# Patient Record
Sex: FEMALE | Race: WHITE | NOT HISPANIC OR LATINO | URBAN - METROPOLITAN AREA
[De-identification: names, ages, dates, MRNs, and addresses within clinical notes are randomized per-mention and may not be internally consistent; named-entity substitution may affect disease eponyms.]

---

## 2017-01-09 ENCOUNTER — OFFICE VISIT - HEALTHEAST (OUTPATIENT)
Dept: FAMILY MEDICINE | Facility: CLINIC | Age: 42
End: 2017-01-09

## 2017-01-09 DIAGNOSIS — Z00.00 HEALTHCARE MAINTENANCE: ICD-10-CM

## 2017-01-09 DIAGNOSIS — E03.9 HYPOTHYROID: ICD-10-CM

## 2017-01-09 DIAGNOSIS — I26.99 PE (PULMONARY THROMBOEMBOLISM) (H): ICD-10-CM

## 2017-01-09 DIAGNOSIS — M32.9 SLE (SYSTEMIC LUPUS ERYTHEMATOSUS RELATED SYNDROME) (H): ICD-10-CM

## 2017-01-09 DIAGNOSIS — G43.109 MIGRAINE HEADACHE WITH AURA: ICD-10-CM

## 2017-01-09 DIAGNOSIS — M75.00 FROZEN SHOULDER SYNDROME: ICD-10-CM

## 2017-01-09 DIAGNOSIS — J84.9 ILD (INTERSTITIAL LUNG DISEASE) (H): ICD-10-CM

## 2017-01-09 ASSESSMENT — MIFFLIN-ST. JEOR: SCORE: 1507.74

## 2017-01-10 ENCOUNTER — COMMUNICATION - HEALTHEAST (OUTPATIENT)
Dept: NURSING | Facility: CLINIC | Age: 42
End: 2017-01-10

## 2017-01-12 ENCOUNTER — HOSPITAL ENCOUNTER (OUTPATIENT)
Dept: PALLIATIVE MEDICINE | Facility: OTHER | Age: 42
Discharge: HOME | End: 2017-01-12
Attending: PHYSICIAN ASSISTANT

## 2017-01-12 ENCOUNTER — COMMUNICATION - HEALTHEAST (OUTPATIENT)
Dept: FAMILY MEDICINE | Facility: CLINIC | Age: 42
End: 2017-01-12

## 2017-01-12 DIAGNOSIS — M67.911 BILATERAL SHOULDER TENDINOPATHY: ICD-10-CM

## 2017-01-12 DIAGNOSIS — F11.20 OPIOID DEPENDENCE, CONTINUOUS (H): ICD-10-CM

## 2017-01-12 DIAGNOSIS — G89.4 CHRONIC PAIN SYNDROME: ICD-10-CM

## 2017-01-12 DIAGNOSIS — Z86.718 HISTORY OF DVT (DEEP VEIN THROMBOSIS): ICD-10-CM

## 2017-01-12 DIAGNOSIS — M67.912 BILATERAL SHOULDER TENDINOPATHY: ICD-10-CM

## 2017-01-12 ASSESSMENT — MIFFLIN-ST. JEOR: SCORE: 1507.74

## 2017-01-13 ENCOUNTER — COMMUNICATION - HEALTHEAST (OUTPATIENT)
Dept: FAMILY MEDICINE | Facility: CLINIC | Age: 42
End: 2017-01-13

## 2017-01-20 ENCOUNTER — AMBULATORY - HEALTHEAST (OUTPATIENT)
Dept: PALLIATIVE MEDICINE | Facility: OTHER | Age: 42
End: 2017-01-20

## 2017-01-30 ENCOUNTER — COMMUNICATION - HEALTHEAST (OUTPATIENT)
Dept: FAMILY MEDICINE | Facility: CLINIC | Age: 42
End: 2017-01-30

## 2017-01-30 DIAGNOSIS — F41.9 ANXIETY: ICD-10-CM

## 2017-02-03 ENCOUNTER — COMMUNICATION - HEALTHEAST (OUTPATIENT)
Dept: ADMINISTRATIVE | Facility: CLINIC | Age: 42
End: 2017-02-03

## 2017-02-03 DIAGNOSIS — M32.9 SYSTEMIC LUPUS ERYTHEMATOSUS (H): ICD-10-CM

## 2017-02-03 DIAGNOSIS — M32.9 SLE (SYSTEMIC LUPUS ERYTHEMATOSUS RELATED SYNDROME) (H): ICD-10-CM

## 2017-02-09 ENCOUNTER — COMMUNICATION - HEALTHEAST (OUTPATIENT)
Dept: PALLIATIVE MEDICINE | Facility: OTHER | Age: 42
End: 2017-02-09

## 2017-02-09 DIAGNOSIS — G89.4 CHRONIC PAIN SYNDROME: ICD-10-CM

## 2017-02-09 DIAGNOSIS — F11.20 OPIOID DEPENDENCE, CONTINUOUS (H): ICD-10-CM

## 2017-02-13 ENCOUNTER — AMBULATORY - HEALTHEAST (OUTPATIENT)
Dept: PALLIATIVE MEDICINE | Facility: OTHER | Age: 42
End: 2017-02-13

## 2017-02-13 ENCOUNTER — COMMUNICATION - HEALTHEAST (OUTPATIENT)
Dept: PALLIATIVE MEDICINE | Facility: OTHER | Age: 42
End: 2017-02-13

## 2017-02-13 DIAGNOSIS — F11.20 OPIOID DEPENDENCE, CONTINUOUS (H): ICD-10-CM

## 2017-02-13 DIAGNOSIS — G89.4 CHRONIC PAIN SYNDROME: ICD-10-CM

## 2017-02-23 ENCOUNTER — OFFICE VISIT - HEALTHEAST (OUTPATIENT)
Dept: FAMILY MEDICINE | Facility: CLINIC | Age: 42
End: 2017-02-23

## 2017-02-23 ENCOUNTER — RECORDS - HEALTHEAST (OUTPATIENT)
Dept: GENERAL RADIOLOGY | Facility: CLINIC | Age: 42
End: 2017-02-23

## 2017-02-23 DIAGNOSIS — Z00.00 HEALTHCARE MAINTENANCE: ICD-10-CM

## 2017-02-23 DIAGNOSIS — E03.9 HYPOTHYROID: ICD-10-CM

## 2017-02-23 DIAGNOSIS — R05.9 COUGH: ICD-10-CM

## 2017-02-23 DIAGNOSIS — J20.9 ACUTE BRONCHITIS, UNSPECIFIED ORGANISM: ICD-10-CM

## 2017-02-23 DIAGNOSIS — R06.2 WHEEZING: ICD-10-CM

## 2017-02-23 ASSESSMENT — MIFFLIN-ST. JEOR: SCORE: 1492.77

## 2017-02-24 ENCOUNTER — COMMUNICATION - HEALTHEAST (OUTPATIENT)
Dept: NURSING | Facility: CLINIC | Age: 42
End: 2017-02-24

## 2017-02-24 ENCOUNTER — COMMUNICATION - HEALTHEAST (OUTPATIENT)
Dept: FAMILY MEDICINE | Facility: CLINIC | Age: 42
End: 2017-02-24

## 2017-02-26 ENCOUNTER — AMBULATORY - HEALTHEAST (OUTPATIENT)
Dept: NURSING | Facility: CLINIC | Age: 42
End: 2017-02-26

## 2017-03-01 ENCOUNTER — COMMUNICATION - HEALTHEAST (OUTPATIENT)
Dept: FAMILY MEDICINE | Facility: CLINIC | Age: 42
End: 2017-03-01

## 2017-03-02 ENCOUNTER — AMBULATORY - HEALTHEAST (OUTPATIENT)
Dept: FAMILY MEDICINE | Facility: CLINIC | Age: 42
End: 2017-03-02

## 2017-03-02 DIAGNOSIS — I10 ESSENTIAL HYPERTENSION: ICD-10-CM

## 2017-03-06 ENCOUNTER — COMMUNICATION - HEALTHEAST (OUTPATIENT)
Dept: PALLIATIVE MEDICINE | Facility: CLINIC | Age: 42
End: 2017-03-06

## 2017-03-06 DIAGNOSIS — G89.29 CHRONIC PAIN: ICD-10-CM

## 2017-03-14 ENCOUNTER — HOSPITAL ENCOUNTER (OUTPATIENT)
Dept: PALLIATIVE MEDICINE | Facility: OTHER | Age: 42
Discharge: HOME | End: 2017-03-14
Attending: PHYSICIAN ASSISTANT

## 2017-03-14 DIAGNOSIS — M25.511 BILATERAL SHOULDER PAIN: ICD-10-CM

## 2017-03-14 DIAGNOSIS — M54.9 BACK PAIN: ICD-10-CM

## 2017-03-14 DIAGNOSIS — G89.4 CHRONIC PAIN SYNDROME: ICD-10-CM

## 2017-03-14 DIAGNOSIS — F11.20 OPIOID DEPENDENCE, CONTINUOUS (H): ICD-10-CM

## 2017-03-14 DIAGNOSIS — M25.512 BILATERAL SHOULDER PAIN: ICD-10-CM

## 2017-03-14 ASSESSMENT — MIFFLIN-ST. JEOR: SCORE: 1461.02

## 2017-03-15 ENCOUNTER — RECORDS - HEALTHEAST (OUTPATIENT)
Dept: ADMINISTRATIVE | Facility: OTHER | Age: 42
End: 2017-03-15

## 2017-04-03 ENCOUNTER — COMMUNICATION - HEALTHEAST (OUTPATIENT)
Dept: FAMILY MEDICINE | Facility: CLINIC | Age: 42
End: 2017-04-03

## 2017-04-03 DIAGNOSIS — F41.9 ANXIETY: ICD-10-CM

## 2017-04-05 ENCOUNTER — COMMUNICATION - HEALTHEAST (OUTPATIENT)
Dept: FAMILY MEDICINE | Facility: CLINIC | Age: 42
End: 2017-04-05

## 2017-04-05 ENCOUNTER — OFFICE VISIT - HEALTHEAST (OUTPATIENT)
Dept: PULMONOLOGY | Facility: OTHER | Age: 42
End: 2017-04-05

## 2017-04-05 DIAGNOSIS — J96.11 CHRONIC RESPIRATORY FAILURE WITH HYPOXIA (H): ICD-10-CM

## 2017-04-05 DIAGNOSIS — J84.9 ILD (INTERSTITIAL LUNG DISEASE) (H): ICD-10-CM

## 2017-04-06 ENCOUNTER — COMMUNICATION - HEALTHEAST (OUTPATIENT)
Dept: FAMILY MEDICINE | Facility: CLINIC | Age: 42
End: 2017-04-06

## 2017-04-27 ENCOUNTER — OFFICE VISIT - HEALTHEAST (OUTPATIENT)
Dept: FAMILY MEDICINE | Facility: CLINIC | Age: 42
End: 2017-04-27

## 2017-04-27 DIAGNOSIS — M81.0 OSTEOPOROSIS: ICD-10-CM

## 2017-04-27 DIAGNOSIS — I26.99 PULMONARY EMBOLI (H): ICD-10-CM

## 2017-04-27 DIAGNOSIS — J98.4 RESTRICTIVE LUNG DISEASE DUE TO KYPHOSCOLIOSIS: ICD-10-CM

## 2017-04-27 DIAGNOSIS — E55.9 VITAMIN D DEFICIENCY: ICD-10-CM

## 2017-04-27 DIAGNOSIS — E03.9 HYPOTHYROIDISM: ICD-10-CM

## 2017-04-27 DIAGNOSIS — M32.9 SYSTEMIC LUPUS ERYTHEMATOSUS (H): ICD-10-CM

## 2017-04-27 DIAGNOSIS — Z00.00 HEALTHCARE MAINTENANCE: ICD-10-CM

## 2017-04-27 DIAGNOSIS — M41.9 RESTRICTIVE LUNG DISEASE DUE TO KYPHOSCOLIOSIS: ICD-10-CM

## 2017-04-27 DIAGNOSIS — N17.9 AKI (ACUTE KIDNEY INJURY) (H): ICD-10-CM

## 2017-04-27 LAB
CHOLEST SERPL-MCNC: 191 MG/DL
FASTING STATUS PATIENT QL REPORTED: ABNORMAL
HDLC SERPL-MCNC: 41 MG/DL
LDLC SERPL CALC-MCNC: 122 MG/DL
TRIGL SERPL-MCNC: 138 MG/DL

## 2017-04-28 ENCOUNTER — COMMUNICATION - HEALTHEAST (OUTPATIENT)
Dept: NURSING | Facility: CLINIC | Age: 42
End: 2017-04-28

## 2017-05-03 ENCOUNTER — COMMUNICATION - HEALTHEAST (OUTPATIENT)
Dept: RHEUMATOLOGY | Facility: CLINIC | Age: 42
End: 2017-05-03

## 2017-05-03 DIAGNOSIS — M32.9 SYSTEMIC LUPUS ERYTHEMATOSUS (H): ICD-10-CM

## 2017-05-04 ENCOUNTER — COMMUNICATION - HEALTHEAST (OUTPATIENT)
Dept: ADMINISTRATIVE | Facility: CLINIC | Age: 42
End: 2017-05-04

## 2017-05-04 ENCOUNTER — COMMUNICATION - HEALTHEAST (OUTPATIENT)
Dept: FAMILY MEDICINE | Facility: CLINIC | Age: 42
End: 2017-05-04

## 2017-05-04 DIAGNOSIS — F41.9 ANXIETY: ICD-10-CM

## 2017-05-11 ENCOUNTER — HOSPITAL ENCOUNTER (OUTPATIENT)
Dept: PALLIATIVE MEDICINE | Facility: OTHER | Age: 42
Discharge: HOME | End: 2017-05-11
Attending: PHYSICIAN ASSISTANT

## 2017-05-11 DIAGNOSIS — G89.4 CHRONIC PAIN SYNDROME: ICD-10-CM

## 2017-05-11 DIAGNOSIS — F11.20 OPIOID DEPENDENCE, CONTINUOUS (H): ICD-10-CM

## 2017-05-11 DIAGNOSIS — S22.000S COMPRESSION FRACTURE OF THORACIC VERTEBRA, SEQUELA: ICD-10-CM

## 2017-05-11 ASSESSMENT — MIFFLIN-ST. JEOR: SCORE: 1424.73

## 2017-05-15 ENCOUNTER — OFFICE VISIT - HEALTHEAST (OUTPATIENT)
Dept: RHEUMATOLOGY | Facility: CLINIC | Age: 42
End: 2017-05-15

## 2017-05-15 ENCOUNTER — RECORDS - HEALTHEAST (OUTPATIENT)
Dept: GENERAL RADIOLOGY | Age: 42
End: 2017-05-15

## 2017-05-15 DIAGNOSIS — M67.911 BILATERAL SHOULDER TENDINOPATHY: ICD-10-CM

## 2017-05-15 DIAGNOSIS — M32.9 SYSTEMIC LUPUS ERYTHEMATOSUS, UNSPECIFIED (H): ICD-10-CM

## 2017-05-15 DIAGNOSIS — M32.9 SLE (SYSTEMIC LUPUS ERYTHEMATOSUS RELATED SYNDROME) (H): ICD-10-CM

## 2017-05-15 DIAGNOSIS — M32.9 SYSTEMIC LUPUS ERYTHEMATOSUS (H): ICD-10-CM

## 2017-05-15 DIAGNOSIS — M67.912 UNSPECIFIED DISORDER OF SYNOVIUM AND TENDON, LEFT SHOULDER: ICD-10-CM

## 2017-05-15 DIAGNOSIS — M67.911 UNSPECIFIED DISORDER OF SYNOVIUM AND TENDON, RIGHT SHOULDER: ICD-10-CM

## 2017-05-15 DIAGNOSIS — M67.912 BILATERAL SHOULDER TENDINOPATHY: ICD-10-CM

## 2017-05-15 ASSESSMENT — MIFFLIN-ST. JEOR: SCORE: 1453.76

## 2017-06-08 ENCOUNTER — HOSPITAL ENCOUNTER (OUTPATIENT)
Dept: PALLIATIVE MEDICINE | Facility: OTHER | Age: 42
Discharge: HOME | End: 2017-06-08
Attending: PHYSICIAN ASSISTANT

## 2017-06-08 ENCOUNTER — COMMUNICATION - HEALTHEAST (OUTPATIENT)
Dept: PALLIATIVE MEDICINE | Facility: OTHER | Age: 42
End: 2017-06-08

## 2017-06-08 DIAGNOSIS — F11.20 OPIOID DEPENDENCE, CONTINUOUS (H): ICD-10-CM

## 2017-06-08 DIAGNOSIS — S22.000S THORACIC COMPRESSION FRACTURE, SEQUELA: ICD-10-CM

## 2017-06-08 DIAGNOSIS — G89.4 CHRONIC PAIN SYNDROME: ICD-10-CM

## 2017-06-08 DIAGNOSIS — M25.512 CHRONIC LEFT SHOULDER PAIN: ICD-10-CM

## 2017-06-08 DIAGNOSIS — M54.50 LOW BACK PAIN: ICD-10-CM

## 2017-06-08 DIAGNOSIS — G89.29 CHRONIC LEFT SHOULDER PAIN: ICD-10-CM

## 2017-06-08 ASSESSMENT — MIFFLIN-ST. JEOR: SCORE: 1438.34

## 2017-06-18 ENCOUNTER — COMMUNICATION - HEALTHEAST (OUTPATIENT)
Dept: FAMILY MEDICINE | Facility: CLINIC | Age: 42
End: 2017-06-18

## 2017-06-18 DIAGNOSIS — F41.9 ANXIETY: ICD-10-CM

## 2017-06-22 ENCOUNTER — AMBULATORY - HEALTHEAST (OUTPATIENT)
Dept: PALLIATIVE MEDICINE | Facility: OTHER | Age: 42
End: 2017-06-22

## 2017-06-26 ENCOUNTER — COMMUNICATION - HEALTHEAST (OUTPATIENT)
Dept: FAMILY MEDICINE | Facility: CLINIC | Age: 42
End: 2017-06-26

## 2017-06-29 ENCOUNTER — OFFICE VISIT - HEALTHEAST (OUTPATIENT)
Dept: FAMILY MEDICINE | Facility: CLINIC | Age: 42
End: 2017-06-29

## 2017-06-29 DIAGNOSIS — J84.9 ILD (INTERSTITIAL LUNG DISEASE) (H): ICD-10-CM

## 2017-06-29 DIAGNOSIS — E05.90 HYPERTHYROIDISM: ICD-10-CM

## 2017-06-29 DIAGNOSIS — M32.9 SYSTEMIC LUPUS ERYTHEMATOSUS (H): ICD-10-CM

## 2017-06-29 DIAGNOSIS — G89.4 CHRONIC PAIN SYNDROME: ICD-10-CM

## 2017-06-29 DIAGNOSIS — F41.1 GENERALIZED ANXIETY DISORDER: ICD-10-CM

## 2017-06-29 DIAGNOSIS — M81.0 OSTEOPOROSIS: ICD-10-CM

## 2017-06-29 DIAGNOSIS — D68.59 PRIMARY HYPERCOAGULABLE STATE (H): ICD-10-CM

## 2017-06-30 ENCOUNTER — COMMUNICATION - HEALTHEAST (OUTPATIENT)
Dept: NURSING | Facility: CLINIC | Age: 42
End: 2017-06-30

## 2017-07-03 ENCOUNTER — AMBULATORY - HEALTHEAST (OUTPATIENT)
Dept: FAMILY MEDICINE | Facility: CLINIC | Age: 42
End: 2017-07-03

## 2017-07-05 ENCOUNTER — HOSPITAL ENCOUNTER (OUTPATIENT)
Dept: MRI IMAGING | Facility: HOSPITAL | Age: 42
Discharge: HOME | End: 2017-07-05
Attending: PHYSICIAN ASSISTANT

## 2017-07-05 DIAGNOSIS — G89.29 CHRONIC LEFT SHOULDER PAIN: ICD-10-CM

## 2017-07-05 DIAGNOSIS — M54.50 LOW BACK PAIN: ICD-10-CM

## 2017-07-05 DIAGNOSIS — S22.000S THORACIC COMPRESSION FRACTURE, SEQUELA: ICD-10-CM

## 2017-07-05 DIAGNOSIS — M25.512 CHRONIC LEFT SHOULDER PAIN: ICD-10-CM

## 2017-07-11 ENCOUNTER — HOSPITAL ENCOUNTER (OUTPATIENT)
Dept: PALLIATIVE MEDICINE | Facility: OTHER | Age: 42
Discharge: HOME | End: 2017-07-11
Attending: PHYSICIAN ASSISTANT

## 2017-07-11 DIAGNOSIS — M48.56XS COMPRESSION FRACTURE OF LUMBAR SPINE, NON-TRAUMATIC, SEQUELA: ICD-10-CM

## 2017-07-11 DIAGNOSIS — F11.20 OPIOID DEPENDENCE, CONTINUOUS (H): ICD-10-CM

## 2017-07-11 DIAGNOSIS — M25.512 CHRONIC LEFT SHOULDER PAIN: ICD-10-CM

## 2017-07-11 DIAGNOSIS — M48.061 LUMBAR STENOSIS: ICD-10-CM

## 2017-07-11 DIAGNOSIS — G89.29 CHRONIC LEFT SHOULDER PAIN: ICD-10-CM

## 2017-07-11 DIAGNOSIS — S22.000S FRACTURE, THORACIC VERTEBRA, COMPRESSION, SEQUELA: ICD-10-CM

## 2017-07-11 DIAGNOSIS — G89.4 CHRONIC PAIN SYNDROME: ICD-10-CM

## 2017-07-11 ASSESSMENT — MIFFLIN-ST. JEOR: SCORE: 1429.26

## 2017-07-19 ENCOUNTER — COMMUNICATION - HEALTHEAST (OUTPATIENT)
Dept: FAMILY MEDICINE | Facility: CLINIC | Age: 42
End: 2017-07-19

## 2017-07-19 ENCOUNTER — COMMUNICATION - HEALTHEAST (OUTPATIENT)
Dept: NURSING | Facility: CLINIC | Age: 42
End: 2017-07-19

## 2017-07-19 DIAGNOSIS — F41.9 ANXIETY: ICD-10-CM

## 2017-07-31 ENCOUNTER — COMMUNICATION - HEALTHEAST (OUTPATIENT)
Dept: NURSING | Facility: CLINIC | Age: 42
End: 2017-07-31

## 2017-07-31 ENCOUNTER — AMBULATORY - HEALTHEAST (OUTPATIENT)
Dept: NURSING | Facility: CLINIC | Age: 42
End: 2017-07-31

## 2017-08-01 ENCOUNTER — AMBULATORY - HEALTHEAST (OUTPATIENT)
Dept: NURSING | Facility: CLINIC | Age: 42
End: 2017-08-01

## 2017-08-02 ENCOUNTER — COMMUNICATION - HEALTHEAST (OUTPATIENT)
Dept: NURSING | Facility: CLINIC | Age: 42
End: 2017-08-02

## 2017-08-02 ENCOUNTER — AMBULATORY - HEALTHEAST (OUTPATIENT)
Dept: NURSING | Facility: CLINIC | Age: 42
End: 2017-08-02

## 2017-08-10 ENCOUNTER — COMMUNICATION - HEALTHEAST (OUTPATIENT)
Dept: PALLIATIVE MEDICINE | Facility: OTHER | Age: 42
End: 2017-08-10

## 2017-08-10 ENCOUNTER — HOSPITAL ENCOUNTER (OUTPATIENT)
Dept: PALLIATIVE MEDICINE | Facility: OTHER | Age: 42
Discharge: HOME | End: 2017-08-10
Attending: PHYSICIAN ASSISTANT

## 2017-08-10 DIAGNOSIS — M51.369 DEGENERATION OF LUMBAR INTERVERTEBRAL DISC: ICD-10-CM

## 2017-08-10 DIAGNOSIS — F11.20 OPIOID DEPENDENCE, CONTINUOUS (H): ICD-10-CM

## 2017-08-10 DIAGNOSIS — K59.03 THERAPEUTIC OPIOID-INDUCED CONSTIPATION (OIC): ICD-10-CM

## 2017-08-10 DIAGNOSIS — T40.2X5A THERAPEUTIC OPIOID-INDUCED CONSTIPATION (OIC): ICD-10-CM

## 2017-08-10 DIAGNOSIS — M32.9 SYSTEMIC LUPUS ERYTHEMATOSUS (H): ICD-10-CM

## 2017-08-10 DIAGNOSIS — G89.4 CHRONIC PAIN SYNDROME: ICD-10-CM

## 2017-08-10 ASSESSMENT — MIFFLIN-ST. JEOR: SCORE: 1406.58

## 2017-08-14 ENCOUNTER — COMMUNICATION - HEALTHEAST (OUTPATIENT)
Dept: PALLIATIVE MEDICINE | Facility: OTHER | Age: 42
End: 2017-08-14

## 2017-08-19 ENCOUNTER — COMMUNICATION - HEALTHEAST (OUTPATIENT)
Dept: SCHEDULING | Facility: CLINIC | Age: 42
End: 2017-08-19

## 2017-08-19 DIAGNOSIS — F41.9 ANXIETY: ICD-10-CM

## 2017-09-01 ENCOUNTER — COMMUNICATION - HEALTHEAST (OUTPATIENT)
Dept: NURSING | Facility: CLINIC | Age: 42
End: 2017-09-01

## 2017-09-06 ENCOUNTER — AMBULATORY - HEALTHEAST (OUTPATIENT)
Dept: NURSING | Facility: CLINIC | Age: 42
End: 2017-09-06

## 2017-09-11 ENCOUNTER — COMMUNICATION - HEALTHEAST (OUTPATIENT)
Dept: FAMILY MEDICINE | Facility: CLINIC | Age: 42
End: 2017-09-11

## 2017-09-11 ENCOUNTER — HOSPITAL ENCOUNTER (OUTPATIENT)
Dept: PALLIATIVE MEDICINE | Facility: OTHER | Age: 42
Discharge: HOME | End: 2017-09-11
Attending: PHYSICIAN ASSISTANT

## 2017-09-11 ENCOUNTER — AMBULATORY - HEALTHEAST (OUTPATIENT)
Dept: FAMILY MEDICINE | Facility: CLINIC | Age: 42
End: 2017-09-11

## 2017-09-11 ENCOUNTER — AMBULATORY - HEALTHEAST (OUTPATIENT)
Dept: LAB | Facility: CLINIC | Age: 42
End: 2017-09-11

## 2017-09-11 ENCOUNTER — COMMUNICATION - HEALTHEAST (OUTPATIENT)
Dept: NURSING | Facility: CLINIC | Age: 42
End: 2017-09-11

## 2017-09-11 DIAGNOSIS — I82.409 DVT (DEEP VENOUS THROMBOSIS) (H): ICD-10-CM

## 2017-09-11 DIAGNOSIS — M47.816 LUMBAR SPONDYLOSIS: ICD-10-CM

## 2017-09-11 DIAGNOSIS — Z86.718 HISTORY OF DVT (DEEP VEIN THROMBOSIS): ICD-10-CM

## 2017-09-11 DIAGNOSIS — M48.061 LUMBAR STENOSIS: ICD-10-CM

## 2017-09-11 ASSESSMENT — MIFFLIN-ST. JEOR: SCORE: 1406.58

## 2017-09-12 ENCOUNTER — COMMUNICATION - HEALTHEAST (OUTPATIENT)
Dept: PALLIATIVE MEDICINE | Facility: OTHER | Age: 42
End: 2017-09-12

## 2017-09-12 ENCOUNTER — COMMUNICATION - HEALTHEAST (OUTPATIENT)
Dept: FAMILY MEDICINE | Facility: CLINIC | Age: 42
End: 2017-09-12

## 2017-09-14 ENCOUNTER — OFFICE VISIT - HEALTHEAST (OUTPATIENT)
Dept: FAMILY MEDICINE | Facility: CLINIC | Age: 42
End: 2017-09-14

## 2017-09-14 ENCOUNTER — AMBULATORY - HEALTHEAST (OUTPATIENT)
Dept: FAMILY MEDICINE | Facility: CLINIC | Age: 42
End: 2017-09-14

## 2017-09-14 DIAGNOSIS — J84.9 ILD (INTERSTITIAL LUNG DISEASE) (H): ICD-10-CM

## 2017-09-14 DIAGNOSIS — D68.59 PRIMARY HYPERCOAGULABLE STATE (H): ICD-10-CM

## 2017-09-14 DIAGNOSIS — M54.50 LUMBAR BACK PAIN: ICD-10-CM

## 2017-09-14 DIAGNOSIS — N93.8 DUB (DYSFUNCTIONAL UTERINE BLEEDING): ICD-10-CM

## 2017-09-14 DIAGNOSIS — S21.001A BREAST WOUND, RIGHT, INITIAL ENCOUNTER: ICD-10-CM

## 2017-09-14 DIAGNOSIS — M32.9 SLE (SYSTEMIC LUPUS ERYTHEMATOSUS) (H): ICD-10-CM

## 2017-09-14 DIAGNOSIS — J34.89 NASAL SEPTAL PERFORATION: ICD-10-CM

## 2017-09-18 ENCOUNTER — AMBULATORY - HEALTHEAST (OUTPATIENT)
Dept: FAMILY MEDICINE | Facility: CLINIC | Age: 42
End: 2017-09-18

## 2017-09-19 ENCOUNTER — COMMUNICATION - HEALTHEAST (OUTPATIENT)
Dept: FAMILY MEDICINE | Facility: CLINIC | Age: 42
End: 2017-09-19

## 2017-09-19 DIAGNOSIS — F41.9 ANXIETY: ICD-10-CM

## 2017-09-20 ENCOUNTER — COMMUNICATION - HEALTHEAST (OUTPATIENT)
Dept: NURSING | Facility: CLINIC | Age: 42
End: 2017-09-20

## 2017-09-20 ENCOUNTER — COMMUNICATION - HEALTHEAST (OUTPATIENT)
Dept: UROLOGY | Facility: CLINIC | Age: 42
End: 2017-09-20

## 2017-09-20 ENCOUNTER — COMMUNICATION - HEALTHEAST (OUTPATIENT)
Dept: SCHEDULING | Facility: CLINIC | Age: 42
End: 2017-09-20

## 2017-09-20 DIAGNOSIS — F41.9 ANXIETY: ICD-10-CM

## 2017-09-21 ENCOUNTER — AMBULATORY - HEALTHEAST (OUTPATIENT)
Dept: FAMILY MEDICINE | Facility: CLINIC | Age: 42
End: 2017-09-21

## 2017-09-21 ENCOUNTER — OFFICE VISIT - HEALTHEAST (OUTPATIENT)
Dept: PULMONOLOGY | Facility: OTHER | Age: 42
End: 2017-09-21

## 2017-09-21 DIAGNOSIS — J84.9 ILD (INTERSTITIAL LUNG DISEASE) (H): ICD-10-CM

## 2017-09-21 DIAGNOSIS — F41.9 ANXIETY: ICD-10-CM

## 2017-09-21 DIAGNOSIS — J96.12 CHRONIC RESPIRATORY FAILURE WITH HYPERCAPNIA (H): ICD-10-CM

## 2017-09-21 DIAGNOSIS — I27.20 PULMONARY HYPERTENSION (H): ICD-10-CM

## 2017-09-22 ENCOUNTER — OFFICE VISIT - HEALTHEAST (OUTPATIENT)
Dept: UROLOGY | Facility: CLINIC | Age: 42
End: 2017-09-22

## 2017-09-22 DIAGNOSIS — N20.0 CALCULUS OF KIDNEY: ICD-10-CM

## 2017-09-22 DIAGNOSIS — N20.9 URINARY CALCULUS, UNSPECIFIED: ICD-10-CM

## 2017-09-27 ENCOUNTER — AMBULATORY - HEALTHEAST (OUTPATIENT)
Dept: LAB | Facility: HOSPITAL | Age: 42
End: 2017-09-27

## 2017-09-27 DIAGNOSIS — N20.9 URINARY CALCULUS, UNSPECIFIED: ICD-10-CM

## 2017-09-27 DIAGNOSIS — N20.0 CALCULUS OF KIDNEY: ICD-10-CM

## 2017-09-28 ENCOUNTER — OFFICE VISIT - HEALTHEAST (OUTPATIENT)
Dept: FAMILY MEDICINE | Facility: CLINIC | Age: 42
End: 2017-09-28

## 2017-09-28 DIAGNOSIS — N20.0 KIDNEY STONES: ICD-10-CM

## 2017-09-28 DIAGNOSIS — E03.9 HYPOTHYROIDISM: ICD-10-CM

## 2017-09-28 DIAGNOSIS — M32.9: ICD-10-CM

## 2017-09-29 ENCOUNTER — COMMUNICATION - HEALTHEAST (OUTPATIENT)
Dept: FAMILY MEDICINE | Facility: CLINIC | Age: 42
End: 2017-09-29

## 2017-10-01 ENCOUNTER — COMMUNICATION - HEALTHEAST (OUTPATIENT)
Dept: NURSING | Facility: CLINIC | Age: 42
End: 2017-10-01

## 2017-10-02 ENCOUNTER — COMMUNICATION - HEALTHEAST (OUTPATIENT)
Dept: FAMILY MEDICINE | Facility: CLINIC | Age: 42
End: 2017-10-02

## 2017-10-04 ENCOUNTER — COMMUNICATION - HEALTHEAST (OUTPATIENT)
Dept: PALLIATIVE MEDICINE | Facility: OTHER | Age: 42
End: 2017-10-04

## 2017-10-04 ENCOUNTER — HOSPITAL ENCOUNTER (OUTPATIENT)
Dept: PALLIATIVE MEDICINE | Facility: OTHER | Age: 42
Discharge: HOME | End: 2017-10-04
Attending: PHYSICIAN ASSISTANT

## 2017-10-04 DIAGNOSIS — M54.6 THORACIC BACK PAIN: ICD-10-CM

## 2017-10-04 DIAGNOSIS — G89.4 CHRONIC PAIN SYNDROME: ICD-10-CM

## 2017-10-04 DIAGNOSIS — M54.50 LOW BACK PAIN: ICD-10-CM

## 2017-10-04 DIAGNOSIS — F11.20 OPIOID DEPENDENCE, CONTINUOUS (H): ICD-10-CM

## 2017-10-04 ASSESSMENT — MIFFLIN-ST. JEOR: SCORE: 1429.26

## 2017-10-06 ENCOUNTER — AMBULATORY - HEALTHEAST (OUTPATIENT)
Dept: LAB | Facility: HOSPITAL | Age: 42
End: 2017-10-06

## 2017-10-06 DIAGNOSIS — N20.0 CALCULUS OF KIDNEY: ICD-10-CM

## 2017-10-06 DIAGNOSIS — N20.9 URINARY CALCULUS: ICD-10-CM

## 2017-10-20 ENCOUNTER — OFFICE VISIT - HEALTHEAST (OUTPATIENT)
Dept: UROLOGY | Facility: CLINIC | Age: 42
End: 2017-10-20

## 2017-10-20 DIAGNOSIS — N20.0 CALCULUS OF KIDNEY: ICD-10-CM

## 2017-10-20 DIAGNOSIS — N20.9 URINARY CALCULUS: ICD-10-CM

## 2017-10-20 DIAGNOSIS — R82.991 HYPOCITRATURIA: ICD-10-CM

## 2017-10-20 DIAGNOSIS — R34 LOW URINE OUTPUT: ICD-10-CM

## 2017-10-20 DIAGNOSIS — N20.9 URINARY TRACT STONES: ICD-10-CM

## 2017-10-23 ENCOUNTER — COMMUNICATION - HEALTHEAST (OUTPATIENT)
Dept: FAMILY MEDICINE | Facility: CLINIC | Age: 42
End: 2017-10-23

## 2017-10-23 DIAGNOSIS — F41.9 ANXIETY: ICD-10-CM

## 2017-10-27 ENCOUNTER — COMMUNICATION - HEALTHEAST (OUTPATIENT)
Dept: UROLOGY | Facility: CLINIC | Age: 42
End: 2017-10-27

## 2017-10-27 ENCOUNTER — COMMUNICATION - HEALTHEAST (OUTPATIENT)
Dept: NURSING | Facility: CLINIC | Age: 42
End: 2017-10-27

## 2017-11-13 ENCOUNTER — AMBULATORY - HEALTHEAST (OUTPATIENT)
Dept: LAB | Facility: CLINIC | Age: 42
End: 2017-11-13

## 2017-11-13 ENCOUNTER — COMMUNICATION - HEALTHEAST (OUTPATIENT)
Dept: FAMILY MEDICINE | Facility: CLINIC | Age: 42
End: 2017-11-13

## 2017-11-13 DIAGNOSIS — I82.409 DVT (DEEP VENOUS THROMBOSIS) (H): ICD-10-CM

## 2017-11-14 ENCOUNTER — COMMUNICATION - HEALTHEAST (OUTPATIENT)
Dept: NURSING | Facility: CLINIC | Age: 42
End: 2017-11-14

## 2017-11-24 ENCOUNTER — COMMUNICATION - HEALTHEAST (OUTPATIENT)
Dept: FAMILY MEDICINE | Facility: CLINIC | Age: 42
End: 2017-11-24

## 2017-11-24 DIAGNOSIS — F41.9 ANXIETY: ICD-10-CM

## 2017-11-25 ENCOUNTER — COMMUNICATION - HEALTHEAST (OUTPATIENT)
Dept: SCHEDULING | Facility: CLINIC | Age: 42
End: 2017-11-25

## 2017-11-25 DIAGNOSIS — F41.9 ANXIETY: ICD-10-CM

## 2017-11-27 ENCOUNTER — OFFICE VISIT - HEALTHEAST (OUTPATIENT)
Dept: RHEUMATOLOGY | Facility: CLINIC | Age: 42
End: 2017-11-27

## 2017-11-27 DIAGNOSIS — J84.9 ILD (INTERSTITIAL LUNG DISEASE) (H): ICD-10-CM

## 2017-11-27 DIAGNOSIS — M67.912 BILATERAL SHOULDER TENDINOPATHY: ICD-10-CM

## 2017-11-27 DIAGNOSIS — M32.9 SYSTEMIC LUPUS ERYTHEMATOSUS (H): ICD-10-CM

## 2017-11-27 DIAGNOSIS — M67.911 BILATERAL SHOULDER TENDINOPATHY: ICD-10-CM

## 2017-11-27 ASSESSMENT — MIFFLIN-ST. JEOR: SCORE: 1429.26

## 2017-12-06 ENCOUNTER — HOSPITAL ENCOUNTER (OUTPATIENT)
Dept: PALLIATIVE MEDICINE | Facility: OTHER | Age: 42
Discharge: HOME | End: 2017-12-06
Attending: PHYSICIAN ASSISTANT

## 2017-12-06 DIAGNOSIS — F11.20 OPIOID DEPENDENCE, CONTINUOUS (H): ICD-10-CM

## 2017-12-06 DIAGNOSIS — M48.061 LUMBAR SPINAL STENOSIS: ICD-10-CM

## 2017-12-06 DIAGNOSIS — S22.000A THORACIC COMPRESSION FRACTURE (H): ICD-10-CM

## 2017-12-06 DIAGNOSIS — M25.512 CHRONIC LEFT SHOULDER PAIN: ICD-10-CM

## 2017-12-06 DIAGNOSIS — G89.29 CHRONIC LEFT SHOULDER PAIN: ICD-10-CM

## 2017-12-06 DIAGNOSIS — G89.4 CHRONIC PAIN SYNDROME: ICD-10-CM

## 2017-12-06 ASSESSMENT — MIFFLIN-ST. JEOR: SCORE: 1429.26

## 2017-12-19 ENCOUNTER — RECORDS - HEALTHEAST (OUTPATIENT)
Dept: ADMINISTRATIVE | Facility: OTHER | Age: 42
End: 2017-12-19

## 2017-12-25 ENCOUNTER — COMMUNICATION - HEALTHEAST (OUTPATIENT)
Dept: FAMILY MEDICINE | Facility: CLINIC | Age: 42
End: 2017-12-25

## 2017-12-25 DIAGNOSIS — F41.9 ANXIETY: ICD-10-CM

## 2017-12-26 ENCOUNTER — COMMUNICATION - HEALTHEAST (OUTPATIENT)
Dept: FAMILY MEDICINE | Facility: CLINIC | Age: 42
End: 2017-12-26

## 2017-12-26 DIAGNOSIS — F41.9 ANXIETY: ICD-10-CM

## 2018-01-17 ENCOUNTER — RECORDS - HEALTHEAST (OUTPATIENT)
Dept: ADMINISTRATIVE | Facility: OTHER | Age: 43
End: 2018-01-17

## 2018-01-25 ENCOUNTER — COMMUNICATION - HEALTHEAST (OUTPATIENT)
Dept: FAMILY MEDICINE | Facility: CLINIC | Age: 43
End: 2018-01-25

## 2018-01-25 DIAGNOSIS — F41.9 ANXIETY: ICD-10-CM

## 2018-01-31 ENCOUNTER — COMMUNICATION - HEALTHEAST (OUTPATIENT)
Dept: NURSING | Facility: CLINIC | Age: 43
End: 2018-01-31

## 2018-01-31 ENCOUNTER — OFFICE VISIT - HEALTHEAST (OUTPATIENT)
Dept: FAMILY MEDICINE | Facility: CLINIC | Age: 43
End: 2018-01-31

## 2018-01-31 ENCOUNTER — AMBULATORY - HEALTHEAST (OUTPATIENT)
Dept: SCHEDULING | Facility: CLINIC | Age: 43
End: 2018-01-31

## 2018-01-31 DIAGNOSIS — E03.9 HYPOTHYROIDISM: ICD-10-CM

## 2018-01-31 DIAGNOSIS — D48.5 NEOPLASM OF UNCERTAIN BEHAVIOR OF SKIN: ICD-10-CM

## 2018-01-31 DIAGNOSIS — M81.0 OSTEOPOROSIS: ICD-10-CM

## 2018-01-31 DIAGNOSIS — Z30.09 ENCOUNTER FOR COUNSELING REGARDING CONTRACEPTION: ICD-10-CM

## 2018-01-31 DIAGNOSIS — J18.1 LOBAR PNEUMONIA, UNSPECIFIED ORGANISM (H): ICD-10-CM

## 2018-01-31 DIAGNOSIS — M32.9 SYSTEMIC LUPUS ERYTHEMATOSUS (H): ICD-10-CM

## 2018-01-31 DIAGNOSIS — J18.9 LEFT LOWER LOBE PNEUMONIA: ICD-10-CM

## 2018-01-31 DIAGNOSIS — I82.409 DVT (DEEP VENOUS THROMBOSIS) (H): ICD-10-CM

## 2018-01-31 DIAGNOSIS — J44.9 COPD (CHRONIC OBSTRUCTIVE PULMONARY DISEASE) (H): ICD-10-CM

## 2018-01-31 LAB
C REACTIVE PROTEIN LHE: 4 MG/DL (ref 0–0.8)
C3 SERPL-MCNC: 125 MG/DL (ref 83–177)
C4 SERPL-MCNC: 28 MG/DL (ref 19–59)
ERYTHROCYTE [SEDIMENTATION RATE] IN BLOOD BY WESTERGREN METHOD: 42 MM/HR (ref 0–20)
TSH SERPL DL<=0.005 MIU/L-ACNC: 0.94 UIU/ML (ref 0.3–5)

## 2018-02-01 ENCOUNTER — HOSPITAL ENCOUNTER (OUTPATIENT)
Dept: PALLIATIVE MEDICINE | Facility: OTHER | Age: 43
Discharge: HOME | End: 2018-02-01
Attending: PHYSICIAN ASSISTANT

## 2018-02-01 DIAGNOSIS — G89.4 CHRONIC PAIN SYNDROME: ICD-10-CM

## 2018-02-01 DIAGNOSIS — S22.000S CLOSED COMPRESSION FRACTURE OF THORACIC VERTEBRA, SEQUELA: ICD-10-CM

## 2018-02-01 DIAGNOSIS — S32.000A LUMBAR COMPRESSION FRACTURE (H): ICD-10-CM

## 2018-02-01 DIAGNOSIS — F11.20 OPIOID DEPENDENCE, CONTINUOUS (H): ICD-10-CM

## 2018-02-01 LAB — FACT X ACT/NOR PPP CHRO: 40 %

## 2018-02-01 ASSESSMENT — MIFFLIN-ST. JEOR: SCORE: 1456.48

## 2018-02-06 LAB — DNA (DS) ANTIBODY - HISTORICAL: 15 IU

## 2018-02-07 ENCOUNTER — COMMUNICATION - HEALTHEAST (OUTPATIENT)
Dept: RHEUMATOLOGY | Facility: CLINIC | Age: 43
End: 2018-02-07

## 2018-02-07 DIAGNOSIS — M32.9 SYSTEMIC LUPUS ERYTHEMATOSUS (H): ICD-10-CM

## 2018-02-28 ENCOUNTER — COMMUNICATION - HEALTHEAST (OUTPATIENT)
Dept: FAMILY MEDICINE | Facility: CLINIC | Age: 43
End: 2018-02-28

## 2018-02-28 DIAGNOSIS — F41.9 ANXIETY: ICD-10-CM

## 2018-03-04 ENCOUNTER — COMMUNICATION - HEALTHEAST (OUTPATIENT)
Dept: NURSING | Facility: CLINIC | Age: 43
End: 2018-03-04

## 2018-03-04 DIAGNOSIS — I26.99 PULMONARY EMBOLI (H): ICD-10-CM

## 2018-03-04 DIAGNOSIS — Z86.718 HISTORY OF DVT (DEEP VEIN THROMBOSIS): ICD-10-CM

## 2018-03-05 ENCOUNTER — COMMUNICATION - HEALTHEAST (OUTPATIENT)
Dept: NURSING | Facility: CLINIC | Age: 43
End: 2018-03-05

## 2018-03-06 ENCOUNTER — AMBULATORY - HEALTHEAST (OUTPATIENT)
Dept: NURSING | Facility: CLINIC | Age: 43
End: 2018-03-06

## 2018-03-07 ENCOUNTER — RECORDS - HEALTHEAST (OUTPATIENT)
Dept: ADMINISTRATIVE | Facility: OTHER | Age: 43
End: 2018-03-07

## 2018-03-13 ENCOUNTER — COMMUNICATION - HEALTHEAST (OUTPATIENT)
Dept: FAMILY MEDICINE | Facility: CLINIC | Age: 43
End: 2018-03-13

## 2018-03-13 DIAGNOSIS — E05.90 HYPERTHYROIDISM: ICD-10-CM

## 2018-03-28 ENCOUNTER — COMMUNICATION - HEALTHEAST (OUTPATIENT)
Dept: FAMILY MEDICINE | Facility: CLINIC | Age: 43
End: 2018-03-28

## 2018-03-28 DIAGNOSIS — F41.9 ANXIETY: ICD-10-CM

## 2018-04-04 ENCOUNTER — HOSPITAL ENCOUNTER (OUTPATIENT)
Dept: PALLIATIVE MEDICINE | Facility: OTHER | Age: 43
Discharge: HOME | End: 2018-04-04
Attending: PHYSICIAN ASSISTANT

## 2018-04-04 DIAGNOSIS — M47.816 LUMBAR SPONDYLOSIS: ICD-10-CM

## 2018-04-04 DIAGNOSIS — G89.4 CHRONIC PAIN SYNDROME: ICD-10-CM

## 2018-04-04 DIAGNOSIS — F11.20 OPIOID DEPENDENCE, CONTINUOUS (H): ICD-10-CM

## 2018-04-04 ASSESSMENT — MIFFLIN-ST. JEOR: SCORE: 1429.26

## 2018-04-13 ENCOUNTER — COMMUNICATION - HEALTHEAST (OUTPATIENT)
Dept: FAMILY MEDICINE | Facility: CLINIC | Age: 43
End: 2018-04-13

## 2018-04-13 DIAGNOSIS — M32.9 SYSTEMIC LUPUS ERYTHEMATOSUS (H): ICD-10-CM

## 2018-04-16 ENCOUNTER — COMMUNICATION - HEALTHEAST (OUTPATIENT)
Dept: RHEUMATOLOGY | Facility: CLINIC | Age: 43
End: 2018-04-16

## 2018-04-16 DIAGNOSIS — M32.9 SYSTEMIC LUPUS ERYTHEMATOSUS (H): ICD-10-CM

## 2018-04-19 ENCOUNTER — COMMUNICATION - HEALTHEAST (OUTPATIENT)
Dept: ADMINISTRATIVE | Facility: CLINIC | Age: 43
End: 2018-04-19

## 2018-04-19 DIAGNOSIS — M32.9 SYSTEMIC LUPUS ERYTHEMATOSUS (H): ICD-10-CM

## 2018-04-27 ENCOUNTER — OFFICE VISIT - HEALTHEAST (OUTPATIENT)
Dept: RHEUMATOLOGY | Facility: CLINIC | Age: 43
End: 2018-04-27

## 2018-04-27 DIAGNOSIS — J98.4 RESTRICTIVE LUNG DISEASE DUE TO KYPHOSCOLIOSIS: ICD-10-CM

## 2018-04-27 DIAGNOSIS — M41.9 RESTRICTIVE LUNG DISEASE DUE TO KYPHOSCOLIOSIS: ICD-10-CM

## 2018-04-27 DIAGNOSIS — I31.8: ICD-10-CM

## 2018-04-27 DIAGNOSIS — M32.9 SYSTEMIC LUPUS ERYTHEMATOSUS (H): ICD-10-CM

## 2018-05-01 ENCOUNTER — COMMUNICATION - HEALTHEAST (OUTPATIENT)
Dept: FAMILY MEDICINE | Facility: CLINIC | Age: 43
End: 2018-05-01

## 2018-05-01 DIAGNOSIS — F41.9 ANXIETY: ICD-10-CM

## 2018-05-04 ENCOUNTER — COMMUNICATION - HEALTHEAST (OUTPATIENT)
Dept: FAMILY MEDICINE | Facility: CLINIC | Age: 43
End: 2018-05-04

## 2018-05-08 ENCOUNTER — OFFICE VISIT - HEALTHEAST (OUTPATIENT)
Dept: ENDOCRINOLOGY | Facility: CLINIC | Age: 43
End: 2018-05-08

## 2018-05-08 DIAGNOSIS — E05.90 HYPERTHYROIDISM: ICD-10-CM

## 2018-05-08 DIAGNOSIS — M81.0 OSTEOPOROSIS: ICD-10-CM

## 2018-05-08 ASSESSMENT — MIFFLIN-ST. JEOR: SCORE: 1456.93

## 2018-05-10 ENCOUNTER — COMMUNICATION - HEALTHEAST (OUTPATIENT)
Dept: ENDOCRINOLOGY | Facility: CLINIC | Age: 43
End: 2018-05-10

## 2018-05-10 DIAGNOSIS — M81.0 OSTEOPOROSIS: ICD-10-CM

## 2018-05-24 ENCOUNTER — AMBULATORY - HEALTHEAST (OUTPATIENT)
Dept: LAB | Facility: CLINIC | Age: 43
End: 2018-05-24

## 2018-05-24 ENCOUNTER — COMMUNICATION - HEALTHEAST (OUTPATIENT)
Dept: FAMILY MEDICINE | Facility: CLINIC | Age: 43
End: 2018-05-24

## 2018-05-24 ENCOUNTER — RECORDS - HEALTHEAST (OUTPATIENT)
Dept: ADMINISTRATIVE | Facility: OTHER | Age: 43
End: 2018-05-24

## 2018-05-24 DIAGNOSIS — I82.409 DVT (DEEP VENOUS THROMBOSIS) (H): ICD-10-CM

## 2018-05-24 LAB — FACT X ACT/NOR PPP CHRO: 27 %

## 2018-05-25 ENCOUNTER — COMMUNICATION - HEALTHEAST (OUTPATIENT)
Dept: RHEUMATOLOGY | Facility: CLINIC | Age: 43
End: 2018-05-25

## 2018-05-25 DIAGNOSIS — M32.9 SYSTEMIC LUPUS ERYTHEMATOSUS (H): ICD-10-CM

## 2018-05-28 ENCOUNTER — COMMUNICATION - HEALTHEAST (OUTPATIENT)
Dept: RHEUMATOLOGY | Facility: CLINIC | Age: 43
End: 2018-05-28

## 2018-05-28 DIAGNOSIS — M32.9 SYSTEMIC LUPUS ERYTHEMATOSUS (H): ICD-10-CM

## 2018-05-31 ENCOUNTER — HOSPITAL ENCOUNTER (OUTPATIENT)
Dept: PALLIATIVE MEDICINE | Facility: OTHER | Age: 43
Discharge: HOME | End: 2018-05-31
Attending: PHYSICIAN ASSISTANT

## 2018-05-31 ENCOUNTER — RECORDS - HEALTHEAST (OUTPATIENT)
Dept: ADMINISTRATIVE | Facility: OTHER | Age: 43
End: 2018-05-31

## 2018-05-31 DIAGNOSIS — S32.000A LUMBAR COMPRESSION FRACTURE (H): ICD-10-CM

## 2018-05-31 DIAGNOSIS — G89.4 CHRONIC PAIN SYNDROME: ICD-10-CM

## 2018-05-31 DIAGNOSIS — G89.29 CHRONIC LEFT SHOULDER PAIN: ICD-10-CM

## 2018-05-31 DIAGNOSIS — M25.512 CHRONIC LEFT SHOULDER PAIN: ICD-10-CM

## 2018-05-31 DIAGNOSIS — M54.6 THORACIC SPINE PAIN: ICD-10-CM

## 2018-05-31 DIAGNOSIS — F11.20 OPIOID DEPENDENCE, CONTINUOUS (H): ICD-10-CM

## 2018-05-31 ASSESSMENT — MIFFLIN-ST. JEOR: SCORE: 1456.93

## 2018-06-01 ENCOUNTER — OFFICE VISIT - HEALTHEAST (OUTPATIENT)
Dept: PULMONOLOGY | Facility: OTHER | Age: 43
End: 2018-06-01

## 2018-06-01 ENCOUNTER — TRANSFERRED RECORDS (OUTPATIENT)
Dept: HEALTH INFORMATION MANAGEMENT | Facility: CLINIC | Age: 43
End: 2018-06-01

## 2018-06-01 DIAGNOSIS — J84.9 ILD (INTERSTITIAL LUNG DISEASE) (H): ICD-10-CM

## 2018-06-01 DIAGNOSIS — J96.12 CHRONIC RESPIRATORY FAILURE WITH HYPERCAPNIA (H): ICD-10-CM

## 2018-06-08 ENCOUNTER — OFFICE VISIT - HEALTHEAST (OUTPATIENT)
Dept: FAMILY MEDICINE | Facility: CLINIC | Age: 43
End: 2018-06-08

## 2018-06-08 DIAGNOSIS — H66.92 LEFT OTITIS MEDIA: ICD-10-CM

## 2018-06-08 DIAGNOSIS — I82.409 DVT (DEEP VENOUS THROMBOSIS) (H): ICD-10-CM

## 2018-06-08 DIAGNOSIS — J20.9 ACUTE BRONCHITIS: ICD-10-CM

## 2018-06-08 DIAGNOSIS — R05.9 COUGH: ICD-10-CM

## 2018-06-08 LAB
ERYTHROCYTE [DISTWIDTH] IN BLOOD BY AUTOMATED COUNT: 12.9 % (ref 11–14.5)
HCT VFR BLD AUTO: 48.2 % (ref 35–47)
HGB BLD-MCNC: 15.9 G/DL (ref 12–16)
MCH RBC QN AUTO: 29.6 PG (ref 27–34)
MCHC RBC AUTO-ENTMCNC: 33.1 G/DL (ref 32–36)
MCV RBC AUTO: 90 FL (ref 80–100)
PLATELET # BLD AUTO: 160 THOU/UL (ref 140–440)
PMV BLD AUTO: 7.9 FL (ref 7–10)
RBC # BLD AUTO: 5.37 MILL/UL (ref 3.8–5.4)
WBC: 7.8 THOU/UL (ref 4–11)

## 2018-06-09 ENCOUNTER — COMMUNICATION - HEALTHEAST (OUTPATIENT)
Dept: FAMILY MEDICINE | Facility: CLINIC | Age: 43
End: 2018-06-09

## 2018-06-09 LAB — FACT X ACT/NOR PPP CHRO: 19 %

## 2018-06-11 ENCOUNTER — AMBULATORY - HEALTHEAST (OUTPATIENT)
Dept: PHARMACY | Facility: CLINIC | Age: 43
End: 2018-06-11

## 2018-06-21 ENCOUNTER — COMMUNICATION - HEALTHEAST (OUTPATIENT)
Dept: INTENSIVE CARE | Facility: CLINIC | Age: 43
End: 2018-06-21

## 2018-06-25 ENCOUNTER — COMMUNICATION - HEALTHEAST (OUTPATIENT)
Dept: ADMINISTRATIVE | Facility: CLINIC | Age: 43
End: 2018-06-25

## 2018-07-13 ENCOUNTER — COMMUNICATION - HEALTHEAST (OUTPATIENT)
Dept: FAMILY MEDICINE | Facility: CLINIC | Age: 43
End: 2018-07-13

## 2018-07-16 ENCOUNTER — AMBULATORY - HEALTHEAST (OUTPATIENT)
Dept: LAB | Facility: CLINIC | Age: 43
End: 2018-07-16

## 2018-07-16 DIAGNOSIS — E03.9 HYPOTHYROIDISM: ICD-10-CM

## 2018-07-16 DIAGNOSIS — I82.409 DVT (DEEP VENOUS THROMBOSIS) (H): ICD-10-CM

## 2018-07-16 LAB — TSH SERPL DL<=0.005 MIU/L-ACNC: 2.6 UIU/ML (ref 0.3–5)

## 2018-07-17 ENCOUNTER — COMMUNICATION - HEALTHEAST (OUTPATIENT)
Dept: FAMILY MEDICINE | Facility: CLINIC | Age: 43
End: 2018-07-17

## 2018-07-17 LAB — FACT X ACT/NOR PPP CHRO: 24 %

## 2018-07-19 ENCOUNTER — COMMUNICATION - HEALTHEAST (OUTPATIENT)
Dept: FAMILY MEDICINE | Facility: CLINIC | Age: 43
End: 2018-07-19

## 2018-07-24 ENCOUNTER — AMBULATORY - HEALTHEAST (OUTPATIENT)
Dept: ENDOCRINOLOGY | Facility: CLINIC | Age: 43
End: 2018-07-24

## 2018-07-24 ENCOUNTER — OFFICE VISIT - HEALTHEAST (OUTPATIENT)
Dept: RHEUMATOLOGY | Facility: CLINIC | Age: 43
End: 2018-07-24

## 2018-07-24 DIAGNOSIS — J84.9 ILD (INTERSTITIAL LUNG DISEASE) (H): ICD-10-CM

## 2018-07-24 DIAGNOSIS — M81.0 OSTEOPOROSIS: ICD-10-CM

## 2018-07-24 DIAGNOSIS — M25.50 POLYARTHRALGIA: ICD-10-CM

## 2018-07-24 DIAGNOSIS — M32.9 SYSTEMIC LUPUS ERYTHEMATOSUS (H): ICD-10-CM

## 2018-07-24 DIAGNOSIS — D68.59 PRIMARY HYPERCOAGULABLE STATE (H): ICD-10-CM

## 2018-07-31 ENCOUNTER — HOSPITAL ENCOUNTER (OUTPATIENT)
Dept: PALLIATIVE MEDICINE | Facility: OTHER | Age: 43
Discharge: HOME | End: 2018-07-31
Attending: PHYSICIAN ASSISTANT

## 2018-07-31 DIAGNOSIS — F11.20 OPIOID DEPENDENCE, CONTINUOUS (H): ICD-10-CM

## 2018-07-31 DIAGNOSIS — S32.000A LUMBAR COMPRESSION FRACTURE (H): ICD-10-CM

## 2018-07-31 DIAGNOSIS — M32.9 SYSTEMIC LUPUS ERYTHEMATOSUS (H): ICD-10-CM

## 2018-07-31 DIAGNOSIS — G89.4 CHRONIC PAIN SYNDROME: ICD-10-CM

## 2018-07-31 ASSESSMENT — MIFFLIN-ST. JEOR: SCORE: 1442.87

## 2018-08-08 ENCOUNTER — COMMUNICATION - HEALTHEAST (OUTPATIENT)
Dept: PALLIATIVE MEDICINE | Facility: OTHER | Age: 43
End: 2018-08-08

## 2018-08-09 ENCOUNTER — HOSPITAL ENCOUNTER (OUTPATIENT)
Dept: PALLIATIVE MEDICINE | Facility: OTHER | Age: 43
Discharge: HOME | End: 2018-08-09
Attending: PAIN MEDICINE | Admitting: PAIN MEDICINE

## 2018-08-09 DIAGNOSIS — M47.816 LUMBAR SPONDYLOSIS: ICD-10-CM

## 2018-08-09 ASSESSMENT — MIFFLIN-ST. JEOR: SCORE: 1442.87

## 2018-08-10 ENCOUNTER — COMMUNICATION - HEALTHEAST (OUTPATIENT)
Dept: PALLIATIVE MEDICINE | Facility: OTHER | Age: 43
End: 2018-08-10

## 2018-08-17 ENCOUNTER — COMMUNICATION - HEALTHEAST (OUTPATIENT)
Dept: PALLIATIVE MEDICINE | Facility: OTHER | Age: 43
End: 2018-08-17

## 2018-08-18 ENCOUNTER — COMMUNICATION - HEALTHEAST (OUTPATIENT)
Dept: FAMILY MEDICINE | Facility: CLINIC | Age: 43
End: 2018-08-18

## 2018-09-19 ENCOUNTER — COMMUNICATION - HEALTHEAST (OUTPATIENT)
Dept: FAMILY MEDICINE | Facility: CLINIC | Age: 43
End: 2018-09-19

## 2018-09-27 ENCOUNTER — RECORDS - HEALTHEAST (OUTPATIENT)
Dept: ADMINISTRATIVE | Facility: OTHER | Age: 43
End: 2018-09-27

## 2018-09-27 ENCOUNTER — HOSPITAL ENCOUNTER (OUTPATIENT)
Dept: PALLIATIVE MEDICINE | Facility: OTHER | Age: 43
Discharge: HOME | End: 2018-09-27
Attending: PHYSICIAN ASSISTANT

## 2018-09-27 DIAGNOSIS — M48.50XS PATHOLOGICAL COMPRESSION FRACTURE OF VERTEBRA, SEQUELA: ICD-10-CM

## 2018-09-27 DIAGNOSIS — F11.20 OPIOID DEPENDENCE, CONTINUOUS (H): ICD-10-CM

## 2018-09-27 DIAGNOSIS — G89.4 CHRONIC PAIN SYNDROME: ICD-10-CM

## 2018-09-27 ASSESSMENT — MIFFLIN-ST. JEOR: SCORE: 1442.87

## 2018-10-17 ENCOUNTER — COMMUNICATION - HEALTHEAST (OUTPATIENT)
Dept: FAMILY MEDICINE | Facility: CLINIC | Age: 43
End: 2018-10-17

## 2018-10-24 ENCOUNTER — TRANSFERRED RECORDS (OUTPATIENT)
Dept: HEALTH INFORMATION MANAGEMENT | Facility: CLINIC | Age: 43
End: 2018-10-24

## 2018-10-24 ENCOUNTER — OFFICE VISIT - HEALTHEAST (OUTPATIENT)
Dept: FAMILY MEDICINE | Facility: CLINIC | Age: 43
End: 2018-10-24

## 2018-10-24 DIAGNOSIS — D61.1 APLASTIC ANEMIA DUE TO DRUGS (H): ICD-10-CM

## 2018-10-24 DIAGNOSIS — R06.00 DYSPNEA: ICD-10-CM

## 2018-10-24 DIAGNOSIS — M62.81 GENERALIZED MUSCLE WEAKNESS: ICD-10-CM

## 2018-10-24 DIAGNOSIS — R42 DIZZINESS: ICD-10-CM

## 2018-10-24 DIAGNOSIS — N39.0 URINARY TRACT INFECTION: ICD-10-CM

## 2018-10-24 DIAGNOSIS — E03.9 ACQUIRED HYPOTHYROIDISM: ICD-10-CM

## 2018-10-24 DIAGNOSIS — D68.61 ANTIPHOSPHOLIPID ANTIBODY SYNDROME (H): ICD-10-CM

## 2018-10-24 DIAGNOSIS — E86.0 DEHYDRATION: ICD-10-CM

## 2018-10-24 DIAGNOSIS — R00.2 PALPITATIONS: ICD-10-CM

## 2018-10-24 LAB
ALBUMIN SERPL-MCNC: 3.8 G/DL (ref 3.5–5)
ALBUMIN UR-MCNC: ABNORMAL MG/DL
ALP SERPL-CCNC: 88 U/L (ref 45–120)
ALT SERPL W P-5'-P-CCNC: 24 U/L (ref 0–45)
ANION GAP SERPL CALCULATED.3IONS-SCNC: 11 MMOL/L (ref 5–18)
APPEARANCE UR: ABNORMAL
AST SERPL W P-5'-P-CCNC: 25 U/L (ref 0–40)
ATRIAL RATE - MUSE: 113 BPM
BACTERIA #/AREA URNS HPF: ABNORMAL HPF
BILIRUB SERPL-MCNC: 0.6 MG/DL (ref 0–1)
BILIRUB UR QL STRIP: ABNORMAL
BNP SERPL-MCNC: 14 PG/ML (ref 0–64)
BUN SERPL-MCNC: 18 MG/DL (ref 8–22)
CALCIUM SERPL-MCNC: 9.1 MG/DL (ref 8.5–10.5)
CHLORIDE BLD-SCNC: 96 MMOL/L (ref 98–107)
CO2 SERPL-SCNC: 32 MMOL/L (ref 22–31)
COLOR UR AUTO: ABNORMAL
CREAT SERPL-MCNC: 0.75 MG/DL (ref 0.6–1.1)
DIASTOLIC BLOOD PRESSURE - MUSE: NORMAL MMHG
ERYTHROCYTE [DISTWIDTH] IN BLOOD BY AUTOMATED COUNT: 13.1 % (ref 11–14.5)
FERRITIN SERPL-MCNC: 67 NG/ML (ref 10–130)
GFR SERPL CREATININE-BSD FRML MDRD: >60 ML/MIN/1.73M2
GLUCOSE BLD-MCNC: 97 MG/DL (ref 70–125)
GLUCOSE UR STRIP-MCNC: ABNORMAL MG/DL
HCT VFR BLD AUTO: 52 % (ref 35–47)
HGB BLD-MCNC: 16.8 G/DL (ref 12–16)
HGB UR QL STRIP: NEGATIVE
HYALINE CASTS #/AREA URNS LPF: ABNORMAL LPF
INTERPRETATION ECG - MUSE: NORMAL
IRON SATN MFR SERPL: 23 % (ref 20–50)
IRON SERPL-MCNC: 76 UG/DL (ref 42–175)
KETONES UR STRIP-MCNC: NEGATIVE MG/DL
LEUKOCYTE ESTERASE UR QL STRIP: NEGATIVE
MCH RBC QN AUTO: 28.7 PG (ref 27–34)
MCHC RBC AUTO-ENTMCNC: 32.3 G/DL (ref 32–36)
MCV RBC AUTO: 89 FL (ref 80–100)
MUCOUS THREADS #/AREA URNS LPF: ABNORMAL LPF
NITRATE UR QL: NEGATIVE
P AXIS - MUSE: 36 DEGREES
PH UR STRIP: 6 [PH] (ref 5–8)
PLATELET # BLD AUTO: 165 THOU/UL (ref 140–440)
PMV BLD AUTO: 8.3 FL (ref 7–10)
POTASSIUM BLD-SCNC: 3.9 MMOL/L (ref 3.5–5)
PR INTERVAL - MUSE: 144 MS
PROT SERPL-MCNC: 8 G/DL (ref 6–8)
QRS DURATION - MUSE: 86 MS
QT - MUSE: 322 MS
QTC - MUSE: 441 MS
R AXIS - MUSE: 109 DEGREES
RBC # BLD AUTO: 5.85 MILL/UL (ref 3.8–5.4)
RBC #/AREA URNS AUTO: ABNORMAL HPF
SODIUM SERPL-SCNC: 139 MMOL/L (ref 136–145)
SP GR UR STRIP: 1.02 (ref 1–1.03)
SQUAMOUS #/AREA URNS AUTO: ABNORMAL LPF
SYSTOLIC BLOOD PRESSURE - MUSE: NORMAL MMHG
T AXIS - MUSE: -27 DEGREES
TIBC SERPL-MCNC: 329 UG/DL (ref 313–563)
TRANS CELLS #/AREA URNS HPF: ABNORMAL LPF
TRANSFERRIN SERPL-MCNC: 263 MG/DL (ref 212–360)
TSH SERPL DL<=0.005 MIU/L-ACNC: 1.88 UIU/ML (ref 0.3–5)
UROBILINOGEN UR STRIP-ACNC: ABNORMAL
VENTRICULAR RATE- MUSE: 113 BPM
WBC #/AREA URNS AUTO: ABNORMAL HPF
WBC CLUMPS #/AREA URNS HPF: PRESENT /[HPF]
WBC: 8.6 THOU/UL (ref 4–11)

## 2018-10-25 ENCOUNTER — COMMUNICATION - HEALTHEAST (OUTPATIENT)
Dept: FAMILY MEDICINE | Facility: CLINIC | Age: 43
End: 2018-10-25

## 2018-10-25 LAB
BACTERIA SPEC CULT: NO GROWTH
FACT X ACT/NOR PPP CHRO: 23 %

## 2018-10-27 ENCOUNTER — COMMUNICATION - HEALTHEAST (OUTPATIENT)
Dept: RHEUMATOLOGY | Facility: CLINIC | Age: 43
End: 2018-10-27

## 2018-10-27 DIAGNOSIS — M32.9 SYSTEMIC LUPUS ERYTHEMATOSUS (H): ICD-10-CM

## 2018-10-29 ENCOUNTER — MEDICAL CORRESPONDENCE (OUTPATIENT)
Dept: HEALTH INFORMATION MANAGEMENT | Facility: CLINIC | Age: 43
End: 2018-10-29

## 2018-10-30 ENCOUNTER — AMBULATORY - HEALTHEAST (OUTPATIENT)
Dept: INTENSIVE CARE | Facility: CLINIC | Age: 43
End: 2018-10-30

## 2018-10-30 ENCOUNTER — AMBULATORY - HEALTHEAST (OUTPATIENT)
Dept: SLEEP MEDICINE | Facility: CLINIC | Age: 43
End: 2018-10-30

## 2018-10-30 DIAGNOSIS — R06.83 SNORING: ICD-10-CM

## 2018-10-30 DIAGNOSIS — G47.33 OSA (OBSTRUCTIVE SLEEP APNEA): ICD-10-CM

## 2018-10-30 DIAGNOSIS — R09.02 HYPOXEMIA: ICD-10-CM

## 2018-10-31 ENCOUNTER — TELEPHONE (OUTPATIENT)
Dept: CARDIOLOGY | Facility: CLINIC | Age: 43
End: 2018-10-31

## 2018-10-31 NOTE — TELEPHONE ENCOUNTER
[facility] Call Center    Phone Message    May a detailed message be left on voicemail: yes    Reason for Call: Other: Pt with referral to see pulmonary hypertention cardiologist. Sent recs to clinic via fax. Please call pt for scheduling, thanks!     Action Taken: Message routed to:  [facility] ([facility]): Cardiology

## 2018-11-01 ENCOUNTER — COMMUNICATION - HEALTHEAST (OUTPATIENT)
Dept: CARE COORDINATION | Facility: CLINIC | Age: 43
End: 2018-11-01

## 2018-11-02 ENCOUNTER — COMMUNICATION - HEALTHEAST (OUTPATIENT)
Dept: FAMILY MEDICINE | Facility: CLINIC | Age: 43
End: 2018-11-02

## 2018-11-02 DIAGNOSIS — Z86.718 HISTORY OF DVT (DEEP VEIN THROMBOSIS): ICD-10-CM

## 2018-11-02 NOTE — TELEPHONE ENCOUNTER
Contacted patient to schedule new consultation appointment with pulmonary hypertension clinic. The pt states that at this time she does not wish to schedule an appointment as she has some family matters she needs to attend to. Provided pt with direct office number ([phone]) for her to call once she is ready to schedule the appointment.    Dk Carey  Clinic   Pulmonary Hypertension  [facility]  (P) [phone]

## 2018-11-12 ENCOUNTER — COMMUNICATION - HEALTHEAST (OUTPATIENT)
Dept: PULMONOLOGY | Facility: OTHER | Age: 43
End: 2018-11-12

## 2018-11-14 ENCOUNTER — COMMUNICATION - HEALTHEAST (OUTPATIENT)
Dept: RHEUMATOLOGY | Facility: CLINIC | Age: 43
End: 2018-11-14

## 2018-11-14 DIAGNOSIS — M32.9 SYSTEMIC LUPUS ERYTHEMATOSUS (H): ICD-10-CM

## 2018-11-20 ENCOUNTER — COMMUNICATION - HEALTHEAST (OUTPATIENT)
Dept: FAMILY MEDICINE | Facility: CLINIC | Age: 43
End: 2018-11-20

## 2018-11-20 ENCOUNTER — COMMUNICATION - HEALTHEAST (OUTPATIENT)
Dept: PALLIATIVE MEDICINE | Facility: OTHER | Age: 43
End: 2018-11-20

## 2018-11-20 DIAGNOSIS — F11.20 OPIOID DEPENDENCE, CONTINUOUS (H): ICD-10-CM

## 2018-11-20 DIAGNOSIS — G89.4 CHRONIC PAIN SYNDROME: ICD-10-CM

## 2018-11-23 NOTE — TELEPHONE ENCOUNTER
Pt contacted office to schedule new PH consultation. As patient wishes to be scheduled after January 1st, appointment has been scheduled for 1/8/19 with Dr. Eng.

## 2018-11-27 ENCOUNTER — RECORDS - HEALTHEAST (OUTPATIENT)
Dept: ADMINISTRATIVE | Facility: OTHER | Age: 43
End: 2018-11-27

## 2018-12-04 ENCOUNTER — HOSPITAL ENCOUNTER (OUTPATIENT)
Dept: PALLIATIVE MEDICINE | Facility: OTHER | Age: 43
Discharge: HOME | End: 2018-12-04
Attending: PHYSICIAN ASSISTANT

## 2018-12-04 DIAGNOSIS — F11.90 CHRONIC, CONTINUOUS USE OF OPIOIDS: ICD-10-CM

## 2018-12-04 DIAGNOSIS — M48.50XS PATHOLOGICAL COMPRESSION FRACTURE OF VERTEBRA, SEQUELA: ICD-10-CM

## 2018-12-04 DIAGNOSIS — F11.20 OPIOID DEPENDENCE, CONTINUOUS (H): ICD-10-CM

## 2018-12-04 DIAGNOSIS — G89.4 CHRONIC PAIN SYNDROME: ICD-10-CM

## 2018-12-04 ASSESSMENT — MIFFLIN-ST. JEOR: SCORE: 1442.87

## 2018-12-10 ENCOUNTER — COMMUNICATION - HEALTHEAST (OUTPATIENT)
Dept: FAMILY MEDICINE | Facility: CLINIC | Age: 43
End: 2018-12-10

## 2018-12-12 ENCOUNTER — COMMUNICATION - HEALTHEAST (OUTPATIENT)
Dept: PULMONOLOGY | Facility: OTHER | Age: 43
End: 2018-12-12

## 2018-12-12 DIAGNOSIS — I27.20 PULMONARY HYPERTENSION (H): ICD-10-CM

## 2018-12-19 ENCOUNTER — TELEPHONE (OUTPATIENT)
Dept: CARDIOLOGY | Facility: CLINIC | Age: 43
End: 2018-12-19

## 2018-12-19 NOTE — TELEPHONE ENCOUNTER
[facility] Call Center    Phone Message    May a detailed message be left on voicemail: yes    Reason for Call: Other: Patient would like to reschedule her cancelled January 8th appointment with Dr. Eng.     Action Taken: Message routed to:  [facility] ([facility]): clinic coord cardiology

## 2019-01-01 ENCOUNTER — HOSPITAL ENCOUNTER (OUTPATIENT)
Dept: PALLIATIVE MEDICINE | Facility: OTHER | Age: 44
Discharge: HOME | End: 2019-12-04
Attending: PHYSICIAN ASSISTANT

## 2019-01-01 ENCOUNTER — AMBULATORY - HEALTHEAST (OUTPATIENT)
Dept: OTHER | Facility: CLINIC | Age: 44
End: 2019-01-01

## 2019-01-01 ENCOUNTER — COMMUNICATION - HEALTHEAST (OUTPATIENT)
Dept: FAMILY MEDICINE | Facility: CLINIC | Age: 44
End: 2019-01-01

## 2019-01-01 ENCOUNTER — COMMUNICATION - HEALTHEAST (OUTPATIENT)
Dept: OTHER | Facility: CLINIC | Age: 44
End: 2019-01-01

## 2019-01-01 DIAGNOSIS — E03.9 ACQUIRED HYPOTHYROIDISM: ICD-10-CM

## 2019-01-01 DIAGNOSIS — F41.9 ANXIETY: ICD-10-CM

## 2019-01-01 DIAGNOSIS — M54.2 CERVICALGIA: ICD-10-CM

## 2019-01-01 DIAGNOSIS — M48.50XS PATHOLOGICAL COMPRESSION FRACTURE OF VERTEBRA, SEQUELA: ICD-10-CM

## 2019-01-01 DIAGNOSIS — G89.4 CHRONIC PAIN SYNDROME: ICD-10-CM

## 2019-01-01 DIAGNOSIS — S22.000S COMPRESSION FRACTURE OF THORACIC VERTEBRA, UNSPECIFIED THORACIC VERTEBRAL LEVEL, SEQUELA: ICD-10-CM

## 2019-01-01 DIAGNOSIS — F11.20 OPIOID DEPENDENCE, CONTINUOUS (H): ICD-10-CM

## 2019-01-01 LAB
DLCOUNC-%PRED-PRE: 39 %
DLCOUNC-PRE: 6.85 ML/MIN/MMHG
DLCOUNC-PRED: 17.54 ML/MIN/MMHG
ERV-%PRED-PRE: 11 %
ERV-PRE: 0.03 L
ERV-PRED: 0.28 L
EXPTIME-PRE: 7.4 SEC
FEF2575-%PRED-PRE: 21 %
FEF2575-PRE: 0.57 L/SEC
FEF2575-PRED: 2.69 L/SEC
FEFMAX-%PRED-PRE: 61 %
FEFMAX-PRE: 3.72 L/SEC
FEFMAX-PRED: 6.06 L/SEC
FEV1-%PRED-PRE: 36 %
FEV1-PRE: 0.88 L
FEV1FEV6-PRE: 74 %
FEV1FEV6-PRED: 84 %
FEV1FVC-PRE: 74 %
FEV1FVC-PRED: 83 %
FEV1SVC-PRE: 80 %
FEV1SVC-PRED: 83 %
FIFMAX-PRE: 2.36 L/SEC
FVC-%PRED-PRE: 40 %
FVC-PRE: 1.19 L
FVC-PRED: 2.92 L
IC-%PRED-PRE: 40 %
IC-PRE: 1.07 L
IC-PRED: 2.61 L
VA-%PRED-PRE: 51 %
VA-PRE: 1.97 L
VC-%PRED-PRE: 37 %
VC-PRE: 1.1 L
VC-PRED: 2.89 L

## 2019-01-01 ASSESSMENT — MIFFLIN-ST. JEOR: SCORE: 1479.16

## 2019-01-02 ENCOUNTER — COMMUNICATION - HEALTHEAST (OUTPATIENT)
Dept: FAMILY MEDICINE | Facility: CLINIC | Age: 44
End: 2019-01-02

## 2019-01-04 ENCOUNTER — OFFICE VISIT - HEALTHEAST (OUTPATIENT)
Dept: PULMONOLOGY | Facility: OTHER | Age: 44
End: 2019-01-04

## 2019-01-04 DIAGNOSIS — I27.21 PULMONARY ARTERIAL HYPERTENSION (H): ICD-10-CM

## 2019-01-04 DIAGNOSIS — J96.12 CHRONIC RESPIRATORY FAILURE WITH HYPOXIA AND HYPERCAPNIA (H): ICD-10-CM

## 2019-01-04 DIAGNOSIS — B96.89 ACUTE BACTERIAL RHINOSINUSITIS: ICD-10-CM

## 2019-01-04 DIAGNOSIS — J01.90 ACUTE BACTERIAL RHINOSINUSITIS: ICD-10-CM

## 2019-01-04 DIAGNOSIS — J42 CHRONIC BRONCHITIS, UNSPECIFIED CHRONIC BRONCHITIS TYPE (H): ICD-10-CM

## 2019-01-04 DIAGNOSIS — J96.11 CHRONIC RESPIRATORY FAILURE WITH HYPOXIA AND HYPERCAPNIA (H): ICD-10-CM

## 2019-01-04 ASSESSMENT — MIFFLIN-ST. JEOR: SCORE: 1438.34

## 2019-01-15 ENCOUNTER — OFFICE VISIT - HEALTHEAST (OUTPATIENT)
Dept: RHEUMATOLOGY | Facility: CLINIC | Age: 44
End: 2019-01-15

## 2019-01-15 DIAGNOSIS — M32.9 SYSTEMIC LUPUS ERYTHEMATOSUS, UNSPECIFIED SLE TYPE, UNSPECIFIED ORGAN INVOLVEMENT STATUS (H): ICD-10-CM

## 2019-01-15 DIAGNOSIS — M25.50 POLYARTHRALGIA: ICD-10-CM

## 2019-01-15 DIAGNOSIS — D68.59 PRIMARY HYPERCOAGULABLE STATE (H): ICD-10-CM

## 2019-01-15 DIAGNOSIS — J84.9 ILD (INTERSTITIAL LUNG DISEASE) (H): ICD-10-CM

## 2019-01-15 ASSESSMENT — MIFFLIN-ST. JEOR: SCORE: 1463.74

## 2019-01-22 ENCOUNTER — PRE VISIT (OUTPATIENT)
Dept: CARDIOLOGY | Facility: CLINIC | Age: 44
End: 2019-01-22

## 2019-01-24 ENCOUNTER — HOSPITAL ENCOUNTER (OUTPATIENT)
Dept: PALLIATIVE MEDICINE | Facility: OTHER | Age: 44
Discharge: HOME | End: 2019-01-24
Attending: PHYSICIAN ASSISTANT

## 2019-01-24 DIAGNOSIS — F11.20 OPIOID DEPENDENCE, CONTINUOUS (H): ICD-10-CM

## 2019-01-24 DIAGNOSIS — G89.4 CHRONIC PAIN SYNDROME: ICD-10-CM

## 2019-01-24 DIAGNOSIS — M32.9 SYSTEMIC LUPUS ERYTHEMATOSUS, UNSPECIFIED SLE TYPE, UNSPECIFIED ORGAN INVOLVEMENT STATUS (H): ICD-10-CM

## 2019-01-24 ASSESSMENT — MIFFLIN-ST. JEOR: SCORE: 1461.02

## 2019-02-12 ENCOUNTER — COMMUNICATION - HEALTHEAST (OUTPATIENT)
Dept: FAMILY MEDICINE | Facility: CLINIC | Age: 44
End: 2019-02-12

## 2019-02-14 ENCOUNTER — COMMUNICATION - HEALTHEAST (OUTPATIENT)
Dept: FAMILY MEDICINE | Facility: CLINIC | Age: 44
End: 2019-02-14

## 2019-02-25 ENCOUNTER — OFFICE VISIT - HEALTHEAST (OUTPATIENT)
Dept: FAMILY MEDICINE | Facility: CLINIC | Age: 44
End: 2019-02-25

## 2019-02-25 DIAGNOSIS — J96.11 CHRONIC RESPIRATORY FAILURE WITH HYPOXIA AND HYPERCAPNIA (H): ICD-10-CM

## 2019-02-25 DIAGNOSIS — I10 ESSENTIAL HYPERTENSION: ICD-10-CM

## 2019-02-25 DIAGNOSIS — J96.12 CHRONIC RESPIRATORY FAILURE WITH HYPOXIA AND HYPERCAPNIA (H): ICD-10-CM

## 2019-02-25 DIAGNOSIS — Z30.017 NEXPLANON INSERTION: ICD-10-CM

## 2019-02-25 DIAGNOSIS — Z86.718 HISTORY OF DVT (DEEP VEIN THROMBOSIS): ICD-10-CM

## 2019-02-25 DIAGNOSIS — F41.1 GENERALIZED ANXIETY DISORDER: ICD-10-CM

## 2019-02-25 DIAGNOSIS — F32.9 CURRENT EPISODE OF MAJOR DEPRESSIVE DISORDER WITHOUT PRIOR EPISODE, UNSPECIFIED DEPRESSION EPISODE SEVERITY: ICD-10-CM

## 2019-02-25 DIAGNOSIS — Z30.46 NEXPLANON REMOVAL: ICD-10-CM

## 2019-02-25 DIAGNOSIS — N92.6 IRREGULAR MENSTRUAL BLEEDING: ICD-10-CM

## 2019-02-25 LAB
FSH SERPL-ACNC: 5.1 MIU/ML
HCG UR QL: NEGATIVE
TSH SERPL DL<=0.005 MIU/L-ACNC: 2.91 UIU/ML (ref 0.3–5)

## 2019-02-26 ENCOUNTER — OFFICE VISIT (OUTPATIENT)
Dept: CARDIOLOGY | Facility: CLINIC | Age: 44
End: 2019-02-26
Attending: INTERNAL MEDICINE
Payer: MEDICARE

## 2019-02-26 ENCOUNTER — ANCILLARY PROCEDURE (OUTPATIENT)
Dept: CT IMAGING | Facility: CLINIC | Age: 44
End: 2019-02-26
Attending: INTERNAL MEDICINE
Payer: MEDICARE

## 2019-02-26 ENCOUNTER — COMMUNICATION - HEALTHEAST (OUTPATIENT)
Dept: FAMILY MEDICINE | Facility: CLINIC | Age: 44
End: 2019-02-26

## 2019-02-26 ENCOUNTER — RECORDS - HEALTHEAST (OUTPATIENT)
Dept: ADMINISTRATIVE | Facility: OTHER | Age: 44
End: 2019-02-26

## 2019-02-26 VITALS
OXYGEN SATURATION: 90 % | WEIGHT: 201 LBS | HEART RATE: 90 BPM | DIASTOLIC BLOOD PRESSURE: 88 MMHG | BODY MASS INDEX: 42.19 KG/M2 | HEIGHT: 58 IN | SYSTOLIC BLOOD PRESSURE: 120 MMHG

## 2019-02-26 DIAGNOSIS — I27.20 PULMONARY HYPERTENSION (H): ICD-10-CM

## 2019-02-26 DIAGNOSIS — Z11.59 ENCOUNTER FOR SCREENING FOR OTHER VIRAL DISEASES: ICD-10-CM

## 2019-02-26 DIAGNOSIS — R06.09 DYSPNEA ON EXERTION: ICD-10-CM

## 2019-02-26 DIAGNOSIS — I27.20 PULMONARY HYPERTENSION (H): Primary | ICD-10-CM

## 2019-02-26 LAB
6 MIN WALK (FT): 670 FT
6 MIN WALK (M): 204 M
ALBUMIN SERPL-MCNC: 3.5 G/DL (ref 3.4–5)
ALP SERPL-CCNC: 78 U/L (ref 40–150)
ALT SERPL W P-5'-P-CCNC: 20 U/L (ref 0–50)
ANION GAP SERPL CALCULATED.3IONS-SCNC: 7 MMOL/L (ref 3–14)
AST SERPL W P-5'-P-CCNC: 25 U/L (ref 0–45)
BILIRUB DIRECT SERPL-MCNC: <0.1 MG/DL (ref 0–0.2)
BILIRUB SERPL-MCNC: 0.4 MG/DL (ref 0.2–1.3)
BUN SERPL-MCNC: 10 MG/DL (ref 7–30)
CALCIUM SERPL-MCNC: 8.2 MG/DL (ref 8.5–10.1)
CHLORIDE SERPL-SCNC: 97 MMOL/L (ref 94–109)
CHOLEST SERPL-MCNC: 180 MG/DL
CO2 SERPL-SCNC: 36 MMOL/L (ref 20–32)
CREAT SERPL-MCNC: 0.7 MG/DL (ref 0.52–1.04)
CRP SERPL-MCNC: 26 MG/L (ref 0–8)
ERYTHROCYTE [DISTWIDTH] IN BLOOD BY AUTOMATED COUNT: 14.4 % (ref 10–15)
FACT X ACT/NOR PPP CHRO: 18 %
GFR SERPL CREATININE-BSD FRML MDRD: >90 ML/MIN/{1.73_M2}
GLUCOSE SERPL-MCNC: 74 MG/DL (ref 70–99)
HCT VFR BLD AUTO: 51.9 % (ref 35–47)
HDLC SERPL-MCNC: 51 MG/DL
HGB BLD-MCNC: 15 G/DL (ref 11.7–15.7)
INR PPP: 3.8 (ref 0.86–1.14)
IRON SATN MFR SERPL: 12 % (ref 15–46)
IRON SERPL-MCNC: 38 UG/DL (ref 35–180)
LDLC SERPL CALC-MCNC: 101 MG/DL
MCH RBC QN AUTO: 28.5 PG (ref 26.5–33)
MCHC RBC AUTO-ENTMCNC: 28.9 G/DL (ref 31.5–36.5)
MCV RBC AUTO: 99 FL (ref 78–100)
NONHDLC SERPL-MCNC: 129 MG/DL
NT-PROBNP SERPL-MCNC: 6579 PG/ML (ref 0–125)
PLATELET # BLD AUTO: 148 10E9/L (ref 150–450)
POTASSIUM SERPL-SCNC: 3.6 MMOL/L (ref 3.4–5.3)
PROT SERPL-MCNC: 8.4 G/DL (ref 6.8–8.8)
RBC # BLD AUTO: 5.26 10E12/L (ref 3.8–5.2)
RHEUMATOID FACT SER NEPH-ACNC: <20 IU/ML (ref 0–20)
SODIUM SERPL-SCNC: 140 MMOL/L (ref 133–144)
TIBC SERPL-MCNC: 332 UG/DL (ref 240–430)
TRIGL SERPL-MCNC: 142 MG/DL
TSH SERPL DL<=0.005 MIU/L-ACNC: 4.03 MU/L (ref 0.4–4)
WBC # BLD AUTO: 7 10E9/L (ref 4–11)

## 2019-02-26 PROCEDURE — 00000401 ZZHCL STATISTIC THROMBIN TIME NC: Performed by: INTERNAL MEDICINE

## 2019-02-26 PROCEDURE — 83540 ASSAY OF IRON: CPT | Performed by: INTERNAL MEDICINE

## 2019-02-26 PROCEDURE — 36415 COLL VENOUS BLD VENIPUNCTURE: CPT | Performed by: INTERNAL MEDICINE

## 2019-02-26 PROCEDURE — 85610 PROTHROMBIN TIME: CPT | Performed by: INTERNAL MEDICINE

## 2019-02-26 PROCEDURE — 86803 HEPATITIS C AB TEST: CPT | Performed by: INTERNAL MEDICINE

## 2019-02-26 PROCEDURE — 83880 ASSAY OF NATRIURETIC PEPTIDE: CPT | Performed by: INTERNAL MEDICINE

## 2019-02-26 PROCEDURE — 86431 RHEUMATOID FACTOR QUANT: CPT | Performed by: INTERNAL MEDICINE

## 2019-02-26 PROCEDURE — 83520 IMMUNOASSAY QUANT NOS NONAB: CPT | Performed by: INTERNAL MEDICINE

## 2019-02-26 PROCEDURE — 85613 RUSSELL VIPER VENOM DILUTED: CPT | Performed by: INTERNAL MEDICINE

## 2019-02-26 PROCEDURE — 86706 HEP B SURFACE ANTIBODY: CPT | Performed by: INTERNAL MEDICINE

## 2019-02-26 PROCEDURE — 85027 COMPLETE CBC AUTOMATED: CPT | Performed by: INTERNAL MEDICINE

## 2019-02-26 PROCEDURE — 84443 ASSAY THYROID STIM HORMONE: CPT | Performed by: INTERNAL MEDICINE

## 2019-02-26 PROCEDURE — G0499 HEPB SCREEN HIGH RISK INDIV: HCPCS | Performed by: INTERNAL MEDICINE

## 2019-02-26 PROCEDURE — 80076 HEPATIC FUNCTION PANEL: CPT | Performed by: INTERNAL MEDICINE

## 2019-02-26 PROCEDURE — 86140 C-REACTIVE PROTEIN: CPT | Performed by: INTERNAL MEDICINE

## 2019-02-26 PROCEDURE — 85732 THROMBOPLASTIN TIME PARTIAL: CPT | Performed by: INTERNAL MEDICINE

## 2019-02-26 PROCEDURE — 83550 IRON BINDING TEST: CPT | Performed by: INTERNAL MEDICINE

## 2019-02-26 PROCEDURE — 87389 HIV-1 AG W/HIV-1&-2 AB AG IA: CPT | Performed by: INTERNAL MEDICINE

## 2019-02-26 PROCEDURE — 80048 BASIC METABOLIC PNL TOTAL CA: CPT | Performed by: INTERNAL MEDICINE

## 2019-02-26 PROCEDURE — 86704 HEP B CORE ANTIBODY TOTAL: CPT | Performed by: INTERNAL MEDICINE

## 2019-02-26 PROCEDURE — 86038 ANTINUCLEAR ANTIBODIES: CPT | Performed by: INTERNAL MEDICINE

## 2019-02-26 PROCEDURE — 85597 PHOSPHOLIPID PLTLT NEUTRALIZ: CPT | Performed by: INTERNAL MEDICINE

## 2019-02-26 PROCEDURE — 84238 ASSAY NONENDOCRINE RECEPTOR: CPT | Performed by: INTERNAL MEDICINE

## 2019-02-26 PROCEDURE — 85730 THROMBOPLASTIN TIME PARTIAL: CPT | Performed by: INTERNAL MEDICINE

## 2019-02-26 PROCEDURE — 86039 ANTINUCLEAR ANTIBODIES (ANA): CPT | Performed by: INTERNAL MEDICINE

## 2019-02-26 PROCEDURE — 85613 RUSSELL VIPER VENOM DILUTED: CPT | Mod: 91 | Performed by: INTERNAL MEDICINE

## 2019-02-26 PROCEDURE — 99205 OFFICE O/P NEW HI 60 MIN: CPT | Mod: ZP | Performed by: INTERNAL MEDICINE

## 2019-02-26 PROCEDURE — 80061 LIPID PANEL: CPT | Performed by: INTERNAL MEDICINE

## 2019-02-26 PROCEDURE — G0463 HOSPITAL OUTPT CLINIC VISIT: HCPCS | Mod: ZF

## 2019-02-26 RX ORDER — LEVOTHYROXINE SODIUM 150 UG/1
150 TABLET ORAL
COMMUNITY
Start: 2018-12-10

## 2019-02-26 RX ORDER — LIDOCAINE 40 MG/G
CREAM TOPICAL
Status: CANCELLED | OUTPATIENT
Start: 2019-02-26

## 2019-02-26 RX ORDER — DILTIAZEM HYDROCHLORIDE 240 MG/1
CAPSULE, EXTENDED RELEASE ORAL DAILY
COMMUNITY

## 2019-02-26 RX ORDER — IOPAMIDOL 755 MG/ML
67 INJECTION, SOLUTION INTRAVASCULAR ONCE
Status: COMPLETED | OUTPATIENT
Start: 2019-02-26 | End: 2019-02-26

## 2019-02-26 RX ADMIN — IOPAMIDOL 67 ML: 755 INJECTION, SOLUTION INTRAVASCULAR at 11:43

## 2019-02-26 ASSESSMENT — PAIN SCALES - GENERAL: PAINLEVEL: NO PAIN (0)

## 2019-02-26 ASSESSMENT — MIFFLIN-ST. JEOR: SCORE: 1466.48

## 2019-02-26 NOTE — PROGRESS NOTES
February 26, 2019      Boris Bearden MD  [facility] [place] [address]    Dear Dr. Bearden    I had the pleasure of seeing your patient Catherine Sharp at the [facility] Pulmonary Hypertension clinic.  As you know, Catherine is a very pleasant 43-year-old female with a past medical history significant for chronic  hypoxic respiratory failure secondary to interstitial lung disease from lupus and past history of ARDS, SLE on plaquenil, history of DVT and on chronic coumadin, hypertension, spine fractures, hypothyroidism, chronic pain, and bone mineral density disease who presents for evaluation of pulmonary hypertension.  Catherine's cortez with lung disease started approximately in 1996 when she had open lung biopsy for ill-defined repeated respiratory failure.  Unfortunately, there was no clear diagnosis on the lung biopsy and she had a subsequent lung biopsy with no clear diagnosis of her lung disease.  She has been on chronic oxygen therapy for approximately 5 years with ranges anywhere between 3-6 L/min.  She does have a lot of variation in her symptoms and sometimes it feels better and she only needs 3 L/min and then other days she feels worse and needs to go up to 6.  She is still able to do her activities of daily living at home, but she is limited in her physical activity due to back pain.  She will get dyspnea on exertion, but many times her limitation is her back pain.  She can go up a flight of stairs but does feel slightly short of breath when she gets to the top.  She does have occasional chest pressure when exerting herself which resolves with rest and oxygen therapy.  She has had presyncopal sensations but no jocelyn syncope.  She does not have any lower extremity edema or abdominal swelling, but she does take Lasix 40 mg twice a day.  Overall I would classify her as WHO functional class III with limitations due to back pain as well.    Luna had an echocardiogram performed  which showed evidence of pulmonary hypertension with right ventricular dilation and estimated PAS P of over 50 mmHg.  She has not had an invasive hemodynamic assessment as they were trying to wean down her prednisone before doing a full exam.  She is currently down to prednisone 5 mg daily and is working on weaning towards 4 mg.    Catherine goins have a history of DVT for which she has been on chronic Coumadin.  She has not had any known diagnosis of pulmonary embolism.  She has not taken any diet drugs in the past.    PAST MEDICAL HISTORY:  -Chronic hypoxemic respiratory failure due to ILD from SLE  -DVT  -Hypertension  -Hypothyroidism  -Bone mineral density  -Chronic pain    FAMILY HISTORY:  No family history of PH    SOCIAL HISTORY:  Denies current tobacco, drug, and alcohol use.  Does have a 10-pack year history.    CURRENT MEDICATIONS:  Current Outpatient Medications   Medication Sig Dispense Refill     Albuterol Sulfate (VENTOLIN HFA IN) Inhale 2 puffs into the lungs every 4 hours as needed.         CALCIUM CITRATE PO Take 250 mg by mouth 2 times daily.       denosumab (PROLIA) 60 MG/ML SOLN injection Inject 60 mg Subcutaneous       diltiazem ER (DILT-XR) 240 MG 24 hr ER beaded capsule Take by mouth daily       ferrous sulfate 325 (65 FE) MG tablet Take 1 tablet by mouth 2 times daily. 30 tablet 1     hydroxychloroquine (PLAQUENIL) 200 MG tablet Take 200 mg by mouth daily.         levothyroxine (SYNTHROID/LEVOTHROID) 150 MCG tablet Take 150 mcg by mouth       OXYCODONE HCL PO Take 30 mg by mouth every 4 hours as needed.       OXYCONTIN 40 MG 12 hr tablet TAKE 1 TABLET BY MOUTH IN THE MORNING  0     PREDNISONE PO Take 5 mg by mouth daily        PROMETHAZINE HCL PO Take 25 mg by mouth every 4 hours as needed.       vitamin D (ERGOCALCIFEROL) 96223 UNIT capsule Take 50,000 Units by mouth twice a week. Monday and thursday       Warfarin Sodium (JANTOVEN PO) Take 5 mg by mouth daily. Patient alternates between 5 mg  "and 7.5 mg every other day.        AZATHIOPRINE PO Take 100 mg by mouth daily.       fentaNYL (DURAGESIC) 50 mcg/hr patch 72 hr Place 1 patch onto the skin every 72 hours.       HYDROXYZINE PAMOATE PO Take 25 mg by mouth every 6 hours as needed.       LISINOPRIL PO Take 40 mg by mouth every 12 hours.         LORAZEPAM PO Take 0.5 mg by mouth daily.         MedroxyPROGESTERone Ace Micro (MEDROXYPROGESTERONE ACETATE) Inject 150 mg/mL into the muscle every 3 months.       polyethylene glycol (MIRALAX/GLYCOLAX) packet Take 1 packet by mouth daily as needed.       Teriparatide, Recombinant, (FORTEO SC) Inject 600 mcg Subcutaneous daily.         ROS:   10 point ROS negative except HPI    EXAM:  /88 (BP Location: Right arm, Patient Position: Chair, Cuff Size: Adult Large)   Pulse 90   Ht 1.473 m (4' 10\")   Wt 91.2 kg (201 lb)   SpO2 90%   BMI 42.01 kg/m    General: appears comfortable, alert and articulate  Head: normocephalic, atraumatic  Eyes: anicteric sclera, EOMI  Neck: no adenopathy  Orophyarynx: moist mucosa, no lesions, dentition intact  Heart: regular, S1/S2 with prominent P2, JVP 3 cm, 1/6 HARSHAD heard at the LLSB  Lungs: inspiratory crackles bilaterally at the bases  Abdomen: soft, non-tender, bowel sounds present, no hepatosplenomegaly  Extremities: no clubbing, cyanosis or edema  Neurological: normal speech and affect, no gross motor deficits    Labs:  CBC RESULTS:  Lab Results   Component Value Date    WBC 6.6 05/14/2013    RBC 4.50 05/14/2013    HGB 9.3 (L) 05/14/2013    HCT 33.5 (L) 05/14/2013    MCV 74 (L) 05/14/2013    MCH 20.7 (L) 05/14/2013    MCHC 27.8 (L) 05/14/2013    RDW 22.1 (H) 05/14/2013     05/14/2013       CMP RESULTS:  Lab Results   Component Value Date     05/14/2013    POTASSIUM 3.9 05/14/2013    CHLORIDE 108 05/14/2013    CO2 21 05/14/2013    ANIONGAP 11 05/14/2013    GLC 92 05/14/2013    BUN 12 05/14/2013    CR 0.59 05/14/2013    GFRESTIMATED >90 05/14/2013    " GFRESTBLACK >90 05/14/2013    JEFFERSON 8.3 (L) 05/14/2013    BILITOTAL 0.3 05/13/2013    ALBUMIN 3.1 (L) 05/13/2013    ALKPHOS 56 05/13/2013    ALT 24 05/13/2013    AST 25 05/13/2013        Echocardiogram 10/29/2019 [facility]:  1. Normal left ventricular size and systolic performance with a visually  estimated ejection fraction of 55%.    2. There is a small area of moderate distal anterior/apical hypokinesis.  3. There is mild sclerosis of the aortic valve with a small area of calcification (unchanged from previous).   4. On selected views, the right ventricle appears mildly enlarged with mildly reduced right ventricular systolic function (right ventricle the  right ventricle is not well visualized on some views).  5. There is mild left atrial enlargement.   6. Right ventricular systolic pressure relative to right atrial pressure is moderately increased.  The pulmonary artery pressure is estimated to be 50-55 mmHg plus right atrial pressure (the IVC is poorly visualized on this study).    Assessment and Plan: Catherine Sharp is a 43 year old female with history of SLE, chronic hypoxemic respiratory failure, and DVT who presents for evaluation of potential pulmonary hypertension.    1.  Possible pulmonary hypertension: Catherine has multiple reasons for potential pulmonary hypertension including interstitial lung disease causing chronic hypoxemic respiratory failure, history of SLE, and history of DVT.  At this point we will need to perform a thorough evaluation which will include her normal pulmonary hypertension screening labs, a VQ scan to rule out chronic thromboembolic pulmonary hypertension due to history of DVT, CT scan with PE protocol to evaluate for any signs of chronic thromboembolic disease and to evaluate parenchymal lung disease, full pulmonary function tests with DLCO assessment, echocardiogram to evaluate right ventricular size and function, 6-minute walk test to evaluate functional capacity, a sleep  study, and an invasive hemodynamic assessment with vasodilator study.  Catherine and is currently on Coumadin for her DVT and I instructed her to hold for 2 days prior to the right heart cath.    Once we have the results from these studies we will determine what the next steps will be in her treatment plan.  Thankfully, she does not have any signs of right heart failure clinically so I am hoping we are earlier in the process.  We did discuss the end option of lung transplant, and I think we are very far away from that having to be performed.    It was a pleasure seeing your patient Catherine Sharp at the [facility] Pulmonary Hypertension clinic.  Please contact us with any questions or concerns that you may have.    Sincerely,    Carter Eng MD, PhD   of Medicine  Center for Pulmonary Hypertension  Cardiovascular Division  [facility]

## 2019-02-26 NOTE — NURSING NOTE
Procedures and/or Testing: Patient given instructions regarding  Echocardiogram with Bubble Study,  6 minute walk test,  Pulmonary Function Test, Chest CT, V/Q Scan, and Sleep Study. Discussed purpose, preparation, procedure and when to expect results reported back to the patient. Patient demonstrated understanding of this information and agreed to call with further questions or concerns.  Right Heart Catheterization: Patient was instructed regarding right heart catheterization, including discussion of the procedure, preparation, intra-procedural steps, and recovery at home. Patient demonstrated understanding of this information and agreed to call with further questions or concerns.  Med Reconcile: Reviewed and verified all current medications with the patient. The updated medication list was printed and given to the patient.  Diet: Patient instructed regarding a heart healthy diet, including discussion of reduced fat and sodium intake. Patient demonstrated understanding of this information and agreed to call with further questions or concerns.  Return Appointment: Patient given instructions regarding scheduling next clinic visit. Patient demonstrated understanding of this information and agreed to call with further questions or concerns.  Patient stated she understood all health information given and agreed to call with further questions or concerns.     Medication Changes:   No medication changes at this time. Please continue current medication regiment.    Patient Instructions:  1. Continue staying active and eat a heart healthy diet.    2. Please keep current list of medications with you at all times.    3. Remember to weigh yourself daily after voiding and before you consume any food or beverages and log the numbers.  If you have gained 2 pounds overnight or 5 pounds in a week contact us immediately for medication adjustments or further instructions.    4. **Please call us immediately if you have any syncope  (fainting or passing out), chest pain, edema (swelling or weight gain), or decline in your functional status (general decline in how you are feeling).    **Labs today      Check-In  Time Check-In Location Estimated Length Procedure   Name        [facility]  waiting room 60-90 minutes Right Heart Catheterization**     Procedure Preparations & Instructions     This is an invasive procedure that DOES require preparation:    - Nothing to eat for 6 hours   - You may have clear liquids up until the time of your procedure  - A ride should be arranged for you in the instance you are unable to drive home, however you should be able to function as you normally would after the procedure     *For Patients on anticoagulants: ? Hold your Coumadin 2 days prior       Check-In  Time Check-In Location Estimated Length Procedure   Name        [facility]   waiting room 60 minutes VQ/Lung Perfusion Scan**   Procedure Preparations & Instructions     This is a non-invasive procedure and does NOT require any preparation        Check-In  Time Check-In Location Estimated Length Procedure   Name         20 minutes CT Scan**   Procedure Preparations & Instructions     This is a non-invasive procedure and does NOT require any preparation        Check-In  Time Check-In Location Estimated Length Procedure   Name        [facility]   3rd Floor 60 minutes Pulmonary Function Test and   6 muinute walk test**   Procedure Preparations & Instructions     This is a non-invasive procedure and does NOT require any preparation        Check-In  Time Check-In Location Estimated Length Procedure   Name         60 minutes Echocardiogram (Echo) with bubble study**   Procedure Preparations & Instructions     This is a non-invasive procedure and does NOT require any preparation       Follow up Appointment Information:  Sleep Referral - Please call them [phone]  Follow up with us after testing is completed

## 2019-02-26 NOTE — PATIENT INSTRUCTIONS
Medication Changes:   No medication changes at this time. Please continue current medication regiment.    Patient Instructions:  1. Continue staying active and eat a heart healthy diet.    2. Please keep current list of medications with you at all times.    3. Remember to weigh yourself daily after voiding and before you consume any food or beverages and log the numbers.  If you have gained 2 pounds overnight or 5 pounds in a week contact us immediately for medication adjustments or further instructions.    4. **Please call us immediately if you have any syncope (fainting or passing out), chest pain, edema (swelling or weight gain), or decline in your functional status (general decline in how you are feeling).    **Labs today      Check-In  Time Check-In Location Estimated Length Procedure   Name        [facility]  waiting room 60-90 minutes Right Heart Catheterization**     Procedure Preparations & Instructions     This is an invasive procedure that DOES require preparation:    - Nothing to eat for 6 hours   - You may have clear liquids up until the time of your procedure  - A ride should be arranged for you in the instance you are unable to drive home, however you should be able to function as you normally would after the procedure     *For Patients on anticoagulants: ? Hold your Coumadin 2 days prior       Check-In  Time Check-In Location Estimated Length Procedure   Name        [facility]   waiting room 60 minutes VQ/Lung Perfusion Scan**   Procedure Preparations & Instructions     This is a non-invasive procedure and does NOT require any preparation        Check-In  Time Check-In Location Estimated Length Procedure   Name         20 minutes CT Scan**   Procedure Preparations & Instructions     This is a non-invasive procedure and does NOT require any preparation        Check-In  Time Check-In Location Estimated Length Procedure   Name        [facility]   3rd Floor 60 minutes Pulmonary Function Test and   6 muinute walk test**    Procedure Preparations & Instructions     This is a non-invasive procedure and does NOT require any preparation        Check-In  Time Check-In Location Estimated Length Procedure   Name         60 minutes Echocardiogram (Echo) with bubble study**   Procedure Preparations & Instructions     This is a non-invasive procedure and does NOT require any preparation       Follow up Appointment Information:  Sleep Referral - Please call them [phone]  Follow up with us after testing is completed      We are located on the third floor of the [facility] ([facility] on the [facility].  Our address is     [address] [place] [address] [place] [address] [place] [address]      Thank you for allowing us to be a part of your care here at the [facility]    If you have questions or concerns please contact us at:    Emilee Gallo, RN, BSN    Dk Carey (Schedule,Prior Auth)  Nurse Coordinator     Clinic   Pulmonary Hypertension   Pulmonary Hypertension  [facility]  (P)[phone]    (P) [phone]        (F)[phone]                ** Please note that you will NOT receive a reminder call regarding your scheduled testing, reminder calls are for provider appointments only.  If you are scheduled for testing within the [employer] system you may receive a call regarding pre-registration for billing purposes only.**     Support Group:  Pulmonary Hypertension Association  Https://www.phassociation.org/  **Look at the Events Tab** They even have Support Groups that you can call into    [facility] PH Support Group  Second Saturday of the Month from 1-3 PM   Location: [address] [place], [place] [address]  Leader: Emmy Gatica and Maria Del Carmen Andrews  Phone: [phone] or [phone]  Email: [email or web address]

## 2019-02-26 NOTE — LETTER
RE: Catherine Sharp  [address]  [facility] [address]      February 26, 2019      Boris Bearden MD  [facility] [place] [address]    Dear Dr. Bearden    I had the pleasure of seeing your patient Catherine Sharp at the [facility] Pulmonary Hypertension clinic.  As you know, Catherine is a very pleasant 43-year-old female with a past medical history significant for chronic  hypoxic respiratory failure secondary to interstitial lung disease from lupus and past history of ARDS, SLE on plaquenil, history of DVT and on chronic coumadin, hypertension, spine fractures, hypothyroidism, chronic pain, and bone mineral density disease who presents for evaluation of pulmonary hypertension.  Catherine's cortez with lung disease started approximately in 1996 when she had open lung biopsy for ill-defined repeated respiratory failure.  Unfortunately, there was no clear diagnosis on the lung biopsy and she had a subsequent lung biopsy with no clear diagnosis of her lung disease.  She has been on chronic oxygen therapy for approximately 5 years with ranges anywhere between 3-6 L/min.  She does have a lot of variation in her symptoms and sometimes it feels better and she only needs 3 L/min and then other days she feels worse and needs to go up to 6.  She is still able to do her activities of daily living at home, but she is limited in her physical activity due to back pain.  She will get dyspnea on exertion, but many times her limitation is her back pain.  She can go up a flight of stairs but does feel slightly short of breath when she gets to the top.  She does have occasional chest pressure when exerting herself which resolves with rest and oxygen therapy.  She has had presyncopal sensations but no jocelyn syncope.  She does not have any lower extremity edema or abdominal swelling, but she does take Lasix 40 mg twice a day.  Overall I would classify her as WHO functional class III with limitations  due to back pain as well.    Luna had an echocardiogram performed which showed evidence of pulmonary hypertension with right ventricular dilation and estimated PAS P of over 50 mmHg.  She has not had an invasive hemodynamic assessment as they were trying to wean down her prednisone before doing a full exam.  She is currently down to prednisone 5 mg daily and is working on weaning towards 4 mg.    Catherine goins have a history of DVT for which she has been on chronic Coumadin.  She has not had any known diagnosis of pulmonary embolism.  She has not taken any diet drugs in the past.    PAST MEDICAL HISTORY:  -Chronic hypoxemic respiratory failure due to ILD from SLE  -DVT  -Hypertension  -Hypothyroidism  -Bone mineral density  -Chronic pain    FAMILY HISTORY:  No family history of PH    SOCIAL HISTORY:  Denies current tobacco, drug, and alcohol use.  Laura have a 10-pack year history.    CURRENT MEDICATIONS:  Current Outpatient Medications   Medication Sig Dispense Refill     Albuterol Sulfate (VENTOLIN HFA IN) Inhale 2 puffs into the lungs every 4 hours as needed.         CALCIUM CITRATE PO Take 250 mg by mouth 2 times daily.       denosumab (PROLIA) 60 MG/ML SOLN injection Inject 60 mg Subcutaneous       diltiazem ER (DILT-XR) 240 MG 24 hr ER beaded capsule Take by mouth daily       ferrous sulfate 325 (65 FE) MG tablet Take 1 tablet by mouth 2 times daily. 30 tablet 1     hydroxychloroquine (PLAQUENIL) 200 MG tablet Take 200 mg by mouth daily.         levothyroxine (SYNTHROID/LEVOTHROID) 150 MCG tablet Take 150 mcg by mouth       OXYCODONE HCL PO Take 30 mg by mouth every 4 hours as needed.       OXYCONTIN 40 MG 12 hr tablet TAKE 1 TABLET BY MOUTH IN THE MORNING  0     PREDNISONE PO Take 5 mg by mouth daily        PROMETHAZINE HCL PO Take 25 mg by mouth every 4 hours as needed.       vitamin D (ERGOCALCIFEROL) 31352 UNIT capsule Take 50,000 Units by mouth twice a week. Monday and thursday       Warfarin Sodium  "(JANTOVEN PO) Take 5 mg by mouth daily. Patient alternates between 5 mg and 7.5 mg every other day.        AZATHIOPRINE PO Take 100 mg by mouth daily.       fentaNYL (DURAGESIC) 50 mcg/hr patch 72 hr Place 1 patch onto the skin every 72 hours.       HYDROXYZINE PAMOATE PO Take 25 mg by mouth every 6 hours as needed.       LISINOPRIL PO Take 40 mg by mouth every 12 hours.         LORAZEPAM PO Take 0.5 mg by mouth daily.         MedroxyPROGESTERone Ace Micro (MEDROXYPROGESTERONE ACETATE) Inject 150 mg/mL into the muscle every 3 months.       polyethylene glycol (MIRALAX/GLYCOLAX) packet Take 1 packet by mouth daily as needed.       Teriparatide, Recombinant, (FORTEO SC) Inject 600 mcg Subcutaneous daily.         ROS:   10 point ROS negative except HPI    EXAM:  /88 (BP Location: Right arm, Patient Position: Chair, Cuff Size: Adult Large)   Pulse 90   Ht 1.473 m (4' 10\")   Wt 91.2 kg (201 lb)   SpO2 90%   BMI 42.01 kg/m     General: appears comfortable, alert and articulate  Head: normocephalic, atraumatic  Eyes: anicteric sclera, EOMI  Neck: no adenopathy  Orophyarynx: moist mucosa, no lesions, dentition intact  Heart: regular, S1/S2 with prominent P2, JVP 3 cm, 1/6 HARSHAD heard at the LLSB  Lungs: inspiratory crackles bilaterally at the bases  Abdomen: soft, non-tender, bowel sounds present, no hepatosplenomegaly  Extremities: no clubbing, cyanosis or edema  Neurological: normal speech and affect, no gross motor deficits    Labs:  CBC RESULTS:  Lab Results   Component Value Date    WBC 6.6 05/14/2013    RBC 4.50 05/14/2013    HGB 9.3 (L) 05/14/2013    HCT 33.5 (L) 05/14/2013    MCV 74 (L) 05/14/2013    MCH 20.7 (L) 05/14/2013    MCHC 27.8 (L) 05/14/2013    RDW 22.1 (H) 05/14/2013     05/14/2013       CMP RESULTS:  Lab Results   Component Value Date     05/14/2013    POTASSIUM 3.9 05/14/2013    CHLORIDE 108 05/14/2013    CO2 21 05/14/2013    ANIONGAP 11 05/14/2013    GLC 92 05/14/2013    BUN 12 " 05/14/2013    CR 0.59 05/14/2013    GFRESTIMATED >90 05/14/2013    GFRESTBLACK >90 05/14/2013    JEFFERSON 8.3 (L) 05/14/2013    BILITOTAL 0.3 05/13/2013    ALBUMIN 3.1 (L) 05/13/2013    ALKPHOS 56 05/13/2013    ALT 24 05/13/2013    AST 25 05/13/2013        Echocardiogram 10/29/2019 [facility]:  1. Normal left ventricular size and systolic performance with a visually  estimated ejection fraction of 55%.    2. There is a small area of moderate distal anterior/apical hypokinesis.  3. There is mild sclerosis of the aortic valve with a small area of calcification (unchanged from previous).   4. On selected views, the right ventricle appears mildly enlarged with mildly reduced right ventricular systolic function (right ventricle the  right ventricle is not well visualized on some views).  5. There is mild left atrial enlargement.   6. Right ventricular systolic pressure relative to right atrial pressure is moderately increased.  The pulmonary artery pressure is estimated to be 50-55 mmHg plus right atrial pressure (the IVC is poorly visualized on this study).    Assessment and Plan: Catherine Sharp is a 43 year old female with history of SLE, chronic hypoxemic respiratory failure, and DVT who presents for evaluation of potential pulmonary hypertension.    1.  Possible pulmonary hypertension: Catherine has multiple reasons for potential pulmonary hypertension including interstitial lung disease causing chronic hypoxemic respiratory failure, history of SLE, and history of DVT.  At this point we will need to perform a thorough evaluation which will include her normal pulmonary hypertension screening labs, a VQ scan to rule out chronic thromboembolic pulmonary hypertension due to history of DVT, CT scan with PE protocol to evaluate for any signs of chronic thromboembolic disease and to evaluate parenchymal lung disease, full pulmonary function tests with DLCO assessment, echocardiogram to evaluate right ventricular size and  function, 6-minute walk test to evaluate functional capacity, a sleep study, and an invasive hemodynamic assessment with vasodilator study.  Catherine and is currently on Coumadin for her DVT and I instructed her to hold for 2 days prior to the right heart cath.    Once we have the results from these studies we will determine what the next steps will be in her treatment plan.  Thankfully, she does not have any signs of right heart failure clinically so I am hoping we are earlier in the process.  We did discuss the end option of lung transplant, and I think we are very far away from that having to be performed.    It was a pleasure seeing your patient Catherine Sharp at the [facility] Pulmonary Hypertension clinic.  Please contact us with any questions or concerns that you may have.    Sincerely,    Crater Eng MD, PhD   of Medicine  [facility]  Cardiovascular Division  [facility]

## 2019-02-26 NOTE — DISCHARGE INSTRUCTIONS

## 2019-02-26 NOTE — NURSING NOTE
Chief Complaint   Patient presents with     New Patient     New PH referral from Dr. Bearden      Vitals were taken and medications were reconciled.     Urmila Marc RMA  7:59 AM'

## 2019-02-26 NOTE — LETTER
February 26, 2019        Boris Bearden MD  [facility] [place] [address]    Dear Dr. Bearden    I had the pleasure of seeing your patient Catherine Sharp at the [facility] Pulmonary Hypertension clinic.  As you know, Catherine is a very pleasant 43-year-old female with a past medical history significant for chronic  hypoxic respiratory failure secondary to interstitial lung disease from lupus and past history of ARDS, SLE on plaquenil, history of DVT and on chronic coumadin, hypertension, spine fractures, hypothyroidism, chronic pain, and bone mineral density disease who presents for evaluation of pulmonary hypertension.  Catherine's cortez with lung disease started approximately in 1996 when she had open lung biopsy for ill-defined repeated respiratory failure.  Unfortunately, there was no clear diagnosis on the lung biopsy and she had a subsequent lung biopsy with no clear diagnosis of her lung disease.  She has been on chronic oxygen therapy for approximately 5 years with ranges anywhere between 3-6 L/min.  She does have a lot of variation in her symptoms and sometimes it feels better and she only needs 3 L/min and then other days she feels worse and needs to go up to 6.  She is still able to do her activities of daily living at home, but she is limited in her physical activity due to back pain.  She will get dyspnea on exertion, but many times her limitation is her back pain.  She can go up a flight of stairs but does feel slightly short of breath when she gets to the top.  She does have occasional chest pressure when exerting herself which resolves with rest and oxygen therapy.  She has had presyncopal sensations but no jocelyn syncope.  She does not have any lower extremity edema or abdominal swelling, but she does take Lasix 40 mg twice a day.  Overall I would classify her as WHO functional class III with limitations due to back pain as well.    Luna had an echocardiogram  performed which showed evidence of pulmonary hypertension with right ventricular dilation and estimated PAS P of over 50 mmHg.  She has not had an invasive hemodynamic assessment as they were trying to wean down her prednisone before doing a full exam.  She is currently down to prednisone 5 mg daily and is working on weaning towards 4 mg.    Catherine goins have a history of DVT for which she has been on chronic Coumadin.  She has not had any known diagnosis of pulmonary embolism.  She has not taken any diet drugs in the past.    PAST MEDICAL HISTORY:  -Chronic hypoxemic respiratory failure due to ILD from SLE  -DVT  -Hypertension  -Hypothyroidism  -Bone mineral density  -Chronic pain    FAMILY HISTORY:  No family history of PH    SOCIAL HISTORY:  Denies current tobacco, drug, and alcohol use.  Does have a 10-pack year history.    CURRENT MEDICATIONS:  Current Outpatient Medications   Medication Sig Dispense Refill     Albuterol Sulfate (VENTOLIN HFA IN) Inhale 2 puffs into the lungs every 4 hours as needed.         CALCIUM CITRATE PO Take 250 mg by mouth 2 times daily.       denosumab (PROLIA) 60 MG/ML SOLN injection Inject 60 mg Subcutaneous       diltiazem ER (DILT-XR) 240 MG 24 hr ER beaded capsule Take by mouth daily       ferrous sulfate 325 (65 FE) MG tablet Take 1 tablet by mouth 2 times daily. 30 tablet 1     hydroxychloroquine (PLAQUENIL) 200 MG tablet Take 200 mg by mouth daily.         levothyroxine (SYNTHROID/LEVOTHROID) 150 MCG tablet Take 150 mcg by mouth       OXYCODONE HCL PO Take 30 mg by mouth every 4 hours as needed.       OXYCONTIN 40 MG 12 hr tablet TAKE 1 TABLET BY MOUTH IN THE MORNING  0     PREDNISONE PO Take 5 mg by mouth daily        PROMETHAZINE HCL PO Take 25 mg by mouth every 4 hours as needed.       vitamin D (ERGOCALCIFEROL) 19113 UNIT capsule Take 50,000 Units by mouth twice a week. Monday and thursday       Warfarin Sodium (JANTOVEN PO) Take 5 mg by mouth daily. Patient alternates  "between 5 mg and 7.5 mg every other day.        AZATHIOPRINE PO Take 100 mg by mouth daily.       fentaNYL (DURAGESIC) 50 mcg/hr patch 72 hr Place 1 patch onto the skin every 72 hours.       HYDROXYZINE PAMOATE PO Take 25 mg by mouth every 6 hours as needed.       LISINOPRIL PO Take 40 mg by mouth every 12 hours.         LORAZEPAM PO Take 0.5 mg by mouth daily.         MedroxyPROGESTERone Ace Micro (MEDROXYPROGESTERONE ACETATE) Inject 150 mg/mL into the muscle every 3 months.       polyethylene glycol (MIRALAX/GLYCOLAX) packet Take 1 packet by mouth daily as needed.       Teriparatide, Recombinant, (FORTEO SC) Inject 600 mcg Subcutaneous daily.         ROS:   10 point ROS negative except HPI    EXAM:  /88 (BP Location: Right arm, Patient Position: Chair, Cuff Size: Adult Large)   Pulse 90   Ht 1.473 m (4' 10\")   Wt 91.2 kg (201 lb)   SpO2 90%   BMI 42.01 kg/m    General: appears comfortable, alert and articulate  Head: normocephalic, atraumatic  Eyes: anicteric sclera, EOMI  Neck: no adenopathy  Orophyarynx: moist mucosa, no lesions, dentition intact  Heart: regular, S1/S2 with prominent P2, JVP 3 cm, 1/6 HARSHAD heard at the LLSB  Lungs: inspiratory crackles bilaterally at the bases  Abdomen: soft, non-tender, bowel sounds present, no hepatosplenomegaly  Extremities: no clubbing, cyanosis or edema  Neurological: normal speech and affect, no gross motor deficits    Labs:  CBC RESULTS:  Lab Results   Component Value Date    WBC 6.6 05/14/2013    RBC 4.50 05/14/2013    HGB 9.3 (L) 05/14/2013    HCT 33.5 (L) 05/14/2013    MCV 74 (L) 05/14/2013    MCH 20.7 (L) 05/14/2013    MCHC 27.8 (L) 05/14/2013    RDW 22.1 (H) 05/14/2013     05/14/2013       CMP RESULTS:  Lab Results   Component Value Date     05/14/2013    POTASSIUM 3.9 05/14/2013    CHLORIDE 108 05/14/2013    CO2 21 05/14/2013    ANIONGAP 11 05/14/2013    GLC 92 05/14/2013    BUN 12 05/14/2013    CR 0.59 05/14/2013    GFRESTIMATED >90 " 05/14/2013    GFRESTBLACK >90 05/14/2013    JEFFERSON 8.3 (L) 05/14/2013    BILITOTAL 0.3 05/13/2013    ALBUMIN 3.1 (L) 05/13/2013    ALKPHOS 56 05/13/2013    ALT 24 05/13/2013    AST 25 05/13/2013        Echocardiogram 10/29/2019 [facility]:  1. Normal left ventricular size and systolic performance with a visually  estimated ejection fraction of 55%.    2. There is a small area of moderate distal anterior/apical hypokinesis.  3. There is mild sclerosis of the aortic valve with a small area of calcification (unchanged from previous).   4. On selected views, the right ventricle appears mildly enlarged with mildly reduced right ventricular systolic function (right ventricle the  right ventricle is not well visualized on some views).  5. There is mild left atrial enlargement.   6. Right ventricular systolic pressure relative to right atrial pressure is moderately increased.  The pulmonary artery pressure is estimated to be 50-55 mmHg plus right atrial pressure (the IVC is poorly visualized on this study).    Assessment and Plan: Catherine Sharp is a 43 year old female with history of SLE, chronic hypoxemic respiratory failure, and DVT who presents for evaluation of potential pulmonary hypertension.    1.  Possible pulmonary hypertension: Catherine has multiple reasons for potential pulmonary hypertension including interstitial lung disease causing chronic hypoxemic respiratory failure, history of SLE, and history of DVT.  At this point we will need to perform a thorough evaluation which will include her normal pulmonary hypertension screening labs, a VQ scan to rule out chronic thromboembolic pulmonary hypertension due to history of DVT, CT scan with PE protocol to evaluate for any signs of chronic thromboembolic disease and to evaluate parenchymal lung disease, full pulmonary function tests with DLCO assessment, echocardiogram to evaluate right ventricular size and function, 6-minute walk test to evaluate functional  capacity, a sleep study, and an invasive hemodynamic assessment with vasodilator study.  Catherine and is currently on Coumadin for her DVT and I instructed her to hold for 2 days prior to the right heart cath.    Once we have the results from these studies we will determine what the next steps will be in her treatment plan.  Thankfully, she does not have any signs of right heart failure clinically so I am hoping we are earlier in the process.  We did discuss the end option of lung transplant, and I think we are very far away from that having to be performed.    It was a pleasure seeing your patient Catherine Sharp at the [facility] Pulmonary Hypertension clinic.  Please contact us with any questions or concerns that you may have.    Sincerely,    Carter Eng MD, PhD   of Medicine  [facility]monary Hypertension  Cardiovascular Division  [facility]

## 2019-02-27 LAB
ANA PAT SER IF-IMP: ABNORMAL
ANA SER QL IF: POSITIVE
ANA TITR SER IF: ABNORMAL {TITER}
HBV CORE AB SERPL QL IA: NONREACTIVE
HBV SURFACE AB SERPL IA-ACNC: 2.04 M[IU]/ML
HBV SURFACE AG SERPL QL IA: NONREACTIVE
HCV AB SERPL QL IA: NONREACTIVE
HIV 1+2 AB+HIV1 P24 AG SERPL QL IA: NONREACTIVE
STFR SERPL-SCNC: 7.1 MG/L (ref 1.9–4.4)

## 2019-02-28 LAB
IL6 SERPL-MCNC: 5.25 PG/ML
LA PPP-IMP: ABNORMAL

## 2019-03-04 LAB
DLCOUNC-%PRED-PRE: 37 %
DLCOUNC-PRE: 6.68 ML/MIN/MMHG
DLCOUNC-PRED: 17.6 ML/MIN/MMHG
ERV-%PRED-PRE: 13 %
ERV-PRE: 0.04 L
ERV-PRED: 0.32 L
EXPTIME-PRE: 3.87 SEC
FEF2575-%PRED-PRE: 40 %
FEF2575-PRE: 1.1 L/SEC
FEF2575-PRED: 2.73 L/SEC
FEFMAX-%PRED-PRE: 57 %
FEFMAX-PRE: 3.48 L/SEC
FEFMAX-PRED: 6.08 L/SEC
FEV1-%PRED-PRE: 24 %
FEV1-PRE: 0.6 L
FEV1FEV6-PRE: 96 %
FEV1FEV6-PRED: 84 %
FEV1FVC-PRE: 96 %
FEV1FVC-PRED: 83 %
FEV1SVC-PRE: 71 %
FEV1SVC-PRED: 84 %
FIFMAX-PRE: 2.57 L/SEC
FRCPLETH-%PRED-PRE: 47 %
FRCPLETH-PRE: 1.12 L
FRCPLETH-PRED: 2.34 L
FVC-%PRED-PRE: 21 %
FVC-PRE: 0.62 L
FVC-PRED: 2.93 L
IC-%PRED-PRE: 30 %
IC-PRE: 0.8 L
IC-PRED: 2.59 L
RVPLETH-%PRED-PRE: 81 %
RVPLETH-PRE: 1.07 L
RVPLETH-PRED: 1.32 L
TLCPLETH-%PRED-PRE: 48 %
TLCPLETH-PRE: 1.91 L
TLCPLETH-PRED: 3.93 L
VA-%PRED-PRE: 41 %
VA-PRE: 1.56 L
VC-%PRED-PRE: 28 %
VC-PRE: 0.84 L
VC-PRED: 2.91 L

## 2019-03-05 ENCOUNTER — COMMUNICATION - HEALTHEAST (OUTPATIENT)
Dept: RHEUMATOLOGY | Facility: CLINIC | Age: 44
End: 2019-03-05

## 2019-03-05 DIAGNOSIS — M32.9 SYSTEMIC LUPUS ERYTHEMATOSUS (H): ICD-10-CM

## 2019-03-09 ENCOUNTER — HEALTH MAINTENANCE LETTER (OUTPATIENT)
Age: 44
End: 2019-03-09

## 2019-03-15 ENCOUNTER — AMBULATORY - HEALTHEAST (OUTPATIENT)
Dept: FAMILY MEDICINE | Facility: CLINIC | Age: 44
End: 2019-03-15

## 2019-03-15 ENCOUNTER — COMMUNICATION - HEALTHEAST (OUTPATIENT)
Dept: FAMILY MEDICINE | Facility: CLINIC | Age: 44
End: 2019-03-15

## 2019-03-19 ENCOUNTER — COMMUNICATION - HEALTHEAST (OUTPATIENT)
Dept: PALLIATIVE MEDICINE | Facility: OTHER | Age: 44
End: 2019-03-19

## 2019-03-19 DIAGNOSIS — F11.20 OPIOID DEPENDENCE, CONTINUOUS (H): ICD-10-CM

## 2019-03-19 DIAGNOSIS — G89.4 CHRONIC PAIN SYNDROME: ICD-10-CM

## 2019-03-21 ENCOUNTER — APPOINTMENT (OUTPATIENT)
Dept: MEDSURG UNIT | Facility: CLINIC | Age: 44
End: 2019-03-21
Attending: INTERNAL MEDICINE
Payer: MEDICARE

## 2019-03-21 ENCOUNTER — APPOINTMENT (OUTPATIENT)
Dept: LAB | Facility: CLINIC | Age: 44
End: 2019-03-21
Attending: INTERNAL MEDICINE
Payer: MEDICARE

## 2019-03-21 ENCOUNTER — HOSPITAL ENCOUNTER (OUTPATIENT)
Dept: GENERAL RADIOLOGY | Facility: CLINIC | Age: 44
End: 2019-03-21
Attending: INTERNAL MEDICINE | Admitting: INTERNAL MEDICINE
Payer: MEDICARE

## 2019-03-21 ENCOUNTER — HOSPITAL ENCOUNTER (OUTPATIENT)
Dept: CARDIOLOGY | Facility: CLINIC | Age: 44
Discharge: HOME | End: 2019-03-21
Attending: INTERNAL MEDICINE | Admitting: INTERNAL MEDICINE
Payer: MEDICARE

## 2019-03-21 ENCOUNTER — HOSPITAL ENCOUNTER (OUTPATIENT)
Dept: NUCLEAR MEDICINE | Facility: CLINIC | Age: 44
Setting detail: NUCLEAR MEDICINE
End: 2019-03-21
Attending: INTERNAL MEDICINE
Payer: MEDICARE

## 2019-03-21 ENCOUNTER — HOSPITAL ENCOUNTER (OUTPATIENT)
Facility: CLINIC | Age: 44
Discharge: HOME | End: 2019-03-21
Attending: INTERNAL MEDICINE | Admitting: INTERNAL MEDICINE
Payer: MEDICARE

## 2019-03-21 VITALS
RESPIRATION RATE: 16 BRPM | SYSTOLIC BLOOD PRESSURE: 169 MMHG | BODY MASS INDEX: 42.2 KG/M2 | HEART RATE: 90 BPM | WEIGHT: 201.06 LBS | DIASTOLIC BLOOD PRESSURE: 96 MMHG | OXYGEN SATURATION: 94 % | HEIGHT: 58 IN

## 2019-03-21 DIAGNOSIS — R06.09 DYSPNEA ON EXERTION: ICD-10-CM

## 2019-03-21 DIAGNOSIS — I27.20 PULMONARY HYPERTENSION (H): ICD-10-CM

## 2019-03-21 DIAGNOSIS — Z11.59 ENCOUNTER FOR SCREENING FOR OTHER VIRAL DISEASES: ICD-10-CM

## 2019-03-21 LAB
B-HCG SERPL-ACNC: <1 IU/L (ref 0–5)
INR PPP: 1.88 (ref 0.86–1.14)

## 2019-03-21 PROCEDURE — 25000125 ZZHC RX 250: Performed by: INTERNAL MEDICINE

## 2019-03-21 PROCEDURE — 36415 COLL VENOUS BLD VENIPUNCTURE: CPT | Performed by: INTERNAL MEDICINE

## 2019-03-21 PROCEDURE — 93306 TTE W/DOPPLER COMPLETE: CPT | Mod: 26 | Performed by: INTERNAL MEDICINE

## 2019-03-21 PROCEDURE — 93463 DRUG ADMIN & HEMODYNMIC MEAS: CPT | Performed by: INTERNAL MEDICINE

## 2019-03-21 PROCEDURE — A9567 TECHNETIUM TC-99M AEROSOL: HCPCS | Performed by: INTERNAL MEDICINE

## 2019-03-21 PROCEDURE — A9540 TC99M MAA: HCPCS | Performed by: INTERNAL MEDICINE

## 2019-03-21 PROCEDURE — 40000986 XR CHEST 2 VW

## 2019-03-21 PROCEDURE — 40000166 ZZH STATISTIC PP CARE STAGE 1

## 2019-03-21 PROCEDURE — 93451 RIGHT HEART CATH: CPT | Performed by: INTERNAL MEDICINE

## 2019-03-21 PROCEDURE — 93451 RIGHT HEART CATH: CPT | Mod: 26 | Performed by: INTERNAL MEDICINE

## 2019-03-21 PROCEDURE — 27210210 NM LUNG SCAN VENTILATION AND PERFUSION

## 2019-03-21 PROCEDURE — 40000264 ECHOCARDIOGRAM COMPLETE

## 2019-03-21 PROCEDURE — 85610 PROTHROMBIN TIME: CPT | Performed by: INTERNAL MEDICINE

## 2019-03-21 PROCEDURE — 27210794 ZZH OR GENERAL SUPPLY STERILE: Performed by: INTERNAL MEDICINE

## 2019-03-21 PROCEDURE — 84702 CHORIONIC GONADOTROPIN TEST: CPT | Performed by: INTERNAL MEDICINE

## 2019-03-21 PROCEDURE — 25800030 ZZH RX IP 258 OP 636

## 2019-03-21 PROCEDURE — 25500064 ZZH RX 255 OP 636: Performed by: INTERNAL MEDICINE

## 2019-03-21 PROCEDURE — 34300033 ZZH RX 343: Performed by: INTERNAL MEDICINE

## 2019-03-21 PROCEDURE — 25000125 ZZHC RX 250

## 2019-03-21 RX ORDER — ACYCLOVIR 200 MG/1
3 CAPSULE ORAL ONCE
Status: DISCONTINUED | OUTPATIENT
Start: 2019-03-21 | End: 2019-03-22 | Stop reason: HOSPADM

## 2019-03-21 RX ORDER — LIDOCAINE 40 MG/G
CREAM TOPICAL
Status: COMPLETED | OUTPATIENT
Start: 2019-03-21 | End: 2019-03-21

## 2019-03-21 RX ORDER — SODIUM NITROPRUSSIDE 25 MG/ML
INJECTION INTRAVENOUS CONTINUOUS PRN
Status: COMPLETED | OUTPATIENT
Start: 2019-03-21 | End: 2019-03-21

## 2019-03-21 RX ORDER — NITROGLYCERIN 20 MG/100ML
.07-2 INJECTION INTRAVENOUS CONTINUOUS PRN
Status: DISCONTINUED | OUTPATIENT
Start: 2019-03-21 | End: 2019-03-21 | Stop reason: HOSPADM

## 2019-03-21 RX ORDER — FUROSEMIDE 20 MG
20 TABLET ORAL 2 TIMES DAILY
COMMUNITY
End: 2019-04-03

## 2019-03-21 RX ADMIN — Medication 7 MCI.: at 09:53

## 2019-03-21 RX ADMIN — KIT FOR THE PREPARATION OF TECHNETIUM TC 99M PENTETATE 2 MCI.: 20 INJECTION, POWDER, LYOPHILIZED, FOR SOLUTION INTRAVENOUS; RESPIRATORY (INHALATION) at 09:53

## 2019-03-21 RX ADMIN — LIDOCAINE: 40 CREAM TOPICAL at 12:58

## 2019-03-21 RX ADMIN — HUMAN ALBUMIN MICROSPHERES AND PERFLUTREN 6 ML: 10; .22 INJECTION, SOLUTION INTRAVENOUS at 12:15

## 2019-03-21 ASSESSMENT — MIFFLIN-ST. JEOR: SCORE: 1466.75

## 2019-03-21 NOTE — IP AVS SNAPSHOT
[address] [address]                                    After Visit Summary   3/21/2019    Catherine Sharp    MRN: [id number]           After Visit Summary Signature Page    I have received my discharge instructions, and my questions have been answered. I have discussed any challenges I see with this plan with the nurse or doctor.    ..........................................................................................................................................  Patient/Patient Representative Signature      ..........................................................................................................................................  Patient Representative Print Name and Relationship to Patient    ..................................................               ................................................  Date                                   Time    ..........................................................................................................................................  Reviewed by Signature/Title    ...................................................              ..............................................  Date                                               Time          22EPIC Rev 08/18

## 2019-03-21 NOTE — PROGRESS NOTES
Discharge instructions given and pt voiced understanding. No scripts needed from pharmacy. Right neck site is soft and flat. No hematoma. Blood pressure elevated - patient will take her home blood pressure medication. Not taken this morning. Up walking in room. Adequate for discharge. Discharge to home with family.

## 2019-03-21 NOTE — DISCHARGE INSTRUCTIONS
[facility]                        Interventional Cardiology  Discharge Instructions   Post Right Heart Cath      AFTER YOU GO HOME:    DO drink plenty of fluids    DO resume your regular diet and medications unless otherwise instructed by your Primary Physician    Do Not scrub the procedure site vigorously    No lotion or powder to the puncture site for 3 days    CALL YOUR PRIMARY PHYSICIAN IF: You may resume all normal activity.  Monitor neck site for bleeding, swelling, or voice changes. If you notice bleeding or swelling immediately apply pressure to the site and call number below to speak with Cardiology Fellow.  If you experience any changes in your breathing you should call your doctor immediately or come to the closest Emergency Department.  Do not drive yourself.    ADDITIONAL INSTRUCTIONS: Medications: You are to resume all home medications including anticoagulation therapy unless otherwise advised by your primary cardiologist or nurse coordinator.    Follow Up: Per your primary cardiology team    If you have any questions or concerns regarding your procedure site please call [phone] at anytime and ask for Cardiology Fellow on call.  They are available 24 hours a day.  You may also contact the Cardiology Clinic after hours number at [phone].                                                       Telephone Numbers [phone] Monday-Friday 8:00 am to 4:30 pm    [phone] After 4:30 pm Monday-Friday, Weekends & Holidays  Ask for Interventional Cardiologist on call. Someone is on call 24 hours/day   [facility] toll free number [phone] Monday-Friday 8:00 am to 4:30 pm   [facility] Emergency Dept [phone]

## 2019-03-22 ASSESSMENT — ENCOUNTER SYMPTOMS
HYPOTENSION: 0
WHEEZING: 1
HEMOPTYSIS: 1
LEG PAIN: 0
DECREASED CONCENTRATION: 0
FLANK PAIN: 1
COUGH: 1
FEVER: 0
WEIGHT GAIN: 0
HEMATURIA: 0
INSOMNIA: 1
SYNCOPE: 0
ARTHRALGIAS: 1
ORTHOPNEA: 1
VOMITING: 0
JAUNDICE: 0
PALPITATIONS: 1
DIARRHEA: 0
NECK PAIN: 0
FATIGUE: 1
CONSTIPATION: 1
DECREASED APPETITE: 1
ALTERED TEMPERATURE REGULATION: 1
JOINT SWELLING: 1
SORE THROAT: 0
POSTURAL DYSPNEA: 1
LIGHT-HEADEDNESS: 1
TROUBLE SWALLOWING: 0
TASTE DISTURBANCE: 0
SINUS CONGESTION: 0
EXERCISE INTOLERANCE: 1
BRUISES/BLEEDS EASILY: 1
DECREASED LIBIDO: 0
MUSCLE CRAMPS: 0
SMELL DISTURBANCE: 0
EYE REDNESS: 0
PANIC: 1
COUGH DISTURBING SLEEP: 0
BLOOD IN STOOL: 0
DIFFICULTY URINATING: 0
DOUBLE VISION: 0
WEIGHT LOSS: 0
NAUSEA: 1
EYE IRRITATION: 1
HEARTBURN: 0
NIGHT SWEATS: 1
SNORES LOUDLY: 1
POLYDIPSIA: 1
CHILLS: 1
SPUTUM PRODUCTION: 1
BLOATING: 1
EYE PAIN: 1
NERVOUS/ANXIOUS: 1
INCREASED ENERGY: 1
SLEEP DISTURBANCES DUE TO BREATHING: 0
SINUS PAIN: 1
MYALGIAS: 1
POLYPHAGIA: 0
BACK PAIN: 1
HYPERTENSION: 1
DYSPNEA ON EXERTION: 1
HOARSE VOICE: 0
HOT FLASHES: 1
BOWEL INCONTINENCE: 0
HALLUCINATIONS: 0
NECK MASS: 0
MUSCLE WEAKNESS: 1
SWOLLEN GLANDS: 0
EYE WATERING: 1
DYSURIA: 0
DEPRESSION: 1
RECTAL PAIN: 0
SHORTNESS OF BREATH: 1
ABDOMINAL PAIN: 0
STIFFNESS: 1

## 2019-03-26 ENCOUNTER — OFFICE VISIT (OUTPATIENT)
Dept: CARDIOLOGY | Facility: CLINIC | Age: 44
End: 2019-03-26
Attending: INTERNAL MEDICINE
Payer: MEDICARE

## 2019-03-26 ENCOUNTER — RECORDS - HEALTHEAST (OUTPATIENT)
Dept: ADMINISTRATIVE | Facility: OTHER | Age: 44
End: 2019-03-26

## 2019-03-26 ENCOUNTER — REFERRAL (OUTPATIENT)
Dept: TRANSPLANT | Facility: CLINIC | Age: 44
End: 2019-03-26

## 2019-03-26 VITALS
WEIGHT: 197 LBS | HEIGHT: 58 IN | DIASTOLIC BLOOD PRESSURE: 88 MMHG | SYSTOLIC BLOOD PRESSURE: 137 MMHG | BODY MASS INDEX: 41.35 KG/M2 | OXYGEN SATURATION: 88 % | HEART RATE: 97 BPM

## 2019-03-26 DIAGNOSIS — I27.20 PULMONARY HYPERTENSION (H): ICD-10-CM

## 2019-03-26 DIAGNOSIS — T50.2X5A DIURETIC-INDUCED HYPOKALEMIA: ICD-10-CM

## 2019-03-26 DIAGNOSIS — R06.02 SOB (SHORTNESS OF BREATH): ICD-10-CM

## 2019-03-26 DIAGNOSIS — E87.6 DIURETIC-INDUCED HYPOKALEMIA: ICD-10-CM

## 2019-03-26 DIAGNOSIS — T50.2X5A DIURETIC-INDUCED HYPOKALEMIA: Primary | ICD-10-CM

## 2019-03-26 DIAGNOSIS — E87.6 DIURETIC-INDUCED HYPOKALEMIA: Primary | ICD-10-CM

## 2019-03-26 LAB
ANION GAP SERPL CALCULATED.3IONS-SCNC: 2 MMOL/L (ref 3–14)
BUN SERPL-MCNC: 12 MG/DL (ref 7–30)
CALCIUM SERPL-MCNC: 8.9 MG/DL (ref 8.5–10.1)
CHLORIDE SERPL-SCNC: 98 MMOL/L (ref 94–109)
CO2 SERPL-SCNC: 37 MMOL/L (ref 20–32)
CREAT SERPL-MCNC: 0.64 MG/DL (ref 0.52–1.04)
GFR SERPL CREATININE-BSD FRML MDRD: >90 ML/MIN/{1.73_M2}
GLUCOSE SERPL-MCNC: 139 MG/DL (ref 70–99)
POTASSIUM SERPL-SCNC: 4.1 MMOL/L (ref 3.4–5.3)
SODIUM SERPL-SCNC: 137 MMOL/L (ref 133–144)

## 2019-03-26 PROCEDURE — 36415 COLL VENOUS BLD VENIPUNCTURE: CPT | Performed by: INTERNAL MEDICINE

## 2019-03-26 PROCEDURE — G0463 HOSPITAL OUTPT CLINIC VISIT: HCPCS | Mod: ZF

## 2019-03-26 PROCEDURE — 99215 OFFICE O/P EST HI 40 MIN: CPT | Mod: ZP | Performed by: INTERNAL MEDICINE

## 2019-03-26 PROCEDURE — 80048 BASIC METABOLIC PNL TOTAL CA: CPT | Performed by: INTERNAL MEDICINE

## 2019-03-26 RX ORDER — POTASSIUM CHLORIDE 1500 MG/1
20 TABLET, EXTENDED RELEASE ORAL DAILY
Qty: 90 TABLET | Refills: 3 | Status: SHIPPED | OUTPATIENT
Start: 2019-03-26 | End: 2020-01-01

## 2019-03-26 ASSESSMENT — PAIN SCALES - GENERAL: PAINLEVEL: NO PAIN (0)

## 2019-03-26 ASSESSMENT — MIFFLIN-ST. JEOR: SCORE: 1448.34

## 2019-03-26 NOTE — PROGRESS NOTES
March 26, 2019      Boris Bearden MD  [facility] [address]    Dear Dr. Bearden    I had the pleasure of seeing your patient Catherine Sharp at the [facility] clinic.  As you know, Catherine is a very pleasant 43-year-old female with a past medical history significant for chronic  hypoxic respiratory failure secondary to interstitial lung disease from lupus and past history of ARDS, SLE on plaquenil, history of DVT and on chronic coumadin, hypertension, spine fractures, hypothyroidism, chronic pain, and bone mineral density disease who presents for evaluation of pulmonary hypertension.  Catherine's cortez with lung disease started approximately in 1996 when she had open lung biopsy for ill-defined repeated respiratory failure.  Unfortunately, there was no clear diagnosis on the lung biopsy and she had a subsequent lung biopsy with no clear diagnosis of her lung disease.  She has been on chronic oxygen therapy for approximately 5 years with ranges anywhere between 3-6 L/min.  She does have a lot of variation in her symptoms and sometimes it feels better and she only needs 3 L/min and then other days she feels worse and needs to go up to 6.  She is still able to do her activities of daily living at home, but she is limited in her physical activity due to back pain.  She will get dyspnea on exertion, but many times her limitation is her back pain.  She can go up a flight of stairs but does feel slightly short of breath when she gets to the top.  She does have occasional chest pressure when exerting herself which resolves with rest and oxygen therapy.  She has had presyncopal sensations but no jocelyn syncope.  She does not have any lower extremity edema or abdominal swelling, but she does take Lasix.    Catherine had a thorough work up for her pulmonary hypertension which showed she has severe Group 3 PH due to her ILD.  She has elevated RA pressures but preserved  cardiac output.  Her baseline RHC had a mildly elevated wedge but her systemic pressure was greater than 180/100 at the time.  She was treated with nipride which normalized her wedge but her PA pressures were still elevated.  She has been weighing herself at home and her weight is stable, but she has not been eating much lately.  She does take lasix 40 mg daily and then a second dose of 40 mg 2-3 times per week.  She was a little more hypoxemic today in clinic, but she said she did not have any issues with worsening dyspnea.  She remains WHO FC III.    PAST MEDICAL HISTORY:  -Chronic hypoxemic respiratory failure due to ILD from SLE  -DVT  -Hypertension  -Hypothyroidism  -Bone mineral density  -Chronic pain    FAMILY HISTORY:  No family history of PH    SOCIAL HISTORY:  Denies current tobacco, drug, and alcohol use.  Does have a 10-pack year history.    CURRENT MEDICATIONS:  Current Outpatient Medications   Medication Sig Dispense Refill     Albuterol Sulfate (VENTOLIN HFA IN) Inhale 2 puffs into the lungs every 4 hours as needed.         CALCIUM CITRATE PO Take 250 mg by mouth 2 times daily.       denosumab (PROLIA) 60 MG/ML SOLN injection Inject 60 mg Subcutaneous       diltiazem ER (DILT-XR) 240 MG 24 hr ER beaded capsule Take by mouth daily       ferrous sulfate 325 (65 FE) MG tablet Take 1 tablet by mouth 2 times daily. 30 tablet 1     furosemide (LASIX) 20 MG tablet Take 20 mg by mouth 2 times daily       hydroxychloroquine (PLAQUENIL) 200 MG tablet Take 200 mg by mouth daily.         levothyroxine (SYNTHROID/LEVOTHROID) 150 MCG tablet Take 150 mcg by mouth       OXYCODONE HCL PO Take 30 mg by mouth 3 times daily as needed        OXYCONTIN 40 MG 12 hr tablet TAKE 1 TABLET BY MOUTH IN THE MORNING  0     PREDNISONE PO Take 5 mg by mouth daily        PROMETHAZINE HCL PO Take 25 mg by mouth every 4 hours as needed.       vitamin D (ERGOCALCIFEROL) 43307 UNIT capsule Take 50,000 Units by mouth twice a week. Monday  "and thursday       Warfarin Sodium (JANTOVEN PO) Take 5 mg by mouth daily. Patient alternates between 5 mg and 7.5 mg every other day.          ROS:   10 point ROS negative except HPI    EXAM:  /88 (BP Location: Right arm, Patient Position: Chair, Cuff Size: Adult Regular)   Pulse 97   Ht 1.473 m (4' 10\")   Wt 89.4 kg (197 lb)   SpO2 (!) 88%   BMI 41.17 kg/m    General: appears comfortable, alert and articulate  Head: normocephalic, atraumatic  Eyes: anicteric sclera, EOMI  Neck: no adenopathy  Orophyarynx: moist mucosa, no lesions, dentition intact  Heart: regular, S1/S2 with prominent P2, JVP 12 cm, 1/6 HARSHAD heard at the LLSB  Lungs: inspiratory crackles bilaterally at the bases but left worse than right  Abdomen: soft, non-tender, bowel sounds present, no hepatosplenomegaly  Extremities: no clubbing, cyanosis or edema  Neurological: normal speech and affect, no gross motor deficits    Labs:  CBC RESULTS:  Lab Results   Component Value Date    WBC 7.0 02/26/2019    RBC 5.26 (H) 02/26/2019    HGB 15.0 02/26/2019    HCT 51.9 (H) 02/26/2019    MCV 99 02/26/2019    MCH 28.5 02/26/2019    MCHC 28.9 (L) 02/26/2019    RDW 14.4 02/26/2019     (L) 02/26/2019       CMP RESULTS:  Lab Results   Component Value Date     02/26/2019    POTASSIUM 3.6 02/26/2019    CHLORIDE 97 02/26/2019    CO2 36 (H) 02/26/2019    ANIONGAP 7 02/26/2019    GLC 74 02/26/2019    BUN 10 02/26/2019    CR 0.70 02/26/2019    GFRESTIMATED >90 02/26/2019    GFRESTBLACK >90 02/26/2019    JEFFERSON 8.2 (L) 02/26/2019    BILITOTAL 0.4 02/26/2019    ALBUMIN 3.5 02/26/2019    ALKPHOS 78 02/26/2019    ALT 20 02/26/2019    AST 25 02/26/2019        Echocardiogram 3/21/2019:  Mild to moderate right ventricular dilation is present.  There is moderate to severe right ventricular hypertrophy.  Global right ventricular function is moderately reduced.  Severe pulmonary hypertension is present.  Estimated pulmonary artery systolic pressure is 75 mmHg " plus right atrial pressure.  The inferior vena cava was normal in size with preserved respiratory variability.  No right to left shunting detected following injection of agitated saline.    Pulmonary Function Tests  FVC: 21%  FEV1: 24%  FEV/FVC: 96%  T%  DLCO: 37%    [facility] 3/21/2019  Systemic BP: 180s/110s  RA: 18  RV: 100/20  PA: 100/50 (68)  PCWP: 17  CO/CI: 6.1/3.3  PVR: 8.4    Nipride Study  PA: 74/27 (50)  PCWP: 14  CO/CI: 5.7/3.1  PVR: 6.3    Assessment and Plan: Catherine Sharp is a 43 year old female who presents for return evaluation for severe Group 3 PH.    1.  Severe Group 3 Pulmonary Hypertension with Preserved Cardiac Output: Ms. Hsu has severe group 3 pulmonary hypertension with limited functional capacity due to pain and significant lung disease.  Her hemodynamic evaluation revealed a PVR over 6, but thankfully her cardiac index is preserved.  She does have a very reduced DLCO which we have previously demonstrated as an independent risk factor for mortality and group 3 pulmonary hypertension (Mariel et al., 2019 [facility]).      As you know, pulmonary vasodilators have limited efficacy in Group 3 PH due to ILD.  She does have high oxygen requirements so I am reluctant to start a PAH specific therapy unless she is in a clinical trial that has a very controlled and protocolized evaluation.    At this point, I will have her speak with our research nurse about the possibility of enrolling in the Increase trial, which is a trial that evaluates the utility of inhaled prostacyclin in interstitial lung disease associated pulmonary hypertension.  I will also refer her to the lung transplant team because with her severe pulmonary hypertension and very low DLCO I would anticipate her 5-year survival to be much lower than what a transplant patient would have.  I will increase her diuretic dose to 40 mg twice a day for the next week to help with her volume overload.  We will recheck a basic metabolic panel next  week to ensure her renal function is not compromised.  Finally I will have her come back and see me in clinic in 3 months.    It was a pleasure seeing your patient Catherine Sharp at the [facility] Pulmonary Hypertension clinic.  Please contact us with any questions or concerns that you may have.    Sincerely,    Carter Eng MD, PhD   of Medicine  Center for Pulmonary Hypertension  Cardiovascular Division  [facility]

## 2019-03-26 NOTE — LETTER
April 18, 2019  Catherine Sharp  [address]  [facility] [address]        Dear Catherine,    Thank you for your interest in the Transplant Center at [facility], [facility]. We look forward to being a part of your care team and assisting you through the transplant process.    As we discussed, your transplant coordinator is Nahed Manzano (Lung).  You may call your coordinator at any time with questions or concerns.  Your first scheduled call will be on May 1,2019.  If this needs to change, call [phone].    Please complete the following.    1. Fill out and return the enclosed forms    Authorization for Electronic Communication    Authorization to Discuss Protected Health Information    Authorization for Release of Protected Health Information    2. Sign up for:    [employer], access to your electronic medical record (see enclosed pamphlet)    [employer], a transplant education website    You can use these tools to learn more about your transplant, communicate with your care team, and track your medical details    Sincerely,    Solid Organ Transplant  [facility], [facility]    cc: Delmis Ma (PCP), Carter Eng MD (Referring)

## 2019-03-26 NOTE — NURSING NOTE
Med Reconcile: Reviewed and verified all current medications with the patient. The updated medication list was printed and given to the patient.  Medication Change: Patient was educated regarding prescribed medication change, including discussion of the indication, administration, side effects, and when to report to MD or RN. Patient demonstrated understanding of this information and agreed to call with further questions or concerns.  Diet: Patient instructed regarding a heart healthy diet, including discussion of reduced fat and sodium intake. Patient demonstrated understanding of this information and agreed to call with further questions or concerns.  Return Appointment: Patient given instructions regarding scheduling next clinic visit. Patient demonstrated understanding of this information and agreed to call with further questions or concerns.  Patient stated she understood all health information given and agreed to call with further questions or concerns.     Medication Changes:   Increase your Lasix to 40 mg twice a day  Start Potassium 20 mEq Daily   With these medication changes you will need to get labs drawn in one week    Patient Instructions:  **Labs today    1. Continue staying active and eat a heart healthy diet.    2. Please keep current list of medications with you at all times.    3. Remember to weigh yourself daily after voiding and before you consume any food or beverages and log the numbers.  If you have gained/lost 2 pounds overnight or 5 pounds in a week contact us immediately for medication adjustments or further instructions.    4. **Please call us immediately if you have any syncope (fainting or passing out), chest pain, edema (swelling or weight gain), or decline in your functional status (general decline in how you are feeling).    Follow up Appointment Information:  Labs in one week to follow up on medication changes  We will refer you to lung transplant - please let me know if they don't  reach out to you in one week.  3 month follow up with Labs prior

## 2019-03-26 NOTE — LETTER
June 4, 2019    From:  [facility]     To: Mr. prema Sharp  [address]  [facility] [address]      Dear Catherine Sharp,    Thank you for considering the [facility] Lung Transplant Program. Dr. Eng referred you to our program for consideration of lung transplant evaluation. We have reviewed your records and you unfortunately do not meet our criteria for lung transplant evaluation.  Although your lung disease may be severe enough to consider transplantation as an option, our opinion is that the following condition(s) would put your health and survival at great risk after lung transplantation.     Obesity is an important risk factor for poor outcomes after lung transplantation. At our center our recommended goal BMI (body mass index) for transplantation is 30 or less. Once you reach weight of 143lbs please call us to schedule an appointment. While we may see you and evaluate you as a potential lung transplant candidate in certain cases before reaching the goal, we will not put you on the transplant waiting list unless your BMI is less than 30.  If you need advice or information regarding the best options to help you lose weight, please talk to your Primary Care Provider.    Though you do not meet our program s criteria for lung transplantation, we will be glad to meet with you in our clinic to explore other treatment options for your lung disease.       Thank you for considering the [facility] Lung Transplant Program for your health care needs. Please feel free to contact us at [phone] if you would like to discuss our decision or with any concerns.     Sincerely,  Elio Martinez MD,  Medical Director, Lung Transplantation  [facility]    CC: Delmis Ma(PCP), Emilee Gallo RN, Yessy Gastelum RN,   Carter Eng MD(Referring)

## 2019-03-26 NOTE — PATIENT INSTRUCTIONS
Medication Changes:   Increase your Lasix to 40 mg twice a day  Start Potassium 20 mEq Daily   With these medication changes you will need to get labs drawn in one week    Patient Instructions:  **Labs today    1. Continue staying active and eat a heart healthy diet.    2. Please keep current list of medications with you at all times.    3. Remember to weigh yourself daily after voiding and before you consume any food or beverages and log the numbers.  If you have gained/lost 2 pounds overnight or 5 pounds in a week contact us immediately for medication adjustments or further instructions.    4. **Please call us immediately if you have any syncope (fainting or passing out), chest pain, edema (swelling or weight gain), or decline in your functional status (general decline in how you are feeling).    Follow up Appointment Information:  Labs in one week to follow up on medication changes  We will refer you to lung transplant - please let me know if they don't reach out to you in one week.  3 month follow up with Labs prior    We are located on the third floor of the Clinic and Surgery Center ([facility]) on the [facility].  Our address is     [address] [place] [address] [place] [address] [place] [address]      Thank you for allowing us to be a part of your care here at the [facility]    If you have questions or concerns please contact us at:    Emilee Gallo RN, BSN    Dk Carey (Schedule,Prior Auth)  Nurse Coordinator     Clinic   Pulmonary Hypertension   Pulmonary Hypertension  [facility]  (P)[phone]    (P) [phone]        (F)[phone]                ** Please note that you will NOT receive a reminder call regarding your scheduled testing, reminder calls are for provider appointments only.  If you are scheduled for testing within the [facility] system you may receive a  call regarding pre-registration for billing purposes only.**     Support Group:  Pulmonary Hypertension Association  Https://www.phassociation.org/  **Look at the Events Tab** They even have Support Groups that you can call into    [facility] PH Support Group  Second Saturday of the Month from 1-3 PM   Location: [address] [place] [id number]  Leader: Emmy Andrews  Phone: [phone] or [phone]  Email: [email or web address]

## 2019-03-26 NOTE — LETTER
3/26/2019      RE: Catherine Sharp  [address]  [facility] [address]     March 26, 2019      Boris Bearden MD  [facility] [place] [address]    Dear Dr. Bearden    I had the pleasure of seeing your patient Catherine Sharp at the [facility] Pulmonary Hypertension clinic.  As you know, Catherine is a very pleasant 43-year-old female with a past medical history significant for chronic  hypoxic respiratory failure secondary to interstitial lung disease from lupus and past history of ARDS, SLE on plaquenil, history of DVT and on chronic coumadin, hypertension, spine fractures, hypothyroidism, chronic pain, and bone mineral density disease who presents for evaluation of pulmonary hypertension.  Catherine's cortez with lung disease started approximately in 1996 when she had open lung biopsy for ill-defined repeated respiratory failure.  Unfortunately, there was no clear diagnosis on the lung biopsy and she had a subsequent lung biopsy with no clear diagnosis of her lung disease.  She has been on chronic oxygen therapy for approximately 5 years with ranges anywhere between 3-6 L/min.  She does have a lot of variation in her symptoms and sometimes it feels better and she only needs 3 L/min and then other days she feels worse and needs to go up to 6.  She is still able to do her activities of daily living at home, but she is limited in her physical activity due to back pain.  She will get dyspnea on exertion, but many times her limitation is her back pain.  She can go up a flight of stairs but does feel slightly short of breath when she gets to the top.  She does have occasional chest pressure when exerting herself which resolves with rest and oxygen therapy.  She has had presyncopal sensations but no jocelyn syncope.  She does not have any lower extremity edema or abdominal swelling, but she does take Lasix.    Catherine had a thorough work up for her pulmonary hypertension which showed she  has severe Group 3 PH due to her ILD.  She has elevated RA pressures but preserved cardiac output.  Her baseline RHC had a mildly elevated wedge but her systemic pressure was greater than 180/100 at the time.  She was treated with nipride which normalized her wedge but her PA pressures were still elevated.  She has been weighing herself at home and her weight is stable, but she has not been eating much lately.  She does take lasix 40 mg daily and then a second dose of 40 mg 2-3 times per week.  She was a little more hypoxemic today in clinic, but she said she did not have any issues with worsening dyspnea.  She remains WHO FC III.    PAST MEDICAL HISTORY:  -Chronic hypoxemic respiratory failure due to ILD from SLE  -DVT  -Hypertension  -Hypothyroidism  -Bone mineral density  -Chronic pain    FAMILY HISTORY:  No family history of PH    SOCIAL HISTORY:  Denies current tobacco, drug, and alcohol use.  Does have a 10-pack year history.    CURRENT MEDICATIONS:  Current Outpatient Medications   Medication Sig Dispense Refill     Albuterol Sulfate (VENTOLIN HFA IN) Inhale 2 puffs into the lungs every 4 hours as needed.         CALCIUM CITRATE PO Take 250 mg by mouth 2 times daily.       denosumab (PROLIA) 60 MG/ML SOLN injection Inject 60 mg Subcutaneous       diltiazem ER (DILT-XR) 240 MG 24 hr ER beaded capsule Take by mouth daily       ferrous sulfate 325 (65 FE) MG tablet Take 1 tablet by mouth 2 times daily. 30 tablet 1     furosemide (LASIX) 20 MG tablet Take 20 mg by mouth 2 times daily       hydroxychloroquine (PLAQUENIL) 200 MG tablet Take 200 mg by mouth daily.         levothyroxine (SYNTHROID/LEVOTHROID) 150 MCG tablet Take 150 mcg by mouth       OXYCODONE HCL PO Take 30 mg by mouth 3 times daily as needed        OXYCONTIN 40 MG 12 hr tablet TAKE 1 TABLET BY MOUTH IN THE MORNING  0     PREDNISONE PO Take 5 mg by mouth daily        PROMETHAZINE HCL PO Take 25 mg by mouth every 4 hours as needed.       vitamin D  "(ERGOCALCIFEROL) 61316 UNIT capsule Take 50,000 Units by mouth twice a week. Monday and thursday       Warfarin Sodium (JANTOVEN PO) Take 5 mg by mouth daily. Patient alternates between 5 mg and 7.5 mg every other day.          ROS:   10 point ROS negative except HPI    EXAM:  /88 (BP Location: Right arm, Patient Position: Chair, Cuff Size: Adult Regular)   Pulse 97   Ht 1.473 m (4' 10\")   Wt 89.4 kg (197 lb)   SpO2 (!) 88%   BMI 41.17 kg/m    General: appears comfortable, alert and articulate  Head: normocephalic, atraumatic  Eyes: anicteric sclera, EOMI  Neck: no adenopathy  Orophyarynx: moist mucosa, no lesions, dentition intact  Heart: regular, S1/S2 with prominent P2, JVP 12 cm, 1/6 HARSHAD heard at the LLSB  Lungs: inspiratory crackles bilaterally at the bases but left worse than right  Abdomen: soft, non-tender, bowel sounds present, no hepatosplenomegaly  Extremities: no clubbing, cyanosis or edema  Neurological: normal speech and affect, no gross motor deficits    Labs:  CBC RESULTS:  Lab Results   Component Value Date    WBC 7.0 02/26/2019    RBC 5.26 (H) 02/26/2019    HGB 15.0 02/26/2019    HCT 51.9 (H) 02/26/2019    MCV 99 02/26/2019    MCH 28.5 02/26/2019    MCHC 28.9 (L) 02/26/2019    RDW 14.4 02/26/2019     (L) 02/26/2019       CMP RESULTS:  Lab Results   Component Value Date     02/26/2019    POTASSIUM 3.6 02/26/2019    CHLORIDE 97 02/26/2019    CO2 36 (H) 02/26/2019    ANIONGAP 7 02/26/2019    GLC 74 02/26/2019    BUN 10 02/26/2019    CR 0.70 02/26/2019    GFRESTIMATED >90 02/26/2019    GFRESTBLACK >90 02/26/2019    JEFFERSON 8.2 (L) 02/26/2019    BILITOTAL 0.4 02/26/2019    ALBUMIN 3.5 02/26/2019    ALKPHOS 78 02/26/2019    ALT 20 02/26/2019    AST 25 02/26/2019        Echocardiogram 3/21/2019:  Mild to moderate right ventricular dilation is present.  There is moderate to severe right ventricular hypertrophy.  Global right ventricular function is moderately reduced.  Severe pulmonary " hypertension is present.  Estimated pulmonary artery systolic pressure is 75 mmHg plus right atrial pressure.  The inferior vena cava was normal in size with preserved respiratory variability.  No right to left shunting detected following injection of agitated saline.    Pulmonary Function Tests  FVC: 21%  FEV1: 24%  FEV/FVC: 96%  T%  DLCO: 37%    [facility] 3/21/2019  Systemic BP: 180s/110s  RA: 18  RV: 100/20  PA: 100/50 (68)  PCWP: 17  CO/CI: 6.1/3.3  PVR: 8.4    Nipride Study  PA: 74/27 (50)  PCWP: 14  CO/CI: 5.7/3.1  PVR: 6.3    Assessment and Plan: Catherine Sharp is a 43 year old female who presents for return evaluation for severe Group 3 PH.    1.  Severe Group 3 Pulmonary Hypertension with Preserved Cardiac Output: Ms. Hsu has severe group 3 pulmonary hypertension with limited functional capacity due to pain and significant lung disease.  Her hemodynamic evaluation revealed a PVR over 6, but thankfully her cardiac index is preserved.  She does have a very reduced DLCO which we have previously demonstrated as an independent risk factor for mortality and group 3 pulmonary hypertension (Mariel et al., 2019 [facility]).      As you know, pulmonary vasodilators have limited efficacy in Group 3 PH due to ILD.  She does have high oxygen requirements so I am reluctant to start a PAH specific therapy unless she is in a clinical trial that has a very controlled and protocolized evaluation.    At this point, I will have her speak with our research nurse about the possibility of enrolling in the Increase trial, which is a trial that evaluates the utility of inhaled prostacyclin in interstitial lung disease associated pulmonary hypertension.  I will also refer her to the lung transplant team because with her severe pulmonary hypertension and very low DLCO I would anticipate her 5-year survival to be much lower than what a transplant patient would have.  I will increase her diuretic dose to 40 mg twice a day for the next  week to help with her volume overload.  We will recheck a basic metabolic panel next week to ensure her renal function is not compromised.  Finally I will have her come back and see me in clinic in 3 months.    It was a pleasure seeing your patient Catherine Sharp at the [facility] Pulmonary Hypertension clinic.  Please contact us with any questions or concerns that you may have.    Sincerely,    Carter Eng MD, PhD   of Medicine  Center for Pulmonary Hypertension  Cardiovascular Division  [facility]

## 2019-03-26 NOTE — LETTER
April 24, 2019        IMAGES REQUEST    [facility] lung transplant team is requesting images from Referring Providers Office for patients referred to the Lung Transplant Program                    Facility:[facility]       Patient:Catherine Sharp  [birth year]    Images Needed to Process Intake of Patient:    CXR Images (most recent)    Chest CT Images (all within last 24 months or most recent)    Heart Cath Images (most recent)    Echocardiogram    Dexa Scan (most recent)    Abdomen/Pelvic CT (most recent)  Requested imaging may be electronically sent to the [facility] system via [id number] DICOM connection. When unable to send imaging electronically, an exported DICOM CD may be sent. Please indicate when images have been sent electronically on a return faxed cover sheet.    Please fax all paper records to [phone].    Please send all scans/slides to:   [facility]  Solid Organ Transplant Office  [address] [place] [address], [address] [address]  Please call our office at [phone] if you have any questions or concerns.

## 2019-03-26 NOTE — NURSING NOTE
Chief Complaint   Patient presents with     Follow Up     Return for PH F/U after testing completion     Vitals were taken and medications were reconciled.     Urmila Marc RMA  7:59 AM

## 2019-03-26 NOTE — Clinical Note
3/26/2019      RE: Catherine Sharp  [address]  [facility] [address]       Dear Colleague,    Thank you for the opportunity to participate in the care of your patient, Catherine Sharp, at the [facility]. Please see a copy of my visit note below.    March 26, 2019      Boris Bearden MD  [facility]  [place] [id number]    Dear Dr. Bearden    I had the pleasure of seeing your patient Catherine Sharp at the [facility] Pulmonary Hypertension clinic.  As you know, Catherine is a very pleasant 43-year-old female with a past medical history significant for chronic  hypoxic respiratory failure secondary to interstitial lung disease from lupus and past history of ARDS, SLE on plaquenil, history of DVT and on chronic coumadin, hypertension, spine fractures, hypothyroidism, chronic pain, and bone mineral density disease who presents for evaluation of pulmonary hypertension.  Catherine's cortez with lung disease started approximately in 1996 when she had open lung biopsy for ill-defined repeated respiratory failure.  Unfortunately, there was no clear diagnosis on the lung biopsy and she had a subsequent lung biopsy with no clear diagnosis of her lung disease.  She has been on chronic oxygen therapy for approximately 5 years with ranges anywhere between 3-6 L/min.  She does have a lot of variation in her symptoms and sometimes it feels better and she only needs 3 L/min and then other days she feels worse and needs to go up to 6.  She is still able to do her activities of daily living at home, but she is limited in her physical activity due to back pain.  She will get dyspnea on exertion, but many times her limitation is her back pain.  She can go up a flight of stairs but does feel slightly short of breath when she gets to the top.  She does have occasional chest pressure when exerting herself which resolves with rest and oxygen  therapy.  She has had presyncopal sensations but no jocelyn syncope.  She does not have any lower extremity edema or abdominal swelling, but she does take Lasix 40 mg twice a day.  Overall I would classify her as WHO functional class III with limitations due to back pain as well.    Catherine had a thorough work up for her pulmonary hypertension    PAST MEDICAL HISTORY:  -Chronic hypoxemic respiratory failure due to ILD from SLE  -DVT  -Hypertension  -Hypothyroidism  -Bone mineral density  -Chronic pain    FAMILY HISTORY:  No family history of PH    SOCIAL HISTORY:  Denies current tobacco, drug, and alcohol use.  Does have a 10-pack year history.    CURRENT MEDICATIONS:  Current Outpatient Medications   Medication Sig Dispense Refill     Albuterol Sulfate (VENTOLIN HFA IN) Inhale 2 puffs into the lungs every 4 hours as needed.         CALCIUM CITRATE PO Take 250 mg by mouth 2 times daily.       denosumab (PROLIA) 60 MG/ML SOLN injection Inject 60 mg Subcutaneous       diltiazem ER (DILT-XR) 240 MG 24 hr ER beaded capsule Take by mouth daily       ferrous sulfate 325 (65 FE) MG tablet Take 1 tablet by mouth 2 times daily. 30 tablet 1     furosemide (LASIX) 20 MG tablet Take 20 mg by mouth 2 times daily       hydroxychloroquine (PLAQUENIL) 200 MG tablet Take 200 mg by mouth daily.         levothyroxine (SYNTHROID/LEVOTHROID) 150 MCG tablet Take 150 mcg by mouth       OXYCODONE HCL PO Take 30 mg by mouth 3 times daily as needed        OXYCONTIN 40 MG 12 hr tablet TAKE 1 TABLET BY MOUTH IN THE MORNING  0     PREDNISONE PO Take 5 mg by mouth daily        PROMETHAZINE HCL PO Take 25 mg by mouth every 4 hours as needed.       vitamin D (ERGOCALCIFEROL) 68927 UNIT capsule Take 50,000 Units by mouth twice a week. Monday and thursday       Warfarin Sodium (JANTOVEN PO) Take 5 mg by mouth daily. Patient alternates between 5 mg and 7.5 mg every other day.          ROS:   10 point ROS negative except HPI    EXAM:  /88 (BP  "Location: Right arm, Patient Position: Chair, Cuff Size: Adult Regular)   Pulse 97   Ht 1.473 m (4' 10\")   Wt 89.4 kg (197 lb)   SpO2 (!) 88%   BMI 41.17 kg/m     General: appears comfortable, alert and articulate  Head: normocephalic, atraumatic  Eyes: anicteric sclera, EOMI  Neck: no adenopathy  Orophyarynx: moist mucosa, no lesions, dentition intact  Heart: regular, S1/S2 with prominent P2, JVP 3 cm, 1/6 HARSHAD heard at the LLSB  Lungs: inspiratory crackles bilaterally at the bases  Abdomen: soft, non-tender, bowel sounds present, no hepatosplenomegaly  Extremities: no clubbing, cyanosis or edema  Neurological: normal speech and affect, no gross motor deficits    Labs:  CBC RESULTS:  Lab Results   Component Value Date    WBC 7.0 02/26/2019    RBC 5.26 (H) 02/26/2019    HGB 15.0 02/26/2019    HCT 51.9 (H) 02/26/2019    MCV 99 02/26/2019    MCH 28.5 02/26/2019    MCHC 28.9 (L) 02/26/2019    RDW 14.4 02/26/2019     (L) 02/26/2019       CMP RESULTS:  Lab Results   Component Value Date     02/26/2019    POTASSIUM 3.6 02/26/2019    CHLORIDE 97 02/26/2019    CO2 36 (H) 02/26/2019    ANIONGAP 7 02/26/2019    GLC 74 02/26/2019    BUN 10 02/26/2019    CR 0.70 02/26/2019    GFRESTIMATED >90 02/26/2019    GFRESTBLACK >90 02/26/2019    JEFFERSON 8.2 (L) 02/26/2019    BILITOTAL 0.4 02/26/2019    ALBUMIN 3.5 02/26/2019    ALKPHOS 78 02/26/2019    ALT 20 02/26/2019    AST 25 02/26/2019        Echocardiogram 3/21/2019:  Mild to moderate right ventricular dilation is present.  There is moderate to severe right ventricular hypertrophy.  Global right ventricular function is moderately reduced.  Severe pulmonary hypertension is present.  Estimated pulmonary artery systolic pressure is 75 mmHg plus right atrial pressure.  The inferior vena cava was normal in size with preserved respiratory variability.  No right to left shunting detected following injection of agitated saline.    Pulmonary Function Tests      RHC " 3/21/2019  Systemic BP: 180s/110s  RA: 18  RV: 100/20  PA: 100/50 (68)  PCWP: 17  CO/CI: 6.1/3.3  PVR: 8.4    Nipride Study  PA: 74/27 (50)  PCWP: 14  CO/CI: 5.7/3.1  PVR: 6.3    Assessment and Plan: Catherine Sharp is a 43 year old female with history of SLE, chronic hypoxemic respiratory failure, and DVT who presents for severe Group 3 PH.    1.  Severe Group 3 Pulmonary Hypertension with Preserved Cardiac Output:    It was a pleasure seeing your patient Catherine Sharp at the [facility] Pulmonary Hypertension clinic.  Please contact us with any questions or concerns that you may have.    Sincerely,    Carter Eng MD, PhD   of Medicine  [facility]  Cardiovascular Division  [facility]                  March 26, 2019      Boris Bearden MD  [facility] [place] [address]    Dear Dr. Bearden    I had the pleasure of seeing your patient Catherine Sharp at the [facility] Pulmonary Hypertension clinic.  As you know, Catherine is a very pleasant 43-year-old female with a past medical history significant for chronic  hypoxic respiratory failure secondary to interstitial lung disease from lupus and past history of ARDS, SLE on plaquenil, history of DVT and on chronic coumadin, hypertension, spine fractures, hypothyroidism, chronic pain, and bone mineral density disease who presents for evaluation of pulmonary hypertension.  Catherine's cortez with lung disease started approximately in 1996 when she had open lung biopsy for ill-defined repeated respiratory failure.  Unfortunately, there was no clear diagnosis on the lung biopsy and she had a subsequent lung biopsy with no clear diagnosis of her lung disease.  She has been on chronic oxygen therapy for approximately 5 years with ranges anywhere between 3-6 L/min.  She does have a lot of variation in her symptoms and sometimes it feels better and she only needs 3 L/min and then  other days she feels worse and needs to go up to 6.  She is still able to do her activities of daily living at home, but she is limited in her physical activity due to back pain.  She will get dyspnea on exertion, but many times her limitation is her back pain.  She can go up a flight of stairs but does feel slightly short of breath when she gets to the top.  She does have occasional chest pressure when exerting herself which resolves with rest and oxygen therapy.  She has had presyncopal sensations but no jocelyn syncope.  She does not have any lower extremity edema or abdominal swelling, but she does take Lasix.    Catherine had a thorough work up for her pulmonary hypertension which showed she has severe Group 3 PH due to her ILD.  She has elevated RA pressures but preserved cardiac output.  Her baseline RHC had a mildly elevated wedge but her systemic pressure was greater than 180/100 at the time.  She was treated with nipride which normalized her wedge but her PA pressures were still elevated.  She has been weighing herself at home and her weight is stable, but she has not been eating much lately.  She does take lasix 40 mg daily and then a second dose of 40 mg 2-3 times per week.  She was a little more hypoxemic today in clinic, but she said she did not have any issues with worsening dyspnea.  She remains WHO FC III.    PAST MEDICAL HISTORY:  -Chronic hypoxemic respiratory failure due to ILD from SLE  -DVT  -Hypertension  -Hypothyroidism  -Bone mineral density  -Chronic pain    FAMILY HISTORY:  No family history of PH    SOCIAL HISTORY:  Denies current tobacco, drug, and alcohol use.  Does have a 10-pack year history.    CURRENT MEDICATIONS:  Current Outpatient Medications   Medication Sig Dispense Refill     Albuterol Sulfate (VENTOLIN HFA IN) Inhale 2 puffs into the lungs every 4 hours as needed.         CALCIUM CITRATE PO Take 250 mg by mouth 2 times daily.       denosumab (PROLIA) 60 MG/ML SOLN injection  "Inject 60 mg Subcutaneous       diltiazem ER (DILT-XR) 240 MG 24 hr ER beaded capsule Take by mouth daily       ferrous sulfate 325 (65 FE) MG tablet Take 1 tablet by mouth 2 times daily. 30 tablet 1     furosemide (LASIX) 20 MG tablet Take 20 mg by mouth 2 times daily       hydroxychloroquine (PLAQUENIL) 200 MG tablet Take 200 mg by mouth daily.         levothyroxine (SYNTHROID/LEVOTHROID) 150 MCG tablet Take 150 mcg by mouth       OXYCODONE HCL PO Take 30 mg by mouth 3 times daily as needed        OXYCONTIN 40 MG 12 hr tablet TAKE 1 TABLET BY MOUTH IN THE MORNING  0     PREDNISONE PO Take 5 mg by mouth daily        PROMETHAZINE HCL PO Take 25 mg by mouth every 4 hours as needed.       vitamin D (ERGOCALCIFEROL) 91809 UNIT capsule Take 50,000 Units by mouth twice a week. Monday and thursday       Warfarin Sodium (JANTOVEN PO) Take 5 mg by mouth daily. Patient alternates between 5 mg and 7.5 mg every other day.          ROS:   10 point ROS negative except HPI    EXAM:  /88 (BP Location: Right arm, Patient Position: Chair, Cuff Size: Adult Regular)   Pulse 97   Ht 1.473 m (4' 10\")   Wt 89.4 kg (197 lb)   SpO2 (!) 88%   BMI 41.17 kg/m     General: appears comfortable, alert and articulate  Head: normocephalic, atraumatic  Eyes: anicteric sclera, EOMI  Neck: no adenopathy  Orophyarynx: moist mucosa, no lesions, dentition intact  Heart: regular, S1/S2 with prominent P2, JVP 12 cm, 1/6 HARSHAD heard at the LLSB  Lungs: inspiratory crackles bilaterally at the bases but left worse than right  Abdomen: soft, non-tender, bowel sounds present, no hepatosplenomegaly  Extremities: no clubbing, cyanosis or edema  Neurological: normal speech and affect, no gross motor deficits    Labs:  CBC RESULTS:  Lab Results   Component Value Date    WBC 7.0 02/26/2019    RBC 5.26 (H) 02/26/2019    HGB 15.0 02/26/2019    HCT 51.9 (H) 02/26/2019    MCV 99 02/26/2019    MCH 28.5 02/26/2019    MCHC 28.9 (L) 02/26/2019    RDW 14.4 " 2019     (L) 2019       CMP RESULTS:  Lab Results   Component Value Date     2019    POTASSIUM 3.6 2019    CHLORIDE 97 2019    CO2 36 (H) 2019    ANIONGAP 7 2019    GLC 74 2019    BUN 10 2019    CR 0.70 2019    GFRESTIMATED >90 2019    GFRESTBLACK >90 2019    JEFFERSON 8.2 (L) 2019    BILITOTAL 0.4 2019    ALBUMIN 3.5 2019    ALKPHOS 78 2019    ALT 20 2019    AST 25 2019        Echocardiogram 3/21/2019:  Mild to moderate right ventricular dilation is present.  There is moderate to severe right ventricular hypertrophy.  Global right ventricular function is moderately reduced.  Severe pulmonary hypertension is present.  Estimated pulmonary artery systolic pressure is 75 mmHg plus right atrial pressure.  The inferior vena cava was normal in size with preserved respiratory variability.  No right to left shunting detected following injection of agitated saline.    Pulmonary Function Tests  FVC: 21%  FEV1: 24%  FEV/FVC: 96%  T%  DLCO: 37%    RHC 3/21/2019  Systemic BP: 180s/110s  RA: 18  RV: 100/20  PA: 100/50 (68)  PCWP: 17  CO/CI: 6.1/3.3  PVR: 8.4    Nipride Study  PA: 74/27 (50)  PCWP: 14  CO/CI: 5.7/3.1  PVR: 6.3    Assessment and Plan: Catherine Sharp is a 43 year old female who presents for return evaluation for severe Group 3 PH.    1.  Severe Group 3 Pulmonary Hypertension with Preserved Cardiac Output: Ms. Hsu has severe group 3 pulmonary hypertension with limited functional capacity due to pain and significant lung disease.  Her hemodynamic evaluation revealed a PVR over 6, but thankfully her cardiac index is preserved.  She does have a very reduced DLCO which we have previously demonstrated as an independent risk factor for mortality and group 3 pulmonary hypertension (Mariel et al., 2019 [facility]).      As you know, pulmonary vasodilators have limited efficacy in Group 3 PH due to ILD.  She  does have high oxygen requirements so I am reluctant to start a PAH specific therapy unless she is in a clinical trial that has a very controlled and protocolized evaluation.    At this point, I will have her speak with our research nurse about the possibility of enrolling in the Increase trial, which is a trial that evaluates the utility of inhaled prostacyclin in interstitial lung disease associated pulmonary hypertension.  I will also refer her to the lung transplant team because with her severe pulmonary hypertension and very low DLCO I would anticipate her 5-year survival to be much lower than what a transplant patient would have.  I will increase her diuretic dose to 40 mg twice a day for the next week to help with her volume overload.  We will recheck a basic metabolic panel next week to ensure her renal function is not compromised.  Finally I will have her come back and see me in clinic in 3 months.    It was a pleasure seeing your patient Catherine Sharp at the [facility] Pulmonary Hypertension clinic.  Please contact us with any questions or concerns that you may have.    Sincerely,    Carter Eng MD, PhD   of Medicine  [facility]  Cardiovascular Division  [facility]                  Please do not hesitate to contact me if you have any questions/concerns.     Sincerely,     Carter Eng MD

## 2019-03-29 ENCOUNTER — AMBULATORY - HEALTHEAST (OUTPATIENT)
Dept: LAB | Facility: CLINIC | Age: 44
End: 2019-03-29

## 2019-03-29 DIAGNOSIS — I27.20 PULMONARY HYPERTENSION (H): ICD-10-CM

## 2019-03-29 DIAGNOSIS — R06.02 SHORTNESS OF BREATH: ICD-10-CM

## 2019-04-03 ENCOUNTER — PATIENT OUTREACH (OUTPATIENT)
Dept: CARDIOLOGY | Facility: CLINIC | Age: 44
End: 2019-04-03

## 2019-04-03 ENCOUNTER — AMBULATORY - HEALTHEAST (OUTPATIENT)
Dept: FAMILY MEDICINE | Facility: CLINIC | Age: 44
End: 2019-04-03

## 2019-04-03 ENCOUNTER — COMMUNICATION - HEALTHEAST (OUTPATIENT)
Dept: FAMILY MEDICINE | Facility: CLINIC | Age: 44
End: 2019-04-03

## 2019-04-03 ENCOUNTER — COMMUNICATION - HEALTHEAST (OUTPATIENT)
Dept: PULMONOLOGY | Facility: OTHER | Age: 44
End: 2019-04-03

## 2019-04-03 DIAGNOSIS — I10 HYPERTENSION, UNSPECIFIED TYPE: ICD-10-CM

## 2019-04-03 DIAGNOSIS — I27.20 PULMONARY HYPERTENSION (H): Primary | ICD-10-CM

## 2019-04-03 DIAGNOSIS — I27.20 PULMONARY HYPERTENSION (H): ICD-10-CM

## 2019-04-03 RX ORDER — FUROSEMIDE 20 MG
40 TABLET ORAL 2 TIMES DAILY
Qty: 360 TABLET | Refills: 3 | Status: SHIPPED | OUTPATIENT
Start: 2019-04-03 | End: 2019-11-01

## 2019-04-03 NOTE — PROGRESS NOTES
I called pt to follow up on her labs that were due this week after medication changes.  I was unable to reach her and left a detailed message.  Emilee Gallo RN on 4/3/2019 at 9:38 AM    I spoke with pt and she stated that she was scheduled to get her labs drawn on Friday at [facility]. Emilee Gallo RN on 4/4/2019 at 10:22 AM    Lab results reviewed through care everywhere and are WNL.  Will call pt later today and notify her of normal results and to continue current medication regiment.       Emilee Gallo RN on 4/8/2019 at 7:35 AM      I called pt and left a message stating that her labs were WLN.  Emilee Gallo RN on 4/8/2019 at 1:51 PM

## 2019-04-04 ENCOUNTER — AMBULATORY - HEALTHEAST (OUTPATIENT)
Dept: PHARMACY | Facility: CLINIC | Age: 44
End: 2019-04-04

## 2019-04-04 DIAGNOSIS — D68.59 PRIMARY HYPERCOAGULABLE STATE (H): ICD-10-CM

## 2019-04-04 DIAGNOSIS — Z86.718 HISTORY OF DVT (DEEP VEIN THROMBOSIS): ICD-10-CM

## 2019-04-05 ENCOUNTER — AMBULATORY - HEALTHEAST (OUTPATIENT)
Dept: LAB | Facility: CLINIC | Age: 44
End: 2019-04-05

## 2019-04-05 DIAGNOSIS — D68.59 PRIMARY HYPERCOAGULABLE STATE (H): ICD-10-CM

## 2019-04-05 DIAGNOSIS — Z86.718 HISTORY OF DVT (DEEP VEIN THROMBOSIS): ICD-10-CM

## 2019-04-05 DIAGNOSIS — I27.20 PULMONARY HYPERTENSION (H): ICD-10-CM

## 2019-04-05 DIAGNOSIS — R06.02 SHORTNESS OF BREATH: ICD-10-CM

## 2019-04-05 LAB
ANION GAP SERPL CALCULATED.3IONS-SCNC: 12 MMOL/L (ref 5–18)
BUN SERPL-MCNC: 12 MG/DL (ref 8–22)
CALCIUM SERPL-MCNC: 8.8 MG/DL (ref 8.5–10.5)
CHLORIDE BLD-SCNC: 100 MMOL/L (ref 98–107)
CO2 SERPL-SCNC: 29 MMOL/L (ref 22–31)
CREAT SERPL-MCNC: 0.76 MG/DL (ref 0.6–1.1)
GFR SERPL CREATININE-BSD FRML MDRD: >60 ML/MIN/1.73M2
GLUCOSE BLD-MCNC: 90 MG/DL (ref 70–125)
POTASSIUM BLD-SCNC: 4.3 MMOL/L (ref 3.5–5)
SODIUM SERPL-SCNC: 141 MMOL/L (ref 136–145)

## 2019-04-06 LAB — FACT X ACT/NOR PPP CHRO: 21 %

## 2019-04-11 VITALS — HEIGHT: 58 IN | BODY MASS INDEX: 41.35 KG/M2 | WEIGHT: 197 LBS

## 2019-04-11 ASSESSMENT — MIFFLIN-ST. JEOR: SCORE: 1448.34

## 2019-04-11 NOTE — TELEPHONE ENCOUNTER
SOT LUNG INTAKE    April 11, 2019    Referring Provider: Carter Eng  Primary Provider: Delmis Ma  Specialist: Alex José   Diagnosis: no diagnosis given,  [facility]  Source/Facility:     Critical History:  Smoking/nicotine use history: quit 2009 Alcohol use history: no  Drug use history: no  Cancer history:  no  Cardiac history:  yes  BMI:41.2      Notes: Patient is a more historian    Insurance information:  Medicare  Policy lieberman:  self  Subscriber/policy/ID number:  [id number]  Group Number:      Insurance information: [facility]  Policy Lieberman: self   ID # [id number]    Is patient in a group home/assisted living? no  Does patient have a guardian? no    Referral intake process completed.  Patient is aware that after financial approval is received, medical records will be requested.   Patient confirmed for a callback from transplant coordinator on 5/1/19. (within 2 weeks)  Tentative evaluation date TBD. (within 4 weeks)    Confirmed coordinator will discuss evaluation process in more detail at the time of their call.   Patient is aware of the need to arrange age appropriate cancer screening, vaccinations, and dental care.  Reminded patient to complete questionnaire, complete medical records release, and review packet prior to evaluation visit .  Assessed patient for special needs (ie--wheelchair, assistance, guardian, and ):  wheelchair for long distances, O2 @  4-6 LPM  Patient instructed to call [phone] with questions.

## 2019-04-12 ENCOUNTER — HOSPITAL ENCOUNTER (OUTPATIENT)
Dept: PALLIATIVE MEDICINE | Facility: OTHER | Age: 44
Discharge: HOME | End: 2019-04-12
Attending: PHYSICIAN ASSISTANT

## 2019-04-12 DIAGNOSIS — M32.9 SYSTEMIC LUPUS ERYTHEMATOSUS, UNSPECIFIED SLE TYPE, UNSPECIFIED ORGAN INVOLVEMENT STATUS (H): ICD-10-CM

## 2019-04-12 DIAGNOSIS — M48.50XS PATHOLOGICAL COMPRESSION FRACTURE OF VERTEBRA, SEQUELA: ICD-10-CM

## 2019-04-12 DIAGNOSIS — F11.20 OPIOID DEPENDENCE, CONTINUOUS (H): ICD-10-CM

## 2019-04-12 DIAGNOSIS — G89.4 CHRONIC PAIN SYNDROME: ICD-10-CM

## 2019-04-12 ASSESSMENT — MIFFLIN-ST. JEOR: SCORE: 1470.09

## 2019-04-16 ENCOUNTER — AMBULATORY - HEALTHEAST (OUTPATIENT)
Dept: ENDOCRINOLOGY | Facility: CLINIC | Age: 44
End: 2019-04-16

## 2019-04-16 DIAGNOSIS — E05.90 HYPERTHYROIDISM: ICD-10-CM

## 2019-04-16 DIAGNOSIS — M81.0 OSTEOPOROSIS, UNSPECIFIED OSTEOPOROSIS TYPE, UNSPECIFIED PATHOLOGICAL FRACTURE PRESENCE: ICD-10-CM

## 2019-04-16 LAB
6MAM UR QL: NOT DETECTED
7AMINOCLONAZEPAM UR QL: NOT DETECTED
A-OH ALPRAZ UR QL: NOT DETECTED
ALPRAZ UR QL: NOT DETECTED
AMPHET UR QL SCN: NOT DETECTED
BARBITURATES UR QL: NOT DETECTED
BUPRENORPHINE UR QL: NOT DETECTED
BZE UR QL: NOT DETECTED
CARBOXYTHC UR QL: NOT DETECTED
CARISOPRODOL UR QL: NOT DETECTED
CLONAZEPAM UR QL: NOT DETECTED
CODEINE UR QL: NOT DETECTED
CREAT UR-MCNC: 270.3 MG/DL (ref 20–400)
DIAZEPAM UR QL: NOT DETECTED
ETHYL GLUCURONIDE UR QL: NORMAL
FENTANYL UR QL: NOT DETECTED
HYDROCODONE UR QL: NOT DETECTED
HYDROMORPHONE UR QL: NOT DETECTED
LORAZEPAM UR QL: PRESENT
MDA UR QL: NOT DETECTED
MDEA UR QL: NOT DETECTED
MDMA UR QL: NOT DETECTED
ME-PHENIDATE UR QL: NOT DETECTED
MEPERIDINE UR QL: NOT DETECTED
METHADONE UR QL: NOT DETECTED
METHAMPHET UR QL: NOT DETECTED
MIDAZOLAM UR QL SCN: NOT DETECTED
MORPHINE UR QL: NOT DETECTED
NORBUPRENORPHINE UR QL CFM: NOT DETECTED
NORDIAZEPAM UR QL: NOT DETECTED
NORFENTANYL UR QL: NOT DETECTED
NORHYDROCODONE UR QL CFM: NOT DETECTED
NOROXYCODONE UR QL CFM: PRESENT
NOROXYMORPHONE UR QL SCN: NOT DETECTED
OXAZEPAM UR QL: NOT DETECTED
OXYCODONE UR QL: PRESENT
OXYMORPHONE UR QL: PRESENT
PATHOLOGY STUDY: NORMAL
PCP UR QL: NOT DETECTED
PHENTERMINE UR QL: NOT DETECTED
PROPOXYPH UR QL: NOT DETECTED
SERVICE CMNT-IMP: NORMAL
TAPENTADOL UR QL SCN: NOT DETECTED
TAPENTADOL UR QL SCN: NOT DETECTED
TEMAZEPAM UR QL: NOT DETECTED
TRAMADOL UR QL: NOT DETECTED
ZOLPIDEM UR QL: NOT DETECTED

## 2019-04-21 LAB
ETHYL GLUCURONIDE UR CFM-MCNC: <100 NG/ML
ETHYL SULFATE UR CFM-MCNC: <100 NG/ML

## 2019-04-22 ENCOUNTER — COMMUNICATION - HEALTHEAST (OUTPATIENT)
Dept: ADMINISTRATIVE | Facility: CLINIC | Age: 44
End: 2019-04-22

## 2019-04-24 ENCOUNTER — COMMUNICATION - HEALTHEAST (OUTPATIENT)
Dept: FAMILY MEDICINE | Facility: CLINIC | Age: 44
End: 2019-04-24

## 2019-04-24 DIAGNOSIS — F41.9 ANXIETY: ICD-10-CM

## 2019-05-01 NOTE — TELEPHONE ENCOUNTER
Called patient to discuss transplant process and complete RN intake. Patient unavailable at time of call. Left voice mail providing transplant coordinator contact information and encouraged patient to call back if available. If no return call received, a second attempt will be made on  5/2.

## 2019-05-07 ENCOUNTER — COMMUNICATION - HEALTHEAST (OUTPATIENT)
Dept: PALLIATIVE MEDICINE | Facility: OTHER | Age: 44
End: 2019-05-07

## 2019-05-07 DIAGNOSIS — G89.4 CHRONIC PAIN SYNDROME: ICD-10-CM

## 2019-05-13 ENCOUNTER — DOCUMENTATION ONLY (OUTPATIENT)
Dept: CARE COORDINATION | Facility: CLINIC | Age: 44
End: 2019-05-13

## 2019-05-14 ENCOUNTER — RESEARCH ENCOUNTER (OUTPATIENT)
Dept: CARDIOLOGY | Facility: CLINIC | Age: 44
End: 2019-05-14

## 2019-05-14 DIAGNOSIS — Z00.6 RESEARCH EXAM: Primary | ICD-10-CM

## 2019-05-14 NOTE — PROGRESS NOTES
Study Title: A Multicenter, Randomized, Double-Blinded, Placebo-Controlled Trial to Evaluate the Safety and Efficacy of Inhaled Treprostinil in Subjects with Pulmonary Hypertension due to Parenchymal Lung Disease (INCREASE 201)  IRB#: [id number]  PI: Anastasia Oconnor -[id number]  : Sonya Navarro -[phone] & Latha Dailey -[phone]    Estimated duration of study: Up to 16 weeks    Subject was approached in clinic several months ago regarding interest in the above trial. The current IRB approved consent form was reviewed and discussed at length with the subject and . This included purpose, research hypothesis, nature of the research, risks & benefits, and procedures required for screening, enrollment, randomization as well as all required follow up. Randomization arms (inhaled Treprostinil vs Placebo) was explained in detail. Confidentiality issues, compensation for injury, and alternative procedures available were also explained. Subject was informed that participation is voluntary and that refusal to participate will involve no penalty or decrease benefits to which the subject is otherwise entitled. Patient / family has had the opportunity to read the entire written consent, ask questions and concerns of the study investigator and offered sufficient time to consider the research trial.  Patient was able to clearly state what study participation involved along with associated requirements.  All questions and concerns were addressed.  Patient voluntarily signed the consent and HIPAA forms May 14, 2019 at 08:35, prior to any research required tests / procedures and was provided with a copy of the signed forms.  Subject declined consent to the optional biomarker and whole genomic sequencing blood samples.    Subject verbalizes understanding that although they have signed consent for this study, it may be determined, during the screening process that they may not be a  suitable candidate for this trial.

## 2019-05-15 ENCOUNTER — AMBULATORY - HEALTHEAST (OUTPATIENT)
Dept: ENDOCRINOLOGY | Facility: CLINIC | Age: 44
End: 2019-05-15

## 2019-05-17 ENCOUNTER — RECORDS - HEALTHEAST (OUTPATIENT)
Dept: ADMINISTRATIVE | Facility: OTHER | Age: 44
End: 2019-05-17

## 2019-05-24 ENCOUNTER — COMMUNICATION - HEALTHEAST (OUTPATIENT)
Dept: FAMILY MEDICINE | Facility: CLINIC | Age: 44
End: 2019-05-24

## 2019-05-24 DIAGNOSIS — F41.9 ANXIETY: ICD-10-CM

## 2019-06-04 NOTE — TELEPHONE ENCOUNTER
"Called patient to discuss recent referral to lung transplant program at [facility]. Patient unavailable at time of call. Patient currently not a candidate for lung transplant based on most recent reported anthropometric HT: 4'10\"  lbs BMI: 41.17.  Patient must have a BMI between 18-30 to be considered for lung transplant evaluation. Patient is is potentially a lung transplant candidate in the future if she is achieve and maintain a BMI between 18-30. Will send letter to patient and referring MD.    "

## 2019-06-07 ENCOUNTER — AMBULATORY - HEALTHEAST (OUTPATIENT)
Dept: PULMONOLOGY | Facility: OTHER | Age: 44
End: 2019-06-07

## 2019-06-07 DIAGNOSIS — J42 CHRONIC BRONCHITIS (H): ICD-10-CM

## 2019-06-07 DIAGNOSIS — I27.20 PULMONARY HYPERTENSION (H): ICD-10-CM

## 2019-06-12 ENCOUNTER — OFFICE VISIT - HEALTHEAST (OUTPATIENT)
Dept: FAMILY MEDICINE | Facility: CLINIC | Age: 44
End: 2019-06-12

## 2019-06-12 DIAGNOSIS — J96.11 CHRONIC RESPIRATORY FAILURE WITH HYPOXIA AND HYPERCAPNIA (H): ICD-10-CM

## 2019-06-12 DIAGNOSIS — J96.21 ACUTE ON CHRONIC RESPIRATORY FAILURE WITH HYPOXIA AND HYPERCAPNIA (H): ICD-10-CM

## 2019-06-12 DIAGNOSIS — E83.41 HYPERMAGNESEMIA: ICD-10-CM

## 2019-06-12 DIAGNOSIS — J96.12 CHRONIC RESPIRATORY FAILURE WITH HYPOXIA AND HYPERCAPNIA (H): ICD-10-CM

## 2019-06-12 DIAGNOSIS — B37.2 CANDIDAL INTERTRIGO: ICD-10-CM

## 2019-06-12 DIAGNOSIS — E66.01 MORBID OBESITY (H): ICD-10-CM

## 2019-06-12 DIAGNOSIS — J84.9 ILD (INTERSTITIAL LUNG DISEASE) (H): ICD-10-CM

## 2019-06-12 DIAGNOSIS — E83.39 HYPOPHOSPHATEMIA: ICD-10-CM

## 2019-06-12 DIAGNOSIS — J96.22 ACUTE ON CHRONIC RESPIRATORY FAILURE WITH HYPOXIA AND HYPERCAPNIA (H): ICD-10-CM

## 2019-06-12 DIAGNOSIS — D68.59 PRIMARY HYPERCOAGULABLE STATE (H): ICD-10-CM

## 2019-06-12 DIAGNOSIS — I50.32 CHRONIC DIASTOLIC HEART FAILURE (H): ICD-10-CM

## 2019-06-12 LAB
ALBUMIN SERPL-MCNC: 3.6 G/DL (ref 3.5–5)
ALP SERPL-CCNC: 123 U/L (ref 45–120)
ALT SERPL W P-5'-P-CCNC: 39 U/L (ref 0–45)
ANION GAP SERPL CALCULATED.3IONS-SCNC: 9 MMOL/L (ref 5–18)
AST SERPL W P-5'-P-CCNC: 34 U/L (ref 0–40)
BILIRUB SERPL-MCNC: 0.7 MG/DL (ref 0–1)
BUN SERPL-MCNC: 15 MG/DL (ref 8–22)
CALCIUM SERPL-MCNC: 9.1 MG/DL (ref 8.5–10.5)
CHLORIDE BLD-SCNC: 93 MMOL/L (ref 98–107)
CO2 SERPL-SCNC: 39 MMOL/L (ref 22–31)
CREAT SERPL-MCNC: 0.76 MG/DL (ref 0.6–1.1)
ERYTHROCYTE [DISTWIDTH] IN BLOOD BY AUTOMATED COUNT: 12.6 % (ref 11–14.5)
FACT X ACT/NOR PPP CHRO: 25 %
GFR SERPL CREATININE-BSD FRML MDRD: >60 ML/MIN/1.73M2
GLUCOSE BLD-MCNC: 94 MG/DL (ref 70–125)
HCT VFR BLD AUTO: 50.9 % (ref 35–47)
HGB BLD-MCNC: 16.1 G/DL (ref 12–16)
MAGNESIUM SERPL-MCNC: 2.4 MG/DL (ref 1.8–2.6)
MCH RBC QN AUTO: 30.3 PG (ref 27–34)
MCHC RBC AUTO-ENTMCNC: 31.6 G/DL (ref 32–36)
MCV RBC AUTO: 96 FL (ref 80–100)
PHOSPHATE SERPL-MCNC: 2.2 MG/DL (ref 2.5–4.5)
PLATELET # BLD AUTO: 162 THOU/UL (ref 140–440)
PMV BLD AUTO: 7.8 FL (ref 7–10)
POTASSIUM BLD-SCNC: 4.1 MMOL/L (ref 3.5–5)
PROT SERPL-MCNC: 7.2 G/DL (ref 6–8)
RBC # BLD AUTO: 5.32 MILL/UL (ref 3.8–5.4)
SODIUM SERPL-SCNC: 141 MMOL/L (ref 136–145)
WBC: 10.2 THOU/UL (ref 4–11)

## 2019-06-12 ASSESSMENT — MIFFLIN-ST. JEOR: SCORE: 1460.45

## 2019-06-15 ENCOUNTER — COMMUNICATION - HEALTHEAST (OUTPATIENT)
Dept: RHEUMATOLOGY | Facility: CLINIC | Age: 44
End: 2019-06-15

## 2019-06-15 DIAGNOSIS — M32.9 SYSTEMIC LUPUS ERYTHEMATOSUS (H): ICD-10-CM

## 2019-06-19 ENCOUNTER — COMMUNICATION - HEALTHEAST (OUTPATIENT)
Dept: NURSING | Facility: CLINIC | Age: 44
End: 2019-06-19

## 2019-06-19 DIAGNOSIS — D68.59 PRIMARY HYPERCOAGULABLE STATE (H): ICD-10-CM

## 2019-06-25 ENCOUNTER — HOSPITAL ENCOUNTER (OUTPATIENT)
Dept: PALLIATIVE MEDICINE | Facility: OTHER | Age: 44
Discharge: HOME | End: 2019-06-25
Attending: PHYSICIAN ASSISTANT

## 2019-06-25 DIAGNOSIS — M51.369 DEGENERATION OF LUMBAR INTERVERTEBRAL DISC: ICD-10-CM

## 2019-06-25 DIAGNOSIS — F11.20 OPIOID DEPENDENCE, CONTINUOUS (H): ICD-10-CM

## 2019-06-25 DIAGNOSIS — M25.50 POLYARTHRALGIA: ICD-10-CM

## 2019-06-25 DIAGNOSIS — G89.4 CHRONIC PAIN SYNDROME: ICD-10-CM

## 2019-06-25 ASSESSMENT — MIFFLIN-ST. JEOR: SCORE: 1456.48

## 2019-06-26 ENCOUNTER — COMMUNICATION - HEALTHEAST (OUTPATIENT)
Dept: FAMILY MEDICINE | Facility: CLINIC | Age: 44
End: 2019-06-26

## 2019-06-26 DIAGNOSIS — I10 HYPERTENSION, UNSPECIFIED TYPE: ICD-10-CM

## 2019-06-27 DIAGNOSIS — Z53.9 ERRONEOUS ENCOUNTER--DISREGARD: Primary | ICD-10-CM

## 2019-06-30 ASSESSMENT — ENCOUNTER SYMPTOMS
SLEEP DISTURBANCES DUE TO BREATHING: 0
LIGHT-HEADEDNESS: 0
INCREASED ENERGY: 0
HEMATURIA: 0
STIFFNESS: 1
CHILLS: 1
SYNCOPE: 0
HALLUCINATIONS: 0
SHORTNESS OF BREATH: 1
JOINT SWELLING: 1
DYSPNEA ON EXERTION: 1
MYALGIAS: 1
DOUBLE VISION: 0
ORTHOPNEA: 1
WEIGHT LOSS: 0
LEG PAIN: 0
POLYDIPSIA: 0
FATIGUE: 1
EYE REDNESS: 0
DIFFICULTY URINATING: 0
HYPOTENSION: 0
HOT FLASHES: 1
BACK PAIN: 1
SPUTUM PRODUCTION: 0
DYSURIA: 0
HYPERTENSION: 1
ALTERED TEMPERATURE REGULATION: 1
DECREASED APPETITE: 0
POSTURAL DYSPNEA: 1
HEMOPTYSIS: 0
COUGH DISTURBING SLEEP: 0
EYE PAIN: 0
MUSCLE CRAMPS: 0
EXERCISE INTOLERANCE: 1
NIGHT SWEATS: 1
COUGH: 0
WEIGHT GAIN: 1
FEVER: 0
POLYPHAGIA: 0
FLANK PAIN: 1
WHEEZING: 0
NECK PAIN: 0
ARTHRALGIAS: 1
DECREASED LIBIDO: 0
EYE WATERING: 1
PALPITATIONS: 0
SNORES LOUDLY: 1
EYE IRRITATION: 0
MUSCLE WEAKNESS: 1

## 2019-07-02 ENCOUNTER — RESEARCH ENCOUNTER (OUTPATIENT)
Dept: CARDIOLOGY | Facility: CLINIC | Age: 44
End: 2019-07-02

## 2019-07-02 ENCOUNTER — RECORDS - HEALTHEAST (OUTPATIENT)
Dept: ADMINISTRATIVE | Facility: OTHER | Age: 44
End: 2019-07-02

## 2019-07-02 ENCOUNTER — OFFICE VISIT (OUTPATIENT)
Dept: CARDIOLOGY | Facility: CLINIC | Age: 44
End: 2019-07-02
Attending: INTERNAL MEDICINE
Payer: MEDICARE

## 2019-07-02 VITALS
SYSTOLIC BLOOD PRESSURE: 114 MMHG | DIASTOLIC BLOOD PRESSURE: 77 MMHG | OXYGEN SATURATION: 95 % | WEIGHT: 201.4 LBS | HEIGHT: 58 IN | HEART RATE: 77 BPM | BODY MASS INDEX: 42.27 KG/M2

## 2019-07-02 DIAGNOSIS — R06.02 SOB (SHORTNESS OF BREATH): ICD-10-CM

## 2019-07-02 DIAGNOSIS — I27.20 PULMONARY HYPERTENSION (H): ICD-10-CM

## 2019-07-02 DIAGNOSIS — I27.20 PULMONARY HYPERTENSION (H): Primary | ICD-10-CM

## 2019-07-02 LAB
ANION GAP SERPL CALCULATED.3IONS-SCNC: 2 MMOL/L (ref 3–14)
BUN SERPL-MCNC: 16 MG/DL (ref 7–30)
CALCIUM SERPL-MCNC: 8.4 MG/DL (ref 8.5–10.1)
CHLORIDE SERPL-SCNC: 95 MMOL/L (ref 94–109)
CO2 SERPL-SCNC: 38 MMOL/L (ref 20–32)
CREAT SERPL-MCNC: 0.73 MG/DL (ref 0.52–1.04)
ERYTHROCYTE [DISTWIDTH] IN BLOOD BY AUTOMATED COUNT: 14.3 % (ref 10–15)
GFR SERPL CREATININE-BSD FRML MDRD: >90 ML/MIN/{1.73_M2}
GLUCOSE SERPL-MCNC: 123 MG/DL (ref 70–99)
HCT VFR BLD AUTO: 49.6 % (ref 35–47)
HGB BLD-MCNC: 15.2 G/DL (ref 11.7–15.7)
MCH RBC QN AUTO: 30.1 PG (ref 26.5–33)
MCHC RBC AUTO-ENTMCNC: 30.6 G/DL (ref 31.5–36.5)
MCV RBC AUTO: 98 FL (ref 78–100)
NT-PROBNP SERPL-MCNC: 863 PG/ML (ref 0–125)
PLATELET # BLD AUTO: 181 10E9/L (ref 150–450)
POTASSIUM SERPL-SCNC: 3.8 MMOL/L (ref 3.4–5.3)
RBC # BLD AUTO: 5.05 10E12/L (ref 3.8–5.2)
SODIUM SERPL-SCNC: 135 MMOL/L (ref 133–144)
WBC # BLD AUTO: 8.5 10E9/L (ref 4–11)

## 2019-07-02 PROCEDURE — 80048 BASIC METABOLIC PNL TOTAL CA: CPT | Performed by: INTERNAL MEDICINE

## 2019-07-02 PROCEDURE — G0463 HOSPITAL OUTPT CLINIC VISIT: HCPCS | Mod: ZF

## 2019-07-02 PROCEDURE — 85027 COMPLETE CBC AUTOMATED: CPT | Performed by: INTERNAL MEDICINE

## 2019-07-02 PROCEDURE — 36415 COLL VENOUS BLD VENIPUNCTURE: CPT | Performed by: INTERNAL MEDICINE

## 2019-07-02 PROCEDURE — 99215 OFFICE O/P EST HI 40 MIN: CPT | Mod: ZP | Performed by: INTERNAL MEDICINE

## 2019-07-02 PROCEDURE — 83880 ASSAY OF NATRIURETIC PEPTIDE: CPT | Performed by: INTERNAL MEDICINE

## 2019-07-02 ASSESSMENT — MIFFLIN-ST. JEOR: SCORE: 1468.29

## 2019-07-02 ASSESSMENT — PAIN SCALES - GENERAL: PAINLEVEL: NO PAIN (0)

## 2019-07-02 NOTE — PROGRESS NOTES
July 2, 2019      Boris Bearden MD  [place] [address]      Dear Dr. Bearden    I had the pleasure of seeing your patient Catherine Sharp at the [facility] Pulmonary Hypertension clinic.  As you know, Catherine is a very pleasant 43-year-old female with a past medical history significant for chronic  hypoxic respiratory failure secondary to interstitial lung disease from lupus and past history of ARDS, SLE on plaquenil, history of DVT and on chronic coumadin, hypertension, spine fractures, hypothyroidism, chronic pain, and bone mineral density disease who presents for evaluation of pulmonary hypertension.  Catherine's cortez with lung disease started approximately in 1996 when she had open lung biopsy for ill-defined repeated respiratory failure.  Unfortunately, there was no clear diagnosis on the lung biopsy and she had a subsequent lung biopsy with no clear diagnosis of her lung disease.  She has been on chronic oxygen therapy for approximately 5 years with ranges anywhere between 3-8 L/min.  She does have a lot of variation in her symptoms and sometimes it feels better and she only needs 3 L/min and then other days she feels worse and needs to go up to 6-8.  She is still able to do her activities of daily living at home, but she is limited in her physical activity due to back pain.  She will get dyspnea on exertion, but many times her limitation is her back pain.  She can go up a flight of stairs but does feel slightly short of breath when she gets to the top.  She does have occasional chest pressure when exerting herself which resolves with rest and oxygen therapy.  She has had presyncopal sensations but no jocelyn syncope.  She does not have any lower extremity edema or abdominal swelling, but she does take Lasix.    Catherine had a thorough work up for her pulmonary hypertension which showed she has severe Group 3 PH due to her ILD.  She has elevated RA pressures but  preserved cardiac output.  Her baseline RHC had a mildly elevated wedge but her systemic pressure was greater than 180/100 at the time.  She was treated with nipride which normalized her wedge but her PA pressures were still elevated.     Catherine was recently hospitalized at [facility] for hypoxemia and potential ILD exacerbation.  She was given a short course of steroids and then her hypoxemia resolved.  She had her evaluation for lung transplant put on hold because of obesity and elevated BMI.    Today, Catherine comes in and she is feeling back to her baseline.  She has been back on her baseline dose of prednisone.  She continues to have exertional dyspnea.  She has noticed that her urine output will change from day to day despite being on the same dose of lasix 40 mg twice a day.  Overall, she remains WHO FC III.    PAST MEDICAL HISTORY:  -Chronic hypoxemic respiratory failure due to ILD from SLE  -DVT  -Hypertension  -Hypothyroidism  -Bone mineral density  -Chronic pain    FAMILY HISTORY:  No family history of PH    SOCIAL HISTORY:  Denies current tobacco, drug, and alcohol use.  Does have a 10-pack year history.    CURRENT MEDICATIONS:  Current Outpatient Medications   Medication Sig Dispense Refill     Albuterol Sulfate (VENTOLIN HFA IN) Inhale 2 puffs into the lungs every 4 hours as needed.         CALCIUM CITRATE PO Take 250 mg by mouth 2 times daily.       denosumab (PROLIA) 60 MG/ML SOLN injection Inject 60 mg Subcutaneous       diltiazem ER (DILT-XR) 240 MG 24 hr ER beaded capsule Take by mouth daily       ferrous sulfate 325 (65 FE) MG tablet Take 1 tablet by mouth 2 times daily. 30 tablet 1     furosemide (LASIX) 20 MG tablet Take 2 tablets (40 mg) by mouth 2 times daily 360 tablet 3     hydroxychloroquine (PLAQUENIL) 200 MG tablet Take 200 mg by mouth daily.         levothyroxine (SYNTHROID/LEVOTHROID) 150 MCG tablet Take 150 mcg by mouth       OXYCODONE HCL PO Take 30 mg by mouth 3 times daily as  "needed        OXYCONTIN 40 MG 12 hr tablet TAKE 1 TABLET BY MOUTH IN THE MORNING  0     potassium chloride ER (K-DUR/KLOR-CON M) 20 MEQ CR tablet Take 1 tablet (20 mEq) by mouth daily 90 tablet 3     PREDNISONE PO Take 5 mg by mouth daily        PROMETHAZINE HCL PO Take 25 mg by mouth every 4 hours as needed.       vitamin D (ERGOCALCIFEROL) 06926 UNIT capsule Take 50,000 Units by mouth twice a week. Monday and thursday       Warfarin Sodium (JANTOVEN PO) Take 5 mg by mouth daily. Patient alternates between 5 mg and 7.5 mg every other day.          ROS:   10 point ROS negative except HPI    EXAM:  /77 (BP Location: Right arm, Patient Position: Chair, Cuff Size: Adult Regular)   Pulse 77   Ht 1.473 m (4' 10\")   Wt 91.4 kg (201 lb 6.4 oz)   SpO2 95%   BMI 42.09 kg/m    General: appears comfortable, alert and articulate  Head: normocephalic, atraumatic  Eyes: anicteric sclera, EOMI  Neck: no adenopathy  Orophyarynx: moist mucosa, no lesions, dentition intact  Heart: regular, S1/S2 with prominent P2, JVP 8 cm, 1/6 HARSHAD heard at the LLSB  Lungs: inspiratory crackles bilaterally at the bases but left worse than right  Abdomen: soft, non-tender, bowel sounds present, no hepatosplenomegaly  Extremities: no clubbing or edema, slight blue coloration of toes  Neurological: normal speech and affect, no gross motor deficits    Labs:  CBC RESULTS:  Lab Results   Component Value Date    WBC 8.5 07/02/2019    RBC 5.05 07/02/2019    HGB 15.2 07/02/2019    HCT 49.6 (H) 07/02/2019    MCV 98 07/02/2019    MCH 30.1 07/02/2019    MCHC 30.6 (L) 07/02/2019    RDW 14.3 07/02/2019     07/02/2019       CMP RESULTS:  Lab Results   Component Value Date     03/26/2019    POTASSIUM 4.1 03/26/2019    CHLORIDE 98 03/26/2019    CO2 37 (H) 03/26/2019    ANIONGAP 2 (L) 03/26/2019     (H) 03/26/2019    BUN 12 03/26/2019    CR 0.64 03/26/2019    GFRESTIMATED >90 03/26/2019    GFRESTBLACK >90 03/26/2019    JEFFERSON 8.9 03/26/2019 "    BILITOTAL 0.4 2019    ALBUMIN 3.5 2019    ALKPHOS 78 2019    ALT 20 2019    AST 25 2019        Echocardiogram 3/21/2019:  Mild to moderate right ventricular dilation is present.  There is moderate to severe right ventricular hypertrophy.  Global right ventricular function is moderately reduced.  Severe pulmonary hypertension is present.  Estimated pulmonary artery systolic pressure is 75 mmHg plus right atrial pressure.  The inferior vena cava was normal in size with preserved respiratory variability.  No right to left shunting detected following injection of agitated saline.    Pulmonary Function Tests  FVC: 21%  FEV1: 24%  FEV/FVC: 96%  T%  DLCO: 37%    RHC 3/21/2019  Systemic BP: 180s/110s  RA: 18  RV: 100/20  PA: 100/50 (68)  PCWP: 17  CO/CI: 6.1/3.3  PVR: 8.4    Nipride Study  PA: 74/27 (50)  PCWP: 14  CO/CI: 5.7/3.1  PVR: 6.3    Assessment and Plan: Catherine Chayito Sharp is a 43 year old female who presents for return evaluation for severe Group 3 PH.    1.  Severe Group 3 Pulmonary Hypertension with Preserved Cardiac Output: Ms. Hsu has severe group 3 pulmonary hypertension with limited functional capacity due to pain and significant lung disease.  Her hemodynamic evaluation revealed a PVR over 6, but thankfully her cardiac index is preserved.  She does have a very reduced DLCO which we have previously demonstrated as an independent risk factor for mortality and group 3 pulmonary hypertension (Mariel et al., 2019 [facility]).  Her PCWP was slightly elevated at baseline, but that was due to systemic hypertension and her wedge normalized after her BP was closer to normal.  Thus her PH is precapillary.  We will keep her on her lasix dose of 40 mg twice a day to help with volume control.    We did discuss pulmonary rehabilitation and she was not interested in pursuing it at this point.  We did offer a referral.     We spoke with Catherine about her options going forward.  She would  like to enroll in the INCREASE trial, which is a trial evaluating the utility of inhaled prostacyclin in ILD-PH.    Overall, her best chance at long term survival in lung transplantation.  She is not a candidate now but hopefully we can get her weight down and she can complete the evaluation.  We encouraged more exercise and diet.    2.  History of DVT and PE: She is on lifelong anticoagulation which I agree with.    It was a pleasure seeing your patient Catherine Sharp at the [facility] Pulmonary Hypertension clinic.  Please contact us with any questions or concerns that you may have.    Sincerely,    Carter Eng MD, PhD   of Medicine  [facility]monary [facility]pertension  Cardiovascular Division  [facility]

## 2019-07-02 NOTE — PROGRESS NOTES
Study Title: A Multicenter, Randomized, Double-Blinded, Placebo-Controlled Trial to Evaluate the Safety and Efficacy of Inhaled Treprostinil in Subjects with Pulmonary Hypertension due to Parenchymal Lung Disease (INCREASE 201)  IRB#: [id number]  PI: Anastasia Oconnor -[phone]  : Sonya Navarro -[phone] & Latha Dailey -[phone]  Estimated duration of study: Up to 16 weeks    Subject was reapproached for possible participation in the study listed above. The current IRB approved consent form was reviewed and discussed at length with the subject and the subjects' spouse.  Study purpose, hypothesis, nature of the research, risks and benefits, and required procedures/visits were thoroughly reviewed.  A detailed explanation of randomization arms (inhaled Treprostinil vs Placebo) was provided. Confidentiality issues, compensation for injury, and alternative procedures was also explained. Subject was informed that participation is voluntary and that refusal to participate will involve no penalty or decreased benefits to which the subject is otherwise entitled to. Patient and spouse had the opportunity to read the entire written consent, ask questions and concerns of the study investigator and offered sufficient time to consider participation in the research trial.  Patient was able to clearly state what study participation involved, along with associated requirements.  Subject and spouse had no questions.  Subject voluntarily signed the consent and HIPAA forms July 2, 2019 at 10:15 AM, prior to any research required tests / procedures and was provided a copy of the signed forms.  Subject declined consent to the optional biomarker and whole genomic sequencing blood samples.    Subject verbalized understanding that although they have signed the consent for this study, it may be determined during the screening process that they may not be a suitable candidate for this trial.

## 2019-07-02 NOTE — NURSING NOTE
Med Reconcile: Reviewed and verified all current medications with the patient. The updated medication list was printed and given to the patient.    Diet: Patient instructed regarding a heart healthy diet, including discussion of reduced fat and sodium intake. Patient demonstrated understanding of this information and agreed to call with further questions or concerns.    Return Appointment: Patient given instructions regarding scheduling next clinic visit. Patient demonstrated understanding of this information and agreed to call with further questions or concerns.    Patient stated she understood all health information given and agreed to call with further questions or concerns.    Medication Changes:   No medication changes at this time. Please continue current medication regiment.      Patient Instructions:  1. Continue staying active and eat a heart healthy diet.    2. Please keep current list of medications with you at all times.    3. Remember to weigh yourself daily after voiding and before you consume any food or beverages and log the numbers.  If you have gained 2 pounds overnight or 5 pounds in a week contact us immediately for medication adjustments or further instructions.    4. **Please call us immediately if you have any syncope (fainting or passing out), chest pain, edema (swelling or weight gain), or decline in your functional status (general decline in how you are feeling).    Follow up Appointment Information:  -Will enroll in trial and follow up with Research  -follow up with Dr. Eng in 6 month w/labs  -Weight loss; Lose 2 pounds per week until next visit    **patient enrolled in the tyvaso trial while here.  Latha completed Research portion of visit.  Although I spoke with patient about weight loss, she is not able to start Pulmonary Rehab until 4 months from now r/t the research trial. Yessy Gastelum RN on 7/2/2019 at 11:01 AM

## 2019-07-02 NOTE — LETTER
RE: Catherine Sharp  [address]  [facility] [address]       July 2, 2019      Boris Bearden MD  [facility] [place] [address]      Dear Dr. Bearden    I had the pleasure of seeing your patient Catherine Sharp at the [facility] Pulmonary Hypertension clinic.  As you know, Catherine is a very pleasant 43-year-old female with a past medical history significant for chronic  hypoxic respiratory failure secondary to interstitial lung disease from lupus and past history of ARDS, SLE on plaquenil, history of DVT and on chronic coumadin, hypertension, spine fractures, hypothyroidism, chronic pain, and bone mineral density disease who presents for evaluation of pulmonary hypertension.  Catherine's cortez with lung disease started approximately in 1996 when she had open lung biopsy for ill-defined repeated respiratory failure.  Unfortunately, there was no clear diagnosis on the lung biopsy and she had a subsequent lung biopsy with no clear diagnosis of her lung disease.  She has been on chronic oxygen therapy for approximately 5 years with ranges anywhere between 3-8 L/min.  She does have a lot of variation in her symptoms and sometimes it feels better and she only needs 3 L/min and then other days she feels worse and needs to go up to 6-8.  She is still able to do her activities of daily living at home, but she is limited in her physical activity due to back pain.  She will get dyspnea on exertion, but many times her limitation is her back pain.  She can go up a flight of stairs but does feel slightly short of breath when she gets to the top.  She does have occasional chest pressure when exerting herself which resolves with rest and oxygen therapy.  She has had presyncopal sensations but no jocelyn syncope.  She does not have any lower extremity edema or abdominal swelling, but she does take Lasix.    Catherine had a thorough work up for her pulmonary hypertension which showed she has severe  Group 3 PH due to her ILD.  She has elevated RA pressures but preserved cardiac output.  Her baseline RHC had a mildly elevated wedge but her systemic pressure was greater than 180/100 at the time.  She was treated with nipride which normalized her wedge but her PA pressures were still elevated.     Catherine was recently hospitalized at [facility] for hypoxemia and potential ILD exacerbation.  She was given a short course of steroids and then her hypoxemia resolved.  She had her evaluation for lung transplant put on hold because of obesity and elevated BMI.    Today, Catherine comes in and she is feeling back to her baseline.  She has been back on her baseline dose of prednisone.  She continues to have exertional dyspnea.  She has noticed that her urine output will change from day to day despite being on the same dose of lasix 40 mg twice a day.  Overall, she remains WHO FC III.    PAST MEDICAL HISTORY:  -Chronic hypoxemic respiratory failure due to ILD from SLE  -DVT  -Hypertension  -Hypothyroidism  -Bone mineral density  -Chronic pain    FAMILY HISTORY:  No family history of PH    SOCIAL HISTORY:  Denies current tobacco, drug, and alcohol use.  Does have a 10-pack year history.    CURRENT MEDICATIONS:  Current Outpatient Medications   Medication Sig Dispense Refill     Albuterol Sulfate (VENTOLIN HFA IN) Inhale 2 puffs into the lungs every 4 hours as needed.         CALCIUM CITRATE PO Take 250 mg by mouth 2 times daily.       denosumab (PROLIA) 60 MG/ML SOLN injection Inject 60 mg Subcutaneous       diltiazem ER (DILT-XR) 240 MG 24 hr ER beaded capsule Take by mouth daily       ferrous sulfate 325 (65 FE) MG tablet Take 1 tablet by mouth 2 times daily. 30 tablet 1     furosemide (LASIX) 20 MG tablet Take 2 tablets (40 mg) by mouth 2 times daily 360 tablet 3     hydroxychloroquine (PLAQUENIL) 200 MG tablet Take 200 mg by mouth daily.         levothyroxine (SYNTHROID/LEVOTHROID) 150 MCG tablet Take 150 mcg by mouth    "    OXYCODONE HCL PO Take 30 mg by mouth 3 times daily as needed        OXYCONTIN 40 MG 12 hr tablet TAKE 1 TABLET BY MOUTH IN THE MORNING  0     potassium chloride ER (K-DUR/KLOR-CON M) 20 MEQ CR tablet Take 1 tablet (20 mEq) by mouth daily 90 tablet 3     PREDNISONE PO Take 5 mg by mouth daily        PROMETHAZINE HCL PO Take 25 mg by mouth every 4 hours as needed.       vitamin D (ERGOCALCIFEROL) 71997 UNIT capsule Take 50,000 Units by mouth twice a week. Monday and thursday       Warfarin Sodium (JANTOVEN PO) Take 5 mg by mouth daily. Patient alternates between 5 mg and 7.5 mg every other day.          ROS:   10 point ROS negative except HPI    EXAM:  /77 (BP Location: Right arm, Patient Position: Chair, Cuff Size: Adult Regular)   Pulse 77   Ht 1.473 m (4' 10\")   Wt 91.4 kg (201 lb 6.4 oz)   SpO2 95%   BMI 42.09 kg/m     General: appears comfortable, alert and articulate  Head: normocephalic, atraumatic  Eyes: anicteric sclera, EOMI  Neck: no adenopathy  Orophyarynx: moist mucosa, no lesions, dentition intact  Heart: regular, S1/S2 with prominent P2, JVP 8 cm, 1/6 HARSHAD heard at the LLSB  Lungs: inspiratory crackles bilaterally at the bases but left worse than right  Abdomen: soft, non-tender, bowel sounds present, no hepatosplenomegaly  Extremities: no clubbing or edema, slight blue coloration of toes  Neurological: normal speech and affect, no gross motor deficits    Labs:  CBC RESULTS:  Lab Results   Component Value Date    WBC 8.5 07/02/2019    RBC 5.05 07/02/2019    HGB 15.2 07/02/2019    HCT 49.6 (H) 07/02/2019    MCV 98 07/02/2019    MCH 30.1 07/02/2019    MCHC 30.6 (L) 07/02/2019    RDW 14.3 07/02/2019     07/02/2019       CMP RESULTS:  Lab Results   Component Value Date     03/26/2019    POTASSIUM 4.1 03/26/2019    CHLORIDE 98 03/26/2019    CO2 37 (H) 03/26/2019    ANIONGAP 2 (L) 03/26/2019     (H) 03/26/2019    BUN 12 03/26/2019    CR 0.64 03/26/2019    GFRESTIMATED >90 " 2019    GFRESTBLACK >90 2019    JEFFERSON 8.9 2019    BILITOTAL 0.4 2019    ALBUMIN 3.5 2019    ALKPHOS 78 2019    ALT 20 2019    AST 25 2019        Echocardiogram 3/21/2019:  Mild to moderate right ventricular dilation is present.  There is moderate to severe right ventricular hypertrophy.  Global right ventricular function is moderately reduced.  Severe pulmonary hypertension is present.  Estimated pulmonary artery systolic pressure is 75 mmHg plus right atrial pressure.  The inferior vena cava was normal in size with preserved respiratory variability.  No right to left shunting detected following injection of agitated saline.    Pulmonary Function Tests  FVC: 21%  FEV1: 24%  FEV/FVC: 96%  T%  DLCO: 37%    RHC 3/21/2019  Systemic BP: 180s/110s  RA: 18  RV: 100/20  PA: 100/50 (68)  PCWP: 17  CO/CI: 6.1/3.3  PVR: 8.4    Nipride Study  PA: 74/27 (50)  PCWP: 14  CO/CI: 5.7/3.1  PVR: 6.3    Assessment and Plan: Catherine Sharp is a 43 year old female who presents for return evaluation for severe Group 3 PH.    1.  Severe Group 3 Pulmonary Hypertension with Preserved Cardiac Output: Ms. Hsu has severe group 3 pulmonary hypertension with limited functional capacity due to pain and significant lung disease.  Her hemodynamic evaluation revealed a PVR over 6, but thankfully her cardiac index is preserved.  She does have a very reduced DLCO which we have previously demonstrated as an independent risk factor for mortality and group 3 pulmonary hypertension (Mariel et al., 2019 [facility]).  Her PCWP was slightly elevated at baseline, but that was due to systemic hypertension and her wedge normalized after her BP was closer to normal.  Thus her PH is precapillary.  We will keep her on her lasix dose of 40 mg twice a day to help with volume control.    We did discuss pulmonary rehabilitation and she was not interested in pursuing it at this point.  We did offer a referral.     We  spoke with Catherine about her options going forward.  She would like to enroll in the INCREASE trial, which is a trial evaluating the utility of inhaled prostacyclin in ILD-PH.    Overall, her best chance at long term survival in lung transplantation.  She is not a candidate now but hopefully we can get her weight down and she can complete the evaluation.  We encouraged more exercise and diet.    2.  History of DVT and PE: She is on lifelong anticoagulation which I agree with.    It was a pleasure seeing your patient Catherine Sharp at the [facility] Pulmonary Hypertension clinic.  Please contact us with any questions or concerns that you may have.    Sincerely,    Carter Eng MD, PhD   of Medicine  Center for Pulmonary Hypertension  Cardiovascular Division  [facility]

## 2019-07-02 NOTE — PATIENT INSTRUCTIONS
Medication Changes:   No medication changes at this time. Please continue current medication regiment.      Patient Instructions:  1. Continue staying active and eat a heart healthy diet.    2. Please keep current list of medications with you at all times.    3. Remember to weigh yourself daily after voiding and before you consume any food or beverages and log the numbers.  If you have gained 2 pounds overnight or 5 pounds in a week contact us immediately for medication adjustments or further instructions.    4. **Please call us immediately if you have any syncope (fainting or passing out), chest pain, edema (swelling or weight gain), or decline in your functional status (general decline in how you are feeling).    Follow up Appointment Information:  -Will enroll in trial and follow up with Research  -follow up with Dr. Eng in 6 month w/labs  -Weight loss; Lose 2 pounds per week until next visit    We are located on the third floor of the [facility] ([facility] on the [facility].  Our address is     [address] [place] [address] [place] [address] [place] [address]      Thank you for allowing us to be a part of your care here at the [facility]    If you have questions or concerns please contact us at:    Emilee Gallo RN, BSN    Dk Carey or Charito Dailey (Schedule,Prior Auth)  Nurse Coordinator     Clinic   Pulmonary Hypertension   Pulmonary Hypertension  [facility]  (P)[phone]    (P) [phone]        (F)[phone]                ** Please note that you will NOT receive a reminder call regarding your scheduled testing, reminder calls are for provider appointments only.  If you are scheduled for testing within the [facility] system you may receive a call regarding pre-registration for billing purposes only.**     Support Group:  Pulmonary Hypertension  Association  Https://www.phassociation.org/  **Look at the Events Tab** They even have Support Groups that you can call into    [facility] Support Group  Second Saturday of the Month from 1-3 PM   [facility] [address]  Leader: Emmy Andrews  Phone: [phone] or [phone]  Email: [email or web address]

## 2019-07-12 DIAGNOSIS — Z00.6 EXAMINATION OF PARTICIPANT OR CONTROL IN CLINICAL RESEARCH: Primary | ICD-10-CM

## 2019-07-12 DIAGNOSIS — Z00.6 RESEARCH EXAM: ICD-10-CM

## 2019-07-12 NOTE — PROGRESS NOTES
"MEDICATION INSTRUCTIONS  DATE DOSING INSTRUCTIONS   7/12/19 (Today) Start 3 breaths, four times a day   7/16/19 Tuesday Increase to 4 breaths, four times a day   7/19/19 Friday Increase to 5 breaths, four times a day   7/22/19 Monday Increase to 6 breaths, four times a day   7/25/19 Thursday Increase to 7 breaths, four times a day   7/28/19 Sunday Increase to 8 breaths, four times a day   7/31/19 Wednesday Increase to 9 breaths, four times a day.   Stay at 9 breaths until further instructions from Dr. Oconnor or research nurse.     Every 3 days, Increase by 1 breath until you reach 9 breaths 4 times a day.  We will call you weekly to check on your progress.     BREATHING TECHNIQUES FOR TYVASO  1. Ensure proper breathing technique by:    Sealing your lips completely around the mouthpiece.    Maintaining normal breathing patterns.    Inhaling approximately 2-3 seconds and breathing \"normal full breaths.\"    Do not hold breath once medication is inhaled.  2. Use the pause button as needed if coughing develops or if you need to pause treatment after starting a session.   3. Keep the inhalation device leveled when doing treatment sessions.     GENERAL INFORMATION  1. Use 1 ampule of inhaled Tyvaso daily.  2. Keep ampules in the foil pouch. The Tyvaso medication is sensitive to light.   3. Use opened foil packs within 7 days.   4. Do not reuse medicine cup and filter membrane. Replace parts daily.   5. Do not use or place the device near microwaves, ovens, flammable liquids/materials, heated surfaces, or other strong electric of magnetic fields (eg. MRI machine).  6. Charge the battery pack nightly. A battery may take up to 40 hours to fully charge.    A fully charged batter typically last up to about 200 inhalations.    A battery charged for one night last about 10 hours (or about 40 inhalations).  7. Wash the plastic parts daily with mild, soapy warm water, rinse thoroughly, and let air dry. Use a clean cloth to wipe " the interior of the inhalation device chamber weekly.     OTHER INSTRUCTIONS  1. Do NOT discard any empty ampules. Bring ALL ampules (empty or full) back to each visit.   2. If medication spills on your hand, make sure the wash your hands immediately to avoid skin irritation.   3. Contact your  if you notice any of the following side effects: Coughing, headache, throat irritation/pain, and/or nausea.  4. If you notice an allergic reaction, please call us. If an allergic reaction is so severe your throat is closing up and cannot breath, call 911.   5. If anyone prescribes a new medication, call us before starting this new medication to ensure that it is safe to take with the study drug.     CONTACT INFORMATION  Primary Coordinator: Sonya Navarro RN, CCRC () [phone]   Research Associate: Latha Dailey [facility] () [phone]

## 2019-07-13 ENCOUNTER — COMMUNICATION - HEALTHEAST (OUTPATIENT)
Dept: FAMILY MEDICINE | Facility: CLINIC | Age: 44
End: 2019-07-13

## 2019-07-13 DIAGNOSIS — F41.9 ANXIETY: ICD-10-CM

## 2019-07-22 LAB
DLCOCOR-%PRED-PRE: 32 %
DLCOCOR-PRE: 5.76 ML/MIN/MMHG
DLCOUNC-%PRED-PRE: 34 %
DLCOUNC-PRE: 6.05 ML/MIN/MMHG
DLCOUNC-PRED: 17.6 ML/MIN/MMHG
ERV-%PRED-PRE: 4 %
ERV-PRE: 0.01 L
ERV-PRED: 0.32 L
EXPTIME-PRE: 5.13 SEC
FEF2575-%PRED-PRE: 42 %
FEF2575-PRE: 1.17 L/SEC
FEF2575-PRED: 2.73 L/SEC
FEFMAX-%PRED-PRE: 59 %
FEFMAX-PRE: 3.59 L/SEC
FEFMAX-PRED: 6.08 L/SEC
FEV1-%PRED-PRE: 30 %
FEV1-PRE: 0.73 L
FEV1FEV6-PRE: 91 %
FEV1FEV6-PRED: 84 %
FEV1FVC-PRE: 91 %
FEV1FVC-PRED: 83 %
FEV1SVC-PRE: 85 %
FEV1SVC-PRED: 84 %
FIFMAX-PRE: 2.39 L/SEC
FRCN2-%PRED-PRE: 42 %
FRCN2-PRE: 1 L
FRCN2-PRED: 2.34 L
FVC-%PRED-PRE: 27 %
FVC-PRE: 0.8 L
FVC-PRED: 2.93 L
IC-%PRED-PRE: 32 %
IC-PRE: 0.85 L
IC-PRED: 2.59 L
RVN2-%PRED-PRE: 74 %
RVN2-PRE: 0.99 L
RVN2-PRED: 1.32 L
TLCN2-%PRED-PRE: 47 %
TLCN2-PRE: 1.85 L
TLCN2-PRED: 3.93 L
VA-%PRED-PRE: 44 %
VA-PRE: 1.68 L
VC-%PRED-PRE: 29 %
VC-PRE: 0.86 L
VC-PRED: 2.91 L

## 2019-07-24 ENCOUNTER — OFFICE VISIT - HEALTHEAST (OUTPATIENT)
Dept: FAMILY MEDICINE | Facility: CLINIC | Age: 44
End: 2019-07-24

## 2019-07-24 DIAGNOSIS — Z02.9 ADMINISTRATIVE ENCOUNTER: ICD-10-CM

## 2019-07-24 ASSESSMENT — MIFFLIN-ST. JEOR: SCORE: 1456.48

## 2019-08-14 ENCOUNTER — AMBULATORY - HEALTHEAST (OUTPATIENT)
Dept: PALLIATIVE MEDICINE | Facility: OTHER | Age: 44
End: 2019-08-14

## 2019-08-14 DIAGNOSIS — Z00.6 EXAMINATION OF PARTICIPANT OR CONTROL IN CLINICAL RESEARCH: ICD-10-CM

## 2019-08-16 ENCOUNTER — HOSPITAL ENCOUNTER (OUTPATIENT)
Dept: PALLIATIVE MEDICINE | Facility: OTHER | Age: 44
Discharge: HOME | End: 2019-08-16
Attending: PHYSICIAN ASSISTANT

## 2019-08-16 DIAGNOSIS — F11.20 OPIOID DEPENDENCE, CONTINUOUS (H): ICD-10-CM

## 2019-08-16 DIAGNOSIS — G89.4 CHRONIC PAIN SYNDROME: ICD-10-CM

## 2019-08-16 DIAGNOSIS — M48.50XS PATHOLOGICAL COMPRESSION FRACTURE OF VERTEBRA, SEQUELA: ICD-10-CM

## 2019-08-16 ASSESSMENT — MIFFLIN-ST. JEOR: SCORE: 1456.48

## 2019-08-28 ENCOUNTER — COMMUNICATION - HEALTHEAST (OUTPATIENT)
Dept: OTHER | Facility: CLINIC | Age: 44
End: 2019-08-28

## 2019-08-28 ENCOUNTER — TRANSFERRED RECORDS (OUTPATIENT)
Dept: HEALTH INFORMATION MANAGEMENT | Facility: CLINIC | Age: 44
End: 2019-08-28

## 2019-08-28 ENCOUNTER — AMBULATORY - HEALTHEAST (OUTPATIENT)
Dept: OTHER | Facility: CLINIC | Age: 44
End: 2019-08-28

## 2019-08-28 ENCOUNTER — OFFICE VISIT - HEALTHEAST (OUTPATIENT)
Dept: PULMONOLOGY | Facility: OTHER | Age: 44
End: 2019-08-28

## 2019-08-28 DIAGNOSIS — J84.9 ILD (INTERSTITIAL LUNG DISEASE) (H): ICD-10-CM

## 2019-08-28 DIAGNOSIS — I27.20 PULMONARY HYPERTENSION (H): ICD-10-CM

## 2019-08-28 DIAGNOSIS — J96.12 CHRONIC RESPIRATORY FAILURE WITH HYPOXIA AND HYPERCAPNIA (H): ICD-10-CM

## 2019-08-28 DIAGNOSIS — J96.11 CHRONIC RESPIRATORY FAILURE WITH HYPOXIA AND HYPERCAPNIA (H): ICD-10-CM

## 2019-08-28 DIAGNOSIS — G47.36 SLEEP-RELATED HYPOVENTILATION DUE TO CHEST WALL DISORDER: ICD-10-CM

## 2019-08-28 DIAGNOSIS — J98.9 SLEEP-RELATED HYPOVENTILATION DUE TO CHEST WALL DISORDER: ICD-10-CM

## 2019-08-28 ASSESSMENT — MIFFLIN-ST. JEOR: SCORE: 1456.48

## 2019-08-29 ENCOUNTER — AMBULATORY - HEALTHEAST (OUTPATIENT)
Dept: OTHER | Facility: CLINIC | Age: 44
End: 2019-08-29

## 2019-09-04 ENCOUNTER — COMMUNICATION - HEALTHEAST (OUTPATIENT)
Dept: FAMILY MEDICINE | Facility: CLINIC | Age: 44
End: 2019-09-04

## 2019-09-04 DIAGNOSIS — F41.9 ANXIETY: ICD-10-CM

## 2019-09-06 ENCOUNTER — AMBULATORY - HEALTHEAST (OUTPATIENT)
Dept: OTHER | Facility: CLINIC | Age: 44
End: 2019-09-06

## 2019-09-09 DIAGNOSIS — Z00.6 EXAMINATION OF PARTICIPANT OR CONTROL IN CLINICAL RESEARCH: ICD-10-CM

## 2019-09-10 ENCOUNTER — COMMUNICATION - HEALTHEAST (OUTPATIENT)
Dept: PALLIATIVE MEDICINE | Facility: OTHER | Age: 44
End: 2019-09-10

## 2019-09-10 DIAGNOSIS — I27.20 PULMONARY HYPERTENSION (H): Primary | ICD-10-CM

## 2019-09-10 DIAGNOSIS — Z00.6 RESEARCH EXAM: ICD-10-CM

## 2019-09-10 DIAGNOSIS — G89.4 CHRONIC PAIN SYNDROME: ICD-10-CM

## 2019-09-10 DIAGNOSIS — F11.20 OPIOID DEPENDENCE, CONTINUOUS (H): ICD-10-CM

## 2019-09-10 LAB
DLCOUNC-%PRED-PRE: 27 %
DLCOUNC-PRE: 4.88 ML/MIN/MMHG
DLCOUNC-PRED: 17.54 ML/MIN/MMHG
ERV-%PRED-PRE: 19 %
ERV-PRE: 0.05 L
ERV-PRED: 0.29 L
EXPTIME-PRE: 6.92 SEC
FEF2575-%PRED-PRE: 54 %
FEF2575-PRE: 1.46 L/SEC
FEF2575-PRED: 2.69 L/SEC
FEFMAX-%PRED-PRE: 53 %
FEFMAX-PRE: 3.27 L/SEC
FEFMAX-PRED: 6.06 L/SEC
FEV1-%PRED-PRE: 31 %
FEV1-PRE: 0.75 L
FEV1FEV6-PRE: 95 %
FEV1FEV6-PRED: 84 %
FEV1FVC-PRE: 95 %
FEV1FVC-PRED: 83 %
FEV1SVC-PRE: 81 %
FEV1SVC-PRED: 83 %
FIFMAX-PRE: 2.32 L/SEC
FRCN2-%PRED-PRE: 45 %
FRCN2-PRE: 1.07 L
FRCN2-PRED: 2.34 L
FVC-%PRED-PRE: 27 %
FVC-PRE: 0.8 L
FVC-PRED: 2.92 L
IC-%PRED-PRE: 33 %
IC-PRE: 0.87 L
IC-PRED: 2.6 L
RVN2-%PRED-PRE: 76 %
RVN2-PRE: 1.02 L
RVN2-PRED: 1.34 L
TLCN2-%PRED-PRE: 49 %
TLCN2-PRE: 1.95 L
TLCN2-PRED: 3.93 L
VA-%PRED-PRE: 41 %
VA-PRE: 1.58 L
VC-%PRED-PRE: 32 %
VC-PRE: 0.93 L
VC-PRED: 2.89 L

## 2019-09-13 ENCOUNTER — COMMUNICATION - HEALTHEAST (OUTPATIENT)
Dept: RHEUMATOLOGY | Facility: CLINIC | Age: 44
End: 2019-09-13

## 2019-09-13 DIAGNOSIS — M32.9 SYSTEMIC LUPUS ERYTHEMATOSUS (H): ICD-10-CM

## 2019-09-13 DIAGNOSIS — M32.9 SYSTEMIC LUPUS ERYTHEMATOSUS, UNSPECIFIED SLE TYPE, UNSPECIFIED ORGAN INVOLVEMENT STATUS (H): ICD-10-CM

## 2019-09-18 ENCOUNTER — COMMUNICATION - HEALTHEAST (OUTPATIENT)
Dept: RHEUMATOLOGY | Facility: CLINIC | Age: 44
End: 2019-09-18

## 2019-09-18 DIAGNOSIS — M32.9 SYSTEMIC LUPUS ERYTHEMATOSUS (H): ICD-10-CM

## 2019-09-23 ENCOUNTER — COMMUNICATION - HEALTHEAST (OUTPATIENT)
Dept: ADMINISTRATIVE | Facility: CLINIC | Age: 44
End: 2019-09-23

## 2019-10-04 ENCOUNTER — HEALTH MAINTENANCE LETTER (OUTPATIENT)
Age: 44
End: 2019-10-04

## 2019-10-07 ENCOUNTER — DOCUMENTATION ONLY (OUTPATIENT)
Dept: CARDIOLOGY | Facility: CLINIC | Age: 44
End: 2019-10-07

## 2019-10-07 ENCOUNTER — COMMUNICATION - HEALTHEAST (OUTPATIENT)
Dept: ADMINISTRATIVE | Facility: CLINIC | Age: 44
End: 2019-10-07

## 2019-10-07 DIAGNOSIS — Z00.6 EXAMINATION OF PARTICIPANT OR CONTROL IN CLINICAL RESEARCH: ICD-10-CM

## 2019-10-07 DIAGNOSIS — Z00.6 EXAMINATION OF PARTICIPANT OR CONTROL IN CLINICAL RESEARCH: Primary | ICD-10-CM

## 2019-10-07 NOTE — PROGRESS NOTES
Study Title: A Multicenter, Randomized, Double-Blinded, Placebo-Controlled Trial to Evaluate the Safety and Efficacy of Inhaled Treprostinil in Subjects with Pulmonary Hypertension due to Parenchymal Lung Disease (INCREASE 201)  IRB#: [id number]  PI: Anastasia Oconnor -[phone]  : Sonya Navarro -[phone] & Latha Dailey  [phone]  Estimated duration of study: Up to 16 week  Subject was re-consented to latest version of IRB approved consent form dated 9/11/19. Changes were review and discussed. Subject was informed that continued participation is voluntary and that refusal to remain in the study will involve no penalty or decrease benefits to which they were otherwise entitled. Subject has had the opportunity to read the entire written consent, ask questions and concerns of the study investigator and offered sufficient time to re-consider the research trial.  Patient was able to clearly state what the changes are.  All questions and concerns were addressed.  Patient voluntarily signed the consent 10/7/19 at 08:10, prior to any research required tests / procedures and was provided with a copy of the signed forms. HIPAA form has not changed and therefore was not re-signed.

## 2019-10-09 ENCOUNTER — HOSPITAL ENCOUNTER (OUTPATIENT)
Dept: PALLIATIVE MEDICINE | Facility: OTHER | Age: 44
Discharge: HOME | End: 2019-10-09
Attending: PHYSICIAN ASSISTANT

## 2019-10-09 DIAGNOSIS — F11.20 OPIOID DEPENDENCE, CONTINUOUS (H): ICD-10-CM

## 2019-10-09 DIAGNOSIS — M48.50XD PATHOLOGIC COMPRESSION FRACTURE OF SPINE, WITH ROUTINE HEALING, SUBSEQUENT ENCOUNTER: ICD-10-CM

## 2019-10-09 DIAGNOSIS — M32.9 SYSTEMIC LUPUS ERYTHEMATOSUS, UNSPECIFIED SLE TYPE, UNSPECIFIED ORGAN INVOLVEMENT STATUS (H): ICD-10-CM

## 2019-10-09 DIAGNOSIS — G89.4 CHRONIC PAIN SYNDROME: ICD-10-CM

## 2019-10-09 ASSESSMENT — MIFFLIN-ST. JEOR: SCORE: 1479.16

## 2019-10-17 ENCOUNTER — COMMUNICATION - HEALTHEAST (OUTPATIENT)
Dept: RHEUMATOLOGY | Facility: CLINIC | Age: 44
End: 2019-10-17

## 2019-10-17 ENCOUNTER — OFFICE VISIT - HEALTHEAST (OUTPATIENT)
Dept: RHEUMATOLOGY | Facility: CLINIC | Age: 44
End: 2019-10-17

## 2019-10-17 DIAGNOSIS — D68.59 PRIMARY HYPERCOAGULABLE STATE (H): ICD-10-CM

## 2019-10-17 DIAGNOSIS — M32.9 SYSTEMIC LUPUS ERYTHEMATOSUS, UNSPECIFIED SLE TYPE, UNSPECIFIED ORGAN INVOLVEMENT STATUS (H): ICD-10-CM

## 2019-10-17 DIAGNOSIS — M32.9 SYSTEMIC LUPUS ERYTHEMATOSUS (H): ICD-10-CM

## 2019-10-17 DIAGNOSIS — M77.8 TENDONITIS OF BOTH SHOULDERS: ICD-10-CM

## 2019-10-17 DIAGNOSIS — J84.9 ILD (INTERSTITIAL LUNG DISEASE) (H): ICD-10-CM

## 2019-10-17 ASSESSMENT — MIFFLIN-ST. JEOR: SCORE: 1479.16

## 2019-10-22 ENCOUNTER — COMMUNICATION - HEALTHEAST (OUTPATIENT)
Dept: RHEUMATOLOGY | Facility: CLINIC | Age: 44
End: 2019-10-22

## 2019-10-28 ENCOUNTER — COMMUNICATION - HEALTHEAST (OUTPATIENT)
Dept: FAMILY MEDICINE | Facility: CLINIC | Age: 44
End: 2019-10-28

## 2019-10-28 DIAGNOSIS — F41.9 ANXIETY: ICD-10-CM

## 2019-11-01 ENCOUNTER — COMMUNICATION - HEALTHEAST (OUTPATIENT)
Dept: OTHER | Facility: CLINIC | Age: 44
End: 2019-11-01

## 2019-11-01 ENCOUNTER — DOCUMENTATION ONLY (OUTPATIENT)
Dept: CARDIOLOGY | Facility: CLINIC | Age: 44
End: 2019-11-01

## 2019-11-01 ENCOUNTER — RESEARCH ENCOUNTER (OUTPATIENT)
Dept: CARDIOLOGY | Facility: CLINIC | Age: 44
End: 2019-11-01

## 2019-11-01 DIAGNOSIS — Z00.6 EXAMINATION OF PARTICIPANT OR CONTROL IN CLINICAL RESEARCH: Primary | ICD-10-CM

## 2019-11-01 DIAGNOSIS — I27.20 PULMONARY HYPERTENSION (H): ICD-10-CM

## 2019-11-01 DIAGNOSIS — Z00.6 EXAMINATION OF PARTICIPANT OR CONTROL IN CLINICAL RESEARCH: ICD-10-CM

## 2019-11-01 RX ORDER — FUROSEMIDE 20 MG
TABLET ORAL
Qty: 360 TABLET | Refills: 3 | COMMUNITY
Start: 2019-11-01 | End: 2020-01-01

## 2019-11-01 NOTE — PROGRESS NOTES
"MEDICATION INSTRUCTIONS  DATE DOSING INSTRUCTIONS   11/1/19 (Today) Start 3 breaths, four times a day   11/4/19 (Mon) Increase to 4 breaths, four times a day   11/7/19 (Thu) Increase to 5 breaths, four times a day   11/10/19 (Sun) Increase to 6 breaths, four times a day   11/13/19 (Wed) Increase to 7 breaths, four times a day   11/16/19 (Sat) Increase to 8 breaths, four times a day   11/19/19 (Tue) Increase to 9 breaths, four times a day.   Stay at 9 breaths until further instructions from Dr. Oconnor or research nurse.     Every 3 days, Increase by 1 breath until you reach 9 breaths 4 times a day.  We will call you weekly to check on your progress.     BREATHING TECHNIQUES FOR TYVASO  1. Ensure proper breathing technique by:    Sealing your lips completely around the mouthpiece.    Maintaining normal breathing patterns.    Inhaling approximately 2-3 seconds and breathing \"normal full breaths.\"    Do not hold breath once medication is inhaled.  2. Use the pause button as needed if coughing develops or if you need to pause treatment after starting a session.   3. Keep the inhalation device leveled when doing treatment sessions.     GENERAL INFORMATION  1. Use 1 ampule of inhaled Tyvaso daily.  2. Keep ampules in the foil pouch. The Tyvaso medication is sensitive to light.   3. Use opened foil packs within 7 days.   4. Do not reuse medicine cup and filter membrane. Replace parts daily.   5. Do not use or place the device near microwaves, ovens, flammable liquids/materials, heated surfaces, or other strong electric of magnetic fields (eg. MRI machine).  6. Charge the battery pack nightly. A battery may take up to 40 hours to fully charge.    A fully charged batter typically last up to about 200 inhalations.    A battery charged for one night last about 10 hours (or about 40 inhalations).  7. Wash the plastic parts daily with mild, soapy warm water, rinse thoroughly, and let air dry. Use a clean cloth to wipe the " interior of the inhalation device chamber weekly.     OTHER INSTRUCTIONS  1. Do NOT discard any empty ampules. Bring ALL ampules (empty or full) back to each visit.   2. If medication spills on your hand, make sure the wash your hands immediately to avoid skin irritation.   3. Contact your  if you notice any of the following side effects: Coughing, headache, throat irritation/pain, and/or nausea.  4. If you notice an allergic reaction, please call us. If an allergic reaction is so severe your throat is closing up and cannot breath, call 911.   5. If anyone prescribes a new medication, call us before starting this new medication to ensure that it is safe to take with the study drug.     CONTACT INFORMATION  Primary Coordinator: Sonya Navarro RN, CCRC () [phone]   Research Associate: Latha Dailey [facility] () [phone]

## 2019-11-01 NOTE — PROGRESS NOTES
Study Title: An Open- Label Extension Study of Inhaled Treprostinil in Subjects with Pulmonary Hypertension due to Parenchymal Lung Disease  IRB# [id number]  PI: Aanstasia Oconnor -[phone]  : Sonya Navarro -[phone] & Latha Dailey -[phone]  Estimated duration of study: Up to 108 weeks  Subject completed 201 and was approached for participation in the open label study. The current IRB approved consent form was reviewed and discussed at length with the subject, and spouse. This included purpose, research hypothesis, nature of the research, risks & benefits, and procedures required for enrollment and follow up. Confidentiality issues, compensation for injury, and alternative procedures available were also explained. Subject was informed that participation is voluntary and that refusal to participate will involve no penalty or decrease benefits to which the subject is otherwise entitled. Patient/ spouse was provided the opportunity to read the entire written consent, ask any questions or concerns of the study investigator(s) and offered sufficient time to consider the research trial. Patient was able to clearly state what study participation involved and the associated requirements. All questions and concerns were addressed. Patient voluntarily signed the consent and HIPAA forms on 11/1/19 at 12:20 , prior to any research required tests / procedures and was given a copy of the signed forms.

## 2020-01-01 ENCOUNTER — COMMUNICATION - HEALTHEAST (OUTPATIENT)
Dept: RHEUMATOLOGY | Facility: CLINIC | Age: 45
End: 2020-01-01

## 2020-01-01 ENCOUNTER — COMMUNICATION - HEALTHEAST (OUTPATIENT)
Dept: NURSING | Facility: CLINIC | Age: 45
End: 2020-01-01

## 2020-01-01 ENCOUNTER — COMMUNICATION - HEALTHEAST (OUTPATIENT)
Dept: ADMINISTRATIVE | Facility: CLINIC | Age: 45
End: 2020-01-01

## 2020-01-01 ENCOUNTER — COMMUNICATION - HEALTHEAST (OUTPATIENT)
Dept: FAMILY MEDICINE | Facility: CLINIC | Age: 45
End: 2020-01-01

## 2020-01-01 ENCOUNTER — RECORDS - HEALTHEAST (OUTPATIENT)
Dept: ADMINISTRATIVE | Facility: OTHER | Age: 45
End: 2020-01-01

## 2020-01-01 ENCOUNTER — TELEPHONE (OUTPATIENT)
Dept: CARDIOLOGY | Facility: CLINIC | Age: 45
End: 2020-01-01

## 2020-01-01 ENCOUNTER — COMMUNICATION - HEALTHEAST (OUTPATIENT)
Dept: PULMONOLOGY | Facility: OTHER | Age: 45
End: 2020-01-01

## 2020-01-01 ENCOUNTER — COMMUNICATION - HEALTHEAST (OUTPATIENT)
Dept: OTHER | Facility: CLINIC | Age: 45
End: 2020-01-01

## 2020-01-01 ENCOUNTER — OFFICE VISIT (OUTPATIENT)
Dept: CARDIOLOGY | Facility: CLINIC | Age: 45
End: 2020-01-01
Attending: INTERNAL MEDICINE
Payer: MEDICARE

## 2020-01-01 ENCOUNTER — HOSPITAL ENCOUNTER (OUTPATIENT)
Dept: PALLIATIVE MEDICINE | Facility: OTHER | Age: 45
Discharge: HOME | End: 2020-07-07
Attending: PHYSICIAN ASSISTANT

## 2020-01-01 ENCOUNTER — HOSPITAL ENCOUNTER (OUTPATIENT)
Dept: PALLIATIVE MEDICINE | Facility: OTHER | Age: 45
Discharge: HOME | End: 2020-03-24
Attending: PHYSICIAN ASSISTANT

## 2020-01-01 ENCOUNTER — OFFICE VISIT - HEALTHEAST (OUTPATIENT)
Dept: ENDOCRINOLOGY | Facility: CLINIC | Age: 45
End: 2020-01-01

## 2020-01-01 ENCOUNTER — COMMUNICATION - HEALTHEAST (OUTPATIENT)
Dept: PALLIATIVE MEDICINE | Facility: OTHER | Age: 45
End: 2020-01-01

## 2020-01-01 ENCOUNTER — HOSPITAL ENCOUNTER (OUTPATIENT)
Dept: PALLIATIVE MEDICINE | Facility: OTHER | Age: 45
Discharge: HOME | End: 2020-09-23
Attending: PHYSICIAN ASSISTANT

## 2020-01-01 ENCOUNTER — HOSPITAL ENCOUNTER (OUTPATIENT)
Facility: CLINIC | Age: 45
End: 2020-01-01
Attending: INTERNAL MEDICINE | Admitting: INTERNAL MEDICINE
Payer: MEDICARE

## 2020-01-01 ENCOUNTER — RECORDS - HEALTHEAST (OUTPATIENT)
Dept: GENERAL RADIOLOGY | Facility: CLINIC | Age: 45
End: 2020-01-01

## 2020-01-01 ENCOUNTER — AMBULATORY - HEALTHEAST (OUTPATIENT)
Dept: ENDOCRINOLOGY | Facility: CLINIC | Age: 45
End: 2020-01-01

## 2020-01-01 ENCOUNTER — OFFICE VISIT - HEALTHEAST (OUTPATIENT)
Dept: RHEUMATOLOGY | Facility: CLINIC | Age: 45
End: 2020-01-01

## 2020-01-01 ENCOUNTER — OFFICE VISIT - HEALTHEAST (OUTPATIENT)
Dept: FAMILY MEDICINE | Facility: CLINIC | Age: 45
End: 2020-01-01

## 2020-01-01 ENCOUNTER — HOSPITAL ENCOUNTER (OUTPATIENT)
Dept: PALLIATIVE MEDICINE | Facility: OTHER | Age: 45
Discharge: HOME | End: 2020-01-29
Attending: PHYSICIAN ASSISTANT

## 2020-01-01 ENCOUNTER — OFFICE VISIT - HEALTHEAST (OUTPATIENT)
Dept: PULMONOLOGY | Facility: OTHER | Age: 45
End: 2020-01-01

## 2020-01-01 ENCOUNTER — HEALTH MAINTENANCE LETTER (OUTPATIENT)
Age: 45
End: 2020-01-01

## 2020-01-01 ENCOUNTER — AMBULATORY - HEALTHEAST (OUTPATIENT)
Dept: OTHER | Facility: CLINIC | Age: 45
End: 2020-01-01

## 2020-01-01 ENCOUNTER — AMBULATORY - HEALTHEAST (OUTPATIENT)
Dept: LAB | Facility: CLINIC | Age: 45
End: 2020-01-01

## 2020-01-01 ENCOUNTER — HOSPITAL ENCOUNTER (OUTPATIENT)
Dept: PALLIATIVE MEDICINE | Facility: OTHER | Age: 45
Discharge: HOME | End: 2020-05-14
Attending: PHYSICIAN ASSISTANT

## 2020-01-01 VITALS
HEART RATE: 77 BPM | HEIGHT: 58 IN | WEIGHT: 202.6 LBS | BODY MASS INDEX: 42.53 KG/M2 | RESPIRATION RATE: 16 BRPM | TEMPERATURE: 98.7 F | OXYGEN SATURATION: 94 % | SYSTOLIC BLOOD PRESSURE: 120 MMHG | DIASTOLIC BLOOD PRESSURE: 77 MMHG

## 2020-01-01 DIAGNOSIS — M81.0 OSTEOPOROSIS: ICD-10-CM

## 2020-01-01 DIAGNOSIS — I27.20 PULMONARY HYPERTENSION (H): ICD-10-CM

## 2020-01-01 DIAGNOSIS — D68.59 PRIMARY HYPERCOAGULABLE STATE (H): ICD-10-CM

## 2020-01-01 DIAGNOSIS — F41.9 ANXIETY: ICD-10-CM

## 2020-01-01 DIAGNOSIS — Z79.899 HIGH RISK MEDICATION USE: ICD-10-CM

## 2020-01-01 DIAGNOSIS — S22.000S COMPRESSION FRACTURE OF THORACIC VERTEBRA, UNSPECIFIED THORACIC VERTEBRAL LEVEL, SEQUELA: ICD-10-CM

## 2020-01-01 DIAGNOSIS — F11.20 OPIOID DEPENDENCE, CONTINUOUS (H): ICD-10-CM

## 2020-01-01 DIAGNOSIS — Z11.59 ENCOUNTER FOR SCREENING FOR OTHER VIRAL DISEASES: Primary | ICD-10-CM

## 2020-01-01 DIAGNOSIS — E03.9 ACQUIRED HYPOTHYROIDISM: ICD-10-CM

## 2020-01-01 DIAGNOSIS — R31.9 HEMATURIA, UNSPECIFIED TYPE: ICD-10-CM

## 2020-01-01 DIAGNOSIS — M32.9 SYSTEMIC LUPUS ERYTHEMATOSUS, UNSPECIFIED SLE TYPE, UNSPECIFIED ORGAN INVOLVEMENT STATUS (H): ICD-10-CM

## 2020-01-01 DIAGNOSIS — Z92.29 PERSONAL HISTORY OF OTHER DRUG THERAPY: ICD-10-CM

## 2020-01-01 DIAGNOSIS — M47.816 LUMBAR FACET ARTHROPATHY: ICD-10-CM

## 2020-01-01 DIAGNOSIS — G89.4 CHRONIC PAIN SYNDROME: ICD-10-CM

## 2020-01-01 DIAGNOSIS — Z79.899 CONTROLLED SUBSTANCE AGREEMENT SIGNED: ICD-10-CM

## 2020-01-01 DIAGNOSIS — M41.9 RESTRICTIVE LUNG DISEASE DUE TO KYPHOSCOLIOSIS: ICD-10-CM

## 2020-01-01 DIAGNOSIS — R09.02 HYPOXIA: ICD-10-CM

## 2020-01-01 DIAGNOSIS — J84.9 ILD (INTERSTITIAL LUNG DISEASE) (H): ICD-10-CM

## 2020-01-01 DIAGNOSIS — Z00.6 EXAMINATION OF PARTICIPANT OR CONTROL IN CLINICAL RESEARCH: ICD-10-CM

## 2020-01-01 DIAGNOSIS — M81.8 OTHER OSTEOPOROSIS WITHOUT CURRENT PATHOLOGICAL FRACTURE: ICD-10-CM

## 2020-01-01 DIAGNOSIS — J96.12 CHRONIC RESPIRATORY FAILURE WITH HYPOXIA AND HYPERCAPNIA (H): ICD-10-CM

## 2020-01-01 DIAGNOSIS — J96.11 CHRONIC RESPIRATORY FAILURE WITH HYPOXIA AND HYPERCAPNIA (H): ICD-10-CM

## 2020-01-01 DIAGNOSIS — R60.0 EDEMA OF RIGHT FOOT: ICD-10-CM

## 2020-01-01 DIAGNOSIS — I10 HYPERTENSION, UNSPECIFIED TYPE: ICD-10-CM

## 2020-01-01 DIAGNOSIS — T50.2X5A DIURETIC-INDUCED HYPOKALEMIA: ICD-10-CM

## 2020-01-01 DIAGNOSIS — R06.02 SOB (SHORTNESS OF BREATH): ICD-10-CM

## 2020-01-01 DIAGNOSIS — R06.09 DYSPNEA ON EXERTION: ICD-10-CM

## 2020-01-01 DIAGNOSIS — K12.1 STOMATITIS: ICD-10-CM

## 2020-01-01 DIAGNOSIS — F11.90 CHRONIC, CONTINUOUS USE OF OPIOIDS: ICD-10-CM

## 2020-01-01 DIAGNOSIS — R06.02 SHORTNESS OF BREATH: ICD-10-CM

## 2020-01-01 DIAGNOSIS — R06.09 DOE (DYSPNEA ON EXERTION): Primary | ICD-10-CM

## 2020-01-01 DIAGNOSIS — D68.61 ANTIPHOSPHOLIPID ANTIBODY SYNDROME (H): ICD-10-CM

## 2020-01-01 DIAGNOSIS — E87.6 DIURETIC-INDUCED HYPOKALEMIA: ICD-10-CM

## 2020-01-01 DIAGNOSIS — M25.50 POLYARTHRALGIA: ICD-10-CM

## 2020-01-01 DIAGNOSIS — I50.32 CHRONIC DIASTOLIC HEART FAILURE (H): ICD-10-CM

## 2020-01-01 DIAGNOSIS — J98.4 RESTRICTIVE LUNG DISEASE DUE TO KYPHOSCOLIOSIS: ICD-10-CM

## 2020-01-01 DIAGNOSIS — M48.061 SPINAL STENOSIS OF LUMBAR REGION WITHOUT NEUROGENIC CLAUDICATION: ICD-10-CM

## 2020-01-01 DIAGNOSIS — I27.20 PULMONARY HYPERTENSION (H): Primary | ICD-10-CM

## 2020-01-01 DIAGNOSIS — B37.2 CANDIDAL INTERTRIGO: ICD-10-CM

## 2020-01-01 LAB
25(OH)D3 SERPL-MCNC: 63.6 NG/ML (ref 30–80)
25(OH)D3 SERPL-MCNC: 63.6 NG/ML (ref 30–80)
6MAM UR QL: NOT DETECTED
7AMINOCLONAZEPAM UR QL: NOT DETECTED
A-OH ALPRAZ UR QL: NOT DETECTED
ALBUMIN SERPL-MCNC: 3.5 G/DL (ref 3.5–5)
ALBUMIN UR-MCNC: ABNORMAL MG/DL
ALPRAZ UR QL: NOT DETECTED
ALT SERPL W P-5'-P-CCNC: 37 U/L (ref 0–45)
AMPHET UR QL SCN: NOT DETECTED
APPEARANCE UR: ABNORMAL
BACTERIA #/AREA URNS HPF: ABNORMAL HPF
BACTERIA SPEC CULT: ABNORMAL
BACTERIA SPEC CULT: ABNORMAL
BARBITURATES UR QL: NOT DETECTED
BASOPHILS # BLD AUTO: 0.1 THOU/UL (ref 0–0.2)
BASOPHILS NFR BLD AUTO: 1 % (ref 0–2)
BILIRUB UR QL STRIP: ABNORMAL
BUPRENORPHINE UR QL: NOT DETECTED
BZE UR QL: NOT DETECTED
C3 SERPL-MCNC: 115 MG/DL (ref 83–177)
C4 SERPL-MCNC: 29 MG/DL (ref 19–59)
CALCIUM SERPL-MCNC: 9 MG/DL (ref 8.5–10.5)
CARBOXYTHC UR QL: NOT DETECTED
CARISOPRODOL UR QL: NOT DETECTED
CLONAZEPAM UR QL: NOT DETECTED
CODEINE UR QL: NOT DETECTED
COLOR UR AUTO: YELLOW
CREAT SERPL-MCNC: 0.74 MG/DL (ref 0.6–1.1)
CREAT UR-MCNC: 170.3 MG/DL (ref 20–400)
DIAZEPAM UR QL: NOT DETECTED
DLCOUNC-%PRED-PRE: 31 %
DLCOUNC-PRE: 5.58 ML/MIN/MMHG
DLCOUNC-PRED: 17.54 ML/MIN/MMHG
DNA (DS) ANTIBODY - HISTORICAL: 15 IU
EOSINOPHIL # BLD AUTO: 0.1 THOU/UL (ref 0–0.4)
EOSINOPHIL NFR BLD AUTO: 2 % (ref 0–6)
ERV-%PRED-PRE: 61 %
ERV-PRE: 0.19 L
ERV-PRED: 0.3 L
ERYTHROCYTE [DISTWIDTH] IN BLOOD BY AUTOMATED COUNT: 14.2 % (ref 11–14.5)
ETHYL GLUCURONIDE UR QL: NOT DETECTED
EXPTIME-PRE: 7.36 SEC
FACT X ACT/NOR PPP CHRO: 29 %
FEF2575-%PRED-PRE: 43 %
FEF2575-PRE: 1.16 L/SEC
FEF2575-PRED: 2.69 L/SEC
FEFMAX-%PRED-PRE: 57 %
FEFMAX-PRE: 3.48 L/SEC
FEFMAX-PRED: 6.06 L/SEC
FENTANYL UR QL: NOT DETECTED
FEV1-%PRED-PRE: 30 %
FEV1-PRE: 0.73 L
FEV1FEV6-PRE: 92 %
FEV1FEV6-PRED: 84 %
FEV1FVC-PRE: 91 %
FEV1FVC-PRED: 83 %
FEV1SVC-PRE: 74 %
FEV1SVC-PRED: 83 %
FIFMAX-PRE: 2.31 L/SEC
FRCPLETH-%PRED-PRE: 55 %
FRCPLETH-PRE: 1.29 L
FRCPLETH-PRED: 2.34 L
FVC-%PRED-PRE: 27 %
FVC-PRE: 0.8 L
FVC-PRED: 2.92 L
GFR SERPL CREATININE-BSD FRML MDRD: >60 ML/MIN/1.73M2
GLUCOSE UR STRIP-MCNC: NEGATIVE MG/DL
HCT VFR BLD AUTO: 49.6 % (ref 35–47)
HGB BLD-MCNC: 15.5 G/DL (ref 12–16)
HGB UR QL STRIP: ABNORMAL
HYDROCODONE UR QL: NOT DETECTED
HYDROMORPHONE UR QL: NOT DETECTED
IC-%PRED-PRE: 30 %
IC-PRE: 0.8 L
IC-PRED: 2.58 L
JO-1 AUTOANTIBODIES - HISTORICAL: 0 EU
KETONES UR STRIP-MCNC: ABNORMAL MG/DL
LEUKOCYTE ESTERASE UR QL STRIP: NEGATIVE
LORAZEPAM UR QL: NOT DETECTED
LYMPHOCYTES # BLD AUTO: 1.4 THOU/UL (ref 0.8–4.4)
LYMPHOCYTES NFR BLD AUTO: 20 % (ref 20–40)
MCH RBC QN AUTO: 29.1 PG (ref 27–34)
MCHC RBC AUTO-ENTMCNC: 31.3 G/DL (ref 32–36)
MCV RBC AUTO: 93 FL (ref 80–100)
MDA UR QL: NOT DETECTED
MDEA UR QL: NOT DETECTED
MDMA UR QL: NOT DETECTED
ME-PHENIDATE UR QL: NOT DETECTED
MEPERIDINE UR QL: NOT DETECTED
METHADONE UR QL: NOT DETECTED
METHAMPHET UR QL: NOT DETECTED
MIDAZOLAM UR QL SCN: NOT DETECTED
MONOCYTES # BLD AUTO: 0.7 THOU/UL (ref 0–0.9)
MONOCYTES NFR BLD AUTO: 10 % (ref 2–10)
MORPHINE UR QL: NOT DETECTED
NEUTROPHILS # BLD AUTO: 4.8 THOU/UL (ref 2–7.7)
NEUTROPHILS NFR BLD AUTO: 67 % (ref 50–70)
NITRATE UR QL: NEGATIVE
NORBUPRENORPHINE UR QL CFM: NOT DETECTED
NORDIAZEPAM UR QL: NOT DETECTED
NORFENTANYL UR QL: NOT DETECTED
NORHYDROCODONE UR QL CFM: NOT DETECTED
NOROXYCODONE UR QL CFM: PRESENT
NOROXYMORPHONE UR QL SCN: PRESENT
OXAZEPAM UR QL: NOT DETECTED
OXYCODONE UR QL: PRESENT
OXYMORPHONE UR QL: PRESENT
PATHOLOGY STUDY: NORMAL
PCP UR QL: NOT DETECTED
PH UR STRIP: 6 [PH] (ref 5–8)
PHENTERMINE UR QL: NOT DETECTED
PLATELET # BLD AUTO: 152 THOU/UL (ref 140–440)
PMV BLD AUTO: 12.1 FL (ref 8.5–12.5)
PROPOXYPH UR QL: NOT DETECTED
RBC # BLD AUTO: 5.32 MILL/UL (ref 3.8–5.4)
RBC #/AREA URNS AUTO: >100 HPF
RVPLETH-%PRED-PRE: 82 %
RVPLETH-PRE: 1.1 L
RVPLETH-PRED: 1.34 L
SCL-70 AUTOANTIBODIES - HISTORICAL: 13 EU
SERVICE CMNT-IMP: NORMAL
SM (SMITH AUTOANTIBODIES - HISTORICAL: 2 EU
SM/RNP AUTOANTIBODIES - HISTORICAL: 3 EU
SP GR UR STRIP: >=1.03 (ref 1–1.03)
SQUAMOUS #/AREA URNS AUTO: ABNORMAL LPF
SS-A/RO AUTOANTIBODIES - HISTORICAL: 141 EU
SS-B/LA AUTOANTIBODIES - HISTORICAL: 2 EU
TAPENTADOL UR QL SCN: NOT DETECTED
TAPENTADOL UR QL SCN: NOT DETECTED
TEMAZEPAM UR QL: NOT DETECTED
TLCPLETH-%PRED-PRE: 53 %
TLCPLETH-PRE: 2.09 L
TLCPLETH-PRED: 3.93 L
TRAMADOL UR QL: NOT DETECTED
TSH SERPL DL<=0.005 MIU/L-ACNC: 1.56 UIU/ML (ref 0.3–5)
UROBILINOGEN UR STRIP-ACNC: ABNORMAL
VA-%PRED-PRE: 47 %
VA-PRE: 1.8 L
VC-%PRED-PRE: 34 %
VC-PRE: 0.99 L
VC-PRED: 2.89 L
WBC #/AREA URNS AUTO: ABNORMAL HPF
WBC: 7.1 THOU/UL (ref 4–11)
ZOLPIDEM UR QL: NOT DETECTED

## 2020-01-01 PROCEDURE — G0463 HOSPITAL OUTPT CLINIC VISIT: HCPCS | Mod: ZF

## 2020-01-01 PROCEDURE — 99443 ZZC PHYSICIAN TELEPHONE EVALUATION 21-30 MIN: CPT | Performed by: INTERNAL MEDICINE

## 2020-01-01 PROCEDURE — 99215 OFFICE O/P EST HI 40 MIN: CPT | Mod: ZP | Performed by: INTERNAL MEDICINE

## 2020-01-01 RX ORDER — MULTIVIT-MIN/IRON/FOLIC ACID/K 18-600-40
CAPSULE ORAL DAILY
COMMUNITY

## 2020-01-01 RX ORDER — FUROSEMIDE 20 MG
TABLET ORAL
Qty: 360 TABLET | Refills: 3 | Status: SHIPPED | OUTPATIENT
Start: 2020-01-01

## 2020-01-01 RX ORDER — LIDOCAINE 40 MG/G
CREAM TOPICAL
Status: CANCELLED | OUTPATIENT
Start: 2020-01-01

## 2020-01-01 RX ORDER — LORAZEPAM 1 MG/1
1 TABLET ORAL EVERY 6 HOURS PRN
COMMUNITY

## 2020-01-01 RX ORDER — POTASSIUM CHLORIDE 1500 MG/1
20 TABLET, EXTENDED RELEASE ORAL DAILY
Qty: 90 TABLET | Refills: 3 | Status: SHIPPED | OUTPATIENT
Start: 2020-01-01

## 2020-01-01 ASSESSMENT — MIFFLIN-ST. JEOR
SCORE: 1479.16
SCORE: 1406.58
SCORE: 1410.67
SCORE: 1479.16
SCORE: 1468.74

## 2020-01-01 ASSESSMENT — PAIN SCALES - GENERAL
PAINLEVEL: NO PAIN (0)
PAINLEVEL: NO PAIN (0)

## 2020-01-01 ASSESSMENT — PATIENT HEALTH QUESTIONNAIRE - PHQ9: SUM OF ALL RESPONSES TO PHQ QUESTIONS 1-9: 8

## 2020-01-28 NOTE — NURSING NOTE
Chief Complaint   Patient presents with     Follow Up     Return for PH F/U 6 month with labs prior     Vitals were takens.    Brandin Devi CMA  10:52 AM

## 2020-01-28 NOTE — LETTER
RE: Catherine Sharp  [address]  [facility] [address]       January 28, 2020    Boris Bearden MD  [facility] [place] [address]      Dear Dr. Bearden    I had the pleasure of seeing your patient Catherine Sharp at the [facility] Pulmonary Hypertension clinic.  As you know, Catherine is a very pleasant 44-year-old female with a past medical history significant for chronic  hypoxic respiratory failure secondary to interstitial lung disease from lupus and past history of ARDS, SLE on plaquenil, history of DVT and on chronic coumadin, hypertension, spine fractures, hypothyroidism, chronic pain, and bone mineral density disease who presents for evaluation of pulmonary hypertension.  Catherine's cortez with lung disease started approximately in 1996 when she had open lung biopsy for ill-defined repeated respiratory failure.  Unfortunately, there was no clear diagnosis on the lung biopsy and she had a subsequent lung biopsy with no clear diagnosis of her lung disease.  She has been on chronic oxygen therapy for approximately 5 years with ranges anywhere between 3-8 L/min.     Catherine had a thorough work up for her pulmonary hypertension which showed she has severe Group 3 PH due to her ILD.  She has elevated RA pressures but preserved cardiac output.  Her baseline RHC had a mildly elevated wedge but her systemic pressure was greater than 180/100 at the time.  She was treated with nipride which normalized her wedge but her PA pressures were still elevated.     Catherine was enrolled in our INCREASE trial and she is feeling about the same on the drug. She is very happy that she has not been hospitalized for a long time. She does feel like her breathing is better but is limited by having a bloody nose and inability to breath through her nose. She is not having troubles with volume retention.       PAST MEDICAL HISTORY:  -Chronic hypoxemic respiratory failure due to ILD from  SLE  -DVT  -Hypertension  -Hypothyroidism  -Bone mineral density  -Chronic pain    FAMILY HISTORY:  No family history of PH    SOCIAL HISTORY:  Denies current tobacco, drug, and alcohol use.  Does have a 10-pack year history.    CURRENT MEDICATIONS:  Current Outpatient Medications   Medication Sig Dispense Refill     Albuterol Sulfate (VENTOLIN HFA IN) Inhale 2 puffs into the lungs every 4 hours as needed.         CALCIUM CITRATE PO Take 250 mg by mouth 2 times daily.       denosumab (PROLIA) 60 MG/ML SOLN injection Inject 60 mg Subcutaneous       diltiazem ER (DILT-XR) 240 MG 24 hr ER beaded capsule Take by mouth daily       ferrous sulfate 325 (65 FE) MG tablet Take 1 tablet by mouth 2 times daily. 30 tablet 1     furosemide (LASIX) 20 MG tablet Take 60 mg in am and 40 mg in pm 360 tablet 3     hydroxychloroquine (PLAQUENIL) 200 MG tablet Take 200 mg by mouth daily.         levothyroxine (SYNTHROID/LEVOTHROID) 150 MCG tablet Take 150 mcg by mouth       OXYCODONE HCL PO Take 30 mg by mouth 3 times daily as needed        OXYCONTIN 40 MG 12 hr tablet TAKE 1 TABLET BY MOUTH IN THE MORNING  0     potassium chloride ER (K-DUR/KLOR-CON M) 20 MEQ CR tablet Take 1 tablet (20 mEq) by mouth daily 90 tablet 3     PREDNISONE PO Take 5 mg by mouth daily        PROMETHAZINE HCL PO Take 25 mg by mouth every 4 hours as needed.       study - treprostinil (IDS# 5041) 0.6 MG/ML SOLN neb solution Inhale 9 Breaths (54 mcg) into the lungs 4 times daily Nebulize and inhale four times daily as directed per protocol. 3 kit      vitamin D (ERGOCALCIFEROL) 48045 UNIT capsule Take 50,000 Units by mouth twice a week. Monday and thursday       Warfarin Sodium (JANTOVEN PO) Take 5 mg by mouth daily. Patient alternates between 5 mg and 7.5 mg every other day.          ROS:   10 point ROS negative except HPI    EXAM:  /77 (BP Location: Right arm, Patient Position: Sitting, Cuff Size: Adult Large)   Pulse 77   Temp 98.7  F (37.1  C)   Resp  "16   Ht 1.473 m (4' 10\")   Wt 91.9 kg (202 lb 9.6 oz)   SpO2 94%   BMI 42.34 kg/m     General: appears comfortable, alert and articulate  Head: normocephalic, atraumatic  Eyes: anicteric sclera, EOMI  Neck: no adenopathy  Orophyarynx: moist mucosa, no lesions, dentition intact  Heart: regular, S1/S2 with prominent P2, JVP 8 cm  Lungs: inspiratory crackles bilaterally at the bases but left worse than right  Abdomen: soft, non-tender, bowel sounds present, no hepatosplenomegaly  Extremities: no clubbing or edema, slight blue coloration of toes  Neurological: normal speech and affect, no gross motor deficits    Labs:  CBC RESULTS:  Lab Results   Component Value Date    WBC 8.5 2019    RBC 5.05 2019    HGB 15.2 2019    HCT 49.6 (H) 2019    MCV 98 2019    MCH 30.1 2019    MCHC 30.6 (L) 2019    RDW 14.3 2019     2019       CMP RESULTS:  Lab Results   Component Value Date     2019    POTASSIUM 3.8 2019    CHLORIDE 95 2019    CO2 38 (H) 2019    ANIONGAP 2 (L) 2019     (H) 2019    BUN 16 2019    CR 0.73 2019    GFRESTIMATED >90 2019    GFRESTBLACK >90 2019    JEFFERSON 8.4 (L) 2019    BILITOTAL 0.4 2019    ALBUMIN 3.5 2019    ALKPHOS 78 2019    ALT 20 2019    AST 25 2019        Echocardiogram 3/21/2019:  Mild to moderate right ventricular dilation is present.  There is moderate to severe right ventricular hypertrophy.  Global right ventricular function is moderately reduced.  Severe pulmonary hypertension is present.  Estimated pulmonary artery systolic pressure is 75 mmHg plus right atrial pressure.  The inferior vena cava was normal in size with preserved respiratory variability.  No right to left shunting detected following injection of agitated saline.    Pulmonary Function Tests  FVC: 21%  FEV1: 24%  FEV/FVC: 96%  T%  DLCO: 37%    RHC " 3/21/2019  Systemic BP: 180s/110s  RA: 18  RV: 100/20  PA: 100/50 (68)  PCWP: 17  CO/CI: 6.1/3.3  PVR: 8.4    Nipride Study  PA: 74/27 (50)  PCWP: 14  CO/CI: 5.7/3.1  PVR: 6.3    Assessment and Plan: Catherine Sharp is a 44 year old female who presents for return evaluation for severe Group 3 PH.    1.  Severe Group 3 Pulmonary Hypertension with Preserved Cardiac Output: Ms. Hsu is in the INCREASE trial, which is evaluating the utility of inhaled prostacyclin in ILD-PH. She has completed the trial and is on the open-label phase currently. She is feeling the same. I did encourage her to start pulmonary rehabilitation and continue to exercise at home. She does need to lose weight to become eligible for lung transplantation in the future so hopefully exercise will help with that. She is not having any signs of right heart failure but does require some diuretics.     2.  History of DVT and PE: She is on lifelong anticoagulation which I agree with.    It was a pleasure seeing your patient Catherine Sharp at the [facility] Pulmonary Hypertension clinic.  Please contact us with any questions or concerns that you may have.    Sincerely,    Carter Eng MD, PhD   of Medicine  [facility]  Cardiovascular Division  [facility]

## 2020-01-28 NOTE — PROGRESS NOTES
January 28, 2020    Boris Bearden MD  [facility] [place] [address]      Dear Dr. Bearden    I had the pleasure of seeing your patient Catherine Sharp at the [facility] Pulmonary Hypertension clinic.  As you know, Catherine is a very pleasant 44-year-old female with a past medical history significant for chronic  hypoxic respiratory failure secondary to interstitial lung disease from lupus and past history of ARDS, SLE on plaquenil, history of DVT and on chronic coumadin, hypertension, spine fractures, hypothyroidism, chronic pain, and bone mineral density disease who presents for evaluation of pulmonary hypertension.  Catherine's cortez with lung disease started approximately in 1996 when she had open lung biopsy for ill-defined repeated respiratory failure.  Unfortunately, there was no clear diagnosis on the lung biopsy and she had a subsequent lung biopsy with no clear diagnosis of her lung disease.  She has been on chronic oxygen therapy for approximately 5 years with ranges anywhere between 3-8 L/min.     Catherine had a thorough work up for her pulmonary hypertension which showed she has severe Group 3 PH due to her ILD.  She has elevated RA pressures but preserved cardiac output.  Her baseline RHC had a mildly elevated wedge but her systemic pressure was greater than 180/100 at the time.  She was treated with nipride which normalized her wedge but her PA pressures were still elevated.     Catherine was enrolled in our INCREASE trial and she is feeling about the same on the drug. She is very happy that she has not been hospitalized for a long time. She does feel like her breathing is better but is limited by having a bloody nose and inability to breath through her nose. She is not having troubles with volume retention.       PAST MEDICAL HISTORY:  -Chronic hypoxemic respiratory failure due to ILD from SLE  -DVT  -Hypertension  -Hypothyroidism  -Bone mineral density  -Chronic  "pain    FAMILY HISTORY:  No family history of PH    SOCIAL HISTORY:  Denies current tobacco, drug, and alcohol use.  Does have a 10-pack year history.    CURRENT MEDICATIONS:  Current Outpatient Medications   Medication Sig Dispense Refill     Albuterol Sulfate (VENTOLIN HFA IN) Inhale 2 puffs into the lungs every 4 hours as needed.         CALCIUM CITRATE PO Take 250 mg by mouth 2 times daily.       denosumab (PROLIA) 60 MG/ML SOLN injection Inject 60 mg Subcutaneous       diltiazem ER (DILT-XR) 240 MG 24 hr ER beaded capsule Take by mouth daily       ferrous sulfate 325 (65 FE) MG tablet Take 1 tablet by mouth 2 times daily. 30 tablet 1     furosemide (LASIX) 20 MG tablet Take 60 mg in am and 40 mg in pm 360 tablet 3     hydroxychloroquine (PLAQUENIL) 200 MG tablet Take 200 mg by mouth daily.         levothyroxine (SYNTHROID/LEVOTHROID) 150 MCG tablet Take 150 mcg by mouth       OXYCODONE HCL PO Take 30 mg by mouth 3 times daily as needed        OXYCONTIN 40 MG 12 hr tablet TAKE 1 TABLET BY MOUTH IN THE MORNING  0     potassium chloride ER (K-DUR/KLOR-CON M) 20 MEQ CR tablet Take 1 tablet (20 mEq) by mouth daily 90 tablet 3     PREDNISONE PO Take 5 mg by mouth daily        PROMETHAZINE HCL PO Take 25 mg by mouth every 4 hours as needed.       study - treprostinil (IDS# 5041) 0.6 MG/ML SOLN neb solution Inhale 9 Breaths (54 mcg) into the lungs 4 times daily Nebulize and inhale four times daily as directed per protocol. 3 kit      vitamin D (ERGOCALCIFEROL) 03909 UNIT capsule Take 50,000 Units by mouth twice a week. Monday and thursday       Warfarin Sodium (JANTOVEN PO) Take 5 mg by mouth daily. Patient alternates between 5 mg and 7.5 mg every other day.          ROS:   10 point ROS negative except HPI    EXAM:  /77 (BP Location: Right arm, Patient Position: Sitting, Cuff Size: Adult Large)   Pulse 77   Temp 98.7  F (37.1  C)   Resp 16   Ht 1.473 m (4' 10\")   Wt 91.9 kg (202 lb 9.6 oz)   SpO2 94%   BMI " 42.34 kg/m    General: appears comfortable, alert and articulate  Head: normocephalic, atraumatic  Eyes: anicteric sclera, EOMI  Neck: no adenopathy  Orophyarynx: moist mucosa, no lesions, dentition intact  Heart: regular, S1/S2 with prominent P2, JVP 8 cm  Lungs: inspiratory crackles bilaterally at the bases but left worse than right  Abdomen: soft, non-tender, bowel sounds present, no hepatosplenomegaly  Extremities: no clubbing or edema, slight blue coloration of toes  Neurological: normal speech and affect, no gross motor deficits    Labs:  CBC RESULTS:  Lab Results   Component Value Date    WBC 8.5 2019    RBC 5.05 2019    HGB 15.2 2019    HCT 49.6 (H) 2019    MCV 98 2019    MCH 30.1 2019    MCHC 30.6 (L) 2019    RDW 14.3 2019     2019       CMP RESULTS:  Lab Results   Component Value Date     2019    POTASSIUM 3.8 2019    CHLORIDE 95 2019    CO2 38 (H) 2019    ANIONGAP 2 (L) 2019     (H) 2019    BUN 16 2019    CR 0.73 2019    GFRESTIMATED >90 2019    GFRESTBLACK >90 2019    JEFFERSON 8.4 (L) 2019    BILITOTAL 0.4 2019    ALBUMIN 3.5 2019    ALKPHOS 78 2019    ALT 20 2019    AST 25 2019        Echocardiogram 3/21/2019:  Mild to moderate right ventricular dilation is present.  There is moderate to severe right ventricular hypertrophy.  Global right ventricular function is moderately reduced.  Severe pulmonary hypertension is present.  Estimated pulmonary artery systolic pressure is 75 mmHg plus right atrial pressure.  The inferior vena cava was normal in size with preserved respiratory variability.  No right to left shunting detected following injection of agitated saline.    Pulmonary Function Tests  FVC: 21%  FEV1: 24%  FEV/FVC: 96%  T%  DLCO: 37%    RHC 3/21/2019  Systemic BP: 180s/110s  RA: 18  RV: 100/20  PA: 100/50 (68)  PCWP: 17  CO/CI:  6.1/3.3  PVR: 8.4    Nipride Study  PA: 74/27 (50)  PCWP: 14  CO/CI: 5.7/3.1  PVR: 6.3    Assessment and Plan: Catherine Sharp is a 44 year old female who presents for return evaluation for severe Group 3 PH.    1.  Severe Group 3 Pulmonary Hypertension with Preserved Cardiac Output: Ms. Hsu is in the INCREASE trial, which is evaluating the utility of inhaled prostacyclin in ILD-PH. She has completed the trial and is on the open-label phase currently. She is feeling the same. I did encourage her to start pulmonary rehabilitation and continue to exercise at home. She does need to lose weight to become eligible for lung transplantation in the future so hopefully exercise will help with that. She is not having any signs of right heart failure but does require some diuretics.     2.  History of DVT and PE: She is on lifelong anticoagulation which I agree with.    It was a pleasure seeing your patient Catherine Sharp at the [facility] Pulmonary Hypertension clinic.  Please contact us with any questions or concerns that you may have.    Sincerely,    Carter Eng MD, PhD   of Medicine  [facility]pertension  Cardiovascular Division  [facility]

## 2020-01-28 NOTE — PATIENT INSTRUCTIONS
Medication Changes:       Patient Instructions:  1. Continue staying active and eat a heart healthy diet.    2. Please keep current list of medications with you at all times.    3. Remember to weigh yourself daily after voiding and before you consume any food or beverages and log the numbers.  If you have gained 2 pounds overnight or 5 pounds in a week contact us immediately for medication adjustments or further instructions.    4. **Please call us immediately if you have any syncope (fainting or passing out), chest pain, edema (swelling or weight gain), or decline in your functional status (general decline in how you are feeling).    Follow up Appointment Information:      Results:    We are located on the third floor [facility] on the [facility].  Our address is     [address] [place] [address] on [address] [place] [address]      Thank you for allowing us to be a part of your care here at the [facility]    If you have questions or concerns please contact us at:    Emilee Gallo RN, BSN    Charito Dailey (Schedule,Prior Auth)  Nurse Coordinator     Clinic   Pulmonary Hypertension   Pulmonary Hypertension  [facility]  (Phone)[phone]   (Phone) [phone]        (Fax)[phone]                ** Please note that you will NOT receive a reminder call regarding your scheduled testing, reminder calls are for provider appointments only.  If you are scheduled for testing within the [employer] system you may receive a call regarding pre-registration for billing purposes only.**     Support Group:  Pulmonary Hypertension Association  Https://www.phassociation.org/  **Look at the Events Tab** They even have Support Groups that you can call into    [facility] PH Support Group  Second Saturday of the Month from 1-3 PM   Location: [facility] [address]  [place] [address]  Leader: Emmy Andrews  Phone: [phone] or [phone]  Email: [email or web address]

## 2020-07-07 NOTE — PROGRESS NOTES
"Catherine Sharp is a 44 year old female who is being evaluated via a billable telephone visit.      The patient has been notified of following:     \"This telephone visit will be conducted via a call between you and your physician/provider. We have found that certain health care needs can be provided without the need for a physical exam.  This service lets us provide the care you need with a short phone conversation.  If a prescription is necessary we can send it directly to your pharmacy.  If lab work is needed we can place an order for that and you can then stop by our lab to have the test done at a later time.    Telephone visits are billed at different rates depending on your insurance coverage. During this emergency period, for some insurers they may be billed the same as an in-person visit.  Please reach out to your insurance provider with any questions.    If during the course of the call the physician/provider feels a telephone visit is not appropriate, you will not be charged for this service.\"    Patient has given verbal consent for Telephone visit?  Yes    What phone number would you like to be contacted at? [phone]    How would you like to obtain your AVS? MyChart    Medications were reconciled.     Brenda Marc CMA    9:33 AM                  "

## 2020-07-07 NOTE — PROGRESS NOTES
"Catherine Sharp is a 44 year old female who is being evaluated via a billable telephone visit.      The patient has been notified of following:     \"This telephone visit will be conducted via a call between you and your physician/provider. We have found that certain health care needs can be provided without the need for a physical exam.  This service lets us provide the care you need with a short phone conversation.  If a prescription is necessary we can send it directly to your pharmacy.  If lab work is needed we can place an order for that and you can then stop by our lab to have the test done at a later time.    If during the course of the call the physician/provider feels a telephone visit is not appropriate, you will not be charged for this service.\"     Consent has been obtained for this service by 1 care team member: yes    I have reviewed and updated the patient's Past Medical History, Social History, Family History and Medication List.    ALLERGIES  Fentanyl; Morphine hcl; Protonix; and Vicodin [hydrocodone-acetaminophen]    Phone call duration:  8 minutes      Service Date: 2020    Boris Bearden MD  [facility] [place] [address]    RE:  Catherine Sharp   MRN:  [id number]  :  1975      Dear Dr. Bearden:    We had the pleasure of seeing Catherine Sharp at the [facility]pert[facility].  As you know, Ms. Sharp is a very pleasant 44-year-old female with a past medical history significant for chronic hypoxic respiratory failure secondary to interstitial lung disease from lupus, ARDS, SLE on plaquenil, DVT on chronic coumadin, hypertension, spine fractures, hypothyroidism, chronic pain, and bone mineral density disease who presents for further management of Group 3 pulmonary hypertension secondary to ILD. Ms. Sharp's cortez with lung disease started approximately in  when she had open lung biopsy for ill-defined repeated respiratory failure. " Unfortunately, there was no clear diagnosis on the lung biopsy and she had a subsequent lung biopsy with no clear diagnosis of her lung disease. She has been on chronic oxygen therapy for approximately 5 years with ranges anywhere between 6-10 L/min.      She had a thorough work up for her pulmonary hypertension which showed she has severe Group 3 PH due to her ILD.  She has elevated RA pressures but preserved cardiac output.  Her baseline RHC had a mildly elevated wedge but her systemic pressure was greater than 180/100 at the time.  She was treated with nipride which normalized her wedge but her PA pressures were still elevated.      Catherine was enrolled in our INCREASE trial and she is feeling about the same on the drug. Today, she notes no worsening in shortness of breath and dyspnea. Denies syncope and lower extremity edema. She now has walker to help her ambulate. Has no other concerns.      PAST MEDICAL HISTORY:  Past Medical History:   Diagnosis Date     Congestive heart failure (H)      Decreased bone density      Depressive disorder     recently diagnosed, PCP     DVT, lower extremity (H)      History of blood transfusion 2009    [facility]     Hypertension     PCP     Hypothyroidism     PCP     ILD (interstitial lung disease) (H)      Lupus      osteoarthritis      Spinal compression fracture (H)     multiple     Thyroid disease        PAST SURGICAL HISTORY:  Past Surgical History:   Procedure Laterality Date     ABDOMEN SURGERY       BIOPSY       CHOLECYSTECTOMY  2008     CORONARY ANGIOGRAPHY ADULT ORDER  unsure     CV RIGHT HEART CATH N/A 3/21/2019     CV STRESS/NIPRID N/A 3/21/2019     GI SURGERY  unsure     THORACIC SURGERY  2015       FAMILY HISTORY:  No family history on file.    SOCIAL HISTORY:  Social History     Socioeconomic History     Marital status:      Spouse name: Not on file     Number of children: Not on file     Years of education: Not on file     Highest education level: Not on  file   Occupational History     Not on file   Social Needs     Financial resource strain: Not on file     Food insecurity     Worry: Not on file     Inability: Not on file     Transportation needs     Medical: Not on file     Non-medical: Not on file   Tobacco Use     Smoking status: Former Smoker     Packs/day: 1.00     Years: 20.00     Pack years: 20.00     Types: Cigarettes     Last attempt to quit:      Years since quittin.5     Smokeless tobacco: Never Used   Substance and Sexual Activity     Alcohol use: No     Drug use: No     Sexual activity: Not on file   Lifestyle     Physical activity     Days per week: Not on file     Minutes per session: Not on file     Stress: Not on file   Relationships     Social connections     Talks on phone: Not on file     Gets together: Not on file     Attends Latter day service: Not on file     Active member of club or organization: Not on file     Attends meetings of clubs or organizations: Not on file     Relationship status: Not on file     Intimate partner violence     Fear of current or ex partner: Not on file     Emotionally abused: Not on file     Physically abused: Not on file     Forced sexual activity: Not on file   Other Topics Concern     Parent/sibling w/ CABG, MI or angioplasty before 65F 55M? Not Asked   Social History Narrative     Not on file       CURRENT MEDICATIONS:  Current Outpatient Medications   Medication Sig     Albuterol Sulfate (VENTOLIN HFA IN) Inhale 2 puffs into the lungs every 4 hours as needed.       Calcium Carb-Cholecalciferol (CALCIUM 500 + D3 PO) Take 500 mg by mouth daily     Cholecalciferol (VITAMIN D) 50 MCG (2000 UT) CAPS Take by mouth daily     denosumab (PROLIA) 60 MG/ML SOLN injection Inject 60 mg Subcutaneous     diltiazem ER (DILT-XR) 240 MG 24 hr ER beaded capsule Take by mouth daily     ferrous sulfate 325 (65 FE) MG tablet Take 1 tablet by mouth 2 times daily.     furosemide (LASIX) 20 MG tablet Take 60 mg in am and 40 mg  in pm     hydroxychloroquine (PLAQUENIL) 200 MG tablet Take 200 mg by mouth daily.       levothyroxine (SYNTHROID/LEVOTHROID) 150 MCG tablet Take 150 mcg by mouth     LORazepam (ATIVAN) 1 MG tablet Take 1 mg by mouth every 6 hours as needed for anxiety     OXYCODONE HCL PO Take 30 mg by mouth 3 times daily as needed      OXYCONTIN 40 MG 12 hr tablet TAKE 1 TABLET BY MOUTH IN THE MORNING     potassium chloride ER (K-DUR/KLOR-CON M) 20 MEQ CR tablet Take 1 tablet (20 mEq) by mouth daily     PREDNISONE PO Take 5 mg by mouth daily      PROMETHAZINE HCL PO Take 25 mg by mouth every 4 hours as needed.     SENNA CO by Combination route as needed     study - treprostinil, IDS# 5041, 0.6 MG/ML neb solution Inhale 9 Breaths (54 mcg) into the lungs 4 times daily Nebulize and inhale four times daily as directed per protocol.     Warfarin Sodium (JANTOVEN PO) Take 5 mg by mouth daily. Patient alternates between 5 mg and 7.5 mg every other day.      No current facility-administered medications for this visit.        ROS:   10 point ROS negative except as discussed in above HPI.    Echocardiogram 3/21/2019:  Mild to moderate right ventricular dilation is present.  There is moderate to severe right ventricular hypertrophy.  Global right ventricular function is moderately reduced.  Severe pulmonary hypertension is present.  Estimated pulmonary artery systolic pressure is 75 mmHg plus right atrial pressure.  The inferior vena cava was normal in size with preserved respiratory variability.  No right to left shunting detected following injection of agitated saline.     Pulmonary Function Tests 2020  FVC: 27%  FEV1: 30%  FEV/FVC: 91%  T%  DLCO: 31%     RHC 3/21/2019  Systemic BP: 180s/110s  RA: 18  RV: 100/20  PA: 100/50 (68)  PCWP: 17  CO/CI: 6.1/3.3  PVR: 8.4     Nipride Study  PA: 74/27 (50)  PCWP: 14  CO/CI: 5.7/3.1  PVR: 6.3    6MWT 19: 204 m initially 10 lpm and then 15 lpm oxymizer    Assessment and Plan:      Catherine Sharp is a 44 year old female who presents for return evaluation for severe Group 3 PH secondary to ILD.     1.  Severe Group 3 Pulmonary Hypertension secondary to ILD with Preserved Cardiac Output: Ms. Hsu is in the INCREASE trial, which is evaluating the utility of inhaled prostacyclin in ILD-PH. She has completed the trial and is on the open-label phase currently. She needs to lose weight to become eligible for lung transplantation in the future so hopefully exercise will help with that. She appears euvolemic per her report. Would like to obtain an echocardiogram and RHC once the COVID pandemic is stable/resolved which can be delayed at this time since she has not been having any worsening symptoms. We refilled her lasix 60 mg/40 mg and KCl per her request.     2.  History of DVT and PE: She is on lifelong warfarin.    Recommend that she follow-up with us in 6 months.    It was a pleasure seeing Catherine Sharp at the [facility] Pulmonary Hypertension [facility]. Please contact us with any questions or concerns that you may have.      Patient was seen and discussed with staff attending, Dr. Eng.      Sincerely,      Gina Germain MD, PhD  Cardiology Fellow    And    Carter Eng MD, PhD   of Medicine    Staff Attestation:   I have seen and examined this patient on July 7, 2020. I have discussed with Dr. Germain agree with the findings and plan in this note. I have personally reviewed vital signs, hemodynamics, medications, laboratory values, and diagnostic testing.       Carter Eng MD, PhD  Cardiovascular Division  [facility]

## 2020-07-07 NOTE — LETTER
2020    RE: Catherine Sharp   [address]  [facility] [address]       Dear Colleague,    Thank you for the opportunity to participate in the care of your patient, Catherine Sharp, at the [facility]. Please see a copy of my visit note below.    Catherine Sharp is a 44 year old female who is being evaluated via a billable telephone visit.      Medications were reconciled.  Brenda Marc CMA  9:33 AM    I have reviewed and updated the patient's Past Medical History, Social History, Family History and Medication List.    ALLERGIES  Fentanyl; Morphine hcl; Protonix; and Vicodin [hydrocodone-acetaminophen]    Phone call duration:  8 minutes      Service Date: 2020    Boris Bearden MD  [facility] [place] [address]    RE:  Catherine Sharp   MRN:  [id number]  :  1975      Dear Dr. Bearden:    We had the pleasure of seeing Catherine Sharp at the [facility]monary Hypertension [facility].  As you know, Ms. Sharp is a very pleasant 44-year-old female with a past medical history significant for chronic hypoxic respiratory failure secondary to interstitial lung disease from lupus, ARDS, SLE on plaquenil, DVT on chronic coumadin, hypertension, spine fractures, hypothyroidism, chronic pain, and bone mineral density disease who presents for further management of Group 3 pulmonary hypertension secondary to ILD. Ms. Sharp's cortez with lung disease started approximately in  when she had open lung biopsy for ill-defined repeated respiratory failure. Unfortunately, there was no clear diagnosis on the lung biopsy and she had a subsequent lung biopsy with no clear diagnosis of her lung disease. She has been on chronic oxygen therapy for approximately 5 years with ranges anywhere between 6-10 L/min.      She had a thorough work up for her pulmonary hypertension which showed she has severe Group 3 PH due to her  ILD.  She has elevated RA pressures but preserved cardiac output.  Her baseline RHC had a mildly elevated wedge but her systemic pressure was greater than 180/100 at the time.  She was treated with nipride which normalized her wedge but her PA pressures were still elevated.      Catherine was enrolled in our INCREASE trial and she is feeling about the same on the drug. Today, she notes no worsening in shortness of breath and dyspnea. Denies syncope and lower extremity edema. She now has walker to help her ambulate. Has no other concerns.      PAST MEDICAL HISTORY:  Past Medical History:   Diagnosis Date     Congestive heart failure (H)      Decreased bone density      Depressive disorder     recently diagnosed, PCP     DVT, lower extremity (H)      History of blood transfusion 2009    [facility]     Hypertension     PCP     Hypothyroidism     PCP     ILD (interstitial lung disease) (H)      Lupus      osteoarthritis      Spinal compression fracture (H)     multiple     Thyroid disease        PAST SURGICAL HISTORY:  Past Surgical History:   Procedure Laterality Date     ABDOMEN SURGERY       BIOPSY       CHOLECYSTECTOMY  2008     CORONARY ANGIOGRAPHY ADULT ORDER  unsure     CV RIGHT HEART CATH N/A 3/21/2019     CV STRESS/NIPRID N/A 3/21/2019     GI SURGERY  unsure     THORACIC SURGERY  2015       FAMILY HISTORY:  No family history on file.    SOCIAL HISTORY:  Social History     Socioeconomic History     Marital status:      Spouse name: Not on file     Number of children: Not on file     Years of education: Not on file     Highest education level: Not on file   Occupational History     Not on file   Social Needs     Financial resource strain: Not on file     Food insecurity     Worry: Not on file     Inability: Not on file     Transportation needs     Medical: Not on file     Non-medical: Not on file   Tobacco Use     Smoking status: Former Smoker     Packs/day: 1.00     Years: 20.00     Pack years: 20.00      Types: Cigarettes     Last attempt to quit:      Years since quittin.5     Smokeless tobacco: Never Used   Substance and Sexual Activity     Alcohol use: No     Drug use: No     Sexual activity: Not on file   Lifestyle     Physical activity     Days per week: Not on file     Minutes per session: Not on file     Stress: Not on file   Relationships     Social connections     Talks on phone: Not on file     Gets together: Not on file     Attends Faith service: Not on file     Active member of club or organization: Not on file     Attends meetings of clubs or organizations: Not on file     Relationship status: Not on file     Intimate partner violence     Fear of current or ex partner: Not on file     Emotionally abused: Not on file     Physically abused: Not on file     Forced sexual activity: Not on file   Other Topics Concern     Parent/sibling w/ CABG, MI or angioplasty before 65F 55M? Not Asked   Social History Narrative     Not on file       CURRENT MEDICATIONS:  Current Outpatient Medications   Medication Sig     Albuterol Sulfate (VENTOLIN HFA IN) Inhale 2 puffs into the lungs every 4 hours as needed.       Calcium Carb-Cholecalciferol (CALCIUM 500 + D3 PO) Take 500 mg by mouth daily     Cholecalciferol (VITAMIN D) 50 MCG (2000 UT) CAPS Take by mouth daily     denosumab (PROLIA) 60 MG/ML SOLN injection Inject 60 mg Subcutaneous     diltiazem ER (DILT-XR) 240 MG 24 hr ER beaded capsule Take by mouth daily     ferrous sulfate 325 (65 FE) MG tablet Take 1 tablet by mouth 2 times daily.     furosemide (LASIX) 20 MG tablet Take 60 mg in am and 40 mg in pm     hydroxychloroquine (PLAQUENIL) 200 MG tablet Take 200 mg by mouth daily.       levothyroxine (SYNTHROID/LEVOTHROID) 150 MCG tablet Take 150 mcg by mouth     LORazepam (ATIVAN) 1 MG tablet Take 1 mg by mouth every 6 hours as needed for anxiety     OXYCODONE HCL PO Take 30 mg by mouth 3 times daily as needed      OXYCONTIN 40 MG 12 hr tablet TAKE 1  TABLET BY MOUTH IN THE MORNING     potassium chloride ER (K-DUR/KLOR-CON M) 20 MEQ CR tablet Take 1 tablet (20 mEq) by mouth daily     PREDNISONE PO Take 5 mg by mouth daily      PROMETHAZINE HCL PO Take 25 mg by mouth every 4 hours as needed.     SENNA CO by Combination route as needed     study - treprostinil, IDS# 5041, 0.6 MG/ML neb solution Inhale 9 Breaths (54 mcg) into the lungs 4 times daily Nebulize and inhale four times daily as directed per protocol.     Warfarin Sodium (JANTOVEN PO) Take 5 mg by mouth daily. Patient alternates between 5 mg and 7.5 mg every other day.      No current facility-administered medications for this visit.        ROS:   10 point ROS negative except as discussed in above HPI.    Echocardiogram 3/21/2019:  Mild to moderate right ventricular dilation is present.  There is moderate to severe right ventricular hypertrophy.  Global right ventricular function is moderately reduced.  Severe pulmonary hypertension is present.  Estimated pulmonary artery systolic pressure is 75 mmHg plus right atrial pressure.  The inferior vena cava was normal in size with preserved respiratory variability.  No right to left shunting detected following injection of agitated saline.     Pulmonary Function Tests 2020  FVC: 27%  FEV1: 30%  FEV/FVC: 91%  T%  DLCO: 31%     RHC 3/21/2019  Systemic BP: 180s/110s  RA: 18  RV: 100/20  PA: 100/50 (68)  PCWP: 17  CO/CI: 6.1/3.3  PVR: 8.4     Nipride Study  PA: 74/27 (50)  PCWP: 14  CO/CI: 5.7/3.1  PVR: 6.3    6MWT 19: 204 m initially 10 lpm and then 15 lpm oxymizer    Assessment and Plan:     Catherine Sharp is a 44 year old female who presents for return evaluation for severe Group 3 PH secondary to ILD.     1.  Severe Group 3 Pulmonary Hypertension secondary to ILD with Preserved Cardiac Output: Ms. Hsu is in the INCREASE trial, which is evaluating the utility of inhaled prostacyclin in ILD-PH. She has completed the trial and is on the  open-label phase currently. She needs to lose weight to become eligible for lung transplantation in the future so hopefully exercise will help with that. She appears euvolemic per her report. Would like to obtain an echocardiogram and RHC once the COVID pandemic is stable/resolved which can be delayed at this time since she has not been having any worsening symptoms. We refilled her lasix 60 mg/40 mg and KCl per her request.     2.  History of DVT and PE: She is on lifelong warfarin.    Recommend that she follow-up with us in 6 months.    It was a pleasure seeing Catherine Chayito Sharp at the [facility]. Please contact us with any questions or concerns that you may have.      Patient was seen and discussed with staff attending, Dr. Eng.      Sincerely,      Gina Germain MD, PhD  Cardiology Fellow    And    Carter Eng MD, PhD   of Medicine    Staff Attestation:   I have seen and examined this patient on July 7, 2020. I have discussed with Dr. Germain agree with the findings and plan in this note. I have personally reviewed vital signs, hemodynamics, medications, laboratory values, and diagnostic testing.   Carter Eng MD, PhD  Cardiovascular Division  [facility]

## 2020-07-08 NOTE — NURSING NOTE
Pts plan of care per Dr. Eng:    No changes at this time. Will get repeat RHC and TTE when COVID situation is stable. Follow-up in 6 months with us.     Orders placed and message sent to Charito to scheduled Right heart catheterization in September.  Emilee Gallo RN on 7/8/2020 at 11:10 AM

## 2020-10-12 NOTE — TELEPHONE ENCOUNTER
Patient LM that she did not get her COVID test done, so she will have to re-schedule everything.  Forwarded message to Charito to call patient and re-schedule testing. Yessy Gastelum RN on 10/12/2020 at 11:42 AM    -----------------------------  LM I was calling about her upcoming testing this Wednesday.  I first wanted to verify she had the COVID test done yesterday as that is a requirement.   She can leave me a message. Left my direct dial number for call back. Or send a Snapshot Interactive[employer] message with questions about her upcoming Echo, Labs & RHC this Wednesday. Yessy Gastelum RN on 10/12/2020 at 9:37 AM    ----- Message from Yessy Gastelum RN sent at 10/5/2020  5:24 PM CDT -----  Regarding: Pre-Teach?  Echo, Labs & RHC on 10/14  ----- Message -----  From: Emilee Gallo RN  Sent: 9/21/2020   9:09 AM CDT  To: Cardiology Ph Nurse-    Can you please follow up with the patient and see if she has any questions regarding her Pre-Procedure instructions.  I sent her a Pain Doctorhart Message.    Emilee Gallo RN  ----- Message -----  From: Charito Dailey  Sent: 8/26/2020   2:39 PM CDT  To: Latha Dailey CMA, JEANNETTE Moraes,    Patient is scheduled for her RHC and echo on 10/14/2020.    Pt's follow-up is scheduled for 1/5/2021 w/ Dr. Eng.    Thank you,  -Charito  ----- Message -----  From: aLtha Dailey CMA  Sent: 7/28/2020   3:22 PM CDT  To: Charito Dailey    She's still on the study.. Can you time her cath and echo some time between 9/18 - 10/16? I don't need cath or echo, but I have to give her drug shipment.     -B.   ----- Message -----  From: Chartio Dailey  Sent: 7/24/2020  12:41 PM CDT  To: Latha Dailey, RL Ambrocio,    She is no longer on the study, correct?    -Charito  ----- Message -----  From: Emilee Gallo RN  Sent: 7/8/2020  11:20 AM CDT  To: Charito Dailey    Pt needs a Right heart catheterization, Echocardiogram and Labs in Sep/Oct and follow up in Jan with Dr. Velasquez Gallo  RN

## 2020-11-07 ENCOUNTER — HEALTH MAINTENANCE LETTER (OUTPATIENT)
Age: 45
End: 2020-11-07

## 2020-11-09 ENCOUNTER — TELEPHONE (OUTPATIENT)
Dept: CARDIOLOGY | Facility: CLINIC | Age: 45
End: 2020-11-09

## 2020-12-28 ENCOUNTER — COMMUNICATION - HEALTHEAST (OUTPATIENT)
Dept: FAMILY MEDICINE | Facility: CLINIC | Age: 45
End: 2020-12-28

## 2020-12-28 DIAGNOSIS — I10 HYPERTENSION, UNSPECIFIED TYPE: ICD-10-CM

## 2021-05-24 ENCOUNTER — RECORDS - HEALTHEAST (OUTPATIENT)
Dept: ADMINISTRATIVE | Facility: CLINIC | Age: 46
End: 2021-05-24

## 2021-05-25 ENCOUNTER — RECORDS - HEALTHEAST (OUTPATIENT)
Dept: ADMINISTRATIVE | Facility: CLINIC | Age: 46
End: 2021-05-25

## 2021-05-26 ENCOUNTER — RECORDS - HEALTHEAST (OUTPATIENT)
Dept: ADMINISTRATIVE | Facility: CLINIC | Age: 46
End: 2021-05-26

## 2021-05-26 ASSESSMENT — PATIENT HEALTH QUESTIONNAIRE - PHQ9: SUM OF ALL RESPONSES TO PHQ QUESTIONS 1-9: 8

## 2021-05-27 ENCOUNTER — RECORDS - HEALTHEAST (OUTPATIENT)
Dept: ADMINISTRATIVE | Facility: CLINIC | Age: 46
End: 2021-05-27

## 2021-05-27 NOTE — PATIENT INSTRUCTIONS - HE
Prescribed 30 days of OxyContin 40 mg in the morning.  Hold for any respiratory or sedation concerns.  Prescribed oxycodone 30 mg 1 every 4 hours maximum for pain as needed.   Printed to fill 04/26/19 to start 04/27/19 and 05/26/19 to start 05/27/29  Discussed medical cannabis program today but will wait on enrollment due to cost.  Discussed starting duloxetine for mood and pain.  30 mg daily x 7 days, then increase to 60 mg daily if tolerating without side effects.  Discussed this may take 4-6 weeks to help so stay on it.  Dispose of fluoxetine prescription so these are not mistaken.    Patient is considering pulmonary research trial and working to see transplant team.    Diagnostics: UDT/SWAB collected today results are pending.  Patient required a random Urine Drug Test due to the need to comply with [place] Model Policy Guidelines and CDC Guideline for the use of any controlled substances. Reasons for definitive testing include:  -Identify specific medication(s) or drug(s) in a class (e.g. Benzodiazepines, barbiturates, opioids, antidepressants)  -Definitive concentration of medication(s), drug(s), and/or metabolites needed to guide management  -Identify non-prescribed medication or illicit drug use for ongoing safe prescribing of controlled substances  -Identify substances that may present high risk for additive or synergistic interaction with prescription medication (e.g. Alcohol).  Patient is either being considered for or taking a controlled substance. Unexpected findings will be discussed and treatment decision may be adjusted. Testing is being implemented across the board randomly w/o bias related to age, race, gender, socioeconomic status or Roman [employer] affiliation.   Follow-up in 8 weeks with Donya

## 2021-05-27 NOTE — PROGRESS NOTES
PAIN CENTER PROGRESS NOTE    Subjective:   Catherine Sharp is a 43 y.o. female who presents for evaluation of multi-site pain secondary to osteoporosis and pathologic fractures as a result of SLE treated with chronic steroids.  She has multiple co-morbidities including Interstitial lung disease, MERLIN, Antiphospholipid Antibody Syndrome, cardiomyopathy, OA in multiple sites, and recurrent hospitalizations.  Pain has been present for years and current treatment plan includes OxyContin, oxycodone, intermittent PT/OT.    Major issues:  1. Chronic pain syndrome    2. Opioid dependence, continuous (H)    3. Systemic lupus erythematosus, unspecified SLE type, unspecified organ involvement status (H)    4. Pathological compression fracture of vertebra, sequela      Pain location and description: 3/10 constant stabbing, burning, deep pain in left lower back.  Function rated 8. At best, pain is rated 4/10 and highest 8/10 and average is 5/10.    Radiation of pain: Denies.    Gait disturbance: wheelchair for outings.  Denies recent falls.  Exacerbating factors: activity, arm movements, morning time, prolonged sitting/standing/walking.  Alleviating factors: Rest, pain medications, sitting/standing short periods, laying down and changing positions.  Associated symptoms: Sedentary lifestyle, obesity, oxygen dependent, depression, pain at night.  Denies numbness, weakness, bowel or bladder incontinence, fever chills, unexplained weight loss.  Functional symptoms: Pain interferes with sleep, work, ADLs, relationships/social, mood (depressed, frustrated, anxious).  Pain treatment does help with function.  Adverse effects of medications:  Uses sennakot 2 tablets at home for intermittent constipation.  Hasn't used miralax but used to.  She bought generic laxative.  Intermittent pruritus from opioids, takes benadryl.  Current treatment efficacy: Fair. Taking OxyContin 40 mg in the morning with pain relief within 1 hour.  She has not  "held any doses this past month due to sedation.  She states she wakes with pain 8-9/10 and reduces to 6/10. Taking oxycodone 30 mg 1 every 4 hours prn has not tolerated reduction in daily dose.   Current treatment compliance: Good.  She has failed Lyrica, Gabapentin, Cymbalta, Fentanyl, Morphine, Vicodin, codeine in the past.  She has [facility] PT/OT and RN services in past, not currently needed.  Uses daily pop out dosing for medications.  Reports taking medication as prescribed.      Since last visit the patient denies ER/urgent care visits or hospitalizations since last seen.  She continues use of her Coumadin for anticoagulant medication.  She states that she has not had any changes in her pain since her last visit.  She denies any new injuries, falls, new pain areas.  She states that she is not having any adverse effects to her medications and denies any questions that she would like addressed today.    She did see pulmonary specialist:     \"Assessment and Plan: Catherine Sharp is a 43 year old female who presents for return evaluation for severe Group 3 PH.    1. Severe Group 3 Pulmonary Hypertension with Preserved Cardiac Output: Ms. Hsu has severe group 3 pulmonary hypertension with limited functional capacity due to pain and significant lung disease. Her hemodynamic evaluation revealed a PVR over 6, but thankfully her cardiac index is preserved. She does have a very reduced DLCO which we have previously demonstrated as an independent risk factor for mortality and group 3 pulmonary hypertension (Mariel et al., 2019 [facility]).     As you know, pulmonary vasodilators have limited efficacy in Group 3 PH due to ILD. She does have high oxygen requirements so I am reluctant to start a PAH specific therapy unless she is in a clinical trial that has a very controlled and protocolized evaluation.    At this point, I will have her speak with our research nurse about the possibility of enrolling in the " "Increase trial, which is a trial that evaluates the utility of inhaled prostacyclin in interstitial lung disease associated pulmonary hypertension. I will also refer her to the lung transplant team because with her severe pulmonary hypertension and very low DLCO I would anticipate her 5-year survival to be much lower than what a transplant patient would have. I will increase her diuretic dose to 40 mg twice a day for the next week to help with her volume overload. We will recheck a basic metabolic panel next week to ensure her renal function is not compromised. Finally I will have her come back and see me in clinic in 3 months.    It was a pleasure seeing your patient Catherine Sharp at the [facility] Pulmonary Hypertension clinic. Please contact us with any questions or concerns that you may have.    Sincerely,    Carter Eng MD, PhD   of Medicine  Center for Pulmonary Hypertension  Cardiovascular Division  [facility]\"    She states this was a shock to be told she has a 50% change of being alive in 2 years and her and her  are still trying to process this.  She is going to participate in research and discuss with transplant.  She had an intake by phone and will call in May to discuss next steps.      We continue to discuss risks of opioids with respiratory concerns.  Review medical cannabis option today to assist in reducing opioids and she is interested but doesn't think she can afford at this time.  Does not want to proceed with registration at this time.    Discussed depression and grief as last visit she did score a 15 on PHQ-9 01/24/19 which indicates moderately severe.   She is offered referral to behavioral health assessment and also trial of antidepressant which could also improve her pain; she declines due to other appointments but will keep in mind.  Denies SI.  She states fluoxetine 20 mg was prescribed by Dr. Ma on 02/25/19, not started yet.  She " "states she is going out of house 2-3 times per week to sister's house.   She states her sleep is still crappy which is chronic.  She states appetite is \"weird\" sometimes doesn't have much of appetite.  She completed PHQ-9 today which score was still 15. She wants to consider a medication but discuss one for pain also such as duloxetine.  She has been on this in the past dating prior to 2015 and I cannot see a reason it was discontinued, nor can she remember.  Denies an allergy to it.       Review of Systems  Constitutional: Denies fever, chills, lethargy.  Has MERLIN, chronic O2. Denies night sweats, weight loss  Musculoskeletal: Positive for mid and lower back pain, denies joint pain/swelling, denies recent fall.  Gastrointestional: Positive for intermittent nausea, vomiting, GERD, abdominal pain, constipation.  Denies difficulty swallowing, change in appetite, diarrhea, fecal incontinence.  Genitourinary: Denies urinary incontinence, dysuria, hematuria, UTI, frequency, hesitancy, change in libido  Neurologic: Headaches.  Denies confusion, seizure, weakness, changes in balance, changes in speech.  Psychiatric: Positive depression, anxiety, grief.  Denies memory loss, psychoses, suicidal ideation, substance use/abuse.     Objective:     Vitals:    04/12/19 1334   BP: 143/90   Pulse: 88   Resp: 16   Weight: 204 lb (92.5 kg)   Height: 4' 10\" (1.473 m)   PainSc:   4     Physical Exam  Constitutional- General appearance: Normal.  Well developed, uncomfortable, obese, appears older than stated age.  Presents with MIL today.   Psychiatric- Judgment and insight: Normal.  Speech: Normal rhythm.  Thought process: Normal.  No abnormal thoughts reported. Alert & Oriented to person, place, and time.  Recent and remote memory: Normal.  Mood and affect: quiet, depressed.   Respiratory- Using O2 by nasal cannula today.  Normal rhythm and rate.  Cardiovascular- Extremities warm and well perfused, no peripheral edema or " varicosities.  Dermatologic- Exposed skin is clean, dry, and intact to inspection and palpation.   Musculoskeletal- Gait and station:  Ambulating independently today, stooped.      *Opioid [place] Precautions:    UDS/Swab - Collected 04/12/19, results pending  Opioid Consent -  05/31/18  Opioid Agreement - 05/31/18  Pharmacy- as documented    [place]  Reviewed - 01/24/19 as expected  Pill Count - Pill count 10/16/15 appropriate.    Psychological evaluation - n/a  MME - 330  Pharmacogenetic testing - n/a  VARGAS Score: 70 04/12/19    Imaging:  None reviewed today    Assessment:   Catherine Sharp is a 43 y.o. female seen in clinic today for pain in her spine due to history of pathologic compression fractures due to steroids to treat SLE.  She is reporting most pain in her left lumbar spine L3-5 at this time, MRI demonstrates spinal canal stenosis L3-4, disc bulging at L3-4 and L5-S1, and mild to moderate facet arthropathy throughout in addition to multiple chronic compression deformities.  She had 30% pain relief with LESI and wants to repeat lumbar procedure to see if additional pain relief.  Initial left LMBB was not helpful.  She continues to have pain in thoracic spine, history of chronic compression fractures. She is having stable pain relief with oxycodone 30 mg every 4 hours prn, have discussed dosing outside of CDC guidelines.  She continues OxyContin 40 mg for extended pain relief in the morning but reports she cannot take this every 12 hours as it causes more difficulty with SOB at increased frequency.  She understands the risks with opioids and respiratory depression.  We review medical cannabis program today as an option for pain control and to reduce opioid dosing; she has reviewed information and cannot participate now due to cost.  She is reporting moderately severe depression symptoms with mother's death, she is advised on behavioral health referral and declines today but is interested in starting SNRI  duloxetine titration dose and possible side effects reviewed.    Plan:   Prescribed 30 days of OxyContin 40 mg in the morning.  Hold for any respiratory or sedation concerns.  Prescribed oxycodone 30 mg 1 every 4 hours maximum for pain as needed.   Printed to fill 04/26/19 to start 04/27/19 and 05/26/19 to start 05/27/29  Discussed medical cannabis program today but will wait on enrollment due to cost.  Discussed starting duloxetine for mood and pain. Take 30 mg daily x 7 days, then increase to 60 mg daily if tolerating without side effects.  Discussed this may take 4-6 weeks to help so stay on it.  Dispose of fluoxetine prescription so these are not mistaken.    Patient is considering pulmonary research trial and working to see transplant team.    Diagnostics: UDT/SWAB collected today results are pending.  Patient required a random Urine Drug Test due to the need to comply with [place] Model Policy Guidelines and CDC Guideline for the use of any controlled substances. Reasons for definitive testing include:  -Identify specific medication(s) or drug(s) in a class (e.g. Benzodiazepines, barbiturates, opioids, antidepressants)  -Definitive concentration of medication(s), drug(s), and/or metabolites needed to guide management  -Identify non-prescribed medication or illicit drug use for ongoing safe prescribing of controlled substances  -Identify substances that may present high risk for additive or synergistic interaction with prescription medication (e.g. Alcohol).  Patient is either being considered for or taking a controlled substance. Unexpected findings will be discussed and treatment decision may be adjusted. Testing is being implemented across the board randomly w/o bias related to age, race, gender, socioeconomic status or Yarsanism affiliation.   Follow-up in 8 weeks with Elizabet.    Elizabet Palmer PA-C  [facility]  [address]  [place] [address]  Ph: [phone]  Fax:  [phone]

## 2021-05-27 NOTE — TELEPHONE ENCOUNTER
Medication Request  Medication name:   diltiazem (DILACOR XR) 240 MG 24 hr capsule   5/1/2018     Sig - Route: Take 240 mg by mouth daily. - Oral    Class: Historical Med        Pharmacy Name and Location: [facility]  Reason for request: Fax received from pharmacy requesting refill for the above medication  When did you use medication last?:  Unknown- listed as historical med  Patient offered appointment:  N/A  Okay to leave a detailed message: no-speak to pharmacy staff

## 2021-05-28 ENCOUNTER — RECORDS - HEALTHEAST (OUTPATIENT)
Dept: ADMINISTRATIVE | Facility: CLINIC | Age: 46
End: 2021-05-28

## 2021-05-28 NOTE — PROGRESS NOTES
"Prolia Injection Phone Screen      Screening questions have been asked 2-3 days prior to administration visit for Prolia. If any questions are answered with \"Yes,\" this phone encounter were will routed to ordering provider for further evaluation.     1.  When was the last injection?  7/24/18    2.  Has insurance for this injection been verified?  Yes    3.  Did you experience any new onset achiness or rashes that lasted for over a month with your previous Prolia injection?   No    4.  Do you have a fever over 101?F or a new deep cough that is unusual for you today? No    5.  Have you started any new medications in the last 6 months that you were told could affect your immune system? These may have been prescribed by oncologist, transplant, rheumatology, or dermatology.   No    6.  In the last 6 months have you have gastric bypass or parathyroid surgery?   No    7.  Do you plan dental work requiring drilling into the bone such as implants/extractions or oral surgery in the next 2-3 months?   No    8. Do you have new insurance since the last injection?    Patient informed if symptoms discussed above present prior to their administration appointment, they are to notify clinic immediately.     Michelle Montana        The following steps were completed to comply with the REMS program for Prolia:  1. Ordering provider has previously reviewed information in the Medication Guide and Patient Counseling Chart, including the serious risks of Prolia  and the symptoms of each risk and have been advised to seek prompt medical attention if they have signs or symptoms of any of the serious risks.  2. Provided each patient a copy of the Medication Guide and Patient Brochure.  See MAR for administration details.   Indication: Prolia  (denosumab) is a prescription medicine used to treat osteoporosis in patients who:   Are at high risk for fracture, meaning patients who have had a fracture related to osteoporosis, or who have multiple " risk factors for fracture; Cannot use another osteoporosis medicine or other osteoporosis medicines did not work well.   The timeline for early/late injections would be 4 weeks early and any time after the 6 month antonio. If a patient receives their injection late, then the subsequent injection would be 6 months from the date that they actually received the injection    Have the screening questions been asked prior to this administration? Yes      After obtaining consent, and per orders of Dr. Alcaraz, injection of Prolia 60 mg given by Michelle Montana. Patient instructed to remain in clinic for 20 minutes afterwards, and to report any adverse reaction to me immediately.

## 2021-05-28 NOTE — TELEPHONE ENCOUNTER
proliaplus started for prolia.     Last dexa was 5/4/2018. dexa scan once every 2 years.     Should pt get prolia injection now or wait until appt in May?

## 2021-05-28 NOTE — TELEPHONE ENCOUNTER
Patient calls back to say she just started taking cymbalta on Sunday and does not need a refill at this time.  Disregard refill order.

## 2021-05-28 NOTE — TELEPHONE ENCOUNTER
[facility] Team    Patient states she missed her Prolia Injection.    Last received: 07.24.2018  Was suppose to receive: 01.24.2019    She has up coming appt with provider in May 09.2019, can she get it then? Please check PA/PP. She has BCBS, but different policy. Insurance has been verified.    Does she need a DEXA scan prior?    She can be reached @ [phone].

## 2021-05-28 NOTE — TELEPHONE ENCOUNTER
Per prolia plus, no PA needed for prolia. 4/22/19. Paperwork sent to scanned.     Please call pt to jose prolia injection now. And dexa should be once every 2 years.

## 2021-05-28 NOTE — TELEPHONE ENCOUNTER
Controlled Substance Refill Request  Medication:   Requested Prescriptions     Pending Prescriptions Disp Refills     LORazepam (ATIVAN) 1 MG tablet [Pharmacy Med Name: LORAZEPAM 1 MG TABLET] 30 tablet 0     Sig: TAKE 1 TABLET (1 MG TOTAL) BY MOUTH EVERY EVENING.     Date Last Fill: 3/15/19  [id number]   Submit electronically to pharmacy  Controlled Substance Agreement on File:   Encounter-Level CSA Scan Date:    There are no encounter-level csa scan date.       Last office visit: Last office visit pertaining to requested medication was 2/25/19.

## 2021-05-28 NOTE — TELEPHONE ENCOUNTER
Medication being requested:cymbalta  Last visit date: 4/12 Provider: RENZO  Next visit date: none Provider:   Expected follow up: 8 weeks  MTM visit (Pain Center) date: no  Red Flags/Comments identified on call: no  Pertinent between visit information about requested medication (telephone, mychart, prior authorization): no  Last date prescribed: 4/12  Provider responsible: RENZO  Spoke with patient: attempted to reach patient  Script being sent to provider by nurse- dates and quantity:   Requested Prescriptions     Pending Prescriptions Disp Refills     DULoxetine (CYMBALTA) 30 MG capsule [Pharmacy Med Name: DULOXETINE HCL DR 30 MG CAP] 60 capsule 0     Sig: Take 2 caps (60 mg) daily   fill 5/0, #60  Ph[facility] c[facility]

## 2021-05-29 ENCOUNTER — RECORDS - HEALTHEAST (OUTPATIENT)
Dept: ADMINISTRATIVE | Facility: CLINIC | Age: 46
End: 2021-05-29

## 2021-05-29 NOTE — TELEPHONE ENCOUNTER
Controlled Substance Refill Request  Medication:   Requested Prescriptions     Pending Prescriptions Disp Refills     LORazepam (ATIVAN) 1 MG tablet [Pharmacy Med Name: LORAZEPAM 1 MG TABLET] 30 tablet 0     Sig: TAKE 1 TABLET (1 MG TOTAL) BY MOUTH EVERY EVENING.     Date Last Fill: 4/26/19  Pharm[id number]   Submit electronically to pharmacy  Controlled Substance Agreement on File:   Encounter-Level CSA Scan Date:    There are no encounter-level csa scan date.       Last office visit: Last office visit pertaining to requested medication was 2/25/19.

## 2021-05-29 NOTE — TELEPHONE ENCOUNTER
Routed to the anticoagulation pool.    Desiree Thrasher, RN, BSN, PHN  [facility] Medication Refill Nurse  6/19/2019  1:21 PM

## 2021-05-29 NOTE — PROGRESS NOTES
Assessment/Plan:     1. Acute on chronic respiratory failure with hypoxia and hypercapnia (H)     2. Candidal intertrigo  nystatin (MYCOSTATIN) powder   3. Antiphospholipid Antibody Syndrome  Factor 10 Assay, Chromogenic   4. Chronic diastolic heart failure (H)  Comprehensive Metabolic Panel    HM2(CBC w/o Differential)   5. Hypermagnesemia  Magnesium   6. Hypophosphatemia  Phosphorus   7. Chronic respiratory failure with hypoxia and hypercapnia (H)     8. ILD (interstitial lung disease) (H)     9. Morbid obesity (H)         Diagnoses and all orders for this visit:    Acute on chronic respiratory failure with hypoxia and hypercapnia (H)  Exacerbation of acute on chronic respiratory failure has resolved with antibiotics and prednisone.  She will continue her oxygen therapy.  She will continue use of albuterol.  She will follow-up with her pulmonologist in July    Candidal intertrigo  -     nystatin (MYCOSTATIN) powder; Apply 1 application topically 3 (three) times a day as needed.  Dispense: 15 g; Refill: 5    Antiphospholipid Antibody Syndrome  -     Factor 10 Assay, Chromogenic  -Continue warfarin    Chronic diastolic heart failure (H)  -     Comprehensive Metabolic Panel  -     HM2(CBC w/o Differential)    Hypermagnesemia  -     Magnesium    Hypophosphatemia  -     Phosphorus    Chronic respiratory failure with hypoxia and hypercapnia (H)  Continue oxygen and albuterol.  She will follow-up with pulmonologist in July.      ILD (interstitial lung disease) (H)  She will follow-up with pulmonologist in July    Morbid obesity (H)  Not a candidate for lung transplant due to her weight.  Discussed weight management programs to help with weight loss.  Encouraged her to look into these on the [employer] website.  Notify me if she would like referral.           The following are part of a depression follow up plan for the patient:  mental health screening assessment    Subjective:      Catherinemaddison Sharp is a 43 y.o. female  who comes in today for hospital follow-up.  She has complicated past medical history significant for underlying interstitial lung disease, chronic hypoxic respiratory failure on continuous O2, pulmonary hypertension, chronic diastolic heart failure and history of DVT/antiphospholipid syndrome.  She is followed by multiple specialists including a specialist at the [facility].  She presented to emergency department on May 21, 2019 with complaint of increased shortness of breath, cough and low oxygen saturation.  Reported O2 sats at home in the 70s.  She was admitted for further evaluation and treatment.  There was initially some talk of transferring her to the [facility] for treatment but no beds were available so she was admitted to [facility].  She was started on BiPAP therapy and improved quickly overnight.  She was placed on broad-spectrum antibiotics with Rocephin and Zithromax.  CT of the chest did not show any significant acute changes.  She was treated with IV steroids initially and then transitioned to oral prednisone on discharge.  Dosage of furosemide was increased to 40 mg twice daily.  Echocardiogram did not show any significant changes.  She was continued on all of her other home medications for management of lupus, hypothyroidism and chronic pain syndrome.  She was discharged home with course of oral antibiotic therapy and prednisone taper.  Today she reports that she is feeling much better.  Feels that she is back to her baseline.  She does have some upcoming appointments with specialist within the next few weeks.  Will be seeing her pulmonologist in July and her specialist at the [facility] in about 2 weeks.  She has no new concerns or questions today.  Medication reconciliation was performed.  She does need some blood work today.  We discussed recent letter that we received from the [facility] regarding her referral for lung transplant.  She was not  deemed to be a candidate due to her obesity.  They had recommended she attempt to reach a goal weight of 143 pounds.  This is difficult for her as she is not able to do much physical activity given her significant chronic lung disease.  Review of systems is otherwise negative today.  No other concerns or questions.    Current Outpatient Medications   Medication Sig Dispense Refill     albuterol (PROAIR HFA;PROVENTIL HFA;VENTOLIN HFA) 90 mcg/actuation inhaler Inhale 2 puffs every 6 (six) hours as needed for wheezing. 1 Inhaler 6     albuterol (PROVENTIL) 2.5 mg /3 mL (0.083 %) nebulizer solution Take 3 mL (2.5 mg total) by nebulization 4 (four) times a day as needed for wheezing or shortness of breath. 75 mL 11     calcium, as carbonate, (OS-JEFFERSON) 500 mg calcium (1,250 mg) tablet Take 1 tablet by mouth daily.       cholecalciferol, vitamin D3, 1,000 unit tablet Take 1,000 Units by mouth daily.        diltiazem (DILACOR XR) 240 MG 24 hr capsule Take 1 capsule (240 mg total) by mouth daily. 90 capsule 0     FERROUS SULFATE (SLOW FE ORAL) Take 65 mg by mouth daily.       furosemide (LASIX) 40 MG tablet Take 1 tablet (40 mg total) by mouth 2 (two) times a day at 9am and 6pm. 60 tablet 0     guaiFENesin ER (MUCINEX) 600 mg 12 hr tablet Take 1 tablet (600 mg total) by mouth 2 (two) times a day.  0     hydroxychloroquine (PLAQUENIL) 200 mg tablet Take 200 mg by mouth daily.       levothyroxine (SYNTHROID, LEVOTHROID) 150 MCG tablet Take 1 tablet (150 mcg total) by mouth daily. 90 tablet 3     LORazepam (ATIVAN) 1 MG tablet TAKE 1 TABLET (1 MG TOTAL) BY MOUTH EVERY EVENING. 30 tablet 0     naloxone (NARCAN) 4 mg/actuation nasal spray 1 spray (4 mg dose) into one nostril for opioid reversal. Call 911. May repeat if no response in 3 minutes. 1 Box 0     nystatin (MYCOSTATIN) powder Apply 1 application topically 3 (three) times a day as needed. 15 g 5     oxyCODONE (ROXICODONE) 30 MG immediate release tablet Take 1 tablet (30 mg  total) by mouth every 4 (four) hours as needed for pain. 180 tablet 0     OXYCONTIN 40 mg 12 hr tablet Take 1 tablet (40 mg total) by mouth every morning. 30 tablet 0     potassium chloride (K-DUR,KLOR-CON) 20 MEQ tablet Take 20 mEq by mouth daily.              senna-docusate (SENNOSIDES-DOCUSATE SODIUM) 8.6-50 mg tablet Take 1 tablet by mouth 2 (two) times a day.  0     warfarin (COUMADIN/JANTOVEN) 5 MG tablet Take 5 mg by mouth See Admin Instructions. Take 5 mg daily       Current Facility-Administered Medications   Medication Dose Route Frequency Provider Last Rate Last Dose     denosumab 60 mg (PROLIA 60 mg/ml)  60 mg Subcutaneous Q6 Months Anirudh Alcaraz MD   60 mg at 05/15/19 1236       Past Medical History, Family History, and Social History reviewed.  Past Medical History:   Diagnosis Date     Acute respiratory failure (H) 1/13/2015     Adhesive pericarditis 4/12/2007     Antiphospholipid antibody with hypercoagulable state (H)     Secondary to lupus     Cervical compression fracture (H) 2009    closed, with spinal cord injury C1-C4 - prednisone induced     Chest pain with normal coronary angiography 2010     Chronic pain     on opiates     Chronic respiratory failure with hypoxia and hypercapnia (H)      Congestive heart failure (H)     Diastolic, Cor Pulmonale, EF 60% 2014     Deep phlebothrombosis, antepartum, with delivery (H) 4/12/2007     Depression      Gastroparesis     causing nausea/vomiting with recurrent admissions     GERD (gastroesophageal reflux disease)      History of DVT (deep vein thrombosis) 1/22/2015     Hypercarbia 9/26/2015     Hypertension      Kidney infection      Kidney stone      Lupus (systemic lupus erythematosus) (H)     complicated by joint and pulmonary involvement     Obesity      Osteoporosis      Peripheral neuropathy      Pulmonary emboli (H)     Created by Conversion      Pulmonary hypertension due to left ventricular diastolic dysfunction (H)     PASP 50-55 mmHg  estimated echocardiographically and LVEDP 26 in 2010 but that was during Takotsubo event     Recurrent Clostridium difficile diarrhea      Respiratory failure (H) 4/13/2017     Seizures (H)     once due to high BP, hypertensive encephalopathy     Snoring 12/3/2015     Stress-induced cardiomyopathy 05/07/2010     Trigger ring finger, right 4/20/2016     Replacement Utility updated for latest IMO load     Past Surgical History:   Procedure Laterality Date     CARDIAC CATHETERIZATION  2010     PICC AND MIDLINE TEAM LINE INSERTION  9/27/2015          OR BIOPSY LUNG/MEDIASTINUM PERCUTANEOUS NEEDLE      Description: Biopsy Lung Percutaneous;  Recorded: 09/15/2011;     OR LAP,CHOLECYSTECTOMY      Description: Cholecystectomy Laparoscopic;  Recorded: 02/15/2008;     THORACOSCOPY Left 1/13/2015    Procedure: LEFT THORACOSCOPY CONVERTED TO THORACOTOMY;  Surgeon: Jose David Granados MD;  Location: [facility] OR;  Service:      THORACOTOMY Left 1/13/2015    Procedure: THORACOTOMY W/ WEDGE RESECTION;  Surgeon: Jose David Granados MD;  Location: [facility];  Service:      Hydrocodone-acetaminophen and Protonix [pantoprazole]  Family History   Problem Relation Age of Onset     Asthma Mother      Hypertension Mother      Stroke Mother      Clotting disorder Mother      Cancer Mother      Hypertension Father      Stroke Father      Urolithiasis Father      Stroke Maternal Aunt      Mental illness Sister      Stroke Sister      Clotting disorder Sister      Cerebral aneurysm Maternal Aunt      Gout Neg Hx      Social History     Socioeconomic History     Marital status:      Spouse name: Not on file     Number of children: Not on file     Years of education: Not on file     Highest education level: Not on file   Occupational History     Occupation: Unemployed   Social Needs     Financial resource strain: Not on file     Food insecurity:     Worry: Not on file     Inability: Not on file     Transportation needs:      "Medical: Not on file     Non-medical: Not on file   Tobacco Use     Smoking status: Former Smoker     Packs/day: 0.50     Years: 10.00     Pack years: 5.00     Types: Cigarettes     Last attempt to quit: 2009     Years since quittin.8     Smokeless tobacco: Never Used   Substance and Sexual Activity     Alcohol use: No     Drug use: Yes     Types: Oxycodone     Sexual activity: Not on file   Lifestyle     Physical activity:     Days per week: Not on file     Minutes per session: Not on file     Stress: Not on file   Relationships     Social connections:     Talks on phone: Not on file     Gets together: Not on file     Attends Protestant service: Not on file     Active member of club or organization: Not on file     Attends meetings of clubs or organizations: Not on file     Relationship status: Not on file     Intimate partner violence:     Fear of current or ex partner: Not on file     Emotionally abused: Not on file     Physically abused: Not on file     Forced sexual activity: Not on file   Other Topics Concern     Not on file   Social History Narrative    Lives with family. Lives in [place].         Review of systems is as stated in HPI, and the remainder of the 10 system review is otherwise negative.    Objective:     Vitals:    19 0951   BP: 112/72   Patient Site: Left Arm   Patient Position: Sitting   Cuff Size: Adult Large   Pulse: 93   Temp: 98.3  F (36.8  C)   TempSrc: Oral   SpO2: 95%   Weight: 201 lb (91.2 kg)   Height: 4' 10.25\" (1.48 m)    Body mass index is 41.65 kg/m .    General appearance: alert, appears stated age and cooperative  Head: Normocephalic, without obvious abnormality, atraumatic  Lungs: rales bibasilar  Heart: regular rate and rhythm, S1, S2 normal, no murmur, click, rub or gallop  Extremities: extremities normal, atraumatic, no cyanosis or edema  Neurologic: Grossly normal    TIBURCIO-7: 12  PHQ-9: 14      This note has been dictated using voice recognition software. Any " grammatical or context distortions are unintentional and inherent to the the software.

## 2021-05-29 NOTE — PROGRESS NOTES
Orders for oxygen (4LPM at rest, 6LPM with activity) faxed to [facility] along with OV notes from January.  Next office visit scheduled for July.

## 2021-05-30 ENCOUNTER — RECORDS - HEALTHEAST (OUTPATIENT)
Dept: ADMINISTRATIVE | Facility: CLINIC | Age: 46
End: 2021-05-30

## 2021-05-30 VITALS — WEIGHT: 212.3 LBS | BODY MASS INDEX: 44.56 KG/M2 | HEIGHT: 58 IN

## 2021-05-30 VITALS — HEIGHT: 58 IN | WEIGHT: 209 LBS | BODY MASS INDEX: 43.87 KG/M2

## 2021-05-30 VITALS — HEIGHT: 58 IN | BODY MASS INDEX: 44.56 KG/M2 | WEIGHT: 212.3 LBS

## 2021-05-30 VITALS — WEIGHT: 202 LBS | BODY MASS INDEX: 42.4 KG/M2 | HEIGHT: 58 IN

## 2021-05-30 VITALS — BODY MASS INDEX: 42.78 KG/M2 | WEIGHT: 204.7 LBS

## 2021-05-30 VITALS — WEIGHT: 201.7 LBS | BODY MASS INDEX: 42.16 KG/M2

## 2021-05-30 NOTE — PROGRESS NOTES
Assessment/Plan:     1. Administrative encounter         Diagnoses and all orders for this visit:    Administrative encounter    Completed disability paperwork for [facility] and this will be faxed. Please see scanned copy for details.         Subjective:      Catherine Sharp is a 43 y.o. female who comes in today for completion of disability paperwork.  She is on long-term disability through her employer.  Has paperwork from [facility] that needs completion.  Last completed in 2017 by Dr. Berrios.  Patient continues to be unable to work.  She has worsening interstitial lung disease with chronic hypoxia and respiratory failure and requires continuous oxygen therapy.  She also has pulmonary hypertension, systemic lupus erythematosus, polyarthralgias, chronic lower back pain, generalized weakness, osteoporosis as well as antiphospholipid antibody syndrome with history of pulmonary embolism and DVT.  We reviewed and updated her medications and allergies.  She has no new concerns or questions today.    Current Outpatient Medications   Medication Sig Dispense Refill     albuterol (PROAIR HFA;PROVENTIL HFA;VENTOLIN HFA) 90 mcg/actuation inhaler Inhale 2 puffs every 6 (six) hours as needed for wheezing. 1 Inhaler 6     albuterol (PROVENTIL) 2.5 mg /3 mL (0.083 %) nebulizer solution Take 3 mL (2.5 mg total) by nebulization 4 (four) times a day as needed for wheezing or shortness of breath. 75 mL 11     calcium, as carbonate, (OS-JEFFERSON) 500 mg calcium (1,250 mg) tablet Take 1 tablet by mouth daily.       cholecalciferol, vitamin D3, 1,000 unit tablet Take 1,000 Units by mouth daily.        DILT- mg 24 hr capsule TAKE 1 CAPSULE BY MOUTH EVERY DAY 90 capsule 3     FERROUS SULFATE (SLOW FE ORAL) Take 65 mg by mouth daily.       furosemide (LASIX) 40 MG tablet Take 1 tablet (40 mg total) by mouth 2 (two) times a day at 9am and 6pm. 60 tablet 0     hydroxychloroquine (PLAQUENIL) 200 mg tablet Take 200 mg by mouth daily.        levothyroxine (SYNTHROID, LEVOTHROID) 150 MCG tablet Take 1 tablet (150 mcg total) by mouth daily. 90 tablet 3     LORazepam (ATIVAN) 1 MG tablet TAKE 1 TABLET (1 MG TOTAL) BY MOUTH EVERY EVENING. 30 tablet 0     nystatin (MYCOSTATIN) powder Apply 1 application topically 3 (three) times a day as needed. 15 g 5     oxyCODONE (ROXICODONE) 30 MG immediate release tablet Take 1 tablet (30 mg total) by mouth every 4 (four) hours as needed for pain. 168 tablet 0     OXYCONTIN 40 mg 12 hr tablet Take 1 tablet (40 mg total) by mouth every morning. 28 tablet 0     potassium chloride (K-DUR,KLOR-CON) 20 MEQ tablet Take 20 mEq by mouth daily.              predniSONE (DELTASONE) 1 MG tablet TAKE 5 TABLETS (5 MG) BY MOUTH DAILY. 450 tablet 0     senna-docusate (SENNOSIDES-DOCUSATE SODIUM) 8.6-50 mg tablet Take 1 tablet by mouth 2 (two) times a day.  0     warfarin (COUMADIN/JANTOVEN) 5 MG tablet TAKE 1-1.5 TABS BY MOUTH DAILY. CURRENTLY 1.5 TABS TUE/THUR & 1 TAB REST OF WEEK. ADJUST PER INR 45 tablet 11     guaiFENesin ER (MUCINEX) 600 mg 12 hr tablet Take 1 tablet (600 mg total) by mouth 2 (two) times a day.  0     naloxone (NARCAN) 4 mg/actuation nasal spray 1 spray (4 mg dose) into one nostril for opioid reversal. Call 911. May repeat if no response in 3 minutes. 1 Box 0     oxyCODONE (ROXICODONE) 30 MG immediate release tablet Take 1 tablet (30 mg total) by mouth every 4 (four) hours as needed for pain. 168 tablet 0     OXYCONTIN 40 mg 12 hr tablet Take 1 tablet (40 mg total) by mouth every morning. 28 tablet 0     Current Facility-Administered Medications   Medication Dose Route Frequency Provider Last Rate Last Dose     denosumab 60 mg (PROLIA 60 mg/ml)  60 mg Subcutaneous Q6 Months Anirudh Alcaraz MD   60 mg at 05/15/19 1236       Past Medical History, Family History, and Social History reviewed.  Past Medical History:   Diagnosis Date     Acute respiratory failure (H) 1/13/2015     Adhesive pericarditis 4/12/2007      Antiphospholipid antibody with hypercoagulable state (H)     Secondary to lupus     Cervical compression fracture (H) 2009    closed, with spinal cord injury C1-C4 - prednisone induced     Chest pain with normal coronary angiography 2010     Chronic pain     on opiates     Chronic respiratory failure with hypoxia and hypercapnia (H)      Congestive heart failure (H)     Diastolic, Cor Pulmonale, EF 60% 2014     Deep phlebothrombosis, antepartum, with delivery (H) 4/12/2007     Depression      Gastroparesis     causing nausea/vomiting with recurrent admissions     GERD (gastroesophageal reflux disease)      History of DVT (deep vein thrombosis) 1/22/2015     Hypercarbia 9/26/2015     Hypertension      Kidney infection      Kidney stone      Lupus (systemic lupus erythematosus) (H)     complicated by joint and pulmonary involvement     Obesity      Osteoporosis      Peripheral neuropathy      Pulmonary emboli (H)     Created by Conversion      Pulmonary hypertension due to left ventricular diastolic dysfunction (H)     PASP 50-55 mmHg estimated echocardiographically and LVEDP 26 in 2010 but that was during Takotsubo event     Recurrent Clostridium difficile diarrhea      Respiratory failure (H) 4/13/2017     Seizures (H)     once due to high BP, hypertensive encephalopathy     Snoring 12/3/2015     Stress-induced cardiomyopathy 05/07/2010     Trigger ring finger, right 4/20/2016     Replacement Utility updated for latest IMO load     Past Surgical History:   Procedure Laterality Date     CARDIAC CATHETERIZATION  2010     PICC AND MIDLINE TEAM LINE INSERTION  9/27/2015          [facility] BIOPSY LUNG/MEDIASTINUM PERCUTANEOUS NEEDLE      Description: Biopsy Lung Percutaneous;  Recorded: 09/15/2011;     [facility] LAP,CHOLECYSTECTOMY      Description: Cholecystectomy Laparoscopic;  Recorded: 02/15/2008;     THORACOSCOPY Left 1/13/2015    Procedure: LEFT THORACOSCOPY CONVERTED TO THORACOTOMY;  Surgeon: Jose David Granados MD;  Location:  [facility] OR;  Service:      THORACOTOMY Left 2015    Procedure: THORACOTOMY W/ WEDGE RESECTION;  Surgeon: Jose David Granados MD;  Location: [facility] OR;  Service:      Hydrocodone-acetaminophen and Protonix [pantoprazole]  Family History   Problem Relation Age of Onset     Asthma Mother      Hypertension Mother      Stroke Mother      Clotting disorder Mother      Cancer Mother      Hypertension Father      Stroke Father      Urolithiasis Father      Stroke Maternal Aunt      Mental illness Sister      Stroke Sister      Clotting disorder Sister      Cerebral aneurysm Maternal Aunt      Gout Neg Hx      Social History     Socioeconomic History     Marital status:      Spouse name: Not on file     Number of children: Not on file     Years of education: Not on file     Highest education level: Not on file   Occupational History     Occupation: Unemployed   Social Needs     Financial resource strain: Not on file     Food insecurity:     Worry: Not on file     Inability: Not on file     Transportation needs:     Medical: Not on file     Non-medical: Not on file   Tobacco Use     Smoking status: Former Smoker     Packs/day: 0.50     Years: 10.00     Pack years: 5.00     Types: Cigarettes     Last attempt to quit: 2009     Years since quittin.9     Smokeless tobacco: Never Used   Substance and Sexual Activity     Alcohol use: No     Drug use: Yes     Types: Oxycodone     Sexual activity: Not on file   Lifestyle     Physical activity:     Days per week: Not on file     Minutes per session: Not on file     Stress: Not on file   Relationships     Social connections:     Talks on phone: Not on file     Gets together: Not on file     Attends Jain service: Not on file     Active member of club or organization: Not on file     Attends meetings of clubs or organizations: Not on file     Relationship status: Not on file     Intimate partner violence:     Fear of current or ex partner: Not  "on file     Emotionally abused: Not on file     Physically abused: Not on file     Forced sexual activity: Not on file   Other Topics Concern     Not on file   Social History Narrative    Lives with family. Lives in [place].         Review of systems is as stated in HPI, and the remainder of the 10 system review is otherwise negative.    Objective:     Vitals:    07/24/19 1412   BP: 127/76   Patient Site: Right Arm   Patient Position: Sitting   Cuff Size: Adult Large   Pulse: 89   Temp: 98.3  F (36.8  C)   Weight: 201 lb (91.2 kg)   Height: 4' 10\" (1.473 m)   PF: (!) 8 L/min    Body mass index is 42.01 kg/m .    General appearance: alert, appears stated age and cooperative  Psych: mood appropriate, affect normal        This note has been dictated using voice recognition software. Any grammatical or context distortions are unintentional and inherent to the the software.       "

## 2021-05-30 NOTE — TELEPHONE ENCOUNTER
Controlled Substance Refill Request  Medication:   Requested Prescriptions     Pending Prescriptions Disp Refills     LORazepam (ATIVAN) 1 MG tablet [Pharmacy Med Name: LORAZEPAM 1 MG TABLET] 30 tablet 0     Sig: TAKE 1 TABLET (1 MG TOTAL) BY MOUTH EVERY EVENING.     Date Last Fill: 5/29/19  Pharmacy:    Submit electronically to pharmacy  Controlled Substance Agreement on File:   Encounter-Level CSA Scan Date - 06/25/2019:    Scan on 6/26/2019  8:45 AM by Meghna Tubbs: [facility] NARCOTIC AGREEMENT (below)         Last office visit: Last office visit pertaining to requested medication was 6/12/19 .

## 2021-05-30 NOTE — TELEPHONE ENCOUNTER
Refill Approved    Rx renewed per Medication Renewal Policy. Medication was last renewed on 4/3/2019.       Gavin Damon, [facility] [place] Connection Triage/Med Refill 6/27/2019     Requested Prescriptions   Pending Prescriptions Disp Refills     DILT- mg 24 hr capsule [Pharmacy Med Name: DILT  MG CAPSULE] 90 capsule 0     Sig: TAKE 1 CAPSULE BY MOUTH EVERY DAY       Calcium-Channel Blockers Protocol Passed - 6/26/2019 12:20 PM        Passed - PCP or prescribing provider visit in past 12 months or next 3 months     Last office visit with prescriber/PCP: 6/12/2019 Delmis Ma MD OR same dept: 6/12/2019 Delmis Ma MD OR same specialty: 6/12/2019 Delmis Ma MD  Last physical: Visit date not found Last MTM visit: 6/28/2016 Amina Carey, PharmD   Next visit within 3 mo: Visit date not found  Next physical within 3 mo: Visit date not found  Prescriber OR PCP: Delmis Ma MD  Last diagnosis associated with med order: 1. Hypertension, unspecified type  - DILT- mg 24 hr capsule [Pharmacy Med Name: DILT  MG CAPSULE]; TAKE 1 CAPSULE BY MOUTH EVERY DAY  Dispense: 90 capsule; Refill: 0    If protocol passes may refill for 12 months if within 3 months of last provider visit (or a total of 15 months).             Passed - Blood pressure filed in past 12 months     BP Readings from Last 1 Encounters:   06/25/19 112/72

## 2021-05-30 NOTE — PATIENT INSTRUCTIONS - HE
Prescribed 30 days of OxyContin 40 mg in the morning.  Hold for any respiratory or sedation concerns.  Prescribed oxycodone 30 mg 1 every 4 hours maximum for pain as needed.   Printed to fill 19 for use 19 & 19 to start on 19  Due to changes in [place] laws, effective 2019, prescriptions for any opioid pain relievers can only be filled for 30 days from the date the prescription was written, or for medications that can be refilled, any refills must be filled within 30 days of your last fill. If you do not fill within 30 days, the prescription will  and you will need a brand new prescription.   In order to provide you with continued access to your prescriptions, we will be switching your prescriptions to a 28 day supply or less. It is your responsibility to get your prescriptions filled before the 30 day expiration date. Failure to do so may cause delays in getting your medications.     Opioid agreement and consent form signed today.  Discussed bringing these copies and your most recent After Visit Summary (AVS) from our appointment to the pharmacy when you are refilling controlled medications to assist with any additional information the pharmacist may require.   Keep follow-up with pulmonologist and research study  Follow-up in 8 weeks with Donya

## 2021-05-30 NOTE — PROGRESS NOTES
PAIN CENTER PROGRESS NOTE    Subjective:   Catherine Sharp is a 43 y.o. female who presents for evaluation of multi-site pain secondary to osteoporosis and pathologic fractures as a result of SLE treated with chronic steroids.  She has multiple co-morbidities including Interstitial lung disease, MERLIN, Antiphospholipid Antibody Syndrome, cardiomyopathy, OA in multiple sites, and recurrent hospitalizations.  Pain has been present for years and current treatment plan includes OxyContin, oxycodone, intermittent PT/OT.    Major issues:  1. Chronic pain syndrome    2. Opioid dependence, continuous (H)    3. Polyarthralgia    4. Degeneration of lumbar intervertebral disc      Pain location and description: 5/10 constant stabbing, deep burning pain in left lower back and joints.  Function rated 8. At best, pain is rated 3/10 and highest 8/10 and average is 4/10.    Radiation of pain: Denies.    Gait disturbance: wheelchair for outings.  Denies recent falls.  Exacerbating factors: activity, arm movements, morning time, prolonged sitting/standing/walking.  Alleviating factors: Rest, pain medications, sitting/standing short periods, laying down and changing positions.  Associated symptoms: Pain at night.  Denies numbness, weakness, bowel or bladder incontinence, fever chills, unexplained weight loss.  Functional symptoms: Pain interferes with sleep, walking, work, ADLs, relationships/social, sexual health, mood (frustrated).  Pain treatment does help with function.  Adverse effects of medications:  Uses sennakot 2 tablets at home for intermittent constipation.  Hasn't used miralax but used to.  She bought generic laxative.  Intermittent pruritus from opioids, takes benadryl.  Current treatment efficacy: Fair. Taking OxyContin 40 mg in the morning with pain relief within 1 hour.  She has not held any doses this past month due to sedation.  She states she wakes with pain 8-9/10 and reduces to 6/10. Taking oxycodone 30 mg 1 every  4 hours prn has not tolerated reduction in daily dose.   Current treatment compliance: Good.  She has failed Lyrica, Gabapentin, Cymbalta, Fentanyl, Morphine, Vicodin, codeine in the past.  She has [facility] PT/OT and RN services in past, not currently needed.  Uses daily pop out dosing for medications.  Reports taking medication as prescribed.      Since last visit, she had ED to hospital admission on 05/21/19-05/24/19:     REASON FOR PRESENTATION(See Admission Note for Details)   Acute on chronic hypoxic respiratory failure  Interstitial lung disease exacerbation  Acute on chronic CHF, diastolic dysfunction  Hyperglycemia  Pulmonary hypertension  History of DVT/antiphospholipid syndrome  PRINCIPAL & ACTIVE DISCHARGE DIAGNOSES      Principal Problem:    Hypoxia  Active Problems:    Pulmonary hypertension (H)    Acute on chronic respiratory failure with hypoxia and hypercapnia     43 years old female with a past medical history of SLE on hydroxychloroquine and chronic low-dose prednisone, antiphospholipid antibody syndrome on chronic anticoagulation, obesity, fibrotic ILD after an episode of ARDS on chronic oxygen, pulmonary HTN, HFpEF who presented to the emergency room for evaluation of dyspnea, she was admitted, please refer to H&P for details.        Acute on chronic hypoxic/hypercapnic respiratory failure:   - Last pulmonary visit she was at 4 L oxygen at rest and 6 L with exertion.    - Yesterday patient was requiring 15 L to maintain saturation around 90.  - ABG showed respiratory acidosis  - Appreciate neurology consult  - Transfered to the ICU and started on BiPAP 5/22/2019  -T mague she is improving significantly  - Transfer out of the ICU on 5/23/2019  - Improving with IV steroid, switch to oral prednisone on discharge  - Switch to oral Lasix, increase dose to 40 mg twice daily     Exacerbation of interstitial lung disease:   - with dyspnea and hypoxia.   -Improving with IV steroid  - Add  broad-spectrum antibiotic with Rocephin and Zithromax  - Pending sputum culture  - Discussed with pulmonology,  - Unremarkable CT chest for significant acute changes  - Patient may benefit from transfer to [facility] giving her advanced interstitial lung disease and SLE if no improvement, unfortunately no ICU beds available at [facility] yesterday.  - Since patient has significant improvement, will hold on transfer to   - Switch to oral antibiotic tomorrow as patient is immunocompromising giving her SLE history and chronic steroid use  - Continue prednisone tapering dose on discharge  - Patient is on chronic prednisone 5 mg daily     Pulmonary HTN:   - Patient was to enroll in a study through the [facility] on Friday.    - Patient will probably benefit from transfer to [facility] giving her advanced interstitial lung disease and SLE, unfortunately no ICU beds available at [facility] today  - Dr. [facility]ccracken pulmonology at [facility] will accept transfer if needed  -Patient did not require transfer as she did improve overnight with BiPAP use.  - Continue to follow-up with pulmonology clinic at [facility] as outpatient     Acute on chronic CHF  - Elevated BNP  - Diastolic dysfunction  -Improved with IV Lasix  - Echo shows EF of 70% with moderate pulmonary hypertension  - Change Lasix to 40 mg twice daily on discharge  - Follow-up with CHF clinic     Hyperglycemia  - Probably secondary to steroid  - Add insulin sliding scale  - Stable hemoglobin A1c at 5.2     History DVT, antiphospholipid antibody syndrome  - Continue warfarin  - pharmacy to dose (uses F X assays not INR)     History of SLE  - Continue hydroxychloroquine  - Holding home prednisone while on IV steroids  - Start tapering prednisone course on discharge     Hypothyroidism:   - Continue levothyroxine     Chronic pain syndrome:   - follows with pain clinic. Has narcan at home.   - Continue  "home OxyContin 40 mg q. a.m.  - Continue oxycodone 40 mg every 4 hours PRN     Weakness and deconditioning  - Secondary to the above  - PT/OT evaluation  - Stable for home discharge     CODE STATUS  - Patient is full code     Discussed with family , patient, pulmonology, nursing staff and discharge planner\"    She had follow-up with Dr. Ma on 06/12/19  Seeing pulmonologist in July.  She states she is planning to participate in a research study through [employer] next week has appointment for screening.      Pain is about the same; some days worse, some days better.  Comfortable with medications and hasn't had as much lower back pain.  She states frequency of back pain is still daily but not as consistent.      We continue to discuss risks of opioids with respiratory concerns.   She took duloxetine 30 mg x 1 week then 60 mg x 1 week but felt nauseated x 2 weeks so she discontinued.  She does have fluoxetine to take but hoping to avoid during summer months as mood has improved with sister being home and trips to cabin.         Review of Systems  Constitutional: Denies fever, chills, lethargy.  Has MERLIN, chronic O2. Denies night sweats, weight loss  Musculoskeletal: Positive for mid and lower back pain, denies joint pain/swelling, denies recent fall.  Gastrointestional: Positive for intermittent nausea, vomiting, GERD, abdominal pain, constipation.  Denies difficulty swallowing, change in appetite, diarrhea, fecal incontinence.  Genitourinary: Denies urinary incontinence, dysuria, hematuria, UTI, frequency, hesitancy, change in libido  Neurologic: Headaches.  Denies confusion, seizure, weakness, changes in balance, changes in speech.  Psychiatric: Positive depression, anxiety, grief.  Denies memory loss, psychoses, suicidal ideation, substance use/abuse.     Objective:     Vitals:    06/25/19 0749   BP: 112/72   Pulse: 78   Resp: 16   Weight: 201 lb (91.2 kg)   Height: 4' 10\" (1.473 m)   PainSc:   5     Physical " Exam  Constitutional- General appearance: Normal.  Well developed, comfortable, obese, appears older than stated age.  Presents alone today.   Psychiatric- Judgment and insight: Normal.  Speech: Normal rhythm.  Thought process: Normal.  No abnormal thoughts reported. Alert & Oriented to person, place, and time.  Recent and remote memory: Normal.  Mood and affect: quiet, depressed.   Respiratory- Using O2 by nasal cannula today.  Normal rhythm and rate.  Cardiovascular- Extremities warm and well perfused, no peripheral edema or varicosities.  Dermatologic- Exposed skin is clean, dry, and intact to inspection and palpation.   Musculoskeletal- Gait and station:  Ambulating independently today, stooped.      *Opioid [place] Precautions:    UDS/Swab - Reviewed from 04/12/19, results as expected  Opioid Consent -  06/25/19   Opioid Agreement - 06/25/19  Pharmacy- as documented    [place]  Reviewed - 06/25/19 as expected  Pill Count - Pill count 10/16/15 appropriate.    Psychological evaluation - n/a  MME - 330  Pharmacogenetic testing - n/a  VARGAS 04/12/19    Imaging:  None reviewed today    Assessment:   Catherine Sharp is a 43 y.o. female seen in clinic today for pain in her spine due to history of pathologic compression fractures due to steroids to treat SLE.  Lumbar MRI demonstrates spinal canal stenosis L3-4, disc bulging at L3-4 and L5-S1, and mild to moderate facet arthropathy throughout in addition to multiple chronic compression deformities.  She had 30% pain relief with LESI and Initial left LMBB was not helpful.  She continues to have pain in thoracic spine, history of chronic compression fractures. She is having stable pain relief with oxycodone 30 mg every 4 hours prn, have discussed dosing outside of CDC guidelines.  She continues OxyContin 40 mg for extended pain relief in the morning but reports she cannot take this every 12 hours as it causes more difficulty with SOB at increased frequency.  She  understands the risks with opioids and respiratory depression.  We have reviewed medical cannabis program but cannot participate now due to cost. Recent trial of duloxetine caused nausea.  May consider ketamine or MTM consult in the future for other non opioid treatment options.      Plan:   Prescribed 30 days of OxyContin 40 mg in the morning.  Hold for any respiratory or sedation concerns.  Prescribed oxycodone 30 mg 1 every 4 hours maximum for pain as needed.   Printed to fill 19 for use 19 & 19 to start on 19  Due to changes in [place] laws, effective 2019, prescriptions for any opioid pain relievers can only be filled for 30 days from the date the prescription was written, or for medications that can be refilled, any refills must be filled within 30 days of your last fill. If you do not fill within 30 days, the prescription will  and you will need a brand new prescription.   In order to provide you with continued access to your prescriptions, we will be switching your prescriptions to a 28 day supply or less. It is your responsibility to get your prescriptions filled before the 30 day expiration date. Failure to do so may cause delays in getting your medications.     Opioid agreement and consent form signed today.  Discussed bringing these copies and your most recent After Visit Summary (AVS) from our appointment to the pharmacy when you are refilling controlled medications to assist with any additional information the pharmacist may require.   Keep follow-up with pulmonologist and research study  Follow-up in 8 weeks with Elizabet.    Elizabet Palmer PA-C  [facility]  [address]  [place] [address]  Ph: [phone]  Fax: [phone]

## 2021-05-31 ENCOUNTER — RECORDS - HEALTHEAST (OUTPATIENT)
Dept: ADMINISTRATIVE | Facility: CLINIC | Age: 46
End: 2021-05-31

## 2021-05-31 VITALS — WEIGHT: 190 LBS | BODY MASS INDEX: 39.88 KG/M2 | HEIGHT: 58 IN

## 2021-05-31 VITALS — BODY MASS INDEX: 40.72 KG/M2 | WEIGHT: 194 LBS | HEIGHT: 58 IN

## 2021-05-31 VITALS — WEIGHT: 195 LBS | BODY MASS INDEX: 40.93 KG/M2 | HEIGHT: 58 IN

## 2021-05-31 VITALS — BODY MASS INDEX: 39.88 KG/M2 | HEIGHT: 58 IN | WEIGHT: 190 LBS

## 2021-05-31 VITALS — WEIGHT: 195 LBS | HEIGHT: 58 IN | BODY MASS INDEX: 40.93 KG/M2

## 2021-05-31 VITALS — HEIGHT: 58 IN | WEIGHT: 200.4 LBS | BODY MASS INDEX: 42.07 KG/M2

## 2021-05-31 VITALS — WEIGHT: 198.6 LBS | BODY MASS INDEX: 41.51 KG/M2

## 2021-05-31 VITALS — BODY MASS INDEX: 40.93 KG/M2 | HEIGHT: 58 IN | WEIGHT: 195 LBS

## 2021-05-31 VITALS — WEIGHT: 196 LBS | BODY MASS INDEX: 40.96 KG/M2

## 2021-05-31 VITALS — BODY MASS INDEX: 40.94 KG/M2 | WEIGHT: 195.9 LBS

## 2021-05-31 VITALS — BODY MASS INDEX: 41.17 KG/M2 | WEIGHT: 197 LBS

## 2021-05-31 VITALS — BODY MASS INDEX: 41.35 KG/M2 | WEIGHT: 197 LBS | HEIGHT: 58 IN

## 2021-05-31 NOTE — PROGRESS NOTES
8/29/19 I called patient to offer choice of DME for her NIV order that we received. She chose to use us at [facility] for this. I offered to set her up as soon as possible, noting that we were ready and would come to her home to do this. She chose to wait until next week (Tuesday) to get setup. I scheduled this setup for Tuesday, 9/3/19 in her home.    Monika NAVAS RRT  [facility]   [phone]

## 2021-05-31 NOTE — TELEPHONE ENCOUNTER
Called patient and left voicemail asking that she call me back as soon as she is able as we have received orders from Dr. Bearden. Provided my direct phone number, but if I'm unavailable, to call [phone], choose customer service, and a representative can assist from there - Ask to speak to a respiratory therapist.

## 2021-05-31 NOTE — PROGRESS NOTES
Assessment/Plan:        Diagnoses and all orders for this visit:    Chronic respiratory failure with hypoxia and hypercapnia (H)    Pulmonary hypertension (H)    ILD (interstitial lung disease) (H)    Sleep-related hypoventilation due to chest wall disorder       44-year-old woman with a history of lupus, interstitial lung disease related to previous ARDS-now planned persistent fibrosis, chronic hypoxic and hypercarbic respiratory failure, sleep-related hypoventilation, pulmonary hypertension here for follow-up.    Pulmonary hypertension: I am concerned that her pulmonary hypertension is out of proportion to her lung disease and sleep disordered breathing.  I agree with advanced therapies which she is considering at the [facility].    Interstitial lung disease: It is important to emphasize that she has bland fibrosis in the setting of previous ARDS.  Pulsing with steroids in the setting of her will not improve her breathing function.    Sleep disordered breathing-sleep-related hypoventilation.  She has chronic hypoventilation worsened at night.  She needs noninvasive positive pressure at night.  Because of her chest wall dysfunction and well-documented hypoventilation on arterial blood gas I am ordering a home ventilator today.    Chronic hypoxic and hypercarbic respiratory failure: She needs oxygen.  Her prescription will be 8 L/min at rest, 10 L/min with activity.    Catherine suffers from chronic respiratory failure due to interstitial lung disease and respiratory home ventilation is required.  A mechanical non invasive home ventilator is required to prevent future hospital admissions, improve pulmonary status and decreased work of breathing.  Mouthpiece ventilation to aid in removing excess carbon dioxide will be beneficial, well CPAP and BiPAP would not be effective.  Without a noninvasive home ventilator for this patient it could lead to more serious respiratory issues or death.    Patient would  benefit from the continued support of a noninvasive ventilator for use at night and daytime with portability.            Subjective:    Patient ID: Catherine Sharp is a 44 y.o. female.    42-year-old female with ILD, lupus, respiratory failure here for follow-up.    ILD: No flares.  This is stable.    Chronic hypoxic respiratory failure: her oxygen requirement has gone up.  She desaturates with less than 8lpm of oxygen at rest.  Increased need with activity.  She is tolerating her oxygen.    Chronic diastolic heart failure: She is on chronic diuretics. She has no lower extremity edema.  Overall this is stable.    Pulmonary hypertension: She is on a trial of the [facility].  She is followed for her pulmonary hypertension there.  She keeps very close track of her weight, diuretics and she is on inhaled prostacyclin.    Lupus: Currently sees rheumatology.  On 5 mg of prednisone and Plaquenil.  No recent flares.      Review of Systems positive for nosebleeds.  The remainder of a 12 system review of systems is negative.  Current Outpatient Medications on File Prior to Visit   Medication Sig Dispense Refill     albuterol (PROAIR HFA;PROVENTIL HFA;VENTOLIN HFA) 90 mcg/actuation inhaler Inhale 2 puffs every 6 (six) hours as needed for wheezing. 1 Inhaler 6     albuterol (PROVENTIL) 2.5 mg /3 mL (0.083 %) nebulizer solution Take 3 mL (2.5 mg total) by nebulization 4 (four) times a day as needed for wheezing or shortness of breath. 75 mL 11     calcium, as carbonate, (OS-JEFFERSON) 500 mg calcium (1,250 mg) tablet Take 1 tablet by mouth daily.       cholecalciferol, vitamin D3, 1,000 unit tablet Take 1,000 Units by mouth daily.        DILT- mg 24 hr capsule TAKE 1 CAPSULE BY MOUTH EVERY DAY 90 capsule 3     FERROUS SULFATE (SLOW FE ORAL) Take 65 mg by mouth daily.       furosemide (LASIX) 40 MG tablet Take 1 tablet (40 mg total) by mouth 2 (two) times a day at 9am and 6pm. 60 tablet 0     guaiFENesin ER (MUCINEX)  "600 mg 12 hr tablet Take 1 tablet (600 mg total) by mouth 2 (two) times a day.  0     hydroxychloroquine (PLAQUENIL) 200 mg tablet Take 200 mg by mouth daily.       levothyroxine (SYNTHROID, LEVOTHROID) 150 MCG tablet Take 1 tablet (150 mcg total) by mouth daily. 90 tablet 3     LORazepam (ATIVAN) 1 MG tablet TAKE 1 TABLET (1 MG TOTAL) BY MOUTH EVERY EVENING. 30 tablet 0     naloxone (NARCAN) 4 mg/actuation nasal spray 1 spray (4 mg dose) into one nostril for opioid reversal. Call 911. May repeat if no response in 3 minutes. 1 Box 0     nystatin (MYCOSTATIN) powder Apply 1 application topically 3 (three) times a day as needed. 15 g 5     oxyCODONE (ROXICODONE) 30 MG immediate release tablet Take 1 tablet (30 mg total) by mouth every 4 (four) hours as needed for pain. 168 tablet 0     OXYCONTIN 40 mg 12 hr tablet Take 1 tablet (40 mg total) by mouth every morning. 28 tablet 0     potassium chloride (K-DUR,KLOR-CON) 20 MEQ tablet Take 20 mEq by mouth daily.              predniSONE (DELTASONE) 1 MG tablet TAKE 5 TABLETS (5 MG) BY MOUTH DAILY. 450 tablet 0     senna-docusate (SENNOSIDES-DOCUSATE SODIUM) 8.6-50 mg tablet Take 1 tablet by mouth 2 (two) times a day.  0     warfarin (COUMADIN/JANTOVEN) 5 MG tablet TAKE 1-1.5 TABS BY MOUTH DAILY. CURRENTLY 1.5 TABS TUE/THUR & 1 TAB REST OF WEEK. ADJUST PER INR 45 tablet 11     Current Facility-Administered Medications on File Prior to Visit   Medication Dose Route Frequency Provider Last Rate Last Dose     denosumab 60 mg (PROLIA 60 mg/ml)  60 mg Subcutaneous Q6 Months Anirudh Alcaraz MD   60 mg at 05/15/19 1236       /80   Pulse 89   Ht 4' 10\" (1.473 m)   Wt 201 lb (91.2 kg)   SpO2 (!) 88%   Breastfeeding? No   BMI 42.01 kg/m            Objective:    Physical Exam   Constitutional: She is oriented to person, place, and time. She appears well-developed and well-nourished.   HENT:   Head: Normocephalic and atraumatic.   Nose: Nose normal.   Mouth/Throat: " "Oropharynx is clear and moist. No oropharyngeal exudate.   Eyes: Right eye exhibits no discharge. Left eye exhibits no discharge. No scleral icterus.   Cardiovascular: Normal rate and regular rhythm.   Pulmonary/Chest: No respiratory distress. She has no rales.   Musculoskeletal: She exhibits no edema.   Neurological: She is alert and oriented to person, place, and time. Coordination normal.   Skin: Skin is warm and dry. No rash noted. No erythema.   Psychiatric: She has a normal mood and affect. Her behavior is normal. Judgment and thought content normal.     /80   Pulse 89   Ht 4' 10\" (1.473 m)   Wt 201 lb (91.2 kg)   SpO2 (!) 88%   Breastfeeding? No   BMI 42.01 kg/m                Medical History  Active Ambulatory (Non-Hospital) Problems    Diagnosis     Chronic respiratory failure with hypoxia and hypercapnia (H)     Hypoxia     Acute on chronic respiratory failure with hypoxia and hypercapnia (H)     Pulmonary arterial hypertension (H)     Pulmonary hypertension (H)     Chronic pain syndrome     High risk medication use     Chronic diastolic heart failure (H)     Polyarthralgia     Bilateral shoulder tendinopathy     Hyperthyroidism     Restrictive lung disease due to kyphoscoliosis     ILD (interstitial lung disease) (H)     Coagulopathy (H)     GERD (gastroesophageal reflux disease)     Opiate dependence (H)     Gastroparesis     Hypertension     Osteoporosis     Anemia     Generalized anxiety disorder     Antiphospholipid Antibody Syndrome     Peripheral Neuropathy     Systemic lupus erythematosus (H)     Regional enteritis (H)     Past Medical History:   Diagnosis Date     Acute respiratory failure (H) 1/13/2015     Adhesive pericarditis 4/12/2007     Antiphospholipid antibody with hypercoagulable state (H)      Cervical compression fracture (H) 2009     Chest pain with normal coronary angiography 2010     Chronic pain      Chronic respiratory failure with hypoxia and hypercapnia (H)      " Congestive heart failure (H)      Deep phlebothrombosis, antepartum, with delivery (H) 4/12/2007     Depression      Gastroparesis      GERD (gastroesophageal reflux disease)      History of DVT (deep vein thrombosis) 1/22/2015     Hypercarbia 9/26/2015     Hypertension      Kidney infection      Kidney stone      Lupus (systemic lupus erythematosus) (H)      Obesity      Osteoporosis      Peripheral neuropathy      Pulmonary emboli (H)      Pulmonary hypertension due to left ventricular diastolic dysfunction (H)      Recurrent Clostridium difficile diarrhea      Respiratory failure (H) 4/13/2017     Seizures (H)      Snoring 12/3/2015     Stress-induced cardiomyopathy 05/07/2010     Trigger ring finger, right 4/20/2016           Social history reviewed: Lives with .   Allergies  Allergies   Allergen Reactions     Hydrocodone-Acetaminophen Swelling     Throat swelling (tolerates oxycodone)  - Acetaminophen causes vomiting per patient/RN, 12/2014 tolerated hydromorphone with hospitalization     Protonix [Pantoprazole] Hives     Per pt  Per pt                              Data Review - imaging, labs, and ekgs listed below were reviewed by me.  CXR and CT images and EKG tracings interpreted personally.     Past Labs  @LASTLABV(<SYNTAX> error)@  Past Imaging  No results found.      CBC:   Lab Results   Component Value Date    WBC 10.2 06/12/2019    WBC 6.9 09/27/2015    RBC 5.32 06/12/2019     BMP:   Lab Results   Component Value Date    CO2 39 (H) 06/12/2019    BUN 15 06/12/2019    CREATININE 0.76 06/12/2019    CALCIUM 9.1 06/12/2019     Coagulation:   Lab Results   Component Value Date    INR 3.14 (H) 10/28/2018     Cardiac markers:   Lab Results   Component Value Date    CKMB 3 10/11/2014     ABGs:   Lab Results   Component Value Date    PH 7.36 09/27/2017     Outside records from [facility] pulmonary hypertension clinic reviewed and interpreted by me: Summarized.  She is currently being treated for WHO class  III pulmonary hypertension with a clinical trial using prostacyclin.

## 2021-05-31 NOTE — PROGRESS NOTES
Received addended documentation and NIV Rx for Bilevel ST. Pending patient to return call to offer DME choice.

## 2021-05-31 NOTE — PROGRESS NOTES
PAIN CENTER PROGRESS NOTE    Subjective:   Catherine Sharp is a 43 y.o. female who presents for evaluation of multi-site pain secondary to osteoporosis and pathologic fractures as a result of SLE treated with chronic steroids.  She has multiple co-morbidities including Interstitial lung disease, MERLIN, Antiphospholipid Antibody Syndrome, cardiomyopathy, OA in multiple sites, and recurrent hospitalizations.  Pain has been present for years and current treatment plan includes OxyContin, oxycodone, intermittent PT/OT.    Major issues:  1. Chronic pain syndrome    2. Opioid dependence, continuous (H)    3. Pathological compression fracture of vertebra, sequela      Pain location and description: 3/10 constant stabbing, deep burning pain in left lower back and joints.  Function rated 8. At best, pain is rated 3/10 and highest 8/10 and average is 4/10.    Radiation of pain: Denies.    Gait disturbance: wheelchair for outings.  Denies recent falls.  Exacerbating factors: activity, arm movements, morning time, prolonged sitting/standing/walking.  Alleviating factors: Rest, pain medications, sitting/standing short periods, laying down and changing positions.  Associated symptoms: Pain at night, numbness, weakness.  Denies bowel or bladder incontinence, fever chills, unexplained weight loss.  Functional symptoms: Pain interferes with sleep, walking, ADLs, relationships/social, sexual health, mood (frustrated).  Pain treatment does help with function.  Adverse effects of medications:  Uses sennakot 2 tablets at home for intermittent constipation.  Hasn't used miralax but used to.  She bought generic laxative.  Intermittent pruritus from opioids, takes benadryl.  Current treatment efficacy: Fair. Taking OxyContin 40 mg in the morning with pain relief within 1 hour.  She has not held any doses this past month due to sedation.  She states she wakes with pain 8-9/10 and reduces to 6/10. Taking oxycodone 30 mg 1 every 4 hours prn  "has not tolerated reduction in daily dose.   Current treatment compliance: Good.  She has failed Lyrica, Gabapentin, Cymbalta, Fentanyl, Morphine, Vicodin, codeine in the past.  She has [facility] PT/OT and RN services in past, not currently needed.  Uses daily pop out dosing for medications.  Reports taking medication as prescribed.      Since the last Pain Center visit, the patient continues warfarin. Denies any new diagnostic testing, new treatments or new medical conditions, any changes in medications, or any ED/urgent care visits.  Patient denies the chance of being pregnant or wanting to become pregnant. She states pain has been unchanged and reports her lower back pain has been tolerable.  Just took her pain doses at 0830 this morning so pain level is reported as low.      Since last visit, she had follow-up for pulmonary hypertension with Dr. Carter Eng:  \"Assessment and Plan: Catherine Sharp is a 43 year old female who presents for return evaluation for severe Group 3 PH.    1. Severe Group 3 Pulmonary Hypertension with Preserved Cardiac Output: Ms. Hsu has severe group 3 pulmonary hypertension with limited functional capacity due to pain and significant lung disease. Her hemodynamic evaluation revealed a PVR over 6, but thankfully her cardiac index is preserved. She does have a very reduced DLCO which we have previously demonstrated as an independent risk factor for mortality and group 3 pulmonary hypertension (Mariel et al., 2019 [facility]). Her PCWP was slightly elevated at baseline, but that was due to systemic hypertension and her wedge normalized after her BP was closer to normal. Thus her PH is precapillary. We will keep her on her lasix dose of 40 mg twice a day to help with volume control.    We did discuss pulmonary rehabilitation and she was not interested in pursuing it at this point. We did offer a referral.     We spoke with Catherine about her options going forward. She would like to " "enroll in the INCREASE trial, which is a trial evaluating the utility of inhaled prostacyclin in ILD-PH.    Overall, her best chance at long term survival in lung transplantation. She is not a candidate now but hopefully we can get her weight down and she can complete the evaluation. We encouraged more exercise and diet.    2. History of DVT and PE: She is on lifelong anticoagulation which I agree with.    It was a pleasure seeing your patient Catherine Sharp at the [facility] Hypertension clinic. Please contact us with any questions or concerns that you may have.\"     She had an appointment yesterday for research trial has been attending x 4 weeks.  She had to do 6 minute walk which improved from last time, PFTs, sometimes seeing Dr. Eng, labs.  Using an inhalent four times a day. Will last for 6 months attends once every 4 weeks.       Pain is about the same; some days worse, some days better.  Comfortable with medications and hasn't had as much lower back pain.  She states frequency of back pain is still daily but not as consistent.      Went to [facility] for a week with family.  Rested 15-20 minutes at a time so she did not get too sore or worn out.  She states she always has  difficulty sleeping well, taking Lorazepam 1 mg taking 3-4 times week.    We continue to discuss risks of opioids with respiratory concerns.   She is returning to [place] M-F next week as summer is winding down and they haven't been as much this season.       Review of Systems  Constitutional: Denies fever, chills, lethargy.  Has MERLIN, chronic O2. Denies night sweats, weight loss  Musculoskeletal: Positive for mid and lower back pain, denies joint pain/swelling, denies recent fall.  Gastrointestional: Positive for intermittent nausea, vomiting, GERD, abdominal pain, constipation.  Denies difficulty swallowing, change in appetite, diarrhea, fecal incontinence.  Genitourinary: Denies urinary incontinence, dysuria, " "hematuria, UTI, frequency, hesitancy, change in libido  Neurologic: Headaches.  Denies confusion, seizure, weakness, changes in balance, changes in speech.  Psychiatric: Positive depression, anxiety, grief.  Denies memory loss, psychoses, suicidal ideation, substance use/abuse.     Objective:     Vitals:    08/16/19 0944   BP: 128/86   Pulse: 89   Resp: 16   Weight: 201 lb (91.2 kg)   Height: 4' 10\" (1.473 m)   PainSc:   3     Physical Exam  Constitutional- General appearance: Normal.  Well developed, comfortable, obese, appears older than stated age.  Presents alone today.   Psychiatric- Judgment and insight: Normal.  Speech: Normal rhythm.  Thought process: Normal.  No abnormal thoughts reported. Alert & Oriented to person, place, and time.  Recent and remote memory: Normal.  Mood and affect: quiet, depressed.   Respiratory- Using O2 by nasal cannula today.  Normal rhythm and rate.  Cardiovascular- Extremities warm and well perfused, no peripheral edema or varicosities.  Dermatologic- Exposed skin is clean, dry, and intact to inspection and palpation.   Musculoskeletal- Gait and station:  Ambulating independently today, stooped.      *Opioid [place] Precautions:    UDS/Swab - 04/12/19, results as expected  Opioid Consent -  06/25/19   Opioid Agreement - 06/25/19  Pharmacy- as documented    [place]  Reviewed - 08/16/19 as expected  Pill Count - Pill count 10/16/15 appropriate.    Psychological evaluation - n/a  MME - 330  Pharmacogenetic testing - n/a  VARGAS Score: 54 08/16/19    Imaging:  None reviewed today    Assessment:   Catherine Sharp is a 43 y.o. female seen in clinic today for pain in her spine due to history of pathologic compression fractures due to steroids to treat SLE.  Lumbar MRI demonstrates spinal canal stenosis L3-4, disc bulging at L3-4 and L5-S1, and mild to moderate facet arthropathy throughout in addition to multiple chronic compression deformities.  She had 30% pain relief with LESI and " Initial left LMBB was not helpful.  She continues to have pain in thoracic spine, history of chronic compression fractures. She is having stable pain relief with oxycodone 30 mg every 4 hours prn, have discussed dosing outside of CDC guidelines.  She continues OxyContin 40 mg for extended pain relief in the morning but reports she cannot take this every 12 hours as it causes more difficulty with SOB at increased frequency.  She understands the risks with opioids and respiratory depression.  We have reviewed medical cannabis program but cannot participate now due to cost. Recent trial of duloxetine caused nausea.  May consider ketamine or MTM consult in the future for other non opioid treatment options.      Plan:   Prescribed 28 days of OxyContin 40 mg in the morning.  Hold for any respiratory or sedation concerns.    Prescribed oxycodone 30 mg 1 every 4 hours maximum for pain as needed.   Printed to fill 19 to start on 19 as [place] cabin [place].    Due to changes in [place] laws, effective 2019, prescriptions for any opioid pain relievers can only be filled for 30 days from the date the prescription was written, or for medications that can be refilled, any refills must be filled within 30 days of your last fill. If you do not fill within 30 days, the prescription will  and you will need a brand new prescription.   In order to provide you with continued access to your prescriptions, we will be switching your prescriptions to a 28 day supply or less. It is your responsibility to get your prescriptions filled before the 30 day expiration date. Failure to do so may cause delays in getting your medications.  Please call our phone # [phone] and choose option #4 to request a medication refill seven days in advance for your second month opioid prescription refill to be sent electronically to your pharmacy.  We will not return a phone call when the refill is sent to your  pharmacy, but expect you to communicate with your pharmacy to ensure it is received and ready by the date needed.  Continue research study visits   Follow-up in 8 weeks with Elizabet.    Elizabet Palmer PA-C  [facility]  [address] [place] [address]. [address]  [place] [address]  Ph: [phone]  Fax: [phone]

## 2021-05-31 NOTE — PROGRESS NOTES
Received NIV Referral into [facility] at 11:19AM from [facility]. Reviewed patient's medical chart. Patient qualifies for a NIV. Followed up with staff at [facility] to discuss my findings. Faxed [facility] prescription for ST/SV which is [facility]'s version of AVAPS to provide volume support for patient and Bilevel ST; Dr. Bearden can sign and return the one he wants patient to be set up on. Also, faxed examples of documentation needed to support need of NIV. Will call patient to offer DME choice.

## 2021-05-31 NOTE — PATIENT INSTRUCTIONS - HE
Prescribed 28 days of OxyContin 40 mg in the morning.  Hold for any respiratory or sedation concerns.    Prescribed oxycodone 30 mg 1 every 4 hours maximum for pain as needed.   Printed to fill 19 to start on 19 [place].    Due to changes in [place] laws, effective 2019, prescriptions for any opioid pain relievers can only be filled for 30 days from the date the prescription was written, or for medications that can be refilled, any refills must be filled within 30 days of your last fill. If you do not fill within 30 days, the prescription will  and you will need a brand new prescription.   In order to provide you with continued access to your prescriptions, we will be switching your prescriptions to a 28 day supply or less. It is your responsibility to get your prescriptions filled before the 30 day expiration date. Failure to do so may cause delays in getting your medications.  Please call our phone # [phone] and choose option #4 to request a medication refill seven days in advance for your second month opioid prescription refill to be sent electronically to your pharmacy.  We will not return a phone call when the refill is sent to your pharmacy, but expect you to communicate with your pharmacy to ensure it is received and ready by the date needed.  Continue research study visits   Follow-up in 8 weeks with Donya

## 2021-06-01 ENCOUNTER — RECORDS - HEALTHEAST (OUTPATIENT)
Dept: ADMINISTRATIVE | Facility: CLINIC | Age: 46
End: 2021-06-01

## 2021-06-01 VITALS — WEIGHT: 195 LBS | BODY MASS INDEX: 40.93 KG/M2 | HEIGHT: 58 IN

## 2021-06-01 VITALS — WEIGHT: 196.38 LBS | BODY MASS INDEX: 41.04 KG/M2

## 2021-06-01 VITALS — HEIGHT: 58 IN | BODY MASS INDEX: 42.21 KG/M2 | WEIGHT: 201.1 LBS

## 2021-06-01 VITALS — BODY MASS INDEX: 41.56 KG/M2 | WEIGHT: 198 LBS | HEIGHT: 58 IN

## 2021-06-01 VITALS — WEIGHT: 198 LBS | BODY MASS INDEX: 41.38 KG/M2

## 2021-06-01 VITALS — BODY MASS INDEX: 42.21 KG/M2 | WEIGHT: 201.1 LBS | HEIGHT: 58 IN

## 2021-06-01 VITALS — BODY MASS INDEX: 42.19 KG/M2 | WEIGHT: 201 LBS | HEIGHT: 58 IN

## 2021-06-01 VITALS — WEIGHT: 200.7 LBS | BODY MASS INDEX: 41.95 KG/M2

## 2021-06-01 VITALS — WEIGHT: 196 LBS | BODY MASS INDEX: 40.96 KG/M2

## 2021-06-01 VITALS — BODY MASS INDEX: 42.11 KG/M2 | WEIGHT: 201.5 LBS

## 2021-06-01 NOTE — TELEPHONE ENCOUNTER
Controlled Substance Refill Request  Medication:   Requested Prescriptions     Pending Prescriptions Disp Refills     LORazepam (ATIVAN) 1 MG tablet [Pharmacy Med Name: LORAZEPAM 1 MG TABLET] 30 tablet 0     Sig: TAKE 1 TABLET (1 MG TOTAL) BY MOUTH EVERY EVENING.     Date Last Fill: 7/15/19  [id number]   Submit electronically to pharmacy  Controlled Substance Agreement on File:   Encounter-Level CSA Scan Date - 06/25/2019:    Scan on 6/26/2019  8:45 AM by Meghna Tubbs: [facility] NARCOTIC AGREEMENT (below)         Last office visit: Last office visit pertaining to requested medication was 7/24/19.

## 2021-06-01 NOTE — PROGRESS NOTES
Pt was seen in her home on 9/3/19 and set up on NIV (non-invasive ventilation). With settings of Ipap 15, EPAP 8, RR 16.   Patient's values on these settings are as follows: P 15.3, AVE P 9.8, PIP 15.5, PEEP 8.1, VA 8.7, MVE 10.3, , LK 0, PIF 41.3, RR 25, TI 1.0, I:E 1:1.3, SONIA 100, CYCLE 100    She felt these pressures were comfortable and understands to wear this at night and as needed during the day. She understands that if she is feeling worse, more short of breath, she can utilize this to help herself at home. She knows how to contact [facility] during the day and after hours/ on-call. She chose the [employer] ffm. And we went over how to use her oxygen inline with her NIV. I had her demonstrate this to me a couple of times, as well as using the machine and putting on the mask. I will be following up with her as needed and at least monthly.     Monika, RRT   [facility]   [phone]

## 2021-06-01 NOTE — TELEPHONE ENCOUNTER
Arnav    In regards of: Prednisone    RX was sent for 1 mg, but pt takes 5 mg.     Please send to: [facility]ARMACY #[id number] - ALANNA JIMENEZ - [phone]

## 2021-06-02 ENCOUNTER — RECORDS - HEALTHEAST (OUTPATIENT)
Dept: ADMINISTRATIVE | Facility: CLINIC | Age: 46
End: 2021-06-02

## 2021-06-02 VITALS — HEIGHT: 58 IN | BODY MASS INDEX: 42.82 KG/M2 | WEIGHT: 204 LBS

## 2021-06-02 VITALS — BODY MASS INDEX: 41.35 KG/M2 | WEIGHT: 197 LBS | HEIGHT: 58 IN

## 2021-06-02 VITALS — WEIGHT: 204.25 LBS | BODY MASS INDEX: 42.69 KG/M2

## 2021-06-02 VITALS — WEIGHT: 198 LBS | BODY MASS INDEX: 41.56 KG/M2 | HEIGHT: 58 IN

## 2021-06-02 VITALS — HEIGHT: 58 IN | BODY MASS INDEX: 41.56 KG/M2 | WEIGHT: 198 LBS

## 2021-06-02 VITALS — BODY MASS INDEX: 42.53 KG/M2 | HEIGHT: 58 IN | WEIGHT: 202.6 LBS

## 2021-06-02 VITALS — WEIGHT: 198.5 LBS | BODY MASS INDEX: 41.49 KG/M2

## 2021-06-02 VITALS — WEIGHT: 202 LBS | HEIGHT: 58 IN | BODY MASS INDEX: 42.4 KG/M2

## 2021-06-02 NOTE — TELEPHONE ENCOUNTER
Patient call this morning   Per Dr. José called in medication for Prednisone 1mg take 5mg daily oty 450.

## 2021-06-02 NOTE — PROGRESS NOTES
PAIN CENTER PROGRESS NOTE    Subjective:   Catherine Sharp is a 44 y.o. female who presents for evaluation of multi-site pain secondary to osteoporosis and pathologic fractures as a result of SLE treated with chronic steroids.  She has multiple co-morbidities including Interstitial lung disease, MERLIN, Antiphospholipid Antibody Syndrome, cardiomyopathy, OA in multiple sites, and recurrent hospitalizations.  Pain has been present for years and current treatment plan includes OxyContin, oxycodone, intermittent PT/OT.    Major issues:  1. Systemic lupus erythematosus, unspecified SLE type, unspecified organ involvement status (H)    2. Opioid dependence, continuous (H)    3. Chronic pain syndrome    4. Pathologic compression fracture of spine, with routine healing, subsequent encounter      Pain location and description: 4/10 constant sharp, stabbing, deep burning pain in left lower back and joints.  Function rated 8. At best, pain is rated 3/10 and highest 8/10 and average is 5/10.    Radiation of pain: Denies.    Gait disturbance: wheelchair for outings.  Denies recent falls.  Exacerbating factors: activity, arm movements, morning time, prolonged sitting/standing/walking.  Alleviating factors: Rest, pain medications, sitting/standing short periods, laying down and changing positions.  Associated symptoms: Pain at night, numbness & weakness all over.  Denies bowel or bladder incontinence, fever chills, unexplained weight loss.  Functional symptoms: Pain interferes with sleep, walking, work, ADLs, relationships/social, sexual health, mood (depressed, angry, anxious, frustrated).  Pain treatment does help with function.  Adverse effects of medications:  Uses sennakot 2 tablets at home for intermittent constipation.  Hasn't used miralax but used to.  She bought generic laxative.  Intermittent pruritus from opioids, takes benadryl.  Current treatment efficacy: Fair. Taking OxyContin 40 mg in the morning with pain relief  "within 1 hour.  She states she wakes with pain 8-9/10 and reduces to 6/10. Taking oxycodone 30 mg 1 every 4 hours prn has not tolerated reduction in daily dose.   Current treatment compliance: Good.  She has failed Lyrica, Gabapentin, Cymbalta, Fentanyl, Morphine, Vicodin, codeine in the past.  She has [facility] PT/OT and RN services in past, not currently needed.  Uses daily pop out dosing for medications.  Reports taking medication as prescribed.      Since the last Pain Center visit, the patient continues warfarin. Denies any new diagnostic testing, new treatments or new medical conditions, any changes in medications, or any ED/urgent care visits.  Patient denies the chance of being pregnant or wanting to become pregnant. She states pain has been unchanged and reports her lower back pain has been tolerable.      Saw pulmonologist on 08/28/19:  \"  Assessment/Plan:        Diagnoses and all orders for this visit:     Chronic respiratory failure with hypoxia and hypercapnia (H)     Pulmonary hypertension (H)     ILD (interstitial lung disease) (H)     Sleep-related hypoventilation due to chest wall disorder        44-year-old woman with a history of lupus, interstitial lung disease related to previous ARDS-now planned persistent fibrosis, chronic hypoxic and hypercarbic respiratory failure, sleep-related hypoventilation, pulmonary hypertension here for follow-up.     Pulmonary hypertension: I am concerned that her pulmonary hypertension is out of proportion to her lung disease and sleep disordered breathing.  I agree with advanced therapies which she is considering at the [facility].     Interstitial lung disease: It is important to emphasize that she has bland fibrosis in the setting of previous ARDS.  Pulsing with steroids in the setting of her will not improve her breathing function.     Sleep disordered breathing-sleep-related hypoventilation.  She has chronic hypoventilation worsened at " "night.  She needs noninvasive positive pressure at night.  Because of her chest wall dysfunction and well-documented hypoventilation on arterial blood gas I am ordering a home ventilator today.     Chronic hypoxic and hypercarbic respiratory failure: She needs oxygen.  Her prescription will be 8 L/min at rest, 10 L/min with activity.     Catherine suffers from chronic respiratory failure due to interstitial lung disease and respiratory home ventilation is required.  A mechanical non invasive home ventilator is required to prevent future hospital admissions, improve pulmonary status and decreased work of breathing.  Mouthpiece ventilation to aid in removing excess carbon dioxide will be beneficial, well CPAP and BiPAP would not be effective.  Without a noninvasive home ventilator for this patient it could lead to more serious respiratory issues or death.     Patient would benefit from the continued support of a noninvasive ventilator for use at night and daytime with portability.\"    She is using ventilator at night obtained about 1 month ago, states it is a large device but still portable if she is going to cabin.  Has to add a humidifier to it for nasal dryness.  She states she would never let anyone visit her room as it looks like a hospital. She states she is able to sleep ok with it.  Oxygen is 8-10L at night.  O2 sats are 91 with use at night.   She didn't take OxyContin this morning due to wheezing, checked her sats and did not take it.  She states now she feels ok.  When she holds OxyContin dose, she takes same dosing of oxycodone.     Pain is about the same; some days worse, some days better.  Comfortable with medications and hasn't had as much lower back pain.  She states frequency of back pain is still daily but not as consistent.      She continues to participate in research study for pulmonary HTN medication named Tyvasso - currently inhaling 4 times a day but unsure if she has the medication or placebo. " "She has 2 more treatments and then will be able to start the medication at the end of trial if she wants.  She feels she is getting placebo as she hasn't had any change in her status or breathing.      She is interested in starting CBD trial after her research study.  She states her brother uses it, offered her some to try but she did not as she wasn't sure if that would be against her agreement with us.    She states she has been seeing her sister's grandson, Rajinder multiple times a week to help babysit.  Has been going over there for lunch and to get out of the house, states her sister holds her accountable. She also will attend a first birthday party this weekend.  Her mood has been stable, always harder for her to leave the house in colder weather.         Review of Systems  Constitutional: Denies fever, chills, lethargy.  Has MERLIN, chronic O2. Denies night sweats, weight loss  Musculoskeletal: Positive for mid and lower back pain, denies joint pain/swelling, denies recent fall.  Gastrointestional: Positive for intermittent nausea, vomiting, GERD, abdominal pain, constipation.  Denies difficulty swallowing, change in appetite, diarrhea, fecal incontinence.  Genitourinary: Denies urinary incontinence, dysuria, hematuria, UTI, frequency, hesitancy, change in libido  Neurologic: Headaches.  Denies confusion, seizure, weakness, changes in balance, changes in speech.  Psychiatric: Positive depression, anxiety.  Denies memory loss, psychoses, suicidal ideation, substance use/abuse.     Objective:     Vitals:    10/09/19 1240   BP: 139/85   Pulse: 86   Resp: 16   Weight: 206 lb (93.4 kg)   Height: 4' 10\" (1.473 m)   PainSc:   5     Physical Exam  Constitutional- General appearance: Normal.  Well developed, comfortable, obese, appears older than stated age.  Presents alone today.   Psychiatric- Judgment and insight: Normal.  Speech: Normal rhythm.  Thought process: Normal.  No abnormal thoughts reported. Alert & " Oriented to person, place, and time.  Recent and remote memory: Normal.  Mood and affect: quiet, depressed.   Respiratory- Using O2 by nasal cannula today.  Normal rhythm and rate.  Cardiovascular- Extremities warm and well perfused, no peripheral edema or varicosities.  Dermatologic- Exposed skin is clean, dry, and intact to inspection and palpation.   Musculoskeletal- Gait and station:  Ambulating independently today, stooped.      *Opioid [place] Precautions:    UDS/Swab - 04/12/19, results as expected  Opioid Consent -  06/25/19   Opioid Agreement - 06/25/19  Pharmacy- as documented    [place]  Reviewed - 10/09/19 as expected  Pill Count - Pill count 10/16/15 appropriate.    Psychological evaluation - n/a  MME - 330  Pharmacogenetic testing - n/a  VARGAS 08/16/19    Imaging:  None reviewed today    Assessment:   Catherine Sharp is a 44 y.o. female seen in clinic today for pain in her spine due to history of pathologic compression fractures due to steroids to treat SLE.  Lumbar MRI demonstrates spinal canal stenosis L3-4, disc bulging at L3-4 and L5-S1, and mild to moderate facet arthropathy throughout in addition to multiple chronic compression deformities.  She had 30% pain relief with LESI and Initial left LMBB was not helpful.  She continues to have pain in thoracic spine, history of chronic compression fractures. She is having stable pain relief with oxycodone 30 mg every 4 hours prn, have discussed dosing outside of CDC guidelines.  She continues OxyContin 40 mg for extended pain relief in the morning but reports she cannot take this every 12 hours as it causes more difficulty with SOB at increased frequency.  She understands the risks with opioids and respiratory depression.  We have reviewed medical cannabis program but cannot participate now due to cost and also the research trial she is participating in. Recent trial of duloxetine caused nausea.  May consider ketamine or MTM consult in the future for  other non opioid treatment options.      Plan:   Prescribed 28 days of OxyContin 40 mg in the morning.  Hold for any respiratory or sedation concerns.    Prescribed oxycodone 30 mg 1 every 4 hours maximum for pain as needed.   Printed to fill 10/11/19 to start 10/13/19 and 11/08/19 to start 11/10/19.    Discussed CBD oil trial after research study completed  Continue research study visits   Continue use of ventilator at night as discussed and holding OxyContin dose for low O2 sats, wheezing, shortness of breath.  Follow-up in 8 weeks with Elizabet.    Elizabet Palmer PA-C  [facility]  [address]  [place] [address]  Ph: [phone]  Fax: [phone]

## 2021-06-02 NOTE — TELEPHONE ENCOUNTER
Per Dr. José called in Prednisone 1 mg tab SIG take 5 mg by mouth daily QTY of 450 (90 day) no refills.

## 2021-06-02 NOTE — TELEPHONE ENCOUNTER
Endo team    Will notify pt of dr flores's leave. She may already know, since we had to cancel her f/u back in may. Just that we need to re-notify and tell her est. W/ a new provider.    Please review and advise if ok to see leela.  Date: 11/20/2019 Status: [facility]   Time: 12:15 PM Length: 15   Visit Type: NURSE VISIT [[id number]] Copay: $0.00   Provider: ELIZABETH ENDOCRINOLOG[facility] Department: W ENDOCRINOLOGY   Referring Provider: MICHELLE HIGUERA CSN: [id number]   Notes: prolia, last inj 5/15/19     I saw snapshot and saw that pt has dx of hyperthyroid as well. But maybe dr flores primarily dx of osteo.?

## 2021-06-02 NOTE — TELEPHONE ENCOUNTER
Called patient to schedule home visit to meet patient, as I will be her new RT, and bring out supplies for NIV. Scheduled for 11/7/19 at 11AM.

## 2021-06-02 NOTE — TELEPHONE ENCOUNTER
Controlled Substance Refill Request  Medication:   Requested Prescriptions     Pending Prescriptions Disp Refills     LORazepam (ATIVAN) 1 MG tablet [Pharmacy Med Name: LORAZEPAM 1 MG TABLET] 30 tablet 0     Sig: TAKE 1 TABLET (1 MG TOTAL) BY MOUTH EVERY EVENING.     Date Last Fill: 9/4/19  [id number]   Submit electronically to pharmacy  Controlled Substance Agreement on File:   Encounter-Level CSA Scan Date - 06/25/2019:    Scan on 6/26/2019  8:45 AM by Meghna Tubbs: [facility] NARCOTIC AGREEMENT       Last office visit: Last office visit pertaining to requested medication was 7/24/19.

## 2021-06-02 NOTE — PATIENT INSTRUCTIONS - HE
Prescribed 28 days of OxyContin 40 mg in the morning.  Hold for any respiratory or sedation concerns.    Prescribed oxycodone 30 mg 1 every 4 hours maximum for pain as needed.   Printed to fill 10/11/19 to start 10/13/19 and 11/08/19 to start 11/10/19.    Discussed CBD oil trial after research study completed  Continue research study visits   Continue use of ventilator at [facility] as discussed and holding OxyContin dose for low O2 sats, wheezing, shortness of breath.  Follow-up in 8 weeks with Donya

## 2021-06-02 NOTE — PROGRESS NOTES
ASSESSMENT AND PLAN:  Catherine Sharp 44 y.o. female is here for follow-up.  She has SLE manifested by stomatitis, serositis, inflammatory joint disease.  This is in association with positive KRISTOPHER, intermittently positive double-stranded DNA antibodies.  She has antiphospholipid syndrome on chronic anticoagulation.  She has developed interstitial lung disease for which she follows up with [facility] clinic, as a part of drug trial. She was not able to tolerate mycophenolate, azathioprine.  Since age 15, reportedly, she has been on prednisone has not been able to taper below 5 mg daily after she is done with the current study at [facility] she is prepared to try 4 mg daily.  Will meet here in 3 months..              Diagnoses and all orders for this visit:    Systemic lupus erythematosus, unspecified SLE type, unspecified organ involvement status (H)  -     hydroxychloroquine (PLAQUENIL) 200 mg tablet; Take 1 tablet (200 mg total) by mouth daily.  Dispense: 90 tablet; Refill: 0    ILD (interstitial lung disease) (H)    Antiphospholipid Antibody Syndrome    Tendonitis of both shoulders      HISTORY OF PRESENTING ILLNESS:  Catherine Sharp, 44 y.o., female is here for longstanding SLE manifested by stomatitis, serositis, inflammatory joint disease.  This is in association with positive KRISTOPHER, intermittently positive double-stranded DNA antibodies.  She is on 5 mg of prednisone.  She has noted intermittent discomfort.  Most of this in the left shoulder.  She has antiphospholipid syndrome on chronic anticoagulation..  She, reportedly, has had this since age 15.  Recent worsening of pulmonary symptoms, needed diuretics, steroids, antibiotics.  She is participating in a drug trial at the [facility].  She looking back thought that the prednisone while did help her lung symptoms.  She has noted pain in her shoulders, worse on the left side, she is well known to have tendinopathy she recalls having a  "corticosteroid injection here that did not provide a significant durable relief.  Her shoulder pain leads her to rate the pain overall is 7.5/10.  Interfering with many day-to-day activities.  Stiffness in the morning for 3 hours.     She has noted mild discomfort in multiple joint areas except hips which she has moderately severe pain in the trochanteric regions.  She rates that a 6.0/10 worse when she lays on those sites.  Interference with some of the day-to-day activities as noted.  She try to stay on 4 mg of prednisone but some days she takes 5 because of generalized achiness.  She has not had fever weight loss blurry vision eye redness, rash.  She has shortness of breath, she gets intermittent mouth ulcers, dry cough.   In the past she was unable to tolerate mycophenolate.  In view of the interstitial lung disease she will be a candidate for methotrexate.  She reports no malar area eruption, photosensitivity, pleuritic symptoms.  As noted elsewhere she has had DVT and in the background of antiphospholipid antibodies would fulfill criteria for the syndrome.  She is on Coumadin since.. She reports occasional nonpleuritic chest pain and palpitations. She had been on azathioprine 50 mg twice daily and hydroxychloroquine but d/c ed by her hospital MD a few months ago for unclear reasons. At one point, she recalls a question of if she may have \"blood\" into her lungs because of the lupus. She underwent treatment with the high-dose prednisone. She subsequently developed osteoporosis. She has experienced chronic pain syndrome and has been on narcotics.  Further historical information and ADL limitations as noted in the multidimensional health assessment questionnaire attached in the EMR.    ALLERGIES:Hydrocodone-acetaminophen and Protonix [pantoprazole]    PAST MEDICAL/ACTIVE PROBLEMS/MEDICATION/ FAMILY HISTORY/SOCIAL DATA:  The patient has a family history of  Past Medical History:   Diagnosis Date     Acute " respiratory failure (H) 1/13/2015     Adhesive pericarditis 4/12/2007     Antiphospholipid antibody with hypercoagulable state (H)     Secondary to lupus     Cervical compression fracture (H) 2009    closed, with spinal cord injury C1-C4 - prednisone induced     Chest pain with normal coronary angiography 2010     Chronic pain     on opiates     Chronic respiratory failure with hypoxia and hypercapnia (H)      Congestive heart failure (H)     Diastolic, Cor Pulmonale, EF 60% 2014     Deep phlebothrombosis, antepartum, with delivery (H) 4/12/2007     Depression      Gastroparesis     causing nausea/vomiting with recurrent admissions     GERD (gastroesophageal reflux disease)      History of DVT (deep vein thrombosis) 1/22/2015     Hypercarbia 9/26/2015     Hypertension      Kidney infection      Kidney stone      Lupus (systemic lupus erythematosus) (H)     complicated by joint and pulmonary involvement     Obesity      Osteoporosis      Peripheral neuropathy      Pulmonary emboli (H)     Created by Conversion      Pulmonary hypertension due to left ventricular diastolic dysfunction (H)     PASP 50-55 mmHg estimated echocardiographically and LVEDP 26 in 2010 but that was during Takotsubo event     Recurrent Clostridium difficile diarrhea      Respiratory failure (H) 4/13/2017     Seizures (H)     once due to high BP, hypertensive encephalopathy     Snoring 12/3/2015     Stress-induced cardiomyopathy 05/07/2010     Trigger ring finger, right 4/20/2016     Replacement Utility updated for latest IMO load     Social History     Tobacco Use   Smoking Status Former Smoker     Packs/day: 0.50     Years: 10.00     Pack years: 5.00     Types: Cigarettes     Last attempt to quit: 7/29/2009     Years since quitting: 10.2   Smokeless Tobacco Never Used     Patient Active Problem List   Diagnosis     Hypertension     Osteoporosis     Anemia     Generalized anxiety disorder     Antiphospholipid Antibody Syndrome     Peripheral  Neuropathy     Systemic lupus erythematosus (H)     GERD (gastroesophageal reflux disease)     Opiate dependence (H)     Gastroparesis     Coagulopathy (H)     ILD (interstitial lung disease) (H)     Restrictive lung disease due to kyphoscoliosis     Regional enteritis (H)     Hyperthyroidism     Bilateral shoulder tendinopathy     Polyarthralgia     Chronic diastolic heart failure (H)     Pulmonary hypertension (H)     Chronic pain syndrome     High risk medication use     Pulmonary arterial hypertension (H)     Hypoxia     Chronic respiratory failure with hypoxia and hypercapnia (H)     Current Outpatient Medications   Medication Sig Dispense Refill     albuterol (PROAIR HFA;PROVENTIL HFA;VENTOLIN HFA) 90 mcg/actuation inhaler Inhale 2 puffs every 6 (six) hours as needed for wheezing. 1 Inhaler 6     albuterol (PROVENTIL) 2.5 mg /3 mL (0.083 %) nebulizer solution Take 3 mL (2.5 mg total) by nebulization 4 (four) times a day as needed for wheezing or shortness of breath. 75 mL 11     calcium, as carbonate, (OS-JEFFERSON) 500 mg calcium (1,250 mg) tablet Take 1 tablet by mouth daily.       cholecalciferol, vitamin D3, 1,000 unit tablet Take 1,000 Units by mouth daily.        DILT- mg 24 hr capsule TAKE 1 CAPSULE BY MOUTH EVERY DAY 90 capsule 3     FERROUS SULFATE (SLOW FE ORAL) Take 65 mg by mouth daily.       furosemide (LASIX) 40 MG tablet Take 1 tablet (40 mg total) by mouth 2 (two) times a day at 9am and 6pm. 60 tablet 0     guaiFENesin ER (MUCINEX) 600 mg 12 hr tablet Take 1 tablet (600 mg total) by mouth 2 (two) times a day.  0     hydroxychloroquine (PLAQUENIL) 200 mg tablet Take 200 mg by mouth daily.       hydroxychloroquine (PLAQUENIL) 200 mg tablet TAKE 1 TABLET BY MOUTH EVERY DAY 90 tablet 0     levothyroxine (SYNTHROID, LEVOTHROID) 150 MCG tablet Take 1 tablet (150 mcg total) by mouth daily. 90 tablet 3     LORazepam (ATIVAN) 1 MG tablet TAKE 1 TABLET (1 MG TOTAL) BY MOUTH EVERY EVENING. 30 tablet 0      naloxone (NARCAN) 4 mg/actuation nasal spray 1 spray (4 mg dose) into one nostril for opioid reversal. Call 911. May repeat if no response in 3 minutes. 1 Box 0     nystatin (MYCOSTATIN) powder Apply 1 application topically 3 (three) times a day as needed. 15 g 5     [START ON 11/8/2019] oxyCODONE (ROXICODONE) 30 MG immediate release tablet Take 1 tablet (30 mg total) by mouth every 4 (four) hours as needed for pain. 168 tablet 0     [START ON 11/8/2019] OXYCONTIN 40 mg 12 hr tablet Take 1 tablet (40 mg total) by mouth every morning. 28 tablet 0     potassium chloride (K-DUR,KLOR-CON) 20 MEQ tablet Take 20 mEq by mouth daily.              predniSONE (DELTASONE) 1 MG tablet Take 5 mg by mouth daily. 150 tablet 0     senna-docusate (SENNOSIDES-DOCUSATE SODIUM) 8.6-50 mg tablet Take 1 tablet by mouth 2 (two) times a day.  0     warfarin (COUMADIN/JANTOVEN) 5 MG tablet TAKE 1-1.5 TABS BY MOUTH DAILY. CURRENTLY 1.5 TABS TUE/THUR & 1 TAB REST OF WEEK. ADJUST PER INR 45 tablet 11     Current Facility-Administered Medications   Medication Dose Route Frequency Provider Last Rate Last Dose     denosumab 60 mg (PROLIA 60 mg/ml)  60 mg Subcutaneous Q6 Months Anirudh Alcaraz MD   60 mg at 05/15/19 1236       DETAILED EXAMINATION:  There were no vitals filed for this visit. Comfortable.  Alert oriented.  Eyes are without inflammatory changes.  Examination of both upper and lower extremities is performed for swollen & tender joints, range of motion, rash, weakness, discoloration, warmth, swelling.  The skin examined for nodules. The salient normal / abnormal findings are appended.  There is no Malar area eruption, sclerodactyly, periungual erythema, no stomatitis, no synovitis palpable joints of upper extremities, mild impingement of the shoulders.  She carries supplemental oxygen.       LAB / IMAGING DATA:  ALT   Date Value Ref Range Status   06/12/2019 39 0 - 45 U/L Final   10/29/2018 9 0 - 45 U/L Final   10/28/2018 13 0 -  45 U/L Final     Albumin   Date Value Ref Range Status   06/12/2019 3.6 3.5 - 5.0 g/dL Final   10/29/2018 2.8 (L) 3.5 - 5.0 g/dL Final   10/28/2018 3.7 3.5 - 5.0 g/dL Final     Creatinine   Date Value Ref Range Status   06/12/2019 0.76 0.60 - 1.10 mg/dL Final   05/24/2019 0.80 0.60 - 1.10 mg/dL Final   05/23/2019 0.77 0.60 - 1.10 mg/dL Final       WBC   Date Value Ref Range Status   06/12/2019 10.2 4.0 - 11.0 thou/uL Final   05/24/2019 11.8 (H) 4.0 - 11.0 thou/uL Final   09/27/2015 6.9 4.0 - 11.0 thou/uL Final   08/23/2015 5.8 4.0 - 11.0 thou/uL Final     Hemoglobin   Date Value Ref Range Status   06/12/2019 16.1 (H) 12.0 - 16.0 g/dL Final   05/24/2019 15.4 12.0 - 16.0 g/dL Final   05/23/2019 14.8 12.0 - 16.0 g/dL Final     Platelets   Date Value Ref Range Status   06/12/2019 162 140 - 440 thou/uL Final   05/24/2019 179 140 - 440 thou/uL Final   05/23/2019 180 140 - 440 thou/uL Final       Lab Results   Component Value Date    RF <15.0 07/10/2014    SEDRATE 42 (H) 01/31/2018

## 2021-06-02 NOTE — PROGRESS NOTES
Patient presents to the clinic today for a follow up with Elizabet Palmer PA-C regarding back and joint pain. Patient describes their pain as sharp, stabbing, and burning. Her/His function score is 8.

## 2021-06-02 NOTE — TELEPHONE ENCOUNTER
Pt called asking for prednisone Rx. Please advise thanks.    RUBÉN Hernandez MPW Rheumatology 10/17/2019 2:00 PM

## 2021-06-03 ENCOUNTER — RECORDS - HEALTHEAST (OUTPATIENT)
Dept: ADMINISTRATIVE | Facility: CLINIC | Age: 46
End: 2021-06-03

## 2021-06-03 VITALS — HEIGHT: 58 IN | BODY MASS INDEX: 42.19 KG/M2 | WEIGHT: 201 LBS

## 2021-06-03 VITALS
WEIGHT: 206 LBS | BODY MASS INDEX: 43.24 KG/M2 | SYSTOLIC BLOOD PRESSURE: 116 MMHG | HEIGHT: 58 IN | DIASTOLIC BLOOD PRESSURE: 74 MMHG | HEART RATE: 76 BPM

## 2021-06-03 VITALS — HEIGHT: 58 IN | WEIGHT: 201 LBS | BODY MASS INDEX: 42.19 KG/M2

## 2021-06-03 VITALS — BODY MASS INDEX: 42.19 KG/M2 | WEIGHT: 201 LBS | HEIGHT: 58 IN

## 2021-06-03 VITALS — WEIGHT: 201 LBS | HEIGHT: 58 IN | BODY MASS INDEX: 42.19 KG/M2

## 2021-06-03 VITALS — BODY MASS INDEX: 42.19 KG/M2 | HEIGHT: 58 IN | WEIGHT: 201 LBS

## 2021-06-03 VITALS — HEIGHT: 58 IN | BODY MASS INDEX: 43.24 KG/M2 | WEIGHT: 206 LBS

## 2021-06-03 NOTE — PROGRESS NOTES
Pt is being seen today by KIN Barrera, for refills, VARGAS and f/u of 6-constant/intermittent, sharp, stabbing, burning back and joint pain.  F8

## 2021-06-03 NOTE — TELEPHONE ENCOUNTER
Controlled Substance Refill Request  Medication:   Requested Prescriptions     Pending Prescriptions Disp Refills     LORazepam (ATIVAN) 1 MG tablet [Pharmacy Med Name: LORAZEPAM 1 MG TABLET] 30 tablet 0     Sig: TAKE 1 TABLET (1 MG TOTAL) BY MOUTH EVERY EVENING.     Date Last Fill: 10.29.19  Pharmacy: [facility]   Submit electronically to pharmacy  Controlled Substance Agreement on File:   Encounter-Level CSA Scan Date - 06/25/2019:    Scan on 6/26/2019  8:45 AM by Meghna Tubbs: [facility] NARCOTIC AGREEMENT       Last office visit: Last office visit pertaining to requested medication was 7.24.19.

## 2021-06-04 VITALS — WEIGHT: 190 LBS | HEIGHT: 58 IN | BODY MASS INDEX: 39.88 KG/M2

## 2021-06-04 VITALS — HEIGHT: 58 IN | WEIGHT: 206 LBS | BODY MASS INDEX: 43.24 KG/M2

## 2021-06-04 VITALS
WEIGHT: 206 LBS | DIASTOLIC BLOOD PRESSURE: 72 MMHG | BODY MASS INDEX: 43.24 KG/M2 | HEIGHT: 58 IN | SYSTOLIC BLOOD PRESSURE: 124 MMHG

## 2021-06-04 NOTE — PROGRESS NOTES
PAIN CENTER PROGRESS NOTE    Subjective:   Catherine Sharp is a 44 y.o. female who presents for evaluation of multi-site pain secondary to osteoporosis and pathologic fractures as a result of SLE treated with chronic steroids.  She has multiple co-morbidities including Interstitial lung disease, MERLIN, Antiphospholipid Antibody Syndrome, cardiomyopathy, OA in multiple sites, and recurrent hospitalizations.  Pain has been present for years and current treatment plan includes OxyContin, oxycodone, intermittent PT/OT.    Major issues:  1. Chronic pain syndrome    2. Opioid dependence, continuous (H)    3. Cervicalgia    4. Compression fracture of thoracic vertebra, unspecified thoracic vertebral level, sequela    5. Pathological compression fracture of vertebra, sequela      Pain location and description: 6/10 constant sharp, stabbing, burning pain in back and joints.  Function rated 8. At best, pain is rated 3/10 and highest 8/10 and average is 5/10.    Radiation of pain: Denies.    Gait disturbance: wheelchair for outings.  Denies recent falls.  Exacerbating factors: activity, arm movements, morning time, prolonged sitting/standing/walking.  Alleviating factors: Rest, pain medications, sitting/standing short periods, laying down and changing positions.  Associated symptoms: Pain at night, numbness & weakness all over.  Denies bowel or bladder incontinence, fever chills, unexplained weight loss.  Functional symptoms: Pain interferes with sleep, walking, work, ADLs, relationships/social, sexual health, mood (depressed, anxious, frustrated).  Pain treatment does help with function.  Adverse effects of medications:  Uses sennakot 2 tablets at home for intermittent constipation.  Hasn't used miralax but used to.  She bought generic laxative.  Intermittent pruritus from opioids, takes benadryl.  Current treatment efficacy: Fair. Taking OxyContin 40 mg in the morning with pain relief within 1 hour.  She states she wakes with  "pain 8-9/10 and reduces to 6/10. Taking oxycodone 30 mg 1 every 4 hours prn has not tolerated reduction in daily dose.   Current treatment compliance: Good.  She has failed Lyrica, Gabapentin, Cymbalta, Fentanyl, Morphine, Vicodin, codeine in the past.  She has [facility] PT/OT and RN services in past, not currently needed.  Uses daily pop out dosing for medications.  Reports taking medication as prescribed.      Since the last Pain Center visit, the patient continues warfarin. Denies any new diagnostic testing, new treatments or new medical conditions, any changes in medications, or any ED/urgent care visits.  Patient denies the chance of being pregnant or wanting to become pregnant. She states pain has been unchanged/improved and reports her lower back pain has been tolerable.      Saw rheumatologist Dr. José on 10/17/19:  \"ASSESSMENT AND PLAN:  Catherine DAVY Quinterot 44 y.o. female is here for follow-up.  She has SLE manifested by stomatitis, serositis, inflammatory joint disease.  This is in association with positive KRISTOPHER, intermittently positive double-stranded DNA antibodies.  She has antiphospholipid syndrome on chronic anticoagulation.  She has developed interstitial lung disease for which she follows up with [facility] clinic, as a part of drug trial. She was not able to tolerate mycophenolate, azathioprine.  Since age 15, reportedly, she has been on prednisone has not been able to taper below 5 mg daily after she is done with the current study at [facility] she is prepared to try 4 mg daily.  Will meet here in 3 months.\"    She will see him again later this month.    Her research study is over first stage.  Now she is on the study medication inhaler 4 times a day and will not know how long she has to take it.  She will continue to have labs checked throughout and will do 6 minute walks and pulmonary function testing.     She is using ventilator at night, states it is a large device " "but still portable if she is going to cabin.  Has to add a humidifier to it for nasal dryness. Oxygen is 8-10L at night.  O2 sats are in the 90s with use at night.   She hasn't had to hold OxyContin doses due to respiratory status.       Pain is about the same; some days worse, some days better.  Comfortable with medications and function is same.  She is interested in trial of CBD oil discussed today.  She states she also doesn't sleep well, not just due to pain but has never been a good sleeper is in her family and she would like improvement in that. She reports she woke today with a right sided neck stiffness from last night.  Has not tried heat or ice yet, denies muscle relaxant.  Had her sister drive here today as she can't turn her head to the right. She denies other symptoms with it such as dizziness, vision changes, right arm numbness/tingling, headache.         Review of Systems  Constitutional: Denies fever, chills, lethargy.  Has MERLIN, chronic O2. Denies night sweats, weight loss  Musculoskeletal: Positive for mid and lower back pain, neck stiffness, denies joint pain/swelling, denies recent fall.  Gastrointestional: Positive for intermittent nausea, vomiting, GERD, abdominal pain, constipation.  Denies difficulty swallowing, change in appetite, diarrhea, fecal incontinence.  Genitourinary: Denies urinary incontinence, dysuria, hematuria, UTI, frequency, hesitancy, change in libido  Neurologic: Headaches.  Denies confusion, seizure, weakness, changes in balance, changes in speech.  Psychiatric: Positive depression, anxiety.  Denies memory loss, psychoses, suicidal ideation, substance use/abuse.     Objective:     Vitals:    12/04/19 1045   BP: 129/85   Pulse: 89   Weight: 206 lb (93.4 kg)   Height: 4' 10\" (1.473 m)   PainSc:   6   PainLoc: Back     Physical Exam  Constitutional- General appearance: Normal.  Well developed, uncomfortable with stiffness in right neck, obese, appears older than stated age.  " Presents with sister today.   Psychiatric- Judgment and insight: Normal.  Speech: Normal rhythm.  Thought process: Normal.  No abnormal thoughts reported. Alert & Oriented to person, place, and time.  Recent and remote memory: Normal.  Mood and affect: appropriate.  Respiratory- Using O2 by nasal cannula today.  Normal rhythm and rate.  Cardiovascular- Extremities warm and well perfused, no peripheral edema or varicosities.  Dermatologic- Exposed skin is clean, dry, and intact to inspection and palpation.   Musculoskeletal- Gait and station:  Ambulating independently today, stooped.      *Opioid [place] Precautions:    UDS/Swab - 04/12/19, results as expected  Opioid Consent -  06/25/19   Opioid Agreement - 06/25/19  Pharmacy- as documented    [place]  Reviewed - 12/04/19 as expected  Pill Count - Pill count 10/16/15 appropriate.    Psychological evaluation - n/a  MME - 330  Pharmacogenetic testing - n/a  VARGAS Score: 70 12/04/19    Imaging:  None reviewed today    Assessment:   Catherine Sharp is a 44 y.o. female seen in clinic today for pain in her spine due to history of pathologic compression fractures due to steroids to treat SLE.  Lumbar MRI demonstrates spinal canal stenosis L3-4, disc bulging at L3-4 and L5-S1, and mild to moderate facet arthropathy throughout in addition to multiple chronic compression deformities.  She had 30% pain relief with LESI and Initial left LMBB was not helpful.  She continues to have pain in thoracic spine, history of chronic compression fractures. She is having stable pain relief with oxycodone 30 mg every 4 hours prn, have discussed dosing outside of CDC guidelines.  She continues OxyContin 40 mg for extended pain relief in the morning but reports she cannot take this every 12 hours as it causes more difficulty with SOB at increased frequency.  She understands the risks with opioids and respiratory depression.  We have reviewed medical cannabis program but cannot participate now  due to cost and also the research trial she is participating in.  She will ask if cleared for CBD oil trial which is discussed today.  Recent trial of duloxetine caused nausea.  May consider ketamine or MTM consult in the future for other non opioid treatment options.      For right sided cervicalgia without red flag symptoms, recommend trying gentle stretches, heat/ice, and also short course of flexeril prn as she has tolerated this medication in the past. She is cautioned on drowsiness. If symptoms persist, will consider PT referral and/or TPI.      Plan:   Prescribed 28 days of OxyContin 40 mg in the morning.  Hold for any respiratory or sedation concerns.    Prescribed oxycodone 30 mg 1 every 4 hours maximum for pain as needed.   Printed to fill 12/04/19 to start 12/06/19 & to fill 01/02/19 for 01/03/19  Discussed CBD oil trial after research study completed - can check on this.  CBD Oil (or Hemp oil) may be helpful - this is the pain reducing component of the cannabis plant and is considered to be a legal, non-addictive, supplement.  Look for a concentration of at least 250 mg. [employer] tends to be the least expensive. I would recommend starting the CBD oil at 2-5 drops two times per day and increasing as needed and as tolerated. On average, people use 20 drops two times per day (of [employer]).   -Website: https://[employer]Eat/   -If you'd prefer to buy locally, look at [facility] ([address] [place]) Whole [employer] or [employer].   For stiffness in neck, may use heating pad and slow stretches.  Also may take flexeril 5 mg tab 1-2 up to 3 times a day as needed for the next 2-3 days.  This will cause drowsiness, no driving after and don't take it at the same time as oxycodone.  Follow-up in 8 weeks with Elizabet.    Elizabet Palmer PA-C  [facility]  [address]  [place] [address]  Ph: [phone]  Fax: [phone]

## 2021-06-04 NOTE — PATIENT INSTRUCTIONS - HE
Prescribed 28 days of OxyContin 40 mg in the morning.  Hold for any respiratory or sedation concerns.    Prescribed oxycodone 30 mg 1 every 4 hours maximum for pain as needed.   Printed to fill 12/04/19 to start 12/06/19 & to fill 01/02/19 for 01/03/19  Discussed CBD oil trial after research study completed - can check on this.  CBD Oil (or Hemp oil) may be helpful - this is the pain reducing component of the cannabis plant and is considered to be a legal, non-addictive, supplement.  Look for a concentration of at least 250 mg. [employer] tends to be the least expensive. I would recommend starting the CBD oil at 2-5 drops two times per day and increasing as needed and as tolerated. On average, people use 20 drops two times per day (of [employer]).   -Website: https://[employer]/   -If you'd prefer to buy locally, look at [employer] ([address] [employer]) [employer] or [employer].   For stiffness in neck, may use heating pad and slow stretches.  Also may take flexeril 5 mg tab 1-2 up to 3 times a day as needed for the next 2-3 days.  This will cause drowsiness, no driving after and don't take it at the same time as oxycodone.  Follow-up in 8 weeks with Elizabet.

## 2021-06-04 NOTE — TELEPHONE ENCOUNTER
RN cannot approve Refill Request    RN can NOT refill this medication associated diagnosis needed. Last office visit: 7/24/2019 Delmis Ma MD Last Physical: Visit date not found Last MTM visit: 6/28/2016 Amina Carey, PharmD Last visit same specialty: 7/24/2019 Delmis Ma MD.  Next visit within 3 mo: Visit date not found  Next physical within 3 mo: Visit date not found      Valentine Polk, [facility] Triage/Med Refill 12/25/2019    Requested Prescriptions   Pending Prescriptions Disp Refills     levothyroxine (SYNTHROID, LEVOTHROID) 150 MCG tablet [Pharmacy Med Name: LEVOTHYROXINE 150 MCG TABLET] 90 tablet 3     Sig: TAKE 1 TABLET BY MOUTH EVERY DAY       Thyroid Hormones Protocol Passed - 12/25/2019  1:46 AM        Passed - Provider visit in past 12 months or next 3 months     Last office visit with prescriber/PCP: 7/24/2019 Delmis Ma MD OR same dept: 7/24/2019 Delmis Ma MD OR same specialty: 7/24/2019 Delmis Ma MD  Last physical: Visit date not found Last MTM visit: 6/28/2016 Amina Carey, PharmD   Next visit within 3 mo: Visit date not found  Next physical within 3 mo: Visit date not found  Prescriber OR PCP: Delmis Ma MD  Last diagnosis associated with med order: There are no diagnoses linked to this encounter.  If protocol passes may refill for 12 months if within 3 months of last provider visit (or a total of 15 months).             Passed - TSH on file in past 12 months for patient age 12 & older     TSH   Date Value Ref Range Status   02/25/2019 2.91 0.30 - 5.00 uIU/mL Final

## 2021-06-04 NOTE — TELEPHONE ENCOUNTER
Called patient to follow up with how she's doing with NIV. Patient states she's not wearing it as much as she should. Asked if there was a reason why and patient didn't have an answer. Informed her that it comes down to her making the effort to use it to benefit her health. She expressed understanding. She then stated the oxygen part is troublesome as she gets up 3 times a night. I suggested she leave her nasal cannula and place her mask on top, however, this can cause mask leak and possible pressure sores. Patient expressed understanding and stated she'd figure it out. Patient had no questions/concerns at this time and stated she did not need any supplies. Patient declined a home visit at this time. Will continue to monitor and follow up with patient.    NIV Device: Resmed Astral 150  NIV Mode: ST/SV  NIV Settings: IPAP 15, EPAP 8, RR 16  Comfort Settings: I-time 0.2-1.5 sec, Cycle 20%, Rise time 200 ms, Trigger High  Mask type: Resmed Airtouch F20 small full face mask  Oxygen: 8-10 LPM O2 through [facility]    No data since October 2019. Patient may have unplugged modem.

## 2021-06-05 ENCOUNTER — RECORDS - HEALTHEAST (OUTPATIENT)
Dept: INTENSIVE CARE | Facility: CLINIC | Age: 46
End: 2021-06-05

## 2021-06-05 VITALS — BODY MASS INDEX: 40.07 KG/M2 | HEIGHT: 58 IN | WEIGHT: 190.9 LBS

## 2021-06-05 VITALS
SYSTOLIC BLOOD PRESSURE: 124 MMHG | HEART RATE: 95 BPM | DIASTOLIC BLOOD PRESSURE: 84 MMHG | OXYGEN SATURATION: 70 % | WEIGHT: 193.9 LBS | BODY MASS INDEX: 40.53 KG/M2

## 2021-06-05 DIAGNOSIS — J98.4: ICD-10-CM

## 2021-06-05 NOTE — PATIENT INSTRUCTIONS - HE
Prescribed 28 days of OxyContin 40 mg in the morning.  Hold for any respiratory or sedation concerns.    Prescribed oxycodone 30 mg 1 every 4 hours maximum for pain as needed.   Printed to fill today to start on 01/31/2020 & to fill on 02/26/2020 to start on 02/28/2020  Continue with rheumatology and pulmonology recommendations - looking into rehab options  Follow-up in 8 weeks with Donya

## 2021-06-05 NOTE — PROGRESS NOTES
ASSESSMENT AND PLAN:  Catherine Sharp 44 y.o. female is here for follow-up of systemic lupus erythematosus accompanied by her friend.  Her SLE manifested by stomatitis, inflammatory joint disease, serositis, and immunologically with positive KRISTOPHER, double-stranded anti-DNA antibodies.  She has had antiphospholipid syndrome on lifelong anticoagulation.  She developed interstitial lung disease follows up with the pulmonary clinic.  In the past she was unable to tolerate mycophenolate due to GI side effects and azathioprine.  For the past nearly 30 years she has been on prednisone.  When she first started care at this clinic she was on 10 mg daily.  Now she ranges between 4 to 5 mg daily predominantly for joint symptoms.  Recently flareup of mouth ulcers she has 1 today.  Magic mouthwash has helped her in the past this is renewed.  Continue hydroxychloroquine.  Follow-up in 3 months.           Diagnoses and all orders for this visit:    Systemic lupus erythematosus, unspecified SLE type, unspecified organ involvement status (H)  -     predniSONE (DELTASONE) 1 MG tablet; Take 5 mg by mouth daily.  Dispense: 450 tablet; Refill: 0  -     HM1(CBC and Differential)  -     Creatinine  -     ALT (SGPT)  -     Albumin  -     DNA (ds) Antibody Screen  -     GRACIELA (Antibodies to Extractable Nuclear Antigens) Profile  -     Complement, C'3  -     Complement, C'4  -     HM1 (CBC with Diff)  -     diphenhydrAMINE 12.5 mg/5 mL Elix 50 mg, aluminum-magnesium hydroxide-simethicone 400-400-40 mg/5 mL Susp 20 mL, viscous lidocaine HC 2 % Soln 20 mL; Swish and spit 15 mL 2 (two) times a day.  Dispense: 300 mL; Refill: 1  -     hydroxychloroquine (PLAQUENIL) 200 mg tablet; Take 1 tablet (200 mg total) by mouth daily.  Dispense: 90 tablet; Refill: 0    ILD (interstitial lung disease) (H)    Polyarthralgia    High risk medication use    Stomatitis  -     diphenhydrAMINE 12.5 mg/5 mL Elix 50 mg, aluminum-magnesium hydroxide-simethicone  400-400-40 mg/5 mL Susp 20 mL, viscous lidocaine HC 2 % Soln 20 mL; Swish and spit 15 mL 2 (two) times a day.  Dispense: 300 mL; Refill: 1      HISTORY OF PRESENTING ILLNESS:  Catherine Sharp, 44 y.o., female is here for longstanding SLE manifested by stomatitis, serositis, inflammatory joint disease.  This is in association with positive KRISTOPHER, intermittently positive double-stranded DNA antibodies.  She is on 4 mg 2 5 mg of prednisone.  She has noted intermittent discomfort in her joints especially the hands, shoulders.  She is not a candidate take nonsteroidals because of being anticoagulated for antiphospholipid syndrome.  She is on hydroxychloroquine 200 mg daily.  She follows up with eye clinic as well.  She has had more stomatitis recently.  This is moderately painful.  She has she has a ulcer now..  Most of this in the left shoulder.  She has antiphospholipid syndrome on chronic anticoagulation..  She, reportedly, has had this since age 15.  Recent worsening of pulmonary symptoms, needed diuretics, steroids, antibiotics.  She is participating in a drug trial at the [facility].  She looking back thought that the prednisone while did help her lung symptoms.  She has noted pain in her shoulders, worse on the left side, she is well known to have tendinopathy she recalls having a corticosteroid injection here that did not provide a significant durable relief.  Her shoulder pain leads her to rate the pain overall is 7.5/10.  Interfering with many day-to-day activities.  Stiffness in the morning for 3 hours.     She has noted mild discomfort in multiple joint areas except hips which she has moderately severe pain in the trochanteric regions.  She rates that a 6.0/10 worse when she lays on those sites.  Interference with some of the day-to-day activities as noted.  She try to stay on 4 mg of prednisone but some days she takes 5 because of generalized achiness.  She has not had fever weight loss blurry vision eye  "redness, rash.  She has shortness of breath, she gets intermittent mouth ulcers, dry cough.   In the past she was unable to tolerate mycophenolate.  In view of the interstitial lung disease she will be a candidate for methotrexate.  She reports no malar area eruption, photosensitivity, pleuritic symptoms.  As noted elsewhere she has had DVT and in the background of antiphospholipid antibodies would fulfill criteria for the syndrome.  She is on Coumadin since.. She reports occasional nonpleuritic chest pain and palpitations. She had been on azathioprine 50 mg twice daily and hydroxychloroquine but d/c ed by her hospital MD a few months ago for unclear reasons. At one point, she recalls a question of if she may have \"blood\" into her lungs because of the lupus. She underwent treatment with the high-dose prednisone. She subsequently developed osteoporosis. She has experienced chronic pain syndrome and has been on narcotics.  Further historical information and ADL limitations as noted in the multidimensional health assessment questionnaire attached in the EMR.    ALLERGIES:Hydrocodone-acetaminophen and Protonix [pantoprazole]    PAST MEDICAL/ACTIVE PROBLEMS/MEDICATION/ FAMILY HISTORY/SOCIAL DATA:  The patient has a family history of  Past Medical History:   Diagnosis Date     Acute respiratory failure (H) 1/13/2015     Adhesive pericarditis 4/12/2007     Antiphospholipid antibody with hypercoagulable state (H)     Secondary to lupus     Cervical compression fracture (H) 2009    closed, with spinal cord injury C1-C4 - prednisone induced     Chest pain with normal coronary angiography 2010     Chronic pain     on opiates     Chronic respiratory failure with hypoxia and hypercapnia (H)      Congestive heart failure (H)     Diastolic, Cor Pulmonale, EF 60% 2014     Deep phlebothrombosis, antepartum, with delivery (H) 4/12/2007     Depression      Gastroparesis     causing nausea/vomiting with recurrent admissions     GERD " (gastroesophageal reflux disease)      History of DVT (deep vein thrombosis) 1/22/2015     Hypercarbia 9/26/2015     Hypertension      Kidney infection      Kidney stone      Lupus (systemic lupus erythematosus) (H)     complicated by joint and pulmonary involvement     Obesity      Osteoporosis      Peripheral neuropathy      Pulmonary emboli (H)     Created by Conversion      Pulmonary hypertension due to left ventricular diastolic dysfunction (H)     PASP 50-55 mmHg estimated echocardiographically and LVEDP 26 in 2010 but that was during Takotsubo event     Recurrent Clostridium difficile diarrhea      Respiratory failure (H) 4/13/2017     Seizures (H)     once due to high BP, hypertensive encephalopathy     Snoring 12/3/2015     Stress-induced cardiomyopathy 05/07/2010     Trigger ring finger, right 4/20/2016     Replacement Utility updated for latest IMO load     Social History     Tobacco Use   Smoking Status Former Smoker     Packs/day: 0.50     Years: 10.00     Pack years: 5.00     Types: Cigarettes     Last attempt to quit: 7/29/2009     Years since quitting: 10.4   Smokeless Tobacco Never Used     Patient Active Problem List   Diagnosis     Hypertension     Osteoporosis     Anemia     Generalized anxiety disorder     Antiphospholipid Antibody Syndrome     Peripheral Neuropathy     Systemic lupus erythematosus (H)     GERD (gastroesophageal reflux disease)     Opiate dependence (H)     Gastroparesis     Coagulopathy (H)     ILD (interstitial lung disease) (H)     Restrictive lung disease due to kyphoscoliosis     Regional enteritis (H)     Hyperthyroidism     Bilateral shoulder tendinopathy     Polyarthralgia     Chronic diastolic heart failure (H)     Pulmonary hypertension (H)     Chronic pain syndrome     High risk medication use     Pulmonary arterial hypertension (H)     Hypoxia     Chronic respiratory failure with hypoxia and hypercapnia (H)     Current Outpatient Medications   Medication Sig Dispense  Refill     albuterol (PROAIR HFA;PROVENTIL HFA;VENTOLIN HFA) 90 mcg/actuation inhaler Inhale 2 puffs every 6 (six) hours as needed for wheezing. 1 Inhaler 6     albuterol (PROVENTIL) 2.5 mg /3 mL (0.083 %) nebulizer solution Take 3 mL (2.5 mg total) by nebulization 4 (four) times a day as needed for wheezing or shortness of breath. 75 mL 11     calcium, as carbonate, (OS-JEFFERSON) 500 mg calcium (1,250 mg) tablet Take 1 tablet by mouth daily.       cholecalciferol, vitamin D3, 1,000 unit tablet Take 1,000 Units by mouth daily.        DILT- mg 24 hr capsule TAKE 1 CAPSULE BY MOUTH EVERY DAY 90 capsule 3     FERROUS SULFATE (SLOW FE ORAL) Take 65 mg by mouth daily.       furosemide (LASIX) 20 MG tablet Take 60 mg in am and 40 mg in pm       guaiFENesin ER (MUCINEX) 600 mg 12 hr tablet Take 1 tablet (600 mg total) by mouth 2 (two) times a day.  0     hydroxychloroquine (PLAQUENIL) 200 mg tablet Take 1 tablet (200 mg total) by mouth daily. 90 tablet 0     levothyroxine (SYNTHROID, LEVOTHROID) 150 MCG tablet TAKE 1 TABLET BY MOUTH EVERY DAY 90 tablet 0     LORazepam (ATIVAN) 1 MG tablet TAKE 1 TABLET (1 MG TOTAL) BY MOUTH EVERY EVENING. 30 tablet 0     naloxone (NARCAN) 4 mg/actuation nasal spray 1 spray (4 mg dose) into one nostril for opioid reversal. Call 911. May repeat if no response in 3 minutes. 1 Box 0     nystatin (MYCOSTATIN) powder Apply 1 application topically 3 (three) times a day as needed. 15 g 5     oxyCODONE (ROXICODONE) 30 MG immediate release tablet Take 1 tablet (30 mg total) by mouth every 4 (four) hours as needed for pain. 168 tablet 0     OXYCONTIN 40 mg 12 hr tablet Take 1 tablet (40 mg total) by mouth every morning. 28 tablet 0     potassium chloride (K-DUR,KLOR-CON) 20 MEQ tablet Take 20 mEq by mouth daily.              senna-docusate (SENNOSIDES-DOCUSATE SODIUM) 8.6-50 mg tablet Take 1 tablet by mouth 2 (two) times a day.  0     warfarin (COUMADIN/JANTOVEN) 5 MG tablet TAKE 1-1.5 TABS BY MOUTH  "DAILY. CURRENTLY 1.5 TABS TUE/THUR & 1 TAB REST OF WEEK. ADJUST PER INR 45 tablet 11     oxyCODONE (ROXICODONE) 30 MG immediate release tablet Take 1 tablet (30 mg total) by mouth every 4 (four) hours as needed for pain. 168 tablet 0     OXYCONTIN 40 mg 12 hr tablet Take 1 tablet (40 mg total) by mouth every morning. 28 tablet 0     Current Facility-Administered Medications   Medication Dose Route Frequency Provider Last Rate Last Dose     denosumab 60 mg (PROLIA 60 mg/ml)  60 mg Subcutaneous Q6 Months Anirudh Alcaraz MD   60 mg at 05/15/19 1236       DETAILED EXAMINATION:  Vitals:    01/23/20 1548   BP: 124/72   Patient Site: Right Arm   Patient Position: Sitting   Cuff Size: Adult Regular   Weight: 206 lb (93.4 kg)   Height: 4' 10\" (1.473 m)    Comfortable.  Alert oriented.  Eyes are without inflammatory changes.  Examination of both upper and lower extremities is performed for swollen & tender joints, range of motion, rash, weakness, discoloration, warmth, swelling.  The skin examined for nodules. The salient normal / abnormal findings are appended.  She carries supplemental oxygen.  She has stomatitis single ulcer under her tongue on the right side on the buccal floor.  She does not have synovitis of palpable joints of upper extremities knees without effusion warmth or GLT.  Minimal impingement of the shoulders.       LAB / IMAGING DATA:  ALT   Date Value Ref Range Status   06/12/2019 39 0 - 45 U/L Final   10/29/2018 9 0 - 45 U/L Final   10/28/2018 13 0 - 45 U/L Final     Albumin   Date Value Ref Range Status   06/12/2019 3.6 3.5 - 5.0 g/dL Final   10/29/2018 2.8 (L) 3.5 - 5.0 g/dL Final   10/28/2018 3.7 3.5 - 5.0 g/dL Final     Creatinine   Date Value Ref Range Status   06/12/2019 0.76 0.60 - 1.10 mg/dL Final   05/24/2019 0.80 0.60 - 1.10 mg/dL Final   05/23/2019 0.77 0.60 - 1.10 mg/dL Final       WBC   Date Value Ref Range Status   06/12/2019 10.2 4.0 - 11.0 thou/uL Final   05/24/2019 11.8 (H) 4.0 - 11.0 " thou/uL Final   09/27/2015 6.9 4.0 - 11.0 thou/uL Final   08/23/2015 5.8 4.0 - 11.0 thou/uL Final     Hemoglobin   Date Value Ref Range Status   06/12/2019 16.1 (H) 12.0 - 16.0 g/dL Final   05/24/2019 15.4 12.0 - 16.0 g/dL Final   05/23/2019 14.8 12.0 - 16.0 g/dL Final     Platelets   Date Value Ref Range Status   06/12/2019 162 140 - 440 thou/uL Final   05/24/2019 179 140 - 440 thou/uL Final   05/23/2019 180 140 - 440 thou/uL Final       Lab Results   Component Value Date    RF <15.0 07/10/2014    SEDRATE 42 (H) 01/31/2018

## 2021-06-05 NOTE — PROGRESS NOTES
Pt is being seen today by KIN Barrera, for refills and f/u of 5-constant/intermittent, burning, stabbing joint and back pain.  F6

## 2021-06-05 NOTE — TELEPHONE ENCOUNTER
Controlled Substance Refill Request  Medication Name:   Requested Prescriptions     Pending Prescriptions Disp Refills     LORazepam (ATIVAN) 1 MG tablet [Pharmacy Med Name: LORAZEPAM 1 MG TABLET] 30 tablet 0     Sig: TAKE 1 TABLET (1 MG TOTAL) BY MOUTH EVERY EVENING.     Date Last Fill:   LORazepam (ATIVAN) 1 MG tablet 30 tablet 0 12/3/2019  No   Sig - Route: TAKE 1 TABLET (1 MG TOTAL) BY MOUTH EVERY EVENING. - Oral   Sent to pharmacy as: LORazepam 1 mg tablet (ATIVAN)   Notes to Pharmacy: Not to exceed 5 additional fills before 04/26/2020   E-Prescribing Status: Receipt confirmed by pharmacy (12/3/2019  2:41 PM CST)   Requested Pharmacy:   Submit electronically to pharmacy  Controlled Substance Agreement on file:   Encounter-Level CSA Scan Date - 06/25/2019:    Scan on 6/26/2019  8:45 AM by Meghna Tubbs: [facility] NARCOTIC AGREEMENT        Last office visit:  7/24/19

## 2021-06-05 NOTE — PROGRESS NOTES
PAIN CENTER PROGRESS NOTE    Subjective:   Catherine Sharp is a 44 y.o. female who presents for evaluation of multi-site pain secondary to osteoporosis and pathologic fractures as a result of SLE treated with chronic steroids.  She has multiple co-morbidities including Interstitial lung disease, MERLIN, Antiphospholipid Antibody Syndrome, cardiomyopathy, OA in multiple sites, and recurrent hospitalizations.  Pain has been present for years and current treatment plan includes OxyContin, oxycodone, intermittent PT/OT.    Major issues:  1. Chronic pain syndrome    2. Chronic, continuous use of opioids    3. Systemic lupus erythematosus, unspecified SLE type, unspecified organ involvement status (H)    4. Compression fracture of thoracic vertebra, unspecified thoracic vertebral level, sequela    5. Spinal stenosis of lumbar region without neurogenic claudication    6. Opioid dependence, continuous (H)      Pain location and description: 5/10 constant sharp, stabbing, burning pain in back and joints.  Function rated 6. At best, pain is rated 3/10 and highest 7/10 and average is 5/10.    Radiation of pain: Denies.    Gait disturbance: wheelchair for outings.  Denies recent falls.  Exacerbating factors: activity, arm movements, morning time, prolonged sitting/standing/walking.  Alleviating factors: Rest, pain medications, sitting/standing short periods, laying down and changing positions.  Associated symptoms: Pain at night, numbness & weakness all over.  Denies bowel or bladder incontinence, fever chills, unexplained weight loss.  Functional symptoms: Pain interferes with sleep, work, ADLs, relationships/social, mood (anxious, frustrated, helpless/hopeless).  Pain treatment does help with function.  Adverse effects of medications:  Uses sennakot 2 tablets at home for intermittent constipation.  Hasn't used miralax but used to.  She bought generic laxative.  Intermittent pruritus from opioids, takes benadryl.  Current  "treatment efficacy: Fair. Taking OxyContin 40 mg in the morning with pain relief within 1 hour.  She states she wakes with pain 8-9/10 and reduces to 6/10. Taking oxycodone 30 mg 1 every 4 hours prn has not tolerated reduction in daily dose.   Current treatment compliance: Good.  She has failed Lyrica, Gabapentin, Cymbalta, Fentanyl, Morphine, Vicodin, codeine in the past.  She has [facility] PT/OT and RN services in past, not currently needed.  Uses daily pop out dosing for medications.  Reports taking medication as prescribed.      Since the last Pain Center visit, the patient continues warfarin. Denies any new diagnostic testing, new treatments or new medical conditions, any changes in medications, or any ED/urgent care visits.  Patient denies the chance of being pregnant or wanting to become pregnant. She states pain has been unchanged and reports her lower back pain has been tolerable.     She saw Dr. Eng pulmonologist on 01/28/2020:  \" Assessment and Plan: Catherine Sharp is a 44 year old female who presents for return evaluation for severe Group 3 PH.    1. Severe Group 3 Pulmonary Hypertension with Preserved Cardiac Output: Ms. Hsu is in the INCREASE trial, which is evaluating the utility of inhaled prostacyclin in ILD-PH. She has completed the trial and is on the open-label phase currently. She is feeling the same. I did encourage her to start pulmonary rehabilitation and continue to exercise at home. She does need to lose weight to become eligible for lung transplantation in the future so hopefully exercise will help with that. She is not having any signs of right heart failure but does require some diuretics.     2. History of DVT and PE: She is on lifelong anticoagulation which I agree with.    It was a pleasure seeing your patient Catherine Sharp at the [facility]pert[facility] clinic. Please contact us with any questions or concerns that you may " "have.    Sincerely,    Carter Eng MD, PhD   of Medicine  [facility] Pulmonary [facility]pert[facility]  Cardiovascular Division  [facility]\"    She states her last 2 PFTs have been better since starting the medication trial. Yesterday she had more difficulty with testing as her nose has been sore and she was advised to use 10-15L when walking. She states she culdn't do it yesterday as it was hurting too bad.  She has scabs in her nostrils and is using aquaphor and nasal saline washes.  At rest using O2 6-8L.  No respiratory illnesses and difficult to be outside in the cold air.  She is considering either pulmonary rehab or warm pool therapy.    Saw rheumatologist Dr. José on 1/23/2020:  \"ASSESSMENT AND PLAN:  Catherine Sharp 44 y.o. female is here for follow-up of systemic lupus erythematosus accompanied by her friend.  Her SLE manifested by stomatitis, inflammatory joint disease, serositis, and immunologically with positive KRISTOPHER, double-stranded anti-DNA antibodies.  She has had antiphospholipid syndrome on lifelong anticoagulation.  She developed interstitial lung disease follows up with the pulmonary clinic.  In the past she was unable to tolerate mycophenolate due to GI side effects and azathioprine.  For the past nearly 30 years she has been on prednisone.  When she first started care at this clinic she was on 10 mg daily.  Now she ranges between 4 to 5 mg daily predominantly for joint symptoms.  Recently flareup of mouth ulcers she has 1 today.  Magic mouthwash has helped her in the past this is renewed.  Continue hydroxychloroquine.  Follow-up in 3 months. \"     Left lower back pain is still the same; doesn't radiate.  She states that pain is intermittent and hasn't happened as often.  She states when it does happen it is painful, Mid back pain is constant and doesn't radiate.  She denies neck pain.  Shoulder pain hasn't bothered her as much recently. Neck pain resolved after use of " "flexeril 5 mg two times a day prn x 2 days.  Reports at this point she did not start CBD as discussed last visit but will keep in mind for future if her pain is not as well controlled.       Review of Systems  Constitutional: Denies fever, chills, lethargy.  Has MERLIN, chronic O2. Denies night sweats, weight loss  Musculoskeletal: Positive for mid and lower back pain, denies joint pain/swelling, denies recent fall.  Denies neck pain.  Gastrointestional: Positive for intermittent nausea, vomiting, GERD, abdominal pain, constipation.  Denies difficulty swallowing, change in appetite, diarrhea, fecal incontinence.  Genitourinary: Denies urinary incontinence, dysuria, hematuria, UTI, frequency, hesitancy, change in libido  Neurologic: Headaches.  Denies confusion, seizure, weakness, changes in balance, changes in speech.  Psychiatric: Positive depression, anxiety.  Denies memory loss, psychoses, suicidal ideation, substance use/abuse.     Objective:     Vitals:    01/29/20 1117   BP: 134/77   Pulse: 89   Weight: 206 lb (93.4 kg)   Height: 4' 10\" (1.473 m)   PainSc:   5   PainLoc: Back     Physical Exam  Constitutional- General appearance: Normal.  Well developed, comfortable, obese, appears older than stated age.  Presents with Mother in law today.   Psychiatric- Judgment and insight: Normal.  Speech: Normal rhythm.  Thought process: Normal.  No abnormal thoughts reported. Alert & Oriented to person, place, and time.  Recent and remote memory: Normal.  Mood and affect: appropriate.  Respiratory- Using O2 by nasal cannula today.  Normal rhythm and rate.  Cardiovascular- Extremities warm and well perfused, no peripheral edema or varicosities.  Dermatologic- Exposed skin is clean, dry, and intact to inspection and palpation.   Musculoskeletal- Gait and station:  Ambulating independently today, stooped.      *Opioid [place] Precautions:    UDS/Swab - 04/12/19, results as expected  Opioid Consent -  06/25/19   Opioid " Agreement - 06/25/19  Pharmacy- as documented    [place]  Reviewed - 1/29/2020 as expected  Pill Count - Pill count 10/16/15 appropriate.    Psychological evaluation - n/a  MME - 330  Pharmacogenetic testing - n/a  VARGAS 12/04/19    Imaging:  None reviewed today    Assessment:   Catherine Sharp is a 44 y.o. female seen in clinic today for pain in her spine due to history of pathologic compression fractures due to steroids to treat SLE.  Lumbar MRI demonstrates spinal canal stenosis L3-4, disc bulging at L3-4 and L5-S1, and mild to moderate facet arthropathy throughout in addition to multiple chronic compression deformities.  She had 30% pain relief with LESI and Initial left LMBB was not helpful.  She continues to have pain in thoracic spine, history of chronic compression fractures. She is having stable pain relief with oxycodone 30 mg every 4 hours prn, have discussed dosing outside of CDC guidelines.  She continues OxyContin 40 mg for extended pain relief in the morning but reports she cannot take this every 12 hours as it causes more difficulty with SOB at increased frequency.  She understands the risks with opioids and respiratory depression.  We have reviewed medical cannabis program but cannot participate now due to cost and also the research trial she is participating in.  She has not started CBD oil. Recent trial of duloxetine caused nausea.  May consider ketamine or MTM consult in the future for other non opioid treatment options.      Plan:   Prescribed 28 days of OxyContin 40 mg in the morning.  Hold for any respiratory or sedation concerns.    Prescribed oxycodone 30 mg 1 every 4 hours maximum for pain as needed.   Printed to fill today to start on 01/31/2020 & to fill on 02/26/2020 to start on 02/28/2020  Continue with rheumatology and pulmonology recommendations - looking into rehab options  Follow-up in 8 weeks with Elizabet.    Elizabet Palmer PA-C  [facility]  [address]  [place] [address]  Ph: [phone]  Fax: [phone]

## 2021-06-06 NOTE — TELEPHONE ENCOUNTER
Returned patient's call. Informed her that I will continue to be her RT and she can follow up with me, and not Monika. Apologized for the RT swap around. Patient states she does not need a home visit nor supplies at this time. She states she has not been using NIV because it's uncomfortable and makes her nose dry. Confirmed she has a humidifier, but has not attached it. She states she has been struggling to find another table to place in her room to get everything to fit. Offered to bring her the vent stand, but she declined and states she's already seen it and does not want it. Patient states she will hook up the humidifier to see if it becomes more comfortable. Instructed her to call me if she's still having issues with discomfort. I will follow up again soon.

## 2021-06-07 NOTE — PROGRESS NOTES
"Catherine Sharp is a 44 y.o. female who is being evaluated via a billable telephone visit.      The patient has been notified of following:     \"This telephone visit will be conducted via a call between you and your physician/provider. We have found that certain health care needs can be provided without the need for a physical exam.  This service lets us provide the care you need with a short phone conversation.  If a prescription is necessary we can send it directly to your pharmacy.  If lab work is needed we can place an order for that and you can then stop by our lab to have the test done at a later time.    Telephone visits are billed at different rates depending on your insurance coverage. During this emergency period, for some insurers they may be billed the same as an in-person visit.  Please reach out to your insurance provider with any questions.    If during the course of the call the physician/provider feels a telephone visit is not appropriate, you will not be charged for this service.\"    Patient has given verbal consent to a Telephone visit? Yes    Patient would like to receive their AVS by AVS Preference: Tuan.    Additional provider notes: 8-10 lpm with activity, 7-8 lpm at rest.    Overall her breathing is doing pretty well.  She feels like the primary reason she is doing better is because she is very careful with her Lasix and not missing any doses, keeping very close track of her fluid status.  She is enrolled [facility] prostacyclin.  She is unsure if this is helping or not.    She is not currently using her nighttime ventilator.  This is somewhat related to logistics-very complex changing of oxygen tubing if she has to take her mask off.  She is speaking with respiratory therapy to see if there is a way to change the  and make this easier.    She is being very careful with isolation precautions during this coronavirus " pandemic.    Plan  Continue oxygen  Continue noninvasive ventilation  Continue follow-up with pulmonary hypertension clinic    Phone call duration: 16 minutes    Boris Bearden MD  Pulmonary

## 2021-06-07 NOTE — PATIENT INSTRUCTIONS - HE
Prescribed 28 days of OxyContin 40 mg in the morning.  Hold for any respiratory or sedation concerns.    Prescribed oxycodone 30 mg 1 every 4 hours maximum for pain as needed.   Printed to fill today to start 03/25/20 & 04/21/20 to start on 04/22/20  Continue with rheumatology and pulmonology recommendations   Follow-up in 8 weeks with Elizabet.

## 2021-06-07 NOTE — PROGRESS NOTES
"Catherine Sharp is a 44 y.o. female who is being evaluated via a billable video visit.      The patient has been notified of following:     \"This video visit will be conducted via a call between you and your physician/provider. We have found that certain health care needs can be provided without the need for an in-person physical exam.  This service lets us provide the care you need with a video conversation.  If a prescription is necessary we can send it directly to your pharmacy.  If lab work is needed we can place an order for that and you can then stop by our lab to have the test done at a later time.    Video visits are billed at different rates depending on your insurance coverage. Please reach out to your insurance provider with any questions.    If during the course of the call the physician/provider feels a video visit is not appropriate, you will not be charged for this service.\"    Patient has given verbal consent to a Video visit? Yes    Patient would like to receive their AVS by AVS Preference: Mail a copy.    Patient would like the video invitation sent by: Text to cell phone: [phone]    Will anyone else be joining your video visit? No        Video Start Time: 11:20 AM    Additional provider notes:      Reason for visit      1. Other osteoporosis without current pathological fracture        History     Catherine Sharp is a very pleasant 44 y.o. old female who presents for follow up.   SUMMARY:    Catherine is being seen today via Video Visit for Osteoporosis. She is a RONA from Dr Alcaraz. She has been getting Prolia injections since 2018.  She has a long hx of fractures and at one time had about 12 of them at the same time. She took Forteo for 2 years with some success. She has a long hx of Prednisone use. She has had to use it for both Lupus and Interstitial lung disease. She is currently on 5 mg a day. She is also on Warfarin.  Her last Dexa Scan showed: Since the previous bone density dated  " September 14, 2015, there has been a   11.6 % increase in the bone density of the spine.   Some increases in the spine and be secondary to evolving degenerative changes. Additionally there has been a 6.3 % change in the left total hip and a 5.1 % decline in the right total hip.  Of note, no statistically significant change was seen in the left one third radius.  A mixed result is seen. She is due for another this year.  We will also get new Calcium and Vit D levels. Pt had her last Prolia in May of 2019.       Risk Factors     The following high- risk conditions have been ruled out: celiac disease, eating disorders, gastric bypass, hyperparathyroidism, inflammatory bowel disease, hyperthyroidism, rheumatoid arthritis, chronic kidney disease.    Catherine Sharp has the following risk factors: Age, Female gender and     She is not on high risk medications such as, anti-convulsants, chemotherapy or levothyroxine.    Patient deniesHysterectomy, Oophrectomy, Breast cancer and Family history of breast cancer.      Past Medical History     Patient Active Problem List   Diagnosis     Hypertension     Osteoporosis     Anemia     Generalized anxiety disorder     Antiphospholipid Antibody Syndrome     Peripheral Neuropathy     Systemic lupus erythematosus (H)     GERD (gastroesophageal reflux disease)     Opiate dependence (H)     Gastroparesis     Coagulopathy (H)     ILD (interstitial lung disease) (H)     Restrictive lung disease due to kyphoscoliosis     Regional enteritis (H)     Hyperthyroidism     Bilateral shoulder tendinopathy     Polyarthralgia     Chronic diastolic heart failure (H)     Pulmonary hypertension (H)     Chronic pain syndrome     High risk medication use     Pulmonary arterial hypertension (H)     Hypoxia     Chronic respiratory failure with hypoxia and hypercapnia (H)     Muscle pain     Dyspnea on exertion       Family History       family history includes Asthma in her mother; Cancer in her  mother; Cerebral aneurysm in her maternal aunt; Clotting disorder in her mother and sister; Hypertension in her father and mother; Mental illness in her sister; Stroke in her father, maternal aunt, mother, and sister; Urolithiasis in her father.    Social History      reports that she quit smoking about 10 years ago. Her smoking use included cigarettes. She has a 5.00 pack-year smoking history. She has never used smokeless tobacco. She reports current drug use. Drug: Oxycodone. She reports that she does not drink alcohol.      Review of Systems     Patient denies current pain, limited mobility, fractures.   Remainder per HPI.    Assessment     1. Other osteoporosis without current pathological fracture        Plan     Pt will remain on Prolia injections. She will get another Dexa Scan this year, as well as current Vit D and Calcium levels. F/u with me in 1 year.           Current Medications     Outpatient Medications Prior to Visit   Medication Sig Dispense Refill     albuterol (PROAIR HFA;PROVENTIL HFA;VENTOLIN HFA) 90 mcg/actuation inhaler Inhale 2 puffs every 6 (six) hours as needed for wheezing. 1 Inhaler 6     albuterol (PROVENTIL) 2.5 mg /3 mL (0.083 %) nebulizer solution Take 3 mL (2.5 mg total) by nebulization 4 (four) times a day as needed for wheezing or shortness of breath. 75 mL 11     calcium, as carbonate, (OS-JEFFERSON) 500 mg calcium (1,250 mg) tablet Take 1 tablet by mouth daily.       cholecalciferol, vitamin D3, 1,000 unit tablet Take 1,000 Units by mouth daily.        DILT- mg 24 hr capsule TAKE 1 CAPSULE BY MOUTH EVERY DAY 90 capsule 3     FERROUS SULFATE (SLOW FE ORAL) Take 65 mg by mouth daily.       furosemide (LASIX) 20 MG tablet Take 60 mg in am and 40 mg in pm       guaiFENesin ER (MUCINEX) 600 mg 12 hr tablet Take 1 tablet (600 mg total) by mouth 2 (two) times a day.  0     hydroxychloroquine (PLAQUENIL) 200 mg tablet Take 1 tablet (200 mg total) by mouth daily. 90 tablet 0      levothyroxine (SYNTHROID, LEVOTHROID) 150 MCG tablet TAKE 1 TABLET BY MOUTH EVERY DAY 90 tablet 3     LORazepam (ATIVAN) 1 MG tablet TAKE 1 TABLET (1 MG TOTAL) BY MOUTH EVERY EVENING. 30 tablet 0     naloxone (NARCAN) 4 mg/actuation nasal spray 1 spray (4 mg dose) into one nostril for opioid reversal. Call 911. May repeat if no response in 3 minutes. 1 Box 0     nystatin (MYCOSTATIN) powder Apply 1 application topically 3 (three) times a day as needed. 15 g 5     oxyCODONE (ROXICODONE) 30 MG immediate release tablet Take 1 tablet (30 mg total) by mouth every 4 (four) hours as needed for pain. 168 tablet 0     OXYCONTIN 40 mg 12 hr tablet Take 1 tablet (40 mg total) by mouth every morning. 28 tablet 0     potassium chloride (K-DUR,KLOR-CON) 20 MEQ tablet Take 20 mEq by mouth daily.              predniSONE (DELTASONE) 1 MG tablet TAKE 5 TABLET BY MOUTH EVERY  tablet 0     senna-docusate (SENNOSIDES-DOCUSATE SODIUM) 8.6-50 mg tablet Take 1 tablet by mouth 2 (two) times a day.  0     UNABLE TO FIND Med Name: study - treprostinil, IDS# [id number], 0.6 MG/ML neb solution       warfarin (COUMADIN/JANTOVEN) 5 MG tablet TAKE 1-1.5 TABS BY MOUTH DAILY. CURRENTLY 1.5 TABS TUE/THUR & 1 TAB REST OF WEEK. ADJUST PER INR 45 tablet 11     oxyCODONE (ROXICODONE) 30 MG immediate release tablet Take 1 tablet (30 mg total) by mouth every 4 (four) hours as needed for pain. 168 tablet 0     OXYCONTIN 40 mg 12 hr tablet Take 1 tablet (40 mg total) by mouth every morning. 28 tablet 0     Facility-Administered Medications Prior to Visit   Medication Dose Route Frequency Provider Last Rate Last Dose     denosumab 60 mg (PROLIA 60 mg/ml)  60 mg Subcutaneous Q6 Months Anirudh Alcaraz MD   60 mg at 05/15/19 1236         Lab Results     TSH   Date Value Ref Range Status   02/25/2019 2.91 0.30 - 5.00 uIU/mL Final     PTH   Date Value Ref Range Status   09/27/2017 38 10 - 86 pg/mL Final     Calcium   Date Value Ref Range Status   06/12/2019  9.1 8.5 - 10.5 mg/dL Final     Phosphorus   Date Value Ref Range Status   06/12/2019 2.2 (L) 2.5 - 4.5 mg/dL Final     Iron   Date Value Ref Range Status   10/24/2018 76 42 - 175 ug/dL Final           Imaging Results   Last DEXA scan:  Results for orders placed in visit on 01/31/18   DXA Bone Density Scan    Narrative 5/4/2018      RE: Catherine Sharp  YOB: 1975        Dear Delmis Ma,    Patient Profile:  42 y.o. female, premenopausal, is here for the follow up bone density   test.   History of fractures - Yes;  Shoulder and Vertebra. Family history of   osteoporosis - None.  Family history of hip fracture: None. Smoking   history - Past. Osteoporosis treatment past -  Yes;  Forteo. Osteoporosis   treatment current - No.  Chronic medical problems - Chronic low back   problems, History of kidney stones and Lupus. High risk medications -    Blood thinner (Coumadin or Heparin);  Yes, Currently, Depo-provera;  Yes,   in the Past, Steroids;  Yes, Currently and Thyroid;  Yes, Currently.      Assessment:    1. The spine bone density L1-L4 with T-score 1.7, with Z-score 1.7  2. Femoral bone densities show left femoral neck T- score -2.6 (Z-Score   -2.2)  and right total hip T-score -2.8 (Z-Score-2.6)  3. Trabecular bone score indicates good trabecular bone architecture.  4.  Left one third radius T score -0.2.  Z score -0.2    42 y.o. female with OSTEOPOROSIS and HIGH fracture risk who had received   Forteo treatment in the past..     Since the previous bone density dated  September 14, 2015, there has been   a   11.6 % increase in the bone density of the spine.   Some increases in   the spine and be secondary to evolving degenerative changes. Additionally   there has been a 6.3 % change in the left total hip and a 5.1 % decline in   the right total hip.  Of note, no statistically significant change was   seen in the left one third radius.  A mixed result is seen.    Recommendations:  Continuation of  evaluation and consideration for treatment is recommended   with follow up bone density scan in 1-2 years.      Bone densitometry was performed on your patient using our Avisena iDXA   densitometer. The results are summarized and a copy of the actual scans   are included for your review. In conformity with the International Society   of Clinical Densitometry's most recent position statement for DXA   interpretation (2015), the diagnosis will be made on the lowest measured   T-score of the lumbar spine, femoral neck, total proximal femur or 33%   radius. Note the change in terminology for diagnostic classification from   OSTEOPENIA to LOW BONE MASS. All trending for sequential exams will be   done using multiple vertebrae or the total proximal femur. Fracture risk   is based on the WHO Fracture Risk Assessment Tool (FRAX). If additional   information is needed or if you would like to discuss the results, please   do not hesitate to call me.       Thank you for referring this patient to [facility].   We are happy to be of service in support of you and your practice. If you   have any questions or suggestions to improve our service, please call me   at [phone].     Sincerely,     Hannah Pelaez M.D. C.C.D.  Osteoporosis Services, [facility]             Video-Visit Details    Type of service:  Video Visit    Video End Time (time video stopped):1143  Originating Location (pt. Location): Home    Distant Location (provider location):  [facility]     Mode of Communication:  Video Conference via [employer]      amarilis PAULSONP-C

## 2021-06-07 NOTE — PROGRESS NOTES
"Catherine Sharp is a 44 y.o. female who is being evaluated via a billable telephone visit regarding back and joint pain. Patient describes her pain as constant in joints, sharp and intermittent in left lower back. Patient's everyday functions effected by pain include: relationships/social, walking, sleep, activities of daily living, mood, and sexual health.     The patient has been notified of following:     \"This telephone visit will be conducted via a call between you and your physician/provider. We have found that certain health care needs can be provided without the need for a physical exam.  This service lets us provide the care you need with a short phone conversation.  If a prescription is necessary we can send it directly to your pharmacy.  If lab work is needed we can place an order for that and you can then stop by our lab to have the test done at a later time.\"    Catherine Sharp complains of    Chief Complaint   Patient presents with     Back Pain     Patient needs printed scripts today.     Joint Pain       I have reviewed and updated the patient's Past Medical History, Social History, Family History and Medication List.    ALLERGIES  Hydrocodone-acetaminophen and Protonix [pantoprazole]     Patient is due for a urine drug test. However, due to COVID the test is being deferred at this time.      Flores Iyer CMA    Additional Provider Notes:  Major issues:  1. Chronic pain syndrome    2. Chronic, continuous use of opioids    3. Systemic lupus erythematosus, unspecified SLE type, unspecified organ involvement status (H)    4. Compression fracture of thoracic vertebra, unspecified thoracic vertebral level, sequela    5. Spinal stenosis of lumbar region without neurogenic claudication    6. Opioid dependence, continuous (H)      Pain location and description: 7/10 intermittent to constant sharp pain in back and joints.  Function rated 6.   Radiation of pain: Denies.    Gait disturbance: wheelchair " for outings.  Denies recent falls.  Exacerbating factors: Activity over 15 minutes.    Alleviating factors: Rest, pain medications, activity less than 15 minutes as laying too long increases pain.  Associated symptoms: Pain at night, numbness & weakness all over but not changed.  Denies bowel or bladder incontinence, fever, chills, unexplained weight loss.  Functional symptoms: Pain interferes with relationships/social, walking, sleep, activities of daily living, mood, and sexual health  Adverse effects of medications:  Uses sennakot 2 tablets at home for intermittent constipation.  Hasn't used miralax but used to.  She bought generic laxative.  Intermittent pruritus from opioids, takes benadryl.    Current treatment efficacy: Fair. Taking OxyContin 40 mg in the morning with pain relief within 1 hour.  She states she wakes with pain 8-9/10 and reduces to 6/10. Taking oxycodone 30 mg 1 every 4 hours prn has not tolerated reduction in daily dose.   Current treatment compliance: Good.  She has failed Lyrica, Gabapentin, Cymbalta, Fentanyl, Morphine, Vicodin, codeine in the past.  She has [facility] PT/OT and RN services in past, not currently needed.  Uses daily pop out dosing for medications.  Reports taking medication as prescribed.       Since the last Pain Center visit, the patient continues warfarin. Denies any new diagnostic testing, new treatments or new medical conditions, any changes in medications, or any ED/urgent care visits.  Patient denies the chance of being pregnant or wanting to become pregnant.  Pain has been same, seems like lower back hasn't been bothering her as bad. She reports it is still there but not as frequent.      She denies any questions for today's visit.  She prefers that her  picks up her prescriptions as she does not want them sent electronically.  Discuss visitor restrictions with COVID19; he denies any symptoms.       *Opioid [place] Precautions:    UDS/Swab -  04/12/19, results as expected  Opioid Consent -  06/25/19   Opioid Agreement - 06/25/19  Pharmacy- as documented    [place]  Reviewed - 03/24/20 as expected  Pill Count - Pill count 10/16/15 appropriate.    Psychological evaluation - n/a  MME - 330  Pharmacogenetic testing - n/a  VARGAS 12/04/19    Assessment:   Catherine Sharp is a 44 y.o. female seen in clinic today for pain in her spine due to history of pathologic compression fractures due to steroids to treat SLE.  Lumbar MRI demonstrates spinal canal stenosis L3-4, disc bulging at L3-4 and L5-S1, and mild to moderate facet arthropathy throughout in addition to multiple chronic compression deformities.  She had 30% pain relief with LESI and Initial left LMBB was not helpful.  She continues to have pain in thoracic spine, history of chronic compression fractures. She is having stable pain relief with oxycodone 30 mg every 4 hours prn, have discussed dosing outside of CDC guidelines.  She continues OxyContin 40 mg for extended pain relief in the morning but reports she cannot take this every 12 hours as it causes more difficulty with SOB at increased frequency.  She understands the risks with opioids and respiratory depression.  We have reviewed medical cannabis program but cannot participate now due to cost and also the research trial she is participating in.  She has not started CBD oil. Recent trial of duloxetine caused nausea.  May consider ketamine or MTM consult in the future for other non opioid treatment options.       Plan:   Prescribed 28 days of OxyContin 40 mg in the morning.  Hold for any respiratory or sedation concerns.    Prescribed oxycodone 30 mg 1 every 4 hours maximum for pain as needed.   Printed to fill today to start 03/25/20 & 04/21/20 to start on 04/22/20  Continue with rheumatology and pulmonology recommendations   Follow-up in 8 weeks with Elizabet.    Total time: 10 minutes  Start - 0754  End - 0804     [facility]  [address] [address]  [place] [address]  Ph: [phone]  Fax: [phone]

## 2021-06-07 NOTE — TELEPHONE ENCOUNTER
Called patient for NIV follow up. Asked her if she has started using NIV with humidity since our last call. Patient stated she has not. She stated she has 2 oxygen concentrators which should make it easier now. She states she did try the NIV the other day and it alarmed on her and wouldn't shut off no matter what she did. She hasn't tried it since. She stated she will try it again and have her  take a look at it and see if something is disconnected. Offered to have RT come out. She declined. Advised that she call back if issue persists, so RT can come out and swap it out. She expressed understanding and stated they would not like anyone to come in/out if it's not necessary due to coronavirus, so they will try to figure it out first. Patient states she does not need any supplies at this time as she has not used her NIV and still has supplies. Will follow up again in 1 month. Instructed patient to call me sooner if needed.    DX: CRF WITH HYPOXIA AND HYPERCAPNIA, ILD    SETUP DATE: 9/3/19  DEVICE TYPE:  ASTRAL  SETTINGS (include mode): ST/SV ; IPAP 15, EPAP 8, RR 16  COMFORT SETTINGS: I-TIME 0.2-1.5 SEC, CYCLE 20%, RISE 200, TRIGGER HIGH  INTERFACE:  AIRTOUCH F20 SMALL FFM  CIRCUIT/HUMIDITY: PT HAS HUMIDITY, BUT HAS NOT USED IT  ALARMS: NO BREATH 60 SEC  O2 BLEED IN (YES/NO): 8-10LPM O2 THROUGH [facility]    No usage so no data available.

## 2021-06-07 NOTE — TELEPHONE ENCOUNTER
RN cannot approve Refill Request    RN can NOT refill this medication Protocol failed and NO refill given.       Tata Landaverde, [facility] Triage/Med Refill 4/7/2020    Requested Prescriptions   Pending Prescriptions Disp Refills     levothyroxine (SYNTHROID, LEVOTHROID) 150 MCG tablet [Pharmacy Med Name: LEVOTHYROXINE 150 MCG TABLET] 90 tablet 3     Sig: TAKE 1 TABLET BY MOUTH EVERY DAY       Thyroid Hormones Protocol Failed - 4/5/2020  6:38 PM        Failed - TSH on file in past 12 months for patient age 12 & older     TSH   Date Value Ref Range Status   02/25/2019 2.91 0.30 - 5.00 uIU/mL Final                   Passed - Provider visit in past 12 months or next 3 months     Last office visit with prescriber/PCP: 7/24/2019 Delmis Ma MD OR same dept: 7/24/2019 Delmis Ma MD OR same specialty: 7/24/2019 Delmis Ma MD  Last physical: Visit date not found Last MTM visit: 6/28/2016 Amina aCrey, PharmD   Next visit within 3 mo: Visit date not found  Next physical within 3 mo: Visit date not found  Prescriber OR PCP: Delmis aM MD  Last diagnosis associated with med order: 1. Acquired hypothyroidism  - levothyroxine (SYNTHROID, LEVOTHROID) 150 MCG tablet [Pharmacy Med Name: LEVOTHYROXINE 150 MCG TABLET]; TAKE 1 TABLET BY MOUTH EVERY DAY  Dispense: 90 tablet; Refill: 0    If protocol passes may refill for 12 months if within 3 months of last provider visit (or a total of 15 months).

## 2021-06-07 NOTE — TELEPHONE ENCOUNTER
CSA 6/25/2019  No future appt scheduled with [facility] med    Controlled Substance Refill Request  Medication Name:   Requested Prescriptions     Pending Prescriptions Disp Refills     LORazepam (ATIVAN) 1 MG tablet [Pharmacy Med Name: LORAZEPAM 1 MG TABLET] 30 tablet 0     Sig: TAKE 1 TABLET (1 MG TOTAL) BY MOUTH EVERY EVENING.     Date Last Fill: 1/27/20  [facility]ed [facility]  Submit electronically to pharmacy  Controlled Substance Agreement on file:           Last relevant office visit:  2/25/19

## 2021-06-08 NOTE — TELEPHONE ENCOUNTER
Called patient to follow up. She states she's still not using NIV. She states quarantine is getting to her as she's not doing anything that she should be doing. Patient states she will try, however, at this time, she's not using it. She understands she needs to use NIV. No supplies needed at this time. Wants a follow up every other month. Informed her of my leave at the end of the month, new RT to follow up to establish care.    DX: CRF WITH HYPOXIA AND HYPERCAPNIA, ILD    SETUP DATE: 9/3/19  DEVICE TYPE:  ASTRAL  SETTINGS (include mode): ST/SV ; IPAP 15, EPAP 8, RR 16  COMFORT SETTINGS: I-TIME 0.2-1.5 SEC, CYCLE 20%, RISE 200, TRIGGER HIGH  INTERFACE:  AIRTOUCH F20 SMALL FFM  CIRCUIT/HUMIDITY: PT HAS HUMIDITY, BUT HAS NOT USED IT  ALARMS: NO BREATH 60 SEC  O2 BLEED IN (YES/NO): 8-10LPM O2 THROUGH [facility]

## 2021-06-08 NOTE — PROGRESS NOTES
PAIN CENTER PROGRESS NOTE    Subjective:   Catherine Sharp is a 41 y.o. female who presents for evaluation of multi-site pain secondary to osteoporosis and pathologic fractures as a result of SLE treated with chronic steroids.  She has multiple co-morbidities including Interstitial lung disease, MERLIN, Antiphospholipid Antibody Syndrome, cardiomyopathy, OA in multiple sites, and recurrent hospitalizations.  Pain has been present for years and current treatment plan includes OxyContin, oxycodone, intermittent PT/OT.    Major issues:  1. Bilateral shoulder tendinopathy    2. Opioid dependence, continuous    3. Chronic pain syndrome    4. Compression fracture of spine, sequela      Pain location and description: 5/10 constant sharp throbbing, stabbing lower back pain, bilateral shoulders. Function rated 7.  Last visit pan rated 6/10.   Radiation of pain: Denies  Paresthesias, numbness, weakness: Denies  Gait disturbance: wheelchair for outings.  Denies recent falls.  Exacerbating factors: activity, sleeping on side without moving at night, right arm movements, morning time.  Alleviating factors: Rest, pain medications  Associated symptoms: Sedentary lifestyle, obesity, oxygen dependent, depression  Adverse effects of medications:  Uses senna at home for intermittent constipation.  Intermittent pruritus from opioids, takes benadryl.  Current treatment efficacy: Fair. Taking OxyContin 40 mg in the morning with pain relief within 1 hour.  She states she wakes with pain 8-9/10 and reduces to 6/10. Taking oxycodone 30 mg 1 every 4 hours prn since hospitalization, has not tolerated reduction in daily dose.   Current treatment compliance: Good.  She has failed Lyrica, Gabapentin, Cymbalta, Fentanyl, Morphine, Vicodin, codeine in the past.  She has [facility] PT/OT and RN services in past, not currently needed.  Has declined consult with Dr. Morales for vertebroplasty status post compression fracture.  Pill count  "10/16/15 appropriate.    She states since last seen no hospitalizations or ED visits.    She has been referred to PT for her bilateral shoulder symptoms by Dr. Berrios, has yet to attend.  She states today she would like orthopedic evaluation first prior to PT.  She has not started PT, no injections, denies imaging of shoulders; was told it was \"frozen shoulder.\"    She states she is very stiff and sore in the morning.  Mornings difficult for her until 10-11 am.  Change from OxyContin 30 mg to 40 mg in the morning was effective and well tolerated.  She is not interested in dosing OxyContin every 12 hours as she has concerns about respiratory depression and also cost as her current copay is over $100 per month. Discussed pharmacogenetic testing in past visits as she has been on high dose opioids in the past and failed numerous medication trials.  Hospitalizations and ED visits have been markedly decreased over the past year.     She is taking prednisone 5 mg daily, attempting slow decrease.  She lost 10 pounds which she is trying to be more active so instead of having mom  things at the store she is attempting to do more. She is supposed to travel to [place] this weekend with her  to attend hockey.  She states she is able to travel in the car with frequent stops.    She is workng with Young Carey with [facility] through Dr. Berrios office.  No recent changes to medications.    Review of Systems  Constitutional: MERLIN, chronic O2. Denies fever, chills, night sweats, lethargy, weight loss  Musculoskeletal: Positive for back pain, shoulder joint pain, joint swelling  Gastrointestional: Positive for intermittent nausea, vomiting, GERD, abdominal pain.  Denies difficulty swallowing, change in appetite, constipation, diarrhea, fecal incontinence.  Genitourinary: Denies urinary incontinence, dysuria, hematuria, UTI, frequency, hesitancy, change in libido  Neurologic: Headaches.  Denies confusion, seizure, weakness, " "changes in balance, changes in speech.  Psychiatric: Positive depression, anxiety.  Denies memory loss, psychoses, suicidal ideation, substance use/abuse.     Objective:     Vitals:    01/12/17 1320   BP: 135/89   Pulse: (!) 110   Resp: 16   Weight: 212 lb 4.8 oz (96.3 kg)   Height: 4' 10\" (1.473 m)   PainSc:   5     Physical Exam  Constitutional- General appearance: Abnormal.  Well developed, uncomfortable, obese, appears older than stated age.  Presents alone today.   Psychiatric- Judgment and insight: Normal.  Speech: Normal rhythm.  Thought process: Normal.  No abnormal thoughts reported. Alert & Oriented to person, place, and time.  Recent and remote memory: Normal.  Mood and affect: Normal.  Observed mood: Appropriate    Respiratory- Using O2 by nasal cannula.  Normal rhythm and rate.  Cardiovascular- Extremities warm and well perfused, no peripheral edema or varicosities.  Dermatologic- Exposed skin is clean, dry, and intact to inspection and palpation.   Musculoskeletal- Gait and station: Ambulating with walker today, stooped.  Able to sit to stand with difficulty.    *Opioid [place] Precautions:    UDS/Swab - collected today  Opioid Consent -  due  Opioid Agreement - 12/23/15, due  Pharmacy- as documented    [place]  Reviewed - 10/14/16  Pill Count - n/a  Psychological evaluation - n/a  MME - 330  Pharmacogenetic testing - n/a    Assessment:   Catherine Sharp is a 41 y.o. female seen in clinic today for pain in her spine due to history of pathologic compression fractures due to steroids to treat SLE.  She is having improved pain relief with oxycodone 30 mg every 4 hours prn, however we would prefer this to be temporary dosing and reduction as tolerated.  She continues OxyContin 40 mg for extended pain relief in the morning but reports she cannot take this every 8-12 hours as it causes more difficulty with SOB at increased frequency.  She is currently working with her pulmonologist on evaluation for CPAP " use and also oxygen tank need.  She understands the risks with opioids and respiratory depression.  Strongly recommend pharmacogenetic testing for guidance in opioid therapies.  She is also encouraged today to schedule with [facility] for shoulder evaluation and treatment, she is requesting orthopedic consult first and has been to [facility] in the past.    Plan:   Prescribed 30 days of OxyContin 40 mg a day - take in the morning. printed to fill today for use on 1/16/17.   Prescribed oxycodone 30 mg 1 every 4 hours maximum for pain as needed. Printed to fill today for use on 1/16/17.  Take Vistaril 25 mg 1 tablet for itching as needed, refill sent to pharmacy.  Do not take this with benadryl.  May cause drowsiness.  UDS/SWAB:  Patient required a random Urine Drug Screen, due to the need to comply with [place] [employer] Policy Guidelines for the use of any controlled substances. This is to ensure that patient is compliant with treatment, and to diminish diversion, abuse, or any other aberrant behaviors. Patient is either being considered for or taking a controlled substance. Unexpected findings will be discussed and treatment decision may be adjusted.   Recommend orthopedic evaluation of bilateral shoulders- call [facility] [phone] to make an appointment  Follow-up in 8 weeks with Elizabet.    Elizabet Palmer PA-C  [facility]  [address] [place] [address]  [place] [address]  Ph: [phone]  Fax: [phone]

## 2021-06-08 NOTE — TELEPHONE ENCOUNTER
Epic will not let me send this prescription electronically to this pharmacy because it is listed as being closed.  I am not in clinic to sign the prescription.  Does patient want it sent to another pharmacy temporarily?

## 2021-06-08 NOTE — PATIENT INSTRUCTIONS - HE
Prescribed 28 days of OxyContin 40 mg in the morning.  Hold for any respiratory or sedation concerns.    Prescribed oxycodone 30 mg 1 every 4 hours maximum for pain as needed.   Printed to fill 05/18/20 to start on 05/19/20 & 06/15/20 to start on 06/16/20.   will  in office on 05/15/20.  Please wear a mask and stop at the barrier to get directions.  Continue with rheumatology and pulmonology recommendations   Discussed ketamine today as a trial in between doses of oxycodone or if helpful to help reduce dosing of oxycodone due to tolerance and risks.  Information given with prescriptions to be picked up tomorrow and reviewed.  Follow-up in 8 weeks with Elizabet.

## 2021-06-08 NOTE — TELEPHONE ENCOUNTER
Controlled Substance Refill Request  Medication Name:   Requested Prescriptions     Pending Prescriptions Disp Refills     LORazepam (ATIVAN) 1 MG tablet 30 tablet 0     Sig: Take 1 tablet (1 mg total) by mouth every evening.     Date Last Fill: 3/26/2020  [facility]  Submit electronically to pharmacy  Controlled Substance Agreement on file:   Encounter-Level CSA Scan Date - 06/25/2019:    Scan on 6/26/2019  8:45 AM by Meghna Tubbs: [facility] NARCOTIC AGREEMENT        Last office visit:  7/24/2019

## 2021-06-08 NOTE — PROGRESS NOTES
Video Start Time: 11:35 AM    Additional provider notes:     Additional Provider Notes:  Major issues:  1. Chronic pain syndrome    2. Chronic, continuous use of opioids    3. Systemic lupus erythematosus, unspecified SLE type, unspecified organ involvement status (H)    4. Compression fracture of thoracic vertebra, unspecified thoracic vertebral level, sequela    5. Spinal stenosis of lumbar region without neurogenic claudication    6. Opioid dependence, continuous (H)      Pain location and description: 5/10 intermittent to constant sharp, stabbing pain in back and joints. Function rated 4.   Radiation of pain: Denies.    Gait disturbance: wheelchair for outings.  Denies recent falls.  Exacerbating factors: Activity over 15 minutes.    Alleviating factors: Rest, pain medications, activity less than 15 minutes as laying too long increases pain.  Associated symptoms: Pain at night, numbness & weakness all over but not changed.  Denies bowel or bladder incontinence, fever, chills, unexplained weight loss.  Functional symptoms: See CMA notes  Adverse effects of medications:  Uses sennakot 2 tablets at home for intermittent constipation.  Hasn't used miralax but used to.  She bought generic laxative.  Intermittent pruritus from opioids, takes benadryl.    Current treatment efficacy: Fair. Taking OxyContin 40 mg in the morning with pain relief within 1 hour.  She states she wakes with pain 8-9/10 and reduces to 6/10.  She states pain reduction in the morning is 50% or more.  Taking oxycodone 30 mg 1 every 4 hours prn has not tolerated reduction in daily dose. She states a dose will last 3 hours at most.    Current treatment compliance: Good.  She has failed Lyrica, Gabapentin, Cymbalta, Fentanyl, Morphine, Vicodin, codeine in the past.  She has [facility] PT/OT and RN services in past, not currently needed.  Uses daily pop out dosing for medications.  Reports taking medication as prescribed.       Since the  "last Pain Center visit, the patient continues warfarin. States she has not had her factor 10 lab in almost 1 year, will check on that.  Denies any new diagnostic testing, new treatments or new medical conditions, any changes in medications, or any ED/urgent care visits.  Patient denies the chance of being pregnant or wanting to become pregnant.  Pain has been same, seems like lower back hasn't been bothering her as bad. She reports it is still there but not as frequent.  She states she has been feeling weaker as she is not doing much because of COVID19.  She states she hasn't gone anywhere except she has ridden with her  on errands.    She states with her pain symptoms she is stiff in the morning for longer and also wonders if weather is contributing.  She states her joints pain is primarily in knees and fingers; she states fingers are always swollen in the morning.  She is taking prednisone 4-5 mg daily.  She tries to base dose on symptoms or try every other day taking a lesser dose.  Will see rheumatologist as scheduled in July.        She had virtual pulmonology visit with Dr. Bearden on 04/16/20:    \"Overall her breathing is doing pretty well.  She feels like the primary reason she is doing better is because she is very careful with her Lasix and not missing any doses, keeping very close track of her fluid status.  She is enrolled in a clinical trial [facility] of inhaled prostacyclin.  She is unsure if this is helping or not.     She is not currently using her nighttime ventilator.  This is somewhat related to logistics-very complex changing of oxygen tubing if she has to take her mask off.  She is speaking with respiratory therapy to see if there is a way to change the  and make this easier.     She is being very careful with isolation precautions during this coronavirus pandemic.     Plan  Continue oxygen  Continue noninvasive ventilation  Continue follow-up with pulmonary " "hypertension clinic\"     She had virtual visit with Yenni Harris endocrinology on 04/28/20:  \"  Assessment      1. Other osteoporosis without current pathological fracture         Plan      Pt will remain on Prolia injections. She will get another Dexa Scan this year, as well as current Vit D and Calcium levels. F/u with me in 1 year\"    She denies any questions for today's visit.  She prefers that her  picks up her prescriptions as she does not want them sent electronically.  Discuss visitor restrictions with SHAQUILLE; he denies any symptoms.      She asks about her sister who has fibromyalgia and EDS; is planning to have her PCP refer to pain center for a consult.    Review of Systems  Constitutional: Denies fever, chills, lethargy.  Has MERLIN, chronic O2. Denies night sweats, weight loss  Musculoskeletal: Positive for mid and lower back pain, joint pain/swelling, denies recent fall.  Denies neck pain.  Gastrointestional: Positive for intermittent nausea, vomiting, GERD, abdominal pain, constipation.  Denies difficulty swallowing, change in appetite, diarrhea, fecal incontinence.  Genitourinary: Denies urinary incontinence, dysuria, hematuria, UTI, frequency, hesitancy, change in libido  Neurologic: Headaches.  Denies confusion, seizure, weakness, changes in balance, changes in speech.  Psychiatric: Positive depression, anxiety.  Denies memory loss, psychoses, suicidal ideation, substance use/abuse.     Objective:     Physical Exam  Constitutional- General appearance: Normal.  Well developed, comfortable, obese, appears older than stated age.  Presents alone today.   Psychiatric- Judgment and insight: Normal.  Speech: Normal rhythm.  Thought process: Normal.  No abnormal thoughts reported. Alert & Oriented to person, place, and time.  Recent and remote memory: Normal.  Mood and affect: appropriate.  Respiratory- Using O2 by nasal cannula today.  Normal rhythm and rate.  Cardiovascular- Extremities warm and " well perfused, no peripheral edema or varicosities.  Dermatologic- Exposed skin is clean, dry, and intact to inspection.   Musculoskeletal- Gait and station: Seated for entire visit.     *Opioid [place] Precautions:    UDS/Swab - 04/12/19, results as expected.  Deferred due to virtual visit.  Opioid Consent -  06/25/19   Opioid Agreement - 06/25/19  Pharmacy- as documented    [place]  Reviewed - 05/14/20 as expected  Pill Count - Pill count 10/16/15 appropriate.    Psychological evaluation - n/a  MME - 330  Pharmacogenetic testing - n/a  VARGAS 12/04/19    Assessment:   Catherine Sharp is a 44 y.o. female seen in clinic today for pain in her spine due to history of pathologic compression fractures due to steroids to treat SLE.  Lumbar MRI demonstrates spinal canal stenosis L3-4, disc bulging at L3-4 and L5-S1, and mild to moderate facet arthropathy throughout in addition to multiple chronic compression deformities.  She had 30% pain relief with LESI and Initial left LMBB was not helpful.  She continues to have pain in thoracic spine, history of chronic compression fractures.  She is reporting joint stiffness and swelling primarily in the morning.  She is having stable pain relief with oxycodone 30 mg every 4 hours prn, have discussed dosing outside of CDC guidelines.  She reports pain relief is 3 hours or less and we discuss ketamine maria esther trial for in between opioid doses and with goal of seeing if this would reduce her daily opioid dose. She continues OxyContin 40 mg for extended pain relief in the morning but reports she cannot take this every 12 hours as it causes more difficulty with SOB at increased frequency.  She understands the risks with opioids and respiratory depression.  We have reviewed medical cannabis program but cannot participate now due to cost and also the research trial she is participating in.  She has not started CBD oil. Recent trial of duloxetine caused nausea.      Plan:   Prescribed 28 days  of OxyContin 40 mg in the morning.  Hold for any respiratory or sedation concerns.    Prescribed oxycodone 30 mg 1 every 4 hours maximum for pain as needed.   Printed to fill 05/18/20 to start on 05/19/20 & 06/15/20 to start on 06/16/20.   will  in office on 05/15/20.  Please wear a mask and stop at the barrier to get directions.  Continue with rheumatology and pulmonology recommendations   Discussed ketamine today as a trial in between doses of oxycodone or if helpful to help reduce dosing of oxycodone due to tolerance and risks.  Follow-up in 8 weeks with [facility]  [address]  [place] [address]  Ph: [phone]  Fax: [phone]    Video-Visit Details    Type of service:  Video Visit    Video End Time (time video stopped): 11:59 AM  Originating Location (pt. Location): Home    Distant Location (provider location):  [facility]     Platform [facility] for Video Visit: [facility]      Elizabet Palmer PA-C

## 2021-06-09 NOTE — PATIENT INSTRUCTIONS - HE
Prescribed 28 days of OxyContin 40 mg in the morning.  Hold for any respiratory or sedation concerns.    Prescribed oxycodone 30 mg 1 every 4 hours maximum for pain as needed.   Printed to fill 07/13/20 & 08/10/20  Continue with rheumatology and pulmonology recommendations   Discussed ketamine as a future consideration, no changes today.  Follow-up in 8 weeks with Elizabet in clinic

## 2021-06-09 NOTE — TELEPHONE ENCOUNTER
RN cannot approve Refill Request    RN can NOT refill this medication med is not covered by policy/route to provider. Last office visit: Visit date not found Last Physical: Visit date not found Last MTM visit: Visit date not found Last visit same specialty: Visit date not found.  Next visit within 3 mo: Visit date not found  Next physical within 3 mo: Visit date not found      Lizeth Moreno, [facility] Triage/Med Refill 7/1/2020    Requested Prescriptions   Pending Prescriptions Disp Refills     warfarin ANTICOAGULANT (COUMADIN/JANTOVEN) 5 MG tablet [Pharmacy Med Name: WARFARIN SODIUM 5 MG TABLET] 135 tablet 3     Sig: TAKE 1-1.5 TABS BY MOUTH DAILY. CURRENTLY 1.5 TABS TUE/THUR & 1 TAB REST OF WEEK. ADJUST PER INR       Warfarin Refill Protocol  Failed - 7/1/2020  1:47 PM        Failed -  Route to appropriate pool/provider     Last Anticoagulation Summary:           Passed - Provider visit in last year     Last office visit with prescriber/PCP: Visit date not found OR same dept: Visit date not found OR same specialty: Visit date not found  Last physical: Visit date not found Last MTM visit: Visit date not found    Next appt within 3 mo: Visit date not found Next physical within 3 mo: Visit date not found  Prescriber OR PCP: Анна Sevilla MD  Last diagnosis associated with med order: 1. Antiphospholipid Antibody Syndrome  - warfarin ANTICOAGULANT (COUMADIN/JANTOVEN) 5 MG tablet [Pharmacy Med Name: WARFARIN SODIUM 5 MG TABLET]; TAKE 1-1.5 TABS BY MOUTH DAILY. CURRENTLY 1.5 TABS TUE/THUR & 1 TAB REST OF WEEK. ADJUST PER INR  Dispense: 135 tablet; Refill: 3    If protocol passes may refill for 6 months if within 3 months of last provider visit (or a total of 9 months).

## 2021-06-09 NOTE — PROGRESS NOTES
PAIN CENTER PROGRESS NOTE    Subjective:   Catherine Sharp is a 41 y.o. female who presents for evaluation of multi-site pain secondary to osteoporosis and pathologic fractures as a result of SLE treated with chronic steroids.  She has multiple co-morbidities including Interstitial lung disease, MERLIN, Antiphospholipid Antibody Syndrome, cardiomyopathy, OA in multiple sites, and recurrent hospitalizations.  Pain has been present for years and current treatment plan includes OxyContin, oxycodone, intermittent PT/OT.    Major issues:  1. Chronic pain syndrome    2. Opioid dependence, continuous      Pain location and description: 7/10 constant stabbing lower back pain, bilateral shoulders. Function rated 7.  Last visit pan rated 5/10.   Radiation of pain: Denies  Paresthesias, numbness, weakness: Denies  Gait disturbance: wheelchair for outings.  Denies recent falls.  Exacerbating factors: activity, arm movements, morning time.  Alleviating factors: Rest, pain medications  Associated symptoms: Sedentary lifestyle, obesity, oxygen dependent, depression  Adverse effects of medications:  Uses senna at home for intermittent constipation.  Intermittent pruritus from opioids, takes benadryl.  Current treatment efficacy: Fair. Taking OxyContin 40 mg in the morning with pain relief within 1 hour.  She states she wakes with pain 8-9/10 and reduces to 6/10. Taking oxycodone 30 mg 1 every 4 hours prn since hospitalization, has not tolerated reduction in daily dose.   Current treatment compliance: Good.  She has failed Lyrica, Gabapentin, Cymbalta, Fentanyl, Morphine, Vicodin, codeine in the past.  She has [facility] PT/OT and RN services in past, not currently needed.  Has declined consult with Dr. Morales for vertebroplasty status post compression fracture.  Pill count 10/16/15 appropriate.  Uses daily pop out dosing for medications.  She reports today that she is one day short of medications and cannot figure it out  "as she has only taken as prescribed and always keeps in the same location.    She states since last seen no hospitalizations or ED visits.  She has had pneumonia a couple times since last seen treated with antibiotics and oral steroids following Dr. Berrios.  She also has pulmonologist.    She has been referred to PT for her bilateral shoulder symptoms by Dr. Berrios, has yet to attend.  She states today she would like orthopedic evaluation first prior to PT.  She has not started PT, no injections, denies imaging of shoulders; was told it was \"frozen shoulder.\"  Tomorrow is scheduled with Dr. Jerome for orthopedic assessment of her back symptoms. She wants to see if anything more for treatment options.  She states she also wants to work on posture in physical therapy.       She is workng with Young Carey with [facility] through Dr. Berrios office.  No recent changes to medications.    Review of Systems  Constitutional: MERLIN, chronic O2. Denies fever, chills, night sweats, lethargy, weight loss  Musculoskeletal: Positive for back pain, shoulder joint pain, joint swelling  Gastrointestional: Positive for intermittent nausea, vomiting, GERD, abdominal pain.  Denies difficulty swallowing, change in appetite, constipation, diarrhea, fecal incontinence.  Genitourinary: Denies urinary incontinence, dysuria, hematuria, UTI, frequency, hesitancy, change in libido  Neurologic: Headaches.  Denies confusion, seizure, weakness, changes in balance, changes in speech.  Psychiatric: Positive depression, anxiety.  Denies memory loss, psychoses, suicidal ideation, substance use/abuse.     Objective:     Vitals:    03/14/17 1055   BP: (!) 129/95   Pulse: (!) 104   Resp: 16   Weight: 202 lb (91.6 kg)   Height: 4' 10\" (1.473 m)   PainSc:   7     Physical Exam  Constitutional- General appearance: Abnormal.  Well developed, uncomfortable, obese, appears older than stated age.  Presents alone today.   Psychiatric- Judgment and insight: " Normal.  Speech: Normal rhythm.  Thought process: Normal.  No abnormal thoughts reported. Alert & Oriented to person, place, and time.  Recent and remote memory: Normal.  Mood and affect: Normal.  Observed mood: Appropriate    Respiratory- Using O2 by nasal cannula.  Normal rhythm and rate.  Cardiovascular- Extremities warm and well perfused, no peripheral edema or varicosities.  Dermatologic- Exposed skin is clean, dry, and intact to inspection and palpation.   Musculoskeletal- Gait and station: Ambulating independently today, stooped.  Able to sit to stand with difficulty.    *Opioid Wheeler Precautions:    UDS/Swab - Reviewed from 01/12/17 as expected  Opioid Consent -  due  Opioid Agreement - 03/14/17  Pharmacy- as documented    [place]  Reviewed - 03/14/17 as expected  Pill Count - n/a  Psychological evaluation - n/a  MME - 330  Pharmacogenetic testing - n/a    Assessment:   Catherine Sharp is a 41 y.o. female seen in clinic today for pain in her spine due to history of pathologic compression fractures due to steroids to treat SLE.  She is having improved pain relief with oxycodone 30 mg every 4 hours prn, however we would prefer this to be temporary dosing and reduction as tolerated.  She continues OxyContin 40 mg for extended pain relief in the morning but reports she cannot take this every 12 hours as it causes more difficulty with SOB at increased frequency.  She is currently working with her pulmonologist for CPAP use and also oxygen tank need.  She understands the risks with opioids and respiratory depression.  Strongly recommend pharmacogenetic testing for guidance in opioid therapies.  She is also encouraged today to schedule with [facility] for shoulder evaluation and treatment, she is requesting orthopedic consult first and scheduled with [facility] 03/15/17.    Plan:   Prescribed 30 days of OxyContin 40 mg a day - take in the morning. printed to fill today and 04/13/17  Prescribed  oxycodone 30 mg 1 every 4 hours maximum for pain as needed. Printed to fill today and 04/13/17  Take Vistaril 25 mg 1 tablet for itching as needed, refill sent to pharmacy.  Do not take this with benadryl.  May cause drowsiness.  Scheduled for orthopedic evaluation with Dr. Jerome for back and possibly shoulder consult in the future - please sign release so we can see the office notes and recommendations.  Opioid agreement renewed today   Follow-up in 8 weeks with Elizabet.    Elizabet Palmer PA-C  [facility]  [address] [place] [address]. [address]  [place] [address]  Ph: [phone]  Fax: [phone]

## 2021-06-09 NOTE — TELEPHONE ENCOUNTER
Spoke with Charito in the lab  Patient is now requesting an order for thyroid labs are placed.    Can you add this on to her lab draw from today?

## 2021-06-09 NOTE — TELEPHONE ENCOUNTER
----- Message from Michelle Montana RN sent at 7/20/2020  1:42 PM CDT -----  Pt missed her [facility] appt, please reschedule/

## 2021-06-09 NOTE — PROGRESS NOTES
Assessment/Plan:        Diagnoses and all orders for this visit:    Chronic respiratory failure with hypoxia  -     Check Pulse Oximetry while ambulating    ILD (interstitial lung disease)  Based on her pathology and clinical history, I do not think that Catherine has recurrent exacerbations of ILD.  Her pathology on lung biopsy was bland.  Dyspnea should be treated as heart failure - with diuresis and positive pressure ventilation.    No changes for now.    Continue with same oxygen plan.    Keep it up with physical activity.  10-15 minutes once daily EVERY day of aerobic activity (walking, stationary bike).    We can consider a consult with a pulmonary hypertension specialist to see if there are any other options for treatment.      Pulmonary Hypertension - changes in blood flow through your lungs that can cause oxygen problems.        Subjective:    Patient ID: Catherine Sharp is a 41 y.o. female.    HPI Comments: 41F with ILD, SLE, respiratory failure here for follow-up.    ILD: She has not had any flares.  She has not been on any prednisone for her lungs in approximately 2 years.     Chronic hypoxic respiratory failure: She is on 4-6 L/min as needed for oxygen.  She is very hopeful to do okay unless.  She is not any problems with her oxygen company.    Chronic diastolic heart failure: After taking quite a bit of time she is now able to titrate and manage her diuretics and is going to the bathroom last.  She has better control her volume status that she used to.      --- from previous  41F w/ ILD, SLE, respiratory failure here for follow up.    She was hospitalized with sudden respiratory failure in July.  She improved a lot with abx, bipap, and diuresis.    She has been more compliant with her lasix.  She has been more active and recently changed to portable concentrator and this has helped.    During her hospitalization she was on BIPAP and had significant hypercarbia.  She would like to be evaluated for  hypoventilation.      --- from previous  40F w/ ILD, SLE, respiratory failure here for follow up.    ILD - clinically stable, no hemoptysis.  No changes in breathing recently    SLE- just getting started on MMF.  Currently having a flare primarily in joints.    Respiratory failure - chronic hypoxic - currently on 5 lpm.  On 4 lpm at rest with sats in 90s.      --- from previous  40F w/ ILD, SLE, respiratory failure here for follow up.  Multiple problems with stress fractures. Started on vitamin D.    Her breathing has slowly been getting worse.  She has more concerns about her sleep and is interested in getting this follow up.  She has some leg swelling.      She has known gross hypoventilation with multiple hypercarbic ABGs.    STOP BANG 4/8, ESS 4.  ---- from previous    39F w/ history of ILD and normal TTE but problems with persistent fluid retention.  She was admitted with dyspnea recently and it resolved entirely with diuresis and no prednisone.  She is getting much better with managing her diuretics and weighing herself daily.          Review of Systems positive for activity change chills sweating fatigue sinus pressure mouth sores dyspnea wheezing nausea light sensitivity heat intolerance thirst joint pain joint swelling easy bruising headaches anxiousness and sleep disturbance.  The remainder of her 14 system review of systems was negative.  Current Outpatient Prescriptions on File Prior to Visit   Medication Sig Dispense Refill     albuterol (PROVENTIL HFA;VENTOLIN HFA) 90 mcg/actuation inhaler Inhale 2 puffs every 6 (six) hours as needed for wheezing. 1 Inhaler 6     albuterol (PROVENTIL) 2.5 mg /3 mL (0.083 %) nebulizer solution Take 3 mL (2.5 mg total) by nebulization 4 (four) times a day as needed for wheezing or shortness of breath. 75 mL 11     alum/mag hydrox-simethicone-diphenhydramine-lidocaine (MAGIC MOUTHWASH) suspension Swish and swallow 5 mL 4 (four) times a day as needed. Swish and swallow 5mL  "3-4 times daily as needed. Standard prep: Diphenhydramine+Sucralfate+Mylanta       BD INSULIN PEN NEEDLE UF MINI 31 gauge x 3/16\" Ndle        biotin 2,500 mcg cap Take 2,500 mcg by mouth every evening.        calcium, as carbonate, (TUMS) 200 mg calcium (500 mg) chewable tablet Chew 1 tablet as needed.        cholecalciferol, vitamin D3, 1,000 unit tablet Take 1,000 Units by mouth daily.       diltiazem (CARDIZEM CD) 240 MG 24 hr capsule Take 1 capsule (240 mg total) by mouth daily. 90 capsule 3     FERROUS SULFATE (SLOW FE ORAL) Take 65 mg by mouth daily.       furosemide (LASIX) 40 MG tablet Take 1 tablet (40 mg total) by mouth 2 (two) times a day at 9am and 6pm. 60 tablet 11     hydrOXYzine (ATARAX) 25 MG tablet Take 1 tablet (25 mg total) by mouth daily. 90 tablet 0     ipratropium-albuterol (DUO-NEB) 0.5-2.5 mg/3 mL nebulizer Take 3 mL by nebulization 4 (four) times a day. 40 vial 0     levothyroxine (SYNTHROID) 125 MCG tablet Take 1 tablet (125 mcg total) by mouth daily. 30 tablet 11     levothyroxine (SYNTHROID, LEVOTHROID) 125 MCG tablet TAKE 1 TABLET(125 MCG) BY MOUTH DAILY 90 tablet 2     LORazepam (ATIVAN) 1 MG tablet Take 1 tablet (1 mg total) by mouth once daily. 30 tablet 1     nystatin (MYCOSTATIN) powder Apply to affected area 3 times daily 60 g 1     [START ON 4/13/2017] oxyCODONE (ROXICODONE) 30 MG immediate release tablet Take 1 tablet (30 mg total) by mouth every 4 (four) hours as needed for pain. 180 tablet 0     [START ON 4/13/2017] OXYCONTIN 40 mg 12 hr tablet Take 1 tablet (40 mg total) by mouth every morning. 30 tablet 0     predniSONE (DELTASONE) 1 MG tablet Take 5 tablets (5 mg total) by mouth daily. 450 tablet 0     warfarin (COUMADIN) 5 MG tablet Take 1 tablet (5 mg total) by mouth See Admin Instructions. Adjust dose based on lab results as directed. 30 tablet 11     hydroxychloroquine (PLAQUENIL) 200 mg tablet Take 1 tablet (200 mg total) by mouth 2 (two) times a day. 180 tablet 0     " predniSONE (DELTASONE) 20 MG tablet Take 1 tablet (20 mg total) by mouth 2 (two) times a day for 5 days. 10 tablet 0     No current facility-administered medications on file prior to visit.        /90  Pulse 100  Resp 24  Wt 204 lb 11.2 oz (92.9 kg)  SpO2 90% Comment: 4 LPM  BMI 42.78 kg/m2          Objective:    Physical Exam   Constitutional: She is oriented to person, place, and time. She appears well-developed and well-nourished.   HENT:   Head: Normocephalic and atraumatic.   Nose: Nose normal.   Mouth/Throat: Oropharynx is clear and moist. No oropharyngeal exudate.   Eyes: Right eye exhibits no discharge. Left eye exhibits no discharge. No scleral icterus.   Cardiovascular: Normal rate and regular rhythm.    Pulmonary/Chest: No respiratory distress. She has rales.   Musculoskeletal: She exhibits no edema.   Neurological: She is alert and oriented to person, place, and time. Coordination normal.   Skin: Skin is warm and dry. No rash noted. No erythema.   Psychiatric: She has a normal mood and affect. Her behavior is normal. Judgment and thought content normal.     /90  Pulse 100  Resp 24  Wt 204 lb 11.2 oz (92.9 kg)  SpO2 90% Comment: 4 LPM  BMI 42.78 kg/m2              Medical History  Active Ambulatory (Non-Hospital) Problems    Diagnosis     Chronic respiratory failure with hypercapnia     Abnormal CXR     Cardiomyopathy     Pneumonia     Chronic diastolic heart failure     Polyarthralgia     Trigger ring finger, right     Bilateral shoulder tendinopathy     Snoring     Hypercarbia     Acute on chronic respiratory failure with hypercapnia     ALFONSO (acute kidney injury)     Encephalopathy acute     Respiratory failure with hypoxia     Chronic pain     Intractable nausea and vomiting     Abdominal pain     History of DVT (deep vein thrombosis)     Respiratory failure, acute-on-chronic     Acute respiratory failure     Hyperthyroidism     Restrictive lung disease due to kyphoscoliosis      ILD (interstitial lung disease)     Coagulopathy     Acute on chronic diastolic congestive heart failure     GERD (gastroesophageal reflux disease)     Opiate dependence     Gastroparesis     Hypertension     Osteoporosis     Anemia     Generalized anxiety disorder     Antiphospholipid Antibody Syndrome     Pulmonary Embolism     Peripheral Neuropathy     Systemic lupus erythematosus     Adhesive pericarditis     Regional enteritis     Deep phlebothrombosis, antepartum, with delivery     Past Medical History:   Diagnosis Date     Antiphospholipid antibody with hypercoagulable state      Cervical compression fracture 2009     Chronic hypercapnic respiratory failure 12/22/2014     Chronic pain      Congestive heart failure      Depression      Gastroparesis      GERD (gastroesophageal reflux disease)      Hypertension      Lupus (systemic lupus erythematosus)      Obesity      MERLIN (obstructive sleep apnea) 1/30/2015     Osteoporosis      Peripheral neuropathy      Recurrent Clostridium difficile diarrhea      Seizures               Allergies  Allergies   Allergen Reactions     Hydrocodone-Acetaminophen Swelling     Throat swelling (tolerates oxycodone)  - Acetaminophen causes vomiting per patient/RN, 12/2014 tolerated hydromorphone with hospitalization     Protonix [Pantoprazole] Hives     Per pt  Per pt     Codeine Nausea Only     abd pain       Fentanyl Nausea And Vomiting and Unknown       Tolerated drip 9/28/15- pt states just nausea at times      Morphine Hives, Nausea And Vomiting and Other (See Comments)     Pt states nausea     Morphine Hcl Itching and Nausea And Vomiting     Pt states she has tolerated in past- just nausea and vomiting at times                              Data Review - imaging, labs, and ekgs listed below were reviewed by me.  CXR and CT images and EKG tracings interpreted personally.     Past Labs  @LASTLABV(<SYNTAX> error)@  Past Imaging  Xr Chest Pa And Lateral    Result Date:  2/24/2017  XR CHEST PA AND LATERAL2/23/2017 1:21 PMINDICATION: Cough for 3 days.  Restricted lung disease; uses O2.COMPARISON: 07/11/2016.FINDINGS: Stable bilateral small lung volumes. Compared to the previous exam there is less interstitial and pulmonary edema. There is scarring and/or atelectasis involving both the right middle lobe and the lingula. There is some patchy opacity in the lingula which is consistent with pneumonia. Normal heart size. No pulmonary vascular congestion.This report was electronically interpreted by: Dr. Damon Prieto MD ON 02/24/2017 at 08:03        CBC:   Lab Results   Component Value Date    WBC 8.1 01/09/2017    WBC 6.9 09/27/2015    RBC 5.04 01/09/2017     BMP:   Lab Results   Component Value Date    CO2 36 (H) 01/09/2017    BUN 14 01/09/2017    CREATININE 0.76 01/09/2017    CALCIUM 9.2 01/09/2017     Coagulation:   Lab Results   Component Value Date    INR 1.52 (H) 01/09/2017     Cardiac markers:   Lab Results   Component Value Date    CKMB 3 10/11/2014     ABGs:   Lab Results   Component Value Date    PH 7.39 03/30/2010         Total time was >25 minutes in which greater than 50% of the time was spent on face to face time with patient, patient care coordination and review of chart

## 2021-06-09 NOTE — PROGRESS NOTES
Video Start Time: 0124 PM    Additional provider notes:     Additional Provider Notes:  Major issues:  1. Chronic pain syndrome    2. Chronic, continuous use of opioids    3. Systemic lupus erythematosus, unspecified SLE type, unspecified organ involvement status (H)    4. Compression fracture of thoracic vertebra, unspecified thoracic vertebral level, sequela    5. Spinal stenosis of lumbar region without neurogenic claudication    6. Opioid dependence, continuous (H)      Pain location and description: 6/10 constant sharp, stabbing, burning pain in back and joints. Function rated 4.   Radiation of pain: Denies.    Gait disturbance: wheelchair for outings.  Denies recent falls.  Exacerbating factors: Activity over 15 minutes.    Alleviating factors: Rest, pain medications, activity less than 15 minutes as laying too long increases pain.  Associated symptoms: Pain at night, numbness & weakness all over but not changed.  Denies bowel or bladder incontinence, fever, chills, unexplained weight loss.  Functional symptoms: See CMA notes  Adverse effects of medications:  Uses sennakot 2 tablets at home for intermittent constipation.  Hasn't used miralax but used to.  She bought generic laxative.  Intermittent pruritus from opioids, takes benadryl.    Current treatment efficacy: Fair. Taking OxyContin 40 mg in the morning with pain relief within 1 hour.  She states she wakes with pain 8-9/10 and reduces to 6/10.  She states pain reduction in the morning is 50% or more.  Taking oxycodone 30 mg 1 every 4 hours prn has not tolerated reduction in daily dose. She states a dose will last 3 hours at most.    Current treatment compliance: Good.  She has failed Lyrica, Gabapentin, Cymbalta, Fentanyl, Morphine, Vicodin, codeine in the past.  She has [facility] PT/OT and RN services in past, not currently needed.  Uses daily pop out dosing for medications.  Reports taking medication as prescribed.       Since the last  Pain Center visit, the patient continues warfarin. Recently completed factor 10 lab. Denies any new diagnostic testing, new treatments or new medical conditions, any changes in medications, or any ED/urgent care visits. Patient denies the chance of being pregnant or wanting to become pregnant.  Pain has been stable and no changes.  She is trying to get up and walk more outside and this activity causes more pain afterwards depending on her activity.  She is averaging 10-15 minutes at a time, she has a walker and she has her oxygen tank on the walker.  No recent falls or injuries. She reports her knees are painful but not pain in the legs from her lower back.      She states her pain is not waking her up at night    Lungs condition is stable, has had televisits with pulmonologist.  Continues O2 24 hours a day and ranges from 6-12 L.      She denies any questions for today's visit.  She prefers that her  picks up her prescriptions as she does not want them sent electronically.      Review of Systems  Constitutional: Denies fever, chills, lethargy.  Has MERLIN, chronic O2. Denies night sweats, weight loss  Musculoskeletal: Positive for mid and lower back pain, joint pain/swelling, denies recent fall.  Denies neck pain.  Gastrointestional: Positive for intermittent nausea, vomiting, GERD, abdominal pain, constipation.  Denies difficulty swallowing, change in appetite, diarrhea, fecal incontinence.  Genitourinary: Denies urinary incontinence, dysuria, hematuria, UTI, frequency, hesitancy, change in libido  Neurologic: Headaches.  Denies confusion, seizure, weakness, changes in balance, changes in speech.  Psychiatric: Positive depression, anxiety.  Denies memory loss, psychoses, suicidal ideation, substance use/abuse.     Objective:     Physical Exam  Constitutional- General appearance: Normal.  Well developed, comfortable, obese, appears older than stated age.  Presents alone today.   Psychiatric- Judgment and  insight: Normal.  Speech: Normal rhythm.  Thought process: Normal.  No abnormal thoughts reported. Alert & Oriented to person, place, and time.  Recent and remote memory: Normal.  Mood and affect: appropriate.  Respiratory- Using O2 by nasal cannula today.  Normal rhythm and rate.  Cardiovascular- Extremities warm and well perfused, no peripheral edema or varicosities.  Dermatologic- Exposed skin is clean, dry, and intact to inspection.   Musculoskeletal- Gait and station: Seated for entire visit.     *Opioid [place] Precautions:    UDT - 04/12/19, results as expected.  Deferred due to virtual visit.  Opioid Consent -  06/25/19   Opioid Agreement - 06/25/19  Pharmacy- as documented    [place]  Reviewed - 07/07/20 as expected  Pill Count - Pill count 10/16/15 appropriate.    Psychological evaluation - n/a  MME - 330  Pharmacogenetic testing - n/a  VARGAS 12/04/19    Assessment:   Catherine Sharp is a 44 y.o. female seen in clinic today for pain in her spine due to history of pathologic compression fractures due to steroids to treat SLE.  Lumbar MRI demonstrates spinal canal stenosis L3-4, disc bulging at L3-4 and L5-S1, and mild to moderate facet arthropathy throughout in addition to multiple chronic compression deformities.  She had 30% pain relief with LESI and Initial left LMBB was not helpful.  She continues to have pain in thoracic spine, history of chronic compression fractures.   She is having stable pain relief with oxycodone 30 mg every 4 hours prn, have discussed dosing outside of CDC guidelines.  She reports pain relief is 3 hours or less and we discuss ketamine maria esther trial for in between opioid doses and with goal of seeing if this would reduce her daily opioid dose; she doesn't want to start at this time. She continues OxyContin 40 mg for extended pain relief in the morning but reports she cannot take this every 12 hours as it causes more difficulty with SOB at increased frequency.  She understands the  risks with opioids and respiratory depression.  We have reviewed medical cannabis program but cannot participate now due to cost and also the research trial she is participating in.     Plan:   Prescribed 28 days of OxyContin 40 mg in the morning.  Hold for any respiratory or sedation concerns.    Prescribed oxycodone 30 mg 1 every 4 hours maximum for pain as needed.   Printed to fill 07/13/20 & 08/10/20  Continue with rheumatology and pulmonology recommendations   Discussed ketamine as a future consideration, no changes today.  Follow-up in 8 weeks with Elizabet in clinic     URIAH Barrera [facility]  [address]  [place] [address]  Ph: [phone]  Fax: [phone]    Video-Visit Details    Type of service:  Video Visit    Video End Time (time video stopped): 0138 PM  Originating Location (pt. Location): Home    Distant Location (provider location):  [facility]     Platform used for [facility] Visit[facility]      Elizabet Palmer PA-C

## 2021-06-09 NOTE — PROGRESS NOTES
Patient oxygen saturation on RA at rest is 82%.    Patient oxygen saturation on 3 LPM resting is 87%.    Patient oxygen saturation on 4 LPM resting is 93%    After ambulating 150ft on 4LPM oxygen saturation is 86%.    After ambulating 150ft on 6LPM oxygen saturation is 89-90%.    Patient is ambulatory within his/her home.

## 2021-06-09 NOTE — PROGRESS NOTES
"Catherine Sharp is a 44 y.o. female who is being evaluated via a billable video visit.      The patient has been notified of following:     \"This video visit will be conducted via a call between you and your physician/provider. We have found that certain health care needs can be provided without the need for an in-person physical exam.  This service lets us provide the care you need with a video conversation.  If a prescription is necessary we can send it directly to your pharmacy.  If lab work is needed we can place an order for that and you can then stop by our lab to have the test done at a later time.    Video visits are billed at different rates depending on your insurance coverage. Please reach out to your insurance provider with any questions.    If during the course of the call the physician/provider feels a video visit is not appropriate, you will not be charged for this service.\"    Patient has given verbal consent to a Video visit? Yes  How would you like to obtain your AVS? AVS Preference: MyChart.  If dropped by the video visit, the video invitation should be sent to: Text to cell phone: [phone]  Will anyone else be joining your video visit? No          Video-Visit Details    Type of service:  Video Visit    Originating Location (pt. Location): Home    Distant Location (provider location):  [facility]     Platform used for Video Visit: Doximity      ASSESSMENT AND PLAN:    Diagnoses and all orders for this visit:    Systemic lupus erythematosus, unspecified SLE type, unspecified organ involvement status (H)  -     Albumin; Standing  -     ALT (SGPT); Standing  -     HM2(CBC w/o Differential); Standing  -     Creatinine; Standing  -     hydrOXYchloroQUINE (PLAQUENIL) 200 mg tablet; Take 1 tablet (200 mg total) by mouth daily.  Dispense: 90 tablet; Refill: 0  -     predniSONE (DELTASONE) 1 MG tablet; Take 4 mg by mouth daily TAKE 5 TABLET BY MOUTH EVERY DAY.  Dispense: 360 tablet; " Refill: 0    ILD (interstitial lung disease) (H)    Other osteoporosis without current pathological fracture    High risk medication use    Antiphospholipid Antibody Syndrome          HISTORY OF PRESENTING ILLNESS:  Catherine Sharp 44 y.o. is evaluated here via video link.  This is for follow-up.  This is for systemic lupus erythematosus.  This is gone on for several years.  This manifested.  Clinical findings including oral ulceration, inflammatory arthropathy, she had features suggestive of serositis.  Immunologically she was positive for KRISTOPHER, double-stranded DNA antibodies.  She has a background of antiphospholipid syndrome.  She is on anticoagulation indefinitely.  She has had interstitial lung disease.  She has been on prednisone for the past 3 decades plus we had had discussion about tapering it down.  When she first came here she was on 10 mg now she ranges between 4 to 5 mg daily.  She is on hydroxychloroquine she is to continue that.  All in all it seems that her current regimen is controlling her symptoms well.  Reminded of eye examination.  Follow-up in 3 months or sooner.       . ROS enquiry held for fever, ocular symptoms, rash, headache,  GI issues.  Today we also discussed the issues related to the current pandemic, the pros and cons of the current treatment plan, the CDC guidelines such as social distancing washing the hands covering the cough.  ALLERGIES:Hydrocodone-acetaminophen and Protonix [pantoprazole]    PAST MEDICAL/ACTIVE PROBLEMS/MEDICATION/SOCIAL DATA  Past Medical History:   Diagnosis Date     Acute respiratory failure (H) 1/13/2015     Adhesive pericarditis 4/12/2007     Antiphospholipid antibody with hypercoagulable state (H)     Secondary to lupus     Cervical compression fracture (H) 2009    closed, with spinal cord injury C1-C4 - prednisone induced     Chest pain with normal coronary angiography 2010     Chronic pain     on opiates     Chronic respiratory failure with hypoxia and  hypercapnia (H)      Congestive heart failure (H)     Diastolic, Cor Pulmonale, EF 60% 2014     Deep phlebothrombosis, antepartum, with delivery (H) 4/12/2007     Depression      Gastroparesis     causing nausea/vomiting with recurrent admissions     GERD (gastroesophageal reflux disease)      History of DVT (deep vein thrombosis) 1/22/2015     Hypercarbia 9/26/2015     Hypertension      Kidney infection      Kidney stone      Lupus (systemic lupus erythematosus) (H)     complicated by joint and pulmonary involvement     Obesity      Osteoporosis      Peripheral neuropathy      Pulmonary emboli (H)     Created by Conversion      Pulmonary hypertension due to left ventricular diastolic dysfunction (H)     PASP 50-55 mmHg estimated echocardiographically and LVEDP 26 in 2010 but that was during Takotsubo event     Recurrent Clostridium difficile diarrhea      Respiratory failure (H) 4/13/2017     Seizures (H)     once due to high BP, hypertensive encephalopathy     Snoring 12/3/2015     Stress-induced cardiomyopathy 05/07/2010     Trigger ring finger, right 4/20/2016     Replacement Utility updated for latest IMO load     Social History     Tobacco Use   Smoking Status Former Smoker     Packs/day: 0.50     Years: 10.00     Pack years: 5.00     Types: Cigarettes     Last attempt to quit: 7/29/2009     Years since quitting: 10.9   Smokeless Tobacco Never Used     Patient Active Problem List   Diagnosis     Hypertension     Osteoporosis     Anemia     Generalized anxiety disorder     Antiphospholipid Antibody Syndrome     Peripheral Neuropathy     Systemic lupus erythematosus (H)     GERD (gastroesophageal reflux disease)     Opiate dependence (H)     Gastroparesis     Coagulopathy (H)     ILD (interstitial lung disease) (H)     Restrictive lung disease due to kyphoscoliosis     Regional enteritis (H)     Hyperthyroidism     Bilateral shoulder tendinopathy     Polyarthralgia     Chronic diastolic heart failure (H)      Pulmonary hypertension (H)     Chronic pain syndrome     High risk medication use     Pulmonary arterial hypertension (H)     Hypoxia     Chronic respiratory failure with hypoxia and hypercapnia (H)     Muscle pain     Dyspnea on exertion     Current Outpatient Medications   Medication Sig Dispense Refill     albuterol (PROAIR HFA;PROVENTIL HFA;VENTOLIN HFA) 90 mcg/actuation inhaler Inhale 2 puffs every 6 (six) hours as needed for wheezing. 1 Inhaler 6     albuterol (PROVENTIL) 2.5 mg /3 mL (0.083 %) nebulizer solution Take 3 mL (2.5 mg total) by nebulization 4 (four) times a day as needed for wheezing or shortness of breath. 75 mL 11     calcium, as carbonate, (OS-JEFFERSON) 500 mg calcium (1,250 mg) tablet Take 1 tablet by mouth daily.       cholecalciferol, vitamin D3, 1,000 unit tablet Take 1,000 Units by mouth daily.        DILT- mg 24 hr capsule TAKE 1 CAPSULE BY MOUTH EVERY DAY 90 capsule 1     FERROUS SULFATE (SLOW FE ORAL) Take 65 mg by mouth daily.       furosemide (LASIX) 20 MG tablet Take 60 mg in am and 40 mg in pm       guaiFENesin ER (MUCINEX) 600 mg 12 hr tablet Take 1 tablet (600 mg total) by mouth 2 (two) times a day.  0     hydroxychloroquine (PLAQUENIL) 200 mg tablet Take 1 tablet (200 mg total) by mouth daily. 90 tablet 0     levothyroxine (SYNTHROID, LEVOTHROID) 150 MCG tablet TAKE 1 TABLET BY MOUTH EVERY DAY 90 tablet 3     LORazepam (ATIVAN) 1 MG tablet Take 1 tablet (1 mg total) by mouth every evening. 30 tablet 0     naloxone (NARCAN) 4 mg/actuation nasal spray 1 spray (4 mg dose) into one nostril for opioid reversal. Call 911. May repeat if no response in 3 minutes. 1 Box 0     nystatin (MYCOSTATIN) powder Apply 1 application topically 3 (three) times a day as needed. 15 g 5     oxyCODONE (ROXICODONE) 30 MG immediate release tablet Take 1 tablet (30 mg total) by mouth every 4 (four) hours as needed for pain. 168 tablet 0     [START ON 8/10/2020] oxyCODONE (ROXICODONE) 30 MG immediate  release tablet Take 1 tablet (30 mg total) by mouth every 4 (four) hours as needed for pain. 168 tablet 0     OXYCONTIN 40 mg 12 hr tablet Take 1 tablet (40 mg total) by mouth every morning. 28 tablet 0     [START ON 8/10/2020] OXYCONTIN 40 mg 12 hr tablet Take 1 tablet (40 mg total) by mouth every morning. 28 tablet 0     potassium chloride (K-DUR,KLOR-CON) 20 MEQ tablet Take 20 mEq by mouth daily.              predniSONE (DELTASONE) 1 MG tablet TAKE 5 TABLET BY MOUTH EVERY DAY. (Patient taking differently: 4 mg TAKE 5 TABLET BY MOUTH EVERY DAY.) 150 tablet 0     senna-docusate (SENNOSIDES-DOCUSATE SODIUM) 8.6-50 mg tablet Take 1 tablet by mouth 2 (two) times a day.  0     UNABLE TO FIND Med Name: study - treprostinil, IDS# [id number], 0.6 MG/ML neb solution       warfarin ANTICOAGULANT (COUMADIN/JANTOVEN) 5 MG tablet TAKE 1-1.5 TABS BY MOUTH DAILY. CURRENTLY 1.5 TABS TUE/THUR & 1 TAB REST OF WEEK. ADJUST PER  tablet 3     Current Facility-Administered Medications   Medication Dose Route Frequency Provider Last Rate Last Dose     denosumab 60 mg (PROLIA 60 mg/ml)  60 mg Subcutaneous Q6 Months Anirudh Alcaraz MD   60 mg at 05/15/19 1236     denosumab 60 mg (PROLIA 60 mg/ml)  60 mg Subcutaneous Q6 Months Yenni Harris, NP             EXAMINATION:    Using the audio and video link as best as possible the constitutional, neck, neurologic, psych, skin, both upper extremities areas/organ system were evaluated during this assessment.  Some of the important findings: No Maller area eruption, no periorbital heliotrope.  She is able to flex the digits.      LAB / IMAGING DATA:  ALT   Date Value Ref Range Status   01/23/2020 37 0 - 45 U/L Final   06/12/2019 39 0 - 45 U/L Final   10/29/2018 9 0 - 45 U/L Final     Albumin   Date Value Ref Range Status   01/23/2020 3.5 3.5 - 5.0 g/dL Final   06/12/2019 3.6 3.5 - 5.0 g/dL Final   10/29/2018 2.8 (L) 3.5 - 5.0 g/dL Final     Creatinine   Date Value Ref Range Status    01/23/2020 0.74 0.60 - 1.10 mg/dL Final   06/12/2019 0.76 0.60 - 1.10 mg/dL Final   05/24/2019 0.80 0.60 - 1.10 mg/dL Final       WBC   Date Value Ref Range Status   01/23/2020 7.1 4.0 - 11.0 thou/uL Final   06/12/2019 10.2 4.0 - 11.0 thou/uL Final   09/27/2015 6.9 4.0 - 11.0 thou/uL Final   08/23/2015 5.8 4.0 - 11.0 thou/uL Final     Hemoglobin   Date Value Ref Range Status   01/23/2020 15.5 12.0 - 16.0 g/dL Final   06/12/2019 16.1 (H) 12.0 - 16.0 g/dL Final   05/24/2019 15.4 12.0 - 16.0 g/dL Final     Platelets   Date Value Ref Range Status   01/23/2020 152 140 - 440 thou/uL Final   06/12/2019 162 140 - 440 thou/uL Final   05/24/2019 179 140 - 440 thou/uL Final       Lab Results   Component Value Date    RF <15.0 07/10/2014    SEDRATE 42 (H) 01/31/2018     Duration of the call:9  Minutes  Call start: 1218  pm  Call end:   1227pm

## 2021-06-09 NOTE — PROGRESS NOTES
"Catherine Sharp is a 44 y.o. female who is being evaluated via a billable video visit.      The patient has been notified of following:     \"This video visit will be conducted via a call between you and your physician/provider. We have found that certain health care needs can be provided without the need for an in-person physical exam.  This service lets us provide the care you need with a video conversation.  If a prescription is necessary we can send it directly to your pharmacy.  If lab work is needed we can place an order for that and you can then stop by our lab to have the test done at a later time. Video visits are billed at different rates depending on your insurance coverage. Please reach out to your insurance provider with any questions.\"    Patient has given verbal consent to a Video visit? Yes  How would you like to obtain your AVS? My chart  Patient would like the video invitation sent by: mobile  Will anyone else be joining your video visit? No     Pain score---6  Constant   What does your pain feel like: sharp, stabbing, burning  Does the pain interfere with:  Work: no  Walking: yes  Sleep: yes  Daily activities: yes  Relationships: yes  F= 4    Flores Iyer CMA    "

## 2021-06-09 NOTE — PROGRESS NOTES
[facility],  no answer. Will try again later.     Arianne Randle CMA [facility] Rheumatology 7/23/2020 11:00 AM

## 2021-06-09 NOTE — TELEPHONE ENCOUNTER
Date: 8/10/2020 Status: [facility]   Time: 2:00 PM Length: 15   Visit Type: NURSE VISIT [[id number]] Copay: $0.00   Provider: [facility]

## 2021-06-09 NOTE — PROGRESS NOTES
Catherine is a 41 y.o. female presenting to the clinic for concerns for cold symptoms for 4 days.  Patient complains of sinus congestion, postnasal drainage, productive cough, chest tightness, wheezing, headache.  She denies stomachache, nausea, vomiting.  She has been taking over-the-counter Mucinex.  Patient does have restrictive airway disease and has been using her albuterol nebulizer every 6 hours and her Ventolin inhaler every time she ambulates.  Her mother-in-law and  are ill.  Patient also has lupus and antiphospholipid syndrome.  She is taking warfarin.    Review of Systems: A complete 14 point review of systems was obtained and is negative or as stated in the history of present illness.    Social History     Social History     Marital status:      Spouse name: N/A     Number of children: N/A     Years of education: N/A     Occupational History     Not on file.     Social History Main Topics     Smoking status: Former Smoker     Packs/day: 0.50     Years: 10.00     Types: Cigarettes     Quit date: 7/29/2009     Smokeless tobacco: Never Used     Alcohol use No     Drug use: Yes     Special: Oxycodone     Sexual activity: Yes     Partners: Male      Comment: Christina, in 2016     Other Topics Concern     Not on file     Social History Narrative    Lives with family. Lives in [place].       Active Ambulatory Problems     Diagnosis Date Noted     Hypertension      Osteoporosis      Anemia      Generalized anxiety disorder      Antiphospholipid Antibody Syndrome      Pulmonary Embolism      Peripheral Neuropathy      Systemic lupus erythematosus      GERD (gastroesophageal reflux disease)      Opiate dependence      Gastroparesis      Acute on chronic diastolic congestive heart failure 08/19/2014     Coagulopathy 10/03/2014     ILD (interstitial lung disease) 10/13/2014     Restrictive lung disease due to kyphoscoliosis 12/01/2014     Adhesive pericarditis 04/12/2007     Regional enteritis  04/12/2007     Deep phlebothrombosis, antepartum, with delivery 04/12/2007     Hyperthyroidism 12/12/2014     Acute respiratory failure 01/13/2015     Respiratory failure, acute-on-chronic 01/21/2015     History of DVT (deep vein thrombosis) 01/22/2015     Abdominal pain 02/13/2015     Intractable nausea and vomiting 07/14/2015     Chronic pain 07/15/2015     Hypercarbia 09/26/2015     Acute on chronic respiratory failure with hypercapnia 09/26/2015     ALFONSO (acute kidney injury) 09/26/2015     Encephalopathy acute 09/26/2015     Respiratory failure with hypoxia 09/26/2015     Snoring 12/03/2015     Bilateral shoulder tendinopathy 03/14/2016     Polyarthralgia 04/20/2016     Trigger ring finger, right 04/20/2016     Chronic diastolic heart failure 04/27/2016     Pneumonia 07/11/2016     Abnormal CXR      Cardiomyopathy      Chronic respiratory failure with hypercapnia      Resolved Ambulatory Problems     Diagnosis Date Noted     Meralgia paresthetica      Closed Cervical Compression Fracture C1-C4 Level With Spinal Cord Injury      Chronic Colitis      Herpes Zoster (Shingles)      Lung Mass      Chronic Obstructive Pulmonary Disease      Hypovolemia 11/13/2014     History of DVT (deep vein thrombosis) 12/15/2014     Chronic hypercapnic respiratory failure 12/22/2014     Shortness of breath 03/15/2015     Hypotension 04/12/2015     COPD exacerbation 05/07/2015     COPD (chronic obstructive pulmonary disease) 05/29/2015     Acute respiratory failure 05/29/2015     Non compliance w medication regimen 05/29/2015     Drug withdrawal 07/17/2012     Pain 07/14/2015     Past Medical History:   Diagnosis Date     Antiphospholipid antibody with hypercoagulable state      Cervical compression fracture 2009     Chronic hypercapnic respiratory failure 12/22/2014     Chronic pain      Congestive heart failure      Depression      Gastroparesis      GERD (gastroesophageal reflux disease)      Hypertension      Lupus (systemic  "lupus erythematosus)      Obesity      MERLIN (obstructive sleep apnea) 1/30/2015     Osteoporosis      Peripheral neuropathy      Recurrent Clostridium difficile diarrhea      Seizures        Family History   Problem Relation Age of Onset     Asthma Mother      Hypertension Mother      Stroke Mother      Clotting disorder Mother      Hypertension Father      Stroke Father      Stroke Maternal Aunt      Mental illness Sister      Stroke Sister      Clotting disorder Sister        OBJECTIVE:     Visit Vitals     /66 (Patient Site: Left Arm, Patient Position: Sitting, Cuff Size: Adult Large)     Pulse 96     Temp 98.4  F (36.9  C)     Ht 4' 10\" (1.473 m)     Wt 209 lb (94.8 kg)     SpO2 92%     BMI 43.68 kg/m2       Patient is alert, in no obvious distress.   Skin: Warm, dry.  No lesions or rashes.  Skin turgor rapid return.   HEENT:  Head normocephalic, atraumatic.  Eyes normal. Ears normal.  Nose patent, mucosa red.  Oropharynx mucosa pink.  No lesions or tonsillar enlargement.   Neck: Supple, no lymphadenopathy.  Lungs:  Expiratory wheezing heard throughout the lungs. Respirations even and unlabored.  No rhonchi or rales noted. She is using O2.   Heart:  Regular rate and rhythm.  No murmurs..      LABORATORY: I ordered and personally reviewed a chest x-ray showing no obvious infiltrate.  Will have radiology review.    Influenza A and B ordered and are negative.    ASSESSMENT AND PLAN:     1. Acute bronchitis, unspecified organism  Due to her being immunocompromised, we'll treat with Z-Vasu, take as directed. Educated on its indications and side effects. Discussed symptomatic treatment.   - azithromycin (ZITHROMAX Z-VASU) 250 MG tablet; Take 2 tablets (500 mg) on  Day 1,  followed by 1 tablet (250 mg) once daily on Days 2 through 5.  Dispense: 6 tablet; Refill: 0    2. Wheezing  Patient takes prednisone 5 mg daily.  Patient will hold this and take prednisone 20 mg twice daily for 5 days.  She will then resume her " daily dose of prednisone.  She will follow with Dr. Berrios's symptoms persist or worsen.  She is aware that these medications interact with warfarin. Will check Factor 10 assay.    - predniSONE (DELTASONE) 20 MG tablet; Take 1 tablet (20 mg total) by mouth 2 (two) times a day for 5 days.  Dispense: 10 tablet; Refill: 0    3. Cough  - Influenza A/B Rapid Test  - XR Chest PA and Lateral; Future

## 2021-06-09 NOTE — TELEPHONE ENCOUNTER
New Appointment Needed  What is the reason for the visit:    Same Date/Next Day Appt Request  What is the reason for your visit?: Labs- standing orders     Provider Preference: [facility] only did not want to go anywhere else  How soon do you need to be seen?: today  Waitlist offered?: No  Okay to leave a detailed message:  Yes

## 2021-06-09 NOTE — TELEPHONE ENCOUNTER
Refill Approved    Rx renewed per Medication Renewal Policy. Medication was last renewed on 6/27/2019.    Tata Landaverde, [facility] Triage/Med Refill 6/29/2020     Requested Prescriptions   Pending Prescriptions Disp Refills     DILT- mg 24 hr capsule [Pharmacy Med Name: DILT  MG CAPSULE] 90 capsule 3     Sig: TAKE 1 CAPSULE BY MOUTH EVERY DAY       Calcium-Channel Blockers Protocol Passed - 6/28/2020  5:07 PM        Passed - PCP or prescribing provider visit in past 12 months or next 3 months     Last office visit with prescriber/PCP: 7/24/2019 Delmis Ma MD OR same dept: 7/24/2019 Delmis Ma MD OR same specialty: 7/24/2019 Delmis Ma MD  Last physical: Visit date not found Last MTM visit: 6/28/2016 Amina Carey, PharmD   Next visit within 3 mo: Visit date not found  Next physical within 3 mo: Visit date not found  Prescriber OR PCP: Delmis Ma MD  Last diagnosis associated with med order: 1. Hypertension, unspecified type  - DILT- mg 24 hr capsule [Pharmacy Med Name: DILT  MG CAPSULE]; TAKE 1 CAPSULE BY MOUTH EVERY DAY  Dispense: 90 capsule; Refill: 3    If protocol passes may refill for 12 months if within 3 months of last provider visit (or a total of 15 months).             Passed - Blood pressure filed in past 12 months     BP Readings from Last 1 Encounters:   01/29/20 134/77

## 2021-06-10 NOTE — TELEPHONE ENCOUNTER
Controlled Substance Refill Request  Medication Name:   Requested Prescriptions     Pending Prescriptions Disp Refills     LORazepam (ATIVAN) 1 MG tablet [Pharmacy Med Name: LORAZEPAM 1 MG TABLET] 30 tablet 0     Sig: TAKE 1 TABLET BY MOUTH EVERYDAY AT BEDTIME     nystatin (MYCOSTATIN) powder [Pharmacy Med Name: NYSTATIN 100,000 UNIT/GM POWD] 15 g 5     Sig: APPLY 1 APPLICATION TOPICALLY 3 (THREE) TIMES A DAY AS NEEDED.     Date Last Fill: 6/12/20  Requested Pharmacy: [facility]  Submit electronically to pharmacy  Controlled Substance Agreement on file:   Encounter-Level CSA Scan Date - 06/25/2019:    Scan on 6/26/2019  8:45 AM by Meghna Tubbs: [facility] NARCOTIC AGREEMENT        Last office visit:  7/24/19        Provider Refill Request  Medication:  nystatin  RN can NOT refill this medication per RN refill protocol because med is not covered by policy/route to provider

## 2021-06-10 NOTE — PROGRESS NOTES
PAIN CENTER PROGRESS NOTE    Subjective:   Catherine Sharp is a 41 y.o. female who presents for evaluation of multi-site pain secondary to osteoporosis and pathologic fractures as a result of SLE treated with chronic steroids.  She has multiple co-morbidities including Interstitial lung disease, MERLIN, Antiphospholipid Antibody Syndrome, cardiomyopathy, OA in multiple sites, and recurrent hospitalizations.  Pain has been present for years and current treatment plan includes OxyContin, oxycodone, intermittent PT/OT.    Major issues:  1. Chronic pain syndrome    2. Opioid dependence, continuous    3. Compression fracture of thoracic vertebra, sequela      Pain location and description: 5/10 constant sharp mid and lower back pain, bilateral shoulders. Function rated 7.  Last visit pan rated 7/10.   Radiation of pain: Denies  Paresthesias, numbness, weakness: Denies  Gait disturbance: wheelchair for outings.  Denies recent falls.  Exacerbating factors: activity, arm movements, morning time.  Alleviating factors: Rest, pain medications  Associated symptoms: Sedentary lifestyle, obesity, oxygen dependent, depression  Adverse effects of medications:  Uses senna at home for intermittent constipation.  Intermittent pruritus from opioids, takes benadryl.  Current treatment efficacy: Fair. Taking OxyContin 40 mg in the morning with pain relief within 1 hour.  She states she wakes with pain 8-9/10 and reduces to 6/10. Taking oxycodone 30 mg 1 every 4 hours prn since hospitalization, has not tolerated reduction in daily dose.   Current treatment compliance: Good.  She has failed Lyrica, Gabapentin, Cymbalta, Fentanyl, Morphine, Vicodin, codeine in the past.  She has [facility] PT/OT and RN services in past, not currently needed.  Has declined consult with Dr. Morales for vertebroplasty status post compression fracture.  Pill count 10/16/15 appropriate.  Uses daily pop out dosing for medications.  Reports taking  "medication as prescribed.      She states she did have a hospitalization 17-04/15/17 for intractable nausea and vomiting.  She continues to be frustrated for her experiences inpatient due to her chronic opioid use and being accused of self-escalating doses, drug seeking, withdrawal, etc. She states she was ready to leave AMA from the ICU as she wasn't getting her chronic dose of opioids.  She states her phsyician asked to search her purse.  She allowed this to happen as she stated she had nothing to hide.  She states she was given her doses after this discussion and no changes to opioid doses upon discharge.  She did have reduction in Lasix dose to 40 mg daily instead of 80 mg daily.   She states she never asks for more opioids while in the ED, she is scared about the possibility that her pain may be high at one point and wouldn't be treated and her dad  of a stroke, it runs in her family.    She has been referred to PT for her bilateral shoulder symptoms by Dr. Berrios, has yet to attend.  She states today she would like orthopedic evaluation first prior to PT.  She has not started PT, no injections, denies imaging of shoulders; was told it was \"frozen shoulder.\"   She states she also wants to work on posture in physical therapy.   Reviewed consult with Dr. Jose David Jerome 3/15/17: \"Assessment is multiple compression fractures healed and medically stable.  Plan discussion of spine deformity and that patient is not a candidate for brace because it will cause more difficulty with breathing. No treatment is indicated.  See follow-up as needed.\"  She reports this visit as a waste of time.  She wonders if she is a candidate for anything further for her back such as vertebroplasty.    She is workng with Young Carey with MTM through Dr. Berrios office.  No recent changes to medications.    Review of Systems  Constitutional: MERLIN, chronic O2. Denies fever, chills, night sweats, lethargy, weight " "loss  Musculoskeletal: Positive for back pain, shoulder joint pain, joint swelling  Gastrointestional: Positive for intermittent nausea, vomiting, GERD, abdominal pain.  Denies difficulty swallowing, change in appetite, constipation, diarrhea, fecal incontinence.  Genitourinary: Denies urinary incontinence, dysuria, hematuria, UTI, frequency, hesitancy, change in libido  Neurologic: Headaches.  Denies confusion, seizure, weakness, changes in balance, changes in speech.  Psychiatric: Positive depression, anxiety.  Denies memory loss, psychoses, suicidal ideation, substance use/abuse.     Objective:     Vitals:    05/11/17 1305   BP: 123/89   Pulse: (!) 109   Resp: 16   Weight: 194 lb (88 kg)   Height: 4' 10\" (1.473 m)   PainSc:   5     Physical Exam  Constitutional- General appearance: Abnormal.  Well developed, uncomfortable, obese, appears older than stated age.  Presents alone today.   Psychiatric- Judgment and insight: Normal.  Speech: Normal rhythm.  Thought process: Normal.  No abnormal thoughts reported. Alert & Oriented to person, place, and time.  Recent and remote memory: Normal.  Mood and affect: Normal.  Observed mood: Appropriate    Respiratory- Using O2 by nasal cannula.  Normal rhythm and rate.  Cardiovascular- Extremities warm and well perfused, no peripheral edema or varicosities.  Dermatologic- Exposed skin is clean, dry, and intact to inspection and palpation.   Musculoskeletal- Gait and station: Ambulating independently today, stooped.  Able to sit to stand with difficulty.    *Opioid [place] Precautions:    UDS/Swab - 01/12/17 as expected  Opioid Consent -  due  Opioid Agreement - 03/14/17  Pharmacy- as documented    [place]  Reviewed - 05/11/17 as expected  Pill Count - n/a  Psychological evaluation - n/a  MME - 330  Pharmacogenetic testing - n/a    Imaging:  AP and Lateral Scoliosis films 03/15/17: Increase in thoracic kyphosis due to multiple compression fractures. They are healed, the lumbar " has increased lordosis.    Assessment:   Catherine Sharp is a 41 y.o. female seen in clinic today for pain in her spine due to history of pathologic compression fractures due to steroids to treat SLE.  She is having improved pain relief with oxycodone 30 mg every 4 hours prn, however we would prefer this to be temporary dosing and reduction as tolerated.  She continues OxyContin 40 mg for extended pain relief in the morning but reports she cannot take this every 12 hours as it causes more difficulty with SOB at increased frequency.  She is currently working with her pulmonologist for CPAP use and also oxygen tank need.  She understands the risks with opioids and respiratory depression.  Strongly recommend pharmacogenetic testing for guidance in opioid therapies.  She had recent consult with [facility] and was told nothing more could be done for her spine symptoms.  We have discussed vertebroplasty but I am unsure if she is a candidate based on her chronicity and multiple levels of compression fractures.      Plan:   Prescribed 30 days of OxyContin 40 mg a day - take in the morning. printed to fill today start date 05/13.    Prescribed oxycodone 30 mg 1 every 4 hours maximum for pain as needed. Printed to fill today start date 05/13.  Take Vistaril 25 mg 1 tablet for itching as needed, refill sent to pharmacy.  Do not take this with benadryl.  May cause drowsiness.  I will send staff message to Dr. Berrios regarding inpatient/ED care plan.    Also discussed Vertebroplasty, I am not sure if you are a candidate given length of time of multiple compression fractures so will review with Dr. Valles.  We do not perform this procedure at our pain center.  Follow-up in 4 weeks with Elizabet.    Elizabet Palmer PA-C  [facility]  [address]  [place] [address]  Ph: [phone]  Fax: [phone]

## 2021-06-10 NOTE — PROGRESS NOTES
Assessment:     Catherine was seen today for follow-up.    Diagnoses and all orders for this visit:    Systemic lupus erythematosus    Osteoporosis    ALFONSO (acute kidney injury)    Pulmonary emboli  -     Factor 10 Assay, Chromogenic  -     HM1(CBC and Differential)  -     HM1 (CBC with Diff)    Hypothyroidism  -     Thyroid Stimulating Hormone (TSH)    Vitamin D deficiency  -     Vitamin D, Total (25-Hydroxy)    Healthcare maintenance  -     Lipid Cascade    Restrictive lung disease due to kyphoscoliosis/etiology of lung disease please see pulmonary note    Discussion per pulmonary regarding    Plan:     1. Systemic lupus erythematosus  Patient's underlying disease stems from SLE.  She is currently on 5 mg of prednisone with slow wean on that.  She sees Dr. José at [place] [facility].    2. Osteoporosis  Marked osteoporosis with myself given daily Forteo shots per the osteoporosis clinic.  She has samples of Forteo per Dr. Amin.  When she is exhausted her supply has reached 18 months on the medicine then discussion regarding use of Prolia or other meds needed.  She will need to establish her self at one of the osteoporosis [facility] sites with a new provider.  Patient has multiple compression fractures and consult with [facility] reviewed and discussed.  She has been advised not to entertain the idea of a back brace as it would be to confining and restrictive for her breathing.    3. ALFONSO (acute kidney injury)  Recent acute kidney injury appeared to resolve by time of discharge.  Labs are reviewed.  I do not check a renal panel today.  Encourage adequate hydration    4. Pulmonary emboli  We monitor patient's Coumadin use with this labs stated below.  Our pharmacist assess with recommendations for managing and changing dose.  - Factor 10 Assay, Chromogenic    5.  Hypothyroidism  We will recheck patient's TSH today and adjust her thyroid meds accordingly    6.  Pulmonary disease with  hypoxia  -Questions have arisen as etiology of patient's pulmonary disease.  At one point is been felt secondary to interstitial lung disease another time secondary to COPD another time I have seen it referred to as restrictive lung disease from her kyphosis.  Her lung disease does not respond to steroids so that is usually not used.  Continue with oxygen support  Continue with efforts to increase activity.    This is a 60 minute visit with greater than 50% of the time spent counseling regarding aspects of her recent hospitalization discussed.  Discussion regarding her disappointment at that a back brace was not thought to be helpful per the orthopedic people.  She is other issues that are not listed in her differential diagnosis here but discussed.  Her chronic pain agreement is up-to-date with [facility] pain center and with Elizabet Palmer.  Her involvement in follow-up with the osteoporosis center discussed.  Her pulmonary consultation discussed.  Her migraine headaches have not been bad and she will continue with her Nexplanon implant for birth control.      Subjective:      Duane is a 41 y.o. female presenting to my clinic for follow-up of multiple issues.  Catherine is well-known to me and to our clinic.  She has a diagnosis of juvenile SLE and has had multiple medical problems stemming from this.  She has developed a lung disease thought to be an interstitial lung disease the biopsy was inconclusive.  She sees Dr. Bearden [facility] pulmonary.  She is gone to the osteoporosis center and is worked closely with Dr. Amin for her severe osteoporosis.  Osteoporosis stemming from multiple effects i.e. side effects of long-term steroid use.  Currently she also has hypothyroidism.  She is using Forteo injectable on a daily basis and has samples from Dr. Amin.  She has a maximum 18 months of being able to use this medicine for osteoporosis.    Recently she consulted with [facility] regarding  possible use of surgery or a back brace.  She was seen by Dr. Zafar and that is reviewed today.  He did not think surgery would be tolerated due to her multiple problems.  He also felt that some of her curvature issues with kyphosis and lordosis would be aggravated with effusion.  Dr. Zafar also did not feel a brace would be helpful as it would further exacerbate her lung troubles in a restrictive fashion.  Patient patient is disappointed with this consult as she was hoping for some help.    She has been encouraged for physical therapy but I do not think she has gone at all.  She is being encouraged to get up and active in her home which she is doing more of.  She is living with her mother-in-law and  and very happy with her 's relationship.  They decided not to have children and she has been on Depo-Provera shots for many years and now the Nexplanon implant.  Nexplanon is post to be more favorable for the bones.  There is always questionable risk of exacerbation of blood clots but this has not been the case.    With her lupus anticoagulant she is in a different management protocol for her Coumadin.  We usethe factor X assay for management of her Coumadin and it has an inverse relationship.  Management of her Coumadin is done with the assistance of our [place] pharmacist.    Patient's recent hospitalization for confusion is reviewed.  At one point she was not given her pain medicines and she wanted to leave AMA.  There is some thought that more pain medicines might further suppress her breathing.  Patient has difficulty as she is very sensitive to the fact that she is on chronic narcotic pain medicines for now her fractures but previously she was on it for joint pain from SLE.  Patient is narcotic dependent and has a pain contract with the pain center.  She is carefully managed with this.  At this time I do not see any drug-seeking behavior on her part.      Current Outpatient Prescriptions on File  "Prior to Visit   Medication Sig Dispense Refill     albuterol (PROVENTIL HFA;VENTOLIN HFA) 90 mcg/actuation inhaler Inhale 2 puffs every 6 (six) hours as needed for wheezing. 1 Inhaler 6     albuterol (PROVENTIL) 2.5 mg /3 mL (0.083 %) nebulizer solution Take 3 mL (2.5 mg total) by nebulization 4 (four) times a day as needed for wheezing or shortness of breath. 75 mL 11     alum/mag hydrox-simethicone-diphenhydramine-lidocaine (MAGIC MOUTHWASH) suspension Swish and swallow 5 mL 4 (four) times a day as needed. Swish and swallow 5mL 3-4 times daily as needed. Standard prep: Diphenhydramine+Sucralfate+Mylanta       BD INSULIN PEN NEEDLE UF MINI 31 gauge x 3/16\" Ndle        biotin 2,500 mcg cap Take 2,500 mcg by mouth every evening.        cholecalciferol, vitamin D3, 1,000 unit tablet Take 1,000 Units by mouth daily.       diltiazem (CARDIZEM CD) 240 MG 24 hr capsule Take 1 capsule (240 mg total) by mouth daily. 90 capsule 3     FERROUS SULFATE (SLOW FE ORAL) Take 65 mg by mouth daily.       furosemide (LASIX) 40 MG tablet Take 1 tablet (40 mg total) by mouth daily. 60 tablet 11     hydrOXYzine (ATARAX) 25 MG tablet Take 1 tablet (25 mg total) by mouth daily. 90 tablet 0     ipratropium-albuterol (DUO-NEB) 0.5-2.5 mg/3 mL nebulizer Inhale 3 mL 4 (four) times a day as needed.       levothyroxine (SYNTHROID) 125 MCG tablet Take 1 tablet (125 mcg total) by mouth daily. 30 tablet 11     LORazepam (ATIVAN) 1 MG tablet Take 1 mg by mouth every evening.       nystatin (MYCOSTATIN) powder Apply 1 application topically 3 (three) times a day as needed.       oxyCODONE (ROXICODONE) 30 MG immediate release tablet Take 1 tablet (30 mg total) by mouth every 4 (four) hours as needed for pain. 180 tablet 0     OXYCONTIN 40 mg 12 hr tablet Take 1 tablet (40 mg total) by mouth every morning. 30 tablet 0     predniSONE (DELTASONE) 1 MG tablet Take 5 tablets (5 mg total) by mouth daily. 450 tablet 0     senna-docusate (SENNOSIDES-DOCUSATE " SODIUM) 8.6-50 mg tablet Take 1 tablet by mouth 2 (two) times a day.  0     warfarin (COUMADIN) 5 MG tablet Take 5 mg by mouth daily.       No current facility-administered medications on file prior to visit.      Allergies   Allergen Reactions     Hydrocodone-Acetaminophen Swelling     Throat swelling (tolerates oxycodone)  - Acetaminophen causes vomiting per patient/RN, 12/2014 tolerated hydromorphone with hospitalization     Protonix [Pantoprazole] Hives     Per pt  Per pt     Codeine Nausea Only     abd pain       Fentanyl Nausea And Vomiting and Unknown       Tolerated drip 9/28/15- pt states just nausea at times      Morphine Hives, Nausea And Vomiting and Other (See Comments)     Pt states nausea     Morphine Hcl Itching and Nausea And Vomiting     Pt states she has tolerated in past- just nausea and vomiting at times     Past Medical History:   Diagnosis Date     Antiphospholipid antibody with hypercoagulable state     Secondary to lupus     Cervical compression fracture 2009    closed, with spinal cord injury C1-C4 - prednisone induced     Chronic hypercapnic respiratory failure 12/22/2014     Chronic pain     on opiates     Congestive heart failure     Diastolic, Cor Pulmonale, EF 60% 2014     Depression      Gastroparesis     causing nausea/vomiting with recurrent admissions     GERD (gastroesophageal reflux disease)      Hypertension      Lupus (systemic lupus erythematosus)     complicated by joint and pulmonary involvement     Obesity      MERLIN (obstructive sleep apnea) 1/30/2015    On home bipap     Osteoporosis      Peripheral neuropathy      Recurrent Clostridium difficile diarrhea      Seizures     once due to high BP, hypertensive encephalopathy     Past Surgical History:   Procedure Laterality Date     PICC AND MIDLINE TEAM LINE INSERTION  9/27/2015          ME BIOPSY LUNG/MEDIASTINUM PERCUTANEOUS NEEDLE      Description: Biopsy Lung Percutaneous;  Recorded: 09/15/2011;     ME LAP,CHOLECYSTECTOMY       Description: Cholecystectomy Laparoscopic;  Recorded: 02/15/2008;     THORACOSCOPY Left 1/13/2015    Procedure: LEFT THORACOSCOPY CONVERTED TO THORACOTOMY;  Surgeon: Jose David Granados MD;  Location: [facility];  Service:      THORACOTOMY Left 1/13/2015    Procedure: THORACOTOMY W/ WEDGE RESECTION;  Surgeon: Jose David Granados MD;  Location: [facility];  Service:      Social History     Social History     Marital status:      Spouse name: N/A     Number of children: N/A     Years of education: N/A     Occupational History     Not on file.     Social History Main Topics     Smoking status: Former Smoker     Packs/day: 0.50     Years: 10.00     Types: Cigarettes     Quit date: 7/29/2009     Smokeless tobacco: Never Used     Alcohol use No     Drug use: Yes     Special: Oxycodone     Sexual activity: Yes     Partners: Male      Comment: Christina, in 2016     Other Topics Concern     Not on file     Social History Narrative    Lives with family. Lives in [place].     Family History   Problem Relation Age of Onset     Asthma Mother      Hypertension Mother      Stroke Mother      Clotting disorder Mother      Hypertension Father      Stroke Father      Stroke Maternal Aunt      Mental illness Sister      Stroke Sister      Clotting disorder Sister        ROS:  I have performed a 10 point ROS.  All pertinent positives and negatives are found in the HPI.  All others are negative.    No recent aggravation or fall.  Hospitalization for confusion unclear etiology, though likely hypoxia    Objective:     Physical Exam:  /80 (Patient Site: Right Arm, Patient Position: Sitting, Cuff Size: Adult Regular)  Pulse 100  Wt 201 lb 11.2 oz (91.5 kg)  BMI 42.16 kg/m2   Breathing comfortably and without distress with nasal cannula and 4 to 5 L of oxygen running.  General Appearance: Alert, cooperative, no distress, appears stated age  Head: Normocephalic, without obvious abnormality,  atraumatic  Eyes: PERRL, conjunctiva/corneas clear, EOM's intact  Ears: Normal TM's and external ear canals, both ears  Nose: Nares normal, septum midline,mucosa normal, no drainage  Throat: Lips, mucosa, and tongue normal; teeth and gums normal  Neck: Supple, symmetrical, trachea midline, no adenopathy;  thyroid: not enlarged, symmetric, no tenderness/mass/nodules; no carotid bruit or JVD  Lungs: Clear to auscultation bilaterally, respirations unlabored  Heart: Regular rate and rhythm, S1 and S2 normal, no murmur, rub, or gallop,   Abdomen: Soft, non-tender, bowel sounds active all four quadrants,  no masses, no organomegaly  Back: Moderate kyphosis of upper back and lordosis of lower back.  Multiple sites particularly on the low thoracic area of tenderness  Extremities: Extremities normal, atraumatic, no cyanosis or edema/patient on 5 L of oxygen   skin: Malar facial rash noted  Lymph nodes: Cervical, supraclavicular, and axillary nodes normal  Neurologic: Intact, no focal deficits   Mental status:  Appropriate, Affect normal/patient appears happy no signs of acute depression or anxiety consult with    Labs:  Renal profile documentation in hospital reviewed    Imaging:  All pertinent imaging reviewed  Admission and discharge summary from recent admission reviewed and discussed with patient    [facility] Dr. Zafar reviewed

## 2021-06-10 NOTE — PROGRESS NOTES
ASSESSMENT AND PLAN:  Catherine Sharp 41 y.o. female is here with exacerbation of pain in her left shoulder this is troubled her for the past few weeks consistent with tendinopathy, cervical spondylosis or combination thereof.  X-rays of the shoulder taken show little in the way of degenerative changes of the glenohumeral joint.  She has copious space underneath the acromion.  For her lupus,  positive KRISTOPHER, ds-DNA, SSA antibodies, anti-phospholipid antibodies, DVT, mouth ulcers, pleuritic/pericarditis symptoms, and inflam polyarthritis,  she is on hydroxychloroquine could not tolerate mycophenolate with the GI side effects.  She is cut back gradually to prednisone 5 mg now.  I recommended that she does so for.  She would like to get 5 mg and try if she can get it down to 4 mg a day.  As noted earlier she has been on prednisone since age 15.  With a diastolic failure she still is on supplemental oxygen.  I will ask her to return for follow-up here in 3 months sooner.  I reminded her of the eye exam she is going to make that appointment the next few days.          Diagnoses and all orders for this visit:    Systemic lupus erythematosus  -     XR Shoulders Bilateral 2 Or More Views; Future; Expected date: 5/15/17  -     methylPREDNISolone acetate injection 40 mg (DEPO-MEDROL); Inject 1 mL (40 mg total) into the joint once.  -     predniSONE (DELTASONE) 1 MG tablet; Take 5 tablets (5 mg total) by mouth daily.  Dispense: 450 tablet; Refill: 1  -     hydroxychloroquine (PLAQUENIL) 200 mg tablet; Take 1 tablet (200 mg total) by mouth 2 (two) times a day.  Dispense: 60 tablet; Refill: 6    Bilateral shoulder tendinopathy  -     XR Shoulders Bilateral 2 Or More Views; Future; Expected date: 5/15/17  -     methylPREDNISolone acetate injection 40 mg (DEPO-MEDROL); Inject 1 mL (40 mg total) into the joint once.    SLE (systemic lupus erythematosus related syndrome)      HISTORY OF PRESENTING ILLNESS:  Catherine Sharp, 41 y.o.,  "female is here for left shoulder pain and longstanding history of SLE.  The left shoulder is hurting with the past several weeks worse but perhaps even longer going back several months.  This is anterolateral.  Moderately severe.  Worse at night when she lays on that side and when she \"her arm up\"..  She has not had recent trauma.  She has \"frozen shoulder\" on her right side.  She has had several images of that.  In the past local injection is been helpful.  She is on anticoagulation and is aware of the potential side effects of the injection and increased risk of bleeding. With regards to lupus she reports she was age 15, the when her joints began to hurt in her hands, knees, elbows, and wrists. In fact, she recalls the whole body was \"painful\" and stiff in the morning up to two hours. She was seen in rheumatology, diagnosis of lupus was arrived. At that point she had no facial eruption or other skin lesions. It was over the subsequent time that she has developed a facial malar area eruption with or without exposure to sunlight. She is not reporting mouth ulcers; however  she has had a DVT, which she experienced six or eight years ago and has been on Coumadin since. She reports occasional chest pain and palpitations, which  is not clear if she has definite pleuritic component. She has a residual pain in her digits, wrists, elbows, and knees. She had been on azathioprine 50 mg twice daily and hydroxychloroquine but d/c ed by her hospital MD a few months ago for unclear reasons. At one point, she recalls a question of if she may have \"blood\" into her lungs because of the lupus. She underwent treatment with the high-dose prednisone. She subsequently developed osteoporosis. Currently, she is on 10 mg of prednisone. In the past, she has seen Dr. Leonardo and more recently Dr. Mathews. She has experienced chronic pain syndrome and has been on narcotics. She noted pain   Further historical information and ADL limitations as " noted in the multidimensional health assessment questionnaire attached in the EMR.    ALLERGIES:Hydrocodone-acetaminophen; Protonix [pantoprazole]; Codeine; Fentanyl; Morphine; and Morphine hcl    PAST MEDICAL/ACTIVE PROBLEMS/MEDICATION/ FAMILY HISTORY/SOCIAL DATA:  The patient has a family history of  Past Medical History:   Diagnosis Date     Antiphospholipid antibody with hypercoagulable state     Secondary to lupus     Cervical compression fracture 2009    closed, with spinal cord injury C1-C4 - prednisone induced     Chronic hypercapnic respiratory failure 12/22/2014     Chronic pain     on opiates     Congestive heart failure     Diastolic, Cor Pulmonale, EF 60% 2014     Depression      Gastroparesis     causing nausea/vomiting with recurrent admissions     GERD (gastroesophageal reflux disease)      Hypertension      Lupus (systemic lupus erythematosus)     complicated by joint and pulmonary involvement     Obesity      MERLIN (obstructive sleep apnea) 1/30/2015    On home bipap     Osteoporosis      Peripheral neuropathy      Recurrent Clostridium difficile diarrhea      Seizures     once due to high BP, hypertensive encephalopathy     History   Smoking Status     Former Smoker     Packs/day: 0.50     Years: 10.00     Types: Cigarettes     Quit date: 7/29/2009   Smokeless Tobacco     Never Used     Patient Active Problem List   Diagnosis     Hypertension     Osteoporosis     Anemia     Generalized anxiety disorder     Antiphospholipid Antibody Syndrome     Pulmonary emboli     Peripheral Neuropathy     Systemic lupus erythematosus     GERD (gastroesophageal reflux disease)     Opiate dependence     Gastroparesis     Acute on chronic diastolic congestive heart failure     Coagulopathy     ILD (interstitial lung disease)     Restrictive lung disease due to kyphoscoliosis     Adhesive pericarditis     Regional enteritis     Deep phlebothrombosis, antepartum, with delivery     Hyperthyroidism     Acute respiratory  "failure     Respiratory failure, acute-on-chronic     History of DVT (deep vein thrombosis)     Abdominal pain     Intractable nausea and vomiting     Chronic pain     Hypercarbia     Acute on chronic respiratory failure with hypercapnia     ALFONSO (acute kidney injury)     Encephalopathy acute     Respiratory failure with hypoxia     Snoring     Bilateral shoulder tendinopathy     Polyarthralgia     Trigger ring finger, right     Chronic diastolic heart failure     Pneumonia     Abnormal CXR     Cardiomyopathy     Chronic respiratory failure with hypercapnia     Vomiting     Respiratory failure     Acute renal failure, unspecified acute renal failure type     Current Outpatient Prescriptions   Medication Sig Dispense Refill     albuterol (PROVENTIL HFA;VENTOLIN HFA) 90 mcg/actuation inhaler Inhale 2 puffs every 6 (six) hours as needed for wheezing. 1 Inhaler 6     albuterol (PROVENTIL) 2.5 mg /3 mL (0.083 %) nebulizer solution Take 3 mL (2.5 mg total) by nebulization 4 (four) times a day as needed for wheezing or shortness of breath. 75 mL 11     alum/mag hydrox-simethicone-diphenhydramine-lidocaine (MAGIC MOUTHWASH) suspension Swish and swallow 5mL 3-4 times daily as needed. Standard prep: Diphenhydramine+Sucralfate+Mylanta 120 mL 1     BD INSULIN PEN NEEDLE UF MINI 31 gauge x 3/16\" Ndle        biotin 2,500 mcg cap Take 2,500 mcg by mouth every evening.        cholecalciferol, vitamin D3, 1,000 unit tablet Take 1,000 Units by mouth daily.       diltiazem (CARDIZEM CD) 240 MG 24 hr capsule Take 1 capsule (240 mg total) by mouth daily. 90 capsule 3     FERROUS SULFATE (SLOW FE ORAL) Take 65 mg by mouth daily.       furosemide (LASIX) 40 MG tablet Take 1 tablet (40 mg total) by mouth daily. 60 tablet 11     hydrOXYzine (ATARAX) 25 MG tablet Take 1 tablet (25 mg total) by mouth daily. 90 tablet 0     ipratropium-albuterol (DUO-NEB) 0.5-2.5 mg/3 mL nebulizer Inhale 3 mL 4 (four) times a day as needed.       levothyroxine " "(SYNTHROID) 125 MCG tablet Take 1 tablet (125 mcg total) by mouth daily. 30 tablet 11     LORazepam (ATIVAN) 1 MG tablet Take 1 mg by mouth every evening.       LORazepam (ATIVAN) 1 MG tablet TAKE 1 TABLET BY MOUTH DAILY 30 tablet 0     nystatin (MYCOSTATIN) powder Apply 1 application topically 3 (three) times a day as needed.       oxyCODONE (ROXICODONE) 30 MG immediate release tablet Take 1 tablet (30 mg total) by mouth every 4 (four) hours as needed for pain. 180 tablet 0     OXYCONTIN 40 mg 12 hr tablet Take 1 tablet (40 mg total) by mouth every morning. 30 tablet 0     senna-docusate (SENNOSIDES-DOCUSATE SODIUM) 8.6-50 mg tablet Take 1 tablet by mouth 2 (two) times a day.  0     warfarin (COUMADIN) 5 MG tablet Take 5 mg by mouth daily.       predniSONE (DELTASONE) 1 MG tablet Take 5 tablets (5 mg total) by mouth daily. 450 tablet 0     No current facility-administered medications for this visit.        DETAILED EXAMINATION:  Vitals:    05/15/17 1546   BP: 132/78   Patient Site: Right Arm   Patient Position: Sitting   Cuff Size: Adult Regular   Pulse: 68   Weight: 200 lb 6.4 oz (90.9 kg)   Height: 4' 10\" (1.473 m)    Comfortable.  Alert oriented.  Eyes are without inflammatory changes.  Examination of both upper and lower extremities is performed for swollen & tender joints, range of motion, rash, weakness, discoloration, warmth, swelling.  The skin examined for nodules. The salient normal / abnormal findings are appended.  She has residual crackles bilaterally in the lungs.  No pleural pericardial rubs.  She does not have synovitis as noted before.   On supp Oxygen. Bilat shoulder impingement, worse on the left side in terms of pain severity, however reduced range of motion worse on the right side she is only able to abduct 110 ..  Does not have sclerodactyly perianal erythema malar area eruption. No appreciable synovitis in any of the palpable appendicular joints.     LAB / IMAGING DATA:  ALT   Date Value Ref " Range Status   04/13/2017 15 0 - 45 U/L Final   01/09/2017 25 0 - 45 U/L Final   10/26/2016 25 0 - 45 U/L Final     Albumin   Date Value Ref Range Status   04/13/2017 3.6 3.5 - 5.0 g/dL Final   01/09/2017 3.5 3.5 - 5.0 g/dL Final   10/26/2016 3.5 3.5 - 5.0 g/dL Final     Creatinine   Date Value Ref Range Status   04/15/2017 1.06 0.60 - 1.10 mg/dL Final   04/14/2017 1.48 (H) 0.60 - 1.10 mg/dL Final   04/13/2017 0.65 0.60 - 1.10 mg/dL Final       WBC   Date Value Ref Range Status   04/27/2017 7.4 4.0 - 11.0 thou/uL Final   04/14/2017 8.2 4.0 - 11.0 thou/uL Final   09/27/2015 6.9 4.0 - 11.0 thou/uL Final   08/23/2015 5.8 4.0 - 11.0 thou/uL Final     Hemoglobin   Date Value Ref Range Status   04/27/2017 13.7 12.0 - 16.0 g/dL Final   04/14/2017 14.1 12.0 - 16.0 g/dL Final   04/13/2017 15.8 12.0 - 16.0 g/dL Final     Platelets   Date Value Ref Range Status   04/27/2017 189 140 - 440 thou/uL Final   04/15/2017 219 140 - 440 thou/uL Final   04/14/2017 207 140 - 440 thou/uL Final       Lab Results   Component Value Date    RF <15.0 07/10/2014    SEDRATE 34 (H) 03/17/2016

## 2021-06-11 NOTE — TELEPHONE ENCOUNTER
Patient is requesting an early refill for 9/4 as opposed to 9/6.  Next refills are technically due on 9/7.  Per the , last filled on 8/10 for 28 days supply.  Last ov with RW: 7/7  Next ov with RW: 9/23    UDT - 04/12/19-- deferred due to COVID, MV  CSA - 06/25/19, deferred due to Covid 19    Will cue as provider indicated for an early fill, please adjust if not appropriate    Requested Prescriptions     Pending Prescriptions Disp Refills     oxyCODONE (ROXICODONE) 30 MG immediate release tablet 168 tablet 0     Sig: Take 1 tablet (30 mg total) by mouth every 4 (four) hours as needed for pain.     OXYCONTIN 40 mg 12 hr tablet 28 tablet 0     Sig: Take 1 tablet (40 mg total) by mouth every morning.     [facility]

## 2021-06-11 NOTE — PROGRESS NOTES
PAIN CENTER PROGRESS NOTE     Subjective:   Catherine Sharp is a 44 y.o. female who presents for evaluation of multi-site pain secondary to osteoporosis and pathologic fractures as a result of SLE treated with chronic steroids.  She has multiple co-morbidities including Interstitial lung disease, MERLIN, Antiphospholipid Antibody Syndrome, cardiomyopathy, OA in multiple sites, and recurrent hospitalizations.  Pain has been present for years and current treatment plan includes OxyContin, oxycodone, intermittent PT/OT.    Major issues:  1. Chronic pain syndrome    2. Chronic, continuous use of opioids    3. Systemic lupus erythematosus, unspecified SLE type, unspecified organ involvement status (H)    4. Compression fracture of thoracic vertebra, unspecified thoracic vertebral level, sequela    5. Spinal stenosis of lumbar region without neurogenic claudication    6. Opioid dependence, continuous (H)      Pain location and description: 5/10 constant sharp, stabbing pain in back and all joints. Function rated 8.   Radiation of pain: Denies.    Gait disturbance: wheelchair for outings.  Denies recent falls.  Exacerbating factors: Activity over 15 minutes.    Alleviating factors: Rest, pain medications, activity less than 15 minutes as laying too long increases pain.  Associated symptoms: Pain at night, numbness & weakness all over but not changed.  Denies bowel or bladder incontinence, fever, chills, unexplained weight loss.  Functional symptoms: See rooming notes  Adverse effects of medications:  Uses sennakot 2 tablets at home for intermittent constipation.  Hasn't used miralax but used to.  She bought generic laxative.  Intermittent pruritus from opioids, takes benadryl.    Current treatment efficacy: Good. Taking OxyContin 40 mg in the morning with pain relief within 1 hour.  She states she wakes with pain 8-9/10 and reduces to 6/10.  She states pain reduction in the morning is 50% or more.  Taking oxycodone 30 mg 1  "every 4 hours prn has not tolerated reduction in daily dose. She states a dose will last 3 hours at most.    Current treatment compliance: Good.  She has failed Lyrica, Gabapentin, Cymbalta, Fentanyl, Morphine, Vicodin, codeine in the past. Have discussed ketamine and medical cannabis, cost prohibitive. She has [facility] PT/OT and RN services in past, not currently needed.  Uses daily pop out dosing for medications.  Reports taking medication as prescribed.      She states pain has been stable, unchanged.  She states certain times when her back is worse sitting (over 30 minutes) or standing prolonged periods.  Worse as the day goes on.  Was able to travel to [employer] this summer, 1 hour drive and can make it without stopping but back gets sore.  She states in her joints fingers and knees are the worst, her right foot swelling and will see PCP. She states after 5 minutes her foot is swollen and tight, no numbness or tingling or temperature change.  Discoloration dorsal foot; has history of blood clot right side.  Is taking her lasix as prescribed.      Since the last Pain Center visit, the patient continues warfarin. She had e-visit with cardiologist per her pulmonologist on 07/07/20:  \"Assessment and Plan:     Catherine Sharp is a 44 year old female who presents for return evaluation for severe Group 3 PH secondary to ILD.     1. Severe Group 3 Pulmonary Hypertension secondary to ILD with Preserved Cardiac Output: Ms. Hsu is in the INCREASE trial, which is evaluating the utility of inhaled prostacyclin in ILD-PH. She has completed the trial and is on the open-label phase currently. She needs to lose weight to become eligible for lung transplantation in the future so hopefully exercise will help with that. She appears euvolemic per her report. Would like to obtain an echocardiogram and RHC once the COVID pandemic is stable/resolved which can be delayed at this time since she has not been having any " "worsening symptoms. We refilled her lasix 60 mg/40 mg and KCl per her request.     2. History of DVT and PE: She is on lifelong warfarin.    Recommend that she follow-up with us in 6 months.    It was a pleasure seeing Catherine Sharp at the [facility]. Please contact us with any questions or concerns that you may have.    Patient was seen and discussed with staff attending, Dr. Eng.    Sincerely,    Gina Germain MD, PhD  Cardiology Fellow\"    Has to do heart cath and echo which is uncomfortable so she plans to take oxycodone prior to the test as she has to lay flat on her back.    She had virtual visit with Dr. José on 07/23/20:  \"ASSESSMENT AND PLAN:     Diagnoses and all orders for this visit:     Systemic lupus erythematosus, unspecified SLE type, unspecified organ involvement status (H)  -     Albumin; Standing  -     ALT (SGPT); Standing  -     HM2(CBC w/o Differential); Standing  -     Creatinine; Standing  -     hydrOXYchloroQUINE (PLAQUENIL) 200 mg tablet; Take 1 tablet (200 mg total) by mouth daily.  Dispense: 90 tablet; Refill: 0  -     predniSONE (DELTASONE) 1 MG tablet; Take 4 mg by mouth daily TAKE 5 TABLET BY MOUTH EVERY DAY.  Dispense: 360 tablet; Refill: 0     ILD (interstitial lung disease) (H)     Other osteoporosis without current pathological fracture     High risk medication use     Antiphospholipid Antibody Syndrome\"    She continues 4 mg prednisone daily.  She states her pain is not waking her up at night    Lungs condition is stable, has had televisits with pulmonologist.  Continues O2 24 hours a day and ranges from 6-12 L.      She denies any questions for today's visit.    Review of Systems  Constitutional: Denies fever, chills, lethargy.  Has MERLIN, chronic O2. Denies night sweats, weight loss  Musculoskeletal: Positive for mid and lower back pain, joint pain/swelling, denies recent fall.  Denies neck pain.  Gastrointestional: Positive for " intermittent nausea, vomiting, GERD, abdominal pain, constipation.  Denies difficulty swallowing, change in appetite, diarrhea, fecal incontinence.  Genitourinary: Denies urinary incontinence, dysuria, hematuria, UTI, frequency, hesitancy, change in libido  Neurologic: Headaches.  Denies confusion, seizure, weakness, changes in balance, changes in speech.  Psychiatric: Positive depression, anxiety.  Denies memory loss, psychoses, suicidal ideation, substance use/abuse.     Objective:     Vitals:    09/23/20 1040   BP: 139/82   Pulse: 89       Physical Exam  Constitutional- General appearance: Normal.  Well developed, comfortable, obese, appears older than stated age.  Presents with MIL today.   Psychiatric- Judgment and insight: Normal.  Speech: Normal rhythm.  Thought process: Normal.  No abnormal thoughts reported. Alert & Oriented to person, place, and time.  Recent and remote memory: Normal.  Mood and affect: appropriate.  Respiratory- Using O2 by nasal cannula today.  Normal rhythm and rate.  Cardiovascular- Extremities warm and well perfused, +1 peripheral edema in right foot, none on left LE or varicosities.  Dermatologic- Exposed skin is clean, dry, and intact to inspection and palpation.  There is mottling in right dorsal foot.  Musculoskeletal- Gait and station: Ambulating independently today, stooped.     *Opioid [place] Precautions:    UDT - Collected 09/23/20, results pending.  Opioid Consent - 09/23/20  Opioid Agreement - 09/23/20  Pharmacy- as documented    [place]  Reviewed - 09/23/20 as expected  Pill Count - Pill count 10/16/15 appropriate.    Psychological evaluation - n/a  MME - [id number]  Pharmacogenetic testing - n/a  VARGAS 12/04/19    Assessment:   Catherine Sharp is a 44 y.o. female with visit today for pain in her spine due to history of pathologic compression fractures due to steroids to treat SLE.  Lumbar MRI demonstrates spinal canal stenosis L3-4, disc bulging at L3-4 and L5-S1, and mild to  moderate facet arthropathy throughout in addition to multiple chronic compression deformities.  She had 30% pain relief with LESI and Initial left LMBB was not helpful. She continues to have pain in thoracic spine, history of chronic compression fractures.   She is having stable pain relief with oxycodone 30 mg every 4 hours prn, have discussed dosing outside of CDC guidelines.  She reports pain relief is 3 hours or less and we discuss ketamine maria esther trial for in between opioid doses and with goal of seeing if this would reduce her daily opioid dose; she doesn't want to start at this time due to cost. She continues OxyContin 40 mg for extended pain relief in the morning but reports she cannot take this every 12 hours as it causes more difficulty with SOB at increased frequency, does hold dose depending on respiratory status.  She understands the risks with opioids and respiratory depression, has narcan available.  We have reviewed medical cannabis program but cannot participate now due to cost and also the research trial she is participating in.     Plan:   Prescribed 28 days of OxyContin 40 mg in the morning.  Hold for any respiratory or sedation concerns.    Prescribed oxycodone 30 mg 1 every 4 hours maximum for pain as needed.   Printed to fill 10/02/20 & 10/30/20.    Continue with rheumatology and pulmonology recommendations, also have cardiology testing completed. Make sure to get labs done as ordered by Dr. José.  Diagnostics: UDT/Blood collected today results are pending.  Patient required a random Drug Test due to the need to comply with [place] [employer] Policy Guidelines and CDC Guideline for the use of any controlled substances. Reasons for definitive testing include:  -Identify specific medication(s) or drug(s) in a class (e.g. Benzodiazepines, barbiturates, opioids, antidepressants)  -Definitive concentration of medication(s), drug(s), and/or metabolites needed to guide management  -Identify  non-prescribed medication or illicit drug use for ongoing safe prescribing of controlled substances  -Identify substances that may present high risk for additive or synergistic interaction with prescription medication (e.g. Alcohol).  Patient is either being considered for or taking a controlled substance. Unexpected findings will be discussed and treatment decision may be adjusted. Testing is being implemented w/o bias related to age, race, gender, socioeconomic status or L[employer] affiliation.   Opioid agreement and consent form signed today.  Discussed bringing these copies and your most recent After Visit Summary (AVS) from our appointment to the pharmacy when you are refilling controlled medications to assist with any additional information the pharmacist may require.   Follow-up in 8 weeks with Elizabet Palmer PA-C  [facility]  [address]  [place] [address]  Ph: [phone]  Fax: [phone]

## 2021-06-11 NOTE — PROGRESS NOTES
Patient is here for a follow up visit with Elizabet Palmer PA-C. Patient has low back and all joints pain, describes the pain as constant, sharp, stabbing, intermittent worsening with sharpness and stabbing and rates the pain 5/10. Patient needs refills on Oxycodone and Oxycontin. Functionality is 8. Patient states their pain interferes with sleep, walking, work, ADL's and relationships. CSA and UDT were completed at todays visit.

## 2021-06-11 NOTE — TELEPHONE ENCOUNTER
Controlled Substance Refill Request  Medication Name:   Requested Prescriptions     Pending Prescriptions Disp Refills     LORazepam (ATIVAN) 1 MG tablet [Pharmacy Med Name: LORAZEPAM 1 MG TABLET] 30 tablet 0     Sig: TAKE 1 TABLET BY MOUTH EVERYDAY AT BEDTIME     Date Last Fill: 8/5/20  [facility]  Submit electronically to pharmacy  Controlled Substance Agreement on file:   Encounter-Level CSA Scan Date - 09/23/2020:    Scan on 9/23/2020  1:32 PM by Luna Santoyo: [facility] NARCOTIC AGREEMENT           Encounter-Level CSA Scan Date - 06/25/2019:    Scan on 6/26/2019  8:45 AM by Meghna Tubbs: [facility] NARCOTIC AGREEMENT        Last office visit:  7/24/19

## 2021-06-11 NOTE — TELEPHONE ENCOUNTER
Approved early refill due to holiday, patient will myron to start on 09/07 per .  Routing to  so they are aware for .

## 2021-06-11 NOTE — PROGRESS NOTES
Assessment:     Catherine was seen today for form and contraception.    Diagnoses and all orders for this visit:    ILD (interstitial lung disease)    Systemic lupus erythematosus  -     Renal Function Profile    Generalized anxiety disorder    Osteoporosis    Hyperthyroidism  -     Thyroid Stimulating Hormone (TSH)    Chronic pain syndrome    Antiphospholipid Antibody Syndrome  -     Factor 10 Assay, Chromogenic    Contraception        Plan:     1. ILD (interstitial lung disease)  Interstitial lung disease is the diagnosis involving her diminished lung status.  This is been diagnosed and managed by Dr. Bearden [facility] pulmonology    2. Systemic lupus erythematosus  Systemic lupus has been an issue since she has been a teenager.  Ongoing management with Dr. José [facility] rheumatology    3. Generalized anxiety disorder  Generalized anxiety disorder for which we use Xanax on a as needed basis.  Seems to be moderately controlled  Patient admits to having depression but does not want antidepressant meds.  Today she is irritable    4. Osteoporosis  Significant osteoporosis with use of Forteo.  She has had compression fractures in the thoracic and lumbar vertebrae.  She has had loss of height.  She is had overuse of steroids contributing to bone loss along with thyroid issues and contraception use previously of Depo-Provera    5. Hyperthyroidism  Recheck thyroid status today    6. Chronic pain syndrome  Chronic pain syndrome which initially was secondary to joint problems from the lupus but I am the last 8 years has been more secondary to vertebral fractures.  She goes to the [facility] pain center and is managed with a chronic narcotics over the last 8 years.  Patient is compliant with a pain management program    7. Antiphospholipid Antibody Syndrome  Patient has lupus antibody and we need to monitor her intake coag program with factor X assay.  We will check this today and feedback per margin on our pharmacist  to now manages her Coumadin level    8.  Contraception  Patient has a  Nexplanon contraceptive implant and is not happy with the degree of menstrual spotting that she has.  Certainly I can remove remove this contraception implant, but I speak cautiously to her about needing to have some kind of estrogen friendly but safe contraception.  The Nexplanon being an estrogen from a progestin does not hurt her bones like her previous use of Depo-Provera did.  She is not able to use the normal birth control pills because of her coagulopathy.  No increase coagulopathy on this Nexplanon    This is a 40 minute visit with greater than 50% of the time spent counseling regarding contraception counseling is given today with clear message that I would not order Depo-Provera for her given her osteoporosis status.  I also spend time today going over her unit him disability forms.  Copies placed in the record given to patient      Subjective:      Duane is a 41 y.o. female presenting to my clinic for review of disability and completion of her disability forms.  Catherine previously worked for [employer] so her disability comes from [place].  I am able to look at previous disability forms as I feel this 1 out.    Her diagnosis includes systemic lupus, interstitial lung disease with hypoxia, severe osteoporosis, coagulopathy, hypothyroidism, and today we talk about contraception.  She sees Dr. Bearden for pulmonary, Dr. José for rheumatology, and Elizabet Yeboah for chronic pain center.  Plans for MRI of her C-spine ordered by Elizabet Palmer are pending.  No procedures have been done this year.  Good news is that Catherine has been in the hospital only twice this year.  Her problems seem to be adequately managed.    Patient has a Nexplanon in her arm for contraception.  This was an improvement from previous Depo-Provera which causes  low bone density.  Patient is not in a very adequate health situation to warrant a pregnancy.    "Her  has not gone in for vasectomy as he previously agreed to.  Patient is not comfortable with the fact that she has spotting and bleeding about 12 days out of the month.  She was accommodated to having no bleeding at all on the Depo-Provera.  I explained to her that the estrogen from the part is what leads to endometrial growth and this is helping her bones.  I am not in agreement for her to resort back to Depo-Provera.  Other option would be the Mirena IUD as placed by Dr. Delmis Ma.  Also the E sure or tubal ligation would be an option.      Current Outpatient Prescriptions on File Prior to Visit   Medication Sig Dispense Refill     albuterol (PROVENTIL HFA;VENTOLIN HFA) 90 mcg/actuation inhaler Inhale 2 puffs every 6 (six) hours as needed for wheezing. 1 Inhaler 6     albuterol (PROVENTIL) 2.5 mg /3 mL (0.083 %) nebulizer solution Take 3 mL (2.5 mg total) by nebulization 4 (four) times a day as needed for wheezing or shortness of breath. 75 mL 11     alum/mag hydrox-simethicone-diphenhydramine-lidocaine (MAGIC MOUTHWASH) suspension Swish and swallow 5mL 3-4 times daily as needed. Standard prep: Diphenhydramine+Sucralfate+Mylanta 120 mL 1     BD INSULIN PEN NEEDLE UF MINI 31 gauge x 3/16\" Ndle        biotin 2,500 mcg cap Take 2,500 mcg by mouth every evening.        cholecalciferol, vitamin D3, 1,000 unit tablet Take 1,000 Units by mouth daily.       FERROUS SULFATE (SLOW FE ORAL) Take 65 mg by mouth daily.       furosemide (LASIX) 40 MG tablet Take 1 tablet (40 mg total) by mouth daily. 60 tablet 11     ipratropium-albuterol (DUO-NEB) 0.5-2.5 mg/3 mL nebulizer Inhale 3 mL 4 (four) times a day as needed.       levothyroxine (SYNTHROID) 125 MCG tablet Take 1 tablet (125 mcg total) by mouth daily. 30 tablet 11     LORazepam (ATIVAN) 1 MG tablet Take 1 mg by mouth every evening.       LORazepam (ATIVAN) 1 MG tablet TAKE 1 TABLET BY MOUTH DAILY 30 tablet 0     nystatin (MYCOSTATIN) powder Apply 1 " application topically 3 (three) times a day as needed.       oxyCODONE (ROXICODONE) 30 MG immediate release tablet Take 1 tablet (30 mg total) by mouth every 4 (four) hours as needed for pain. 180 tablet 0     OXYCONTIN 40 mg 12 hr tablet Take 1 tablet (40 mg total) by mouth every morning. 30 tablet 0     predniSONE (DELTASONE) 1 MG tablet Take 5 tablets (5 mg total) by mouth daily. 450 tablet 1     senna-docusate (SENNOSIDES-DOCUSATE SODIUM) 8.6-50 mg tablet Take 1 tablet by mouth 2 (two) times a day.  0     warfarin (COUMADIN) 5 MG tablet Take 5 mg by mouth daily.       diltiazem (CARDIZEM CD) 240 MG 24 hr capsule Take 1 capsule (240 mg total) by mouth daily. 90 capsule 3     No current facility-administered medications on file prior to visit.      Allergies   Allergen Reactions     Hydrocodone-Acetaminophen Swelling     Throat swelling (tolerates oxycodone)  - Acetaminophen causes vomiting per patient/RN, 12/2014 tolerated hydromorphone with hospitalization     Protonix [Pantoprazole] Hives     Per pt  Per pt     Codeine Nausea Only     abd pain       Fentanyl Nausea And Vomiting and Unknown       Tolerated drip 9/28/15- pt states just nausea at times      Morphine Hives, Nausea And Vomiting and Other (See Comments)     Pt states nausea     Morphine Hcl Itching and Nausea And Vomiting     Pt states she has tolerated in past- just nausea and vomiting at times     Past Medical History:   Diagnosis Date     Antiphospholipid antibody with hypercoagulable state     Secondary to lupus     Cervical compression fracture 2009    closed, with spinal cord injury C1-C4 - prednisone induced     Chronic hypercapnic respiratory failure 12/22/2014     Chronic pain     on opiates     Congestive heart failure     Diastolic, Cor Pulmonale, EF 60% 2014     Depression      Gastroparesis     causing nausea/vomiting with recurrent admissions     GERD (gastroesophageal reflux disease)      Hypertension      Lupus (systemic lupus  erythematosus)     complicated by joint and pulmonary involvement     Obesity      MERLIN (obstructive sleep apnea) 1/30/2015    On home bipap     Osteoporosis      Peripheral neuropathy      Recurrent Clostridium difficile diarrhea      Seizures     once due to high BP, hypertensive encephalopathy     Past Surgical History:   Procedure Laterality Date     PICC AND MIDLINE TEAM LINE INSERTION  9/27/2015          KS BIOPSY LUNG/MEDIASTINUM PERCUTANEOUS NEEDLE      Description: Biopsy Lung Percutaneous;  Recorded: 09/15/2011;     KS LAP,CHOLECYSTECTOMY      Description: Cholecystectomy Laparoscopic;  Recorded: 02/15/2008;     THORACOSCOPY Left 1/13/2015    Procedure: LEFT THORACOSCOPY CONVERTED TO THORACOTOMY;  Surgeon: Jose David Granados MD;  Location: [facility];  Service:      THORACOTOMY Left 1/13/2015    Procedure: THORACOTOMY W/ WEDGE RESECTION;  Surgeon: Jose David Granados MD;  Location: [facility];  Service:      Social History     Social History     Marital status:      Spouse name: N/A     Number of children: N/A     Years of education: N/A     Occupational History     Not on file.     Social History Main Topics     Smoking status: Former Smoker     Packs/day: 0.50     Years: 10.00     Types: Cigarettes     Quit date: 7/29/2009     Smokeless tobacco: Never Used     Alcohol use No     Drug use: Yes     Special: Oxycodone     Sexual activity: Yes     Partners: Male      Comment: Christina, in 2016     Other Topics Concern     Not on file     Social History Narrative    Lives with family. Lives in [place].     Family History   Problem Relation Age of Onset     Asthma Mother      Hypertension Mother      Stroke Mother      Clotting disorder Mother      Hypertension Father      Stroke Father      Stroke Maternal Aunt      Mental illness Sister      Stroke Sister      Clotting disorder Sister        ROS:  I have performed a 10 point ROS.  All pertinent positives and negatives are found in the  HPI.  All others are negative.    No recent fevers chills nausea or vomiting    Objective:     Physical Exam:  /80 (Patient Site: Right Arm, Patient Position: Sitting, Cuff Size: Adult Regular)  Pulse (!) 104  Wt 198 lb 9.6 oz (90.1 kg)  BMI 41.51 kg/m2  General Appearance: Alert, cooperative, no distress, appears stated age  Head: Normocephalic, without obvious abnormality, atraumatic  Eyes: PERRL, conjunctiva/corneas clear, EOM's intact  Ears: Normal TM's and external ear canals, both ears  Nose: Nares normal, septum midline,mucosa normal, no drainage  Throat: Lips, mucosa, and tongue normal; teeth and gums normal  Neck: Supple, symmetrical, trachea midline, no adenopathy;  thyroid: not enlarged, symmetric, no tenderness/mass/nodules; no carotid bruit or JVD  Lungs: Clear to auscultation bilaterally, respirations unlabored/clear with deep depressed breath sounds  Heart: Regular rate and rhythm, S1 and S2 normal, no murmur, rub, or gallop,   .  Abdomen: Soft, non-tender, bowel sounds active all four quadrants,  no masses, no organomegaly  Back: Kyphosis is present with pain in the upper thoracic area near C7/T1/also a midthoracic about T8 also down in the lumbar area L2-L3  Suspicious sites for vertebral fracture  Extremities: Extremities normal, atraumatic, no cyanosis or edema  Skin: Skin color, texture, turgor normal, malar facial rash unchanged  Lymph nodes: Cervical, supraclavicular, and axillary nodes normal  Neurologic: Intact, no focal deficits   Mental status: Irritable, admits to depression, does not want antidepressants    Labs:  inr/factor X assay

## 2021-06-11 NOTE — PROGRESS NOTES
PAIN CENTER PROGRESS NOTE    Subjective:   Catherine Sharp is a 41 y.o. female who presents for evaluation of multi-site pain secondary to osteoporosis and pathologic fractures as a result of SLE treated with chronic steroids.  She has multiple co-morbidities including Interstitial lung disease, MERLIN, Antiphospholipid Antibody Syndrome, cardiomyopathy, OA in multiple sites, and recurrent hospitalizations.  Pain has been present for years and current treatment plan includes OxyContin, oxycodone, intermittent PT/OT.    Major issues:  1. Chronic pain syndrome    2. Opioid dependence, continuous    3. Lumbar stenosis    4. Compression fracture of lumbar spine, non-traumatic, sequela    5. Fracture, thoracic vertebra, compression, sequela      Pain location and description: 7/10 constant sharp, burning mid and lower back pain, bilateral shoulders.  Reports low back most bothersome currently. Function rated 8.  Last visit pan rated 7/10.   Radiation of pain: Denies  Paresthesias, numbness, weakness: Left thigh numbness which she has had for many years.    Gait disturbance: wheelchair for outings.  Denies recent falls.  Exacerbating factors: activity, arm movements, morning time.  Alleviating factors: Rest, pain medications  Associated symptoms: Sedentary lifestyle, obesity, oxygen dependent, depression  Adverse effects of medications:  Uses senna at home for intermittent constipation.  Intermittent pruritus from opioids, takes benadryl.  Current treatment efficacy: Fair. Taking OxyContin 40 mg in the morning with pain relief within 1 hour.  She states she wakes with pain 8-9/10 and reduces to 6/10. Taking oxycodone 30 mg 1 every 4 hours prn since hospitalization, has not tolerated reduction in daily dose.   Current treatment compliance: Good.  She has failed Lyrica, Gabapentin, Cymbalta, Fentanyl, Morphine, Vicodin, codeine in the past.  She has [facility] PT/OT and RN services in past, not currently needed.   "Uses daily pop out dosing for medications.  Reports taking medication as prescribed.      She had ED visit on 07/09/17 for chest wall pain.  Ruled out acute cardiac etiology and PE. Discussed admission but patient was discharged to home.  She states she went in because she felt chest pain similar to a time she was rushed to have an angiogram and \"didn't want to take any chances.\"  She states it is still painful with deep breath but slowly getting better.  She states no trigger for this.  She is scheduled with PMD but difficult to get in sooner.      She states she had lumbar MRI but wasn't able to do thoracic MRI or left shoulder due to discomfort.  She states low back pain is the worst.  Denies lower extremity numbness, except in left thigh.  She reports back painful with walking any distance.  Describes some claudication in lower legs bilaterally.  MRI demonstrates mild to moderate facet arthropathy throughout, L3-4 central stenosis, L5-S1 disc protrusion, and old compression fractures L1, 2, 4, 5.  She denies previous history of lumbar injections but is interested in this treatment for her pain.    States more difficulty with pain in the left shoulder describing near her AC joint; she states if she presses hard in certain spots her pain will subside but when she breathes she feels pain go down neck to shoulder.  She states her shoulder injection with Dr. José on 05/15/17 did not help even temporarily.  She denies neck imaging but also does not endorse neck pain in the spine or paracervical muscles.  Rotation of head does not cause pain.  Denies left arm pain, tingling, numbness.  X-ray negative for left shoulder, she has had some PT of left shoulder for \"frozen shoulder\" with minimal benefit.  She has not yet done left shoulder MRI, still pending.    Review of Systems  Constitutional: MRELIN, chronic O2. Denies fever, chills, night sweats, lethargy, weight loss  Musculoskeletal: Positive for mid and lower back " "pain, left shoulder joint pain, denies joint swelling  Gastrointestional: Positive for intermittent nausea, vomiting, GERD, abdominal pain.  Denies difficulty swallowing, change in appetite, constipation, diarrhea, fecal incontinence.  Genitourinary: Denies urinary incontinence, dysuria, hematuria, UTI, frequency, hesitancy, change in libido  Neurologic: Headaches.  Denies confusion, seizure, weakness, changes in balance, changes in speech.  Psychiatric: Positive depression, anxiety.  Denies memory loss, psychoses, suicidal ideation, substance use/abuse.     Objective:     Vitals:    07/11/17 0818   BP: 138/88   Pulse: 95   Resp: 16   Weight: 195 lb (88.5 kg)   Height: 4' 10\" (1.473 m)   PainSc:   7     Physical Exam  Constitutional- General appearance: Abnormal.  Well developed, uncomfortable, obese, appears older than stated age.  Presents alone today.   Psychiatric- Judgment and insight: Normal.  Speech: Normal rhythm.  Thought process: Normal.  No abnormal thoughts reported. Alert & Oriented to person, place, and time.  Recent and remote memory: Normal.  Mood and affect: Normal.  Observed mood: Appropriate    Respiratory- Using O2 by nasal cannula.  Normal rhythm and rate.  Cardiovascular- Extremities warm and well perfused, no peripheral edema or varicosities.  Dermatologic- Exposed skin is clean, dry, and intact to inspection and palpation.   Musculoskeletal- Gait and station: Ambulating independently today, stooped.  Significant thoracic kyphosis.  Able to sit to stand with difficulty.  Pain to palpation L3-5, denies pain bilateral SI joints.    *Opioid Universal Precautions:    UDS/Swab - 01/12/17 as expected  Opioid Consent -  due  Opioid Agreement - 03/14/17  Pharmacy- as documented    [place]  Reviewed - 07/11/17 as expected  Pill Count - Pill count 10/16/15 appropriate.    Psychological evaluation - n/a  MME - 330  Pharmacogenetic testing - n/a    Imaging:  MR Lumbar Spine without Contrast " 07/05/17  IMPRESSION:   CONCLUSION:  1.  Multilevel chronic compression deformities at T12, L1, L2, L4, and L5 as above. No marrow edema or other findings to suggest acute compression fracture.  2.  At L3-4, mild trefoil type spinal canal stenosis related to a combination of vertebral retropulsion and disc bulging.  3.  At L5-S1, shallow right central/subarticular disc protrusion with small superimposed caudally dissecting extruded fragment. Mild right lateral recess stenosis with mild posterior displacement of the right S1 nerve root.  4.  At L2-3, low-grade narrowing of the lateral recesses and minimal right neural foraminal stenosis.    Assessment:   Catherine Sharp is a 41 y.o. female seen in clinic today for pain in her spine due to history of pathologic compression fractures due to steroids to treat SLE.  She is reporting most pain in her lumbar spine L3-5 at this time, MRI demonstrates spinal canal stenosis L3-4, disc bulging at L3-4 and L5-S1, and mild to moderate facet arthropathy throughout in addition to multiple chronic compression deformities.  She is advised to consult with Dr. Valles or Roxana for lumbar injections, likely LESI and then progress to LMBB if needed.  She is educated on the risks and benefits of these procedures today.  She is on coumadin and will need to hold dose and have INR checked, she can work with [facility] Pharmacist on this.  She continues to have pain in thoracic spine, assessing for acute on chronic compression fractures with MRI of thoracic. She is having improved pain relief with oxycodone 30 mg every 4 hours prn, however we would prefer this to be temporary dosing and reduction as tolerated.  She continues OxyContin 40 mg for extended pain relief in the morning but reports she cannot take this every 12 hours as it causes more difficulty with SOB at increased frequency.  She is currently working with her pulmonologist for CPAP use and also oxygen tank need.  She  understands the risks with opioids and respiratory depression.  Strongly recommend pharmacogenetic testing for guidance in opioid therapies but this has been cost prohibitive.  She had recent consult with [facility] and was told nothing more could be done for her spine symptoms.  She is also reporting chronic left shoulder pain, recent x-ray and shoulder injection negative.  Failed PT.  Have ordered left shoulder MRI as cervical spine exam today is normal outside of kyphosis.  If shoulder MRI is normal, would consider cervical spine imaging starting with x-ray.      Plan:   Prescribed 30 days of OxyContin 40 mg in the morning. printed to fill today, start date 07/12   Prescribed oxycodone 30 mg 1 every 4 hours maximum for pain as needed. Printed to fill today, start date 07/12.  Take Vistaril 25 mg 1 tablet for itching as needed.  Do not take this with benadryl.  May cause drowsiness.  Recommend thoracic and left shoulder MRI to evaluate shoulder pain in setting of recent failed joint injection and negative shoulder x-ray.   For lumbar pain, schedule with Dr. Valles or Roxana for lumbar epidural steroid injection.  You must hold coumadin 5 days prior to procedure and have INR checked day of injection.  You must have a  the day of procedure take you home.  Follow-up in 4 weeks with Elizabet.    Elizabet Palmer PA-C  [facility]  [address]  [place] [address]  Ph: [phone]  Fax: [phone]

## 2021-06-11 NOTE — PATIENT INSTRUCTIONS - HE
Prescribed 28 days of OxyContin 40 mg in the morning.  Hold for any respiratory or sedation concerns.    Prescribed oxycodone 30 mg 1 every 4 hours maximum for pain as needed.   Printed to fill 10/02/20 & 10/30/20.    Continue with rheumatology and pulmonology recommendations, also have cardiology testing completed. Make sure to get labs done as ordered by Dr. José.  Diagnostics: UDT/Blood collected today results are pending.  Patient required a random Drug Test due to the need to comply with [place] [employer] Policy Guidelines and CDC Guideline for the use of any controlled substances. Reasons for definitive testing include:  -Identify specific medication(s) or drug(s) in a class (e.g. Benzodiazepines, barbiturates, opioids, antidepressants)  -Definitive concentration of medication(s), drug(s), and/or metabolites needed to guide management  -Identify non-prescribed medication or illicit drug use for ongoing safe prescribing of controlled substances  -Identify substances that may present high risk for additive or synergistic interaction with prescription medication (e.g. Alcohol).  Patient is either being considered for or taking a controlled substance. Unexpected findings will be discussed and treatment decision may be adjusted. Testing is being implemented w/o bias related to age, race, gender, socioeconomic status or Oriental orthodox affiliation.   Opioid agreement and consent form signed today.  Discussed bringing these copies and your most recent After Visit Summary (AVS) from our appointment to the pharmacy when you are refilling controlled medications to assist with any additional information the pharmacist may require.   Follow-up in 8 weeks with Elizabet hodge

## 2021-06-11 NOTE — PROGRESS NOTES
PAIN CENTER PROGRESS NOTE    Subjective:   Catherine Sharp is a 41 y.o. female who presents for evaluation of multi-site pain secondary to osteoporosis and pathologic fractures as a result of SLE treated with chronic steroids.  She has multiple co-morbidities including Interstitial lung disease, MERLIN, Antiphospholipid Antibody Syndrome, cardiomyopathy, OA in multiple sites, and recurrent hospitalizations.  Pain has been present for years and current treatment plan includes OxyContin, oxycodone, intermittent PT/OT.    Major issues:  1. Chronic pain syndrome    2. Opioid dependence, continuous    3. Chronic left shoulder pain    4. Thoracic compression fracture, sequela    5. Low back pain      Pain location and description: 5/10 constant sharp, burning mid and lower back pain, bilateral shoulders. Function rated 7.  Last visit pan rated 7/10.   Radiation of pain: Denies  Paresthesias, numbness, weakness: Denies  Gait disturbance: wheelchair for outings.  Denies recent falls.  Exacerbating factors: activity, arm movements, morning time.  Alleviating factors: Rest, pain medications  Associated symptoms: Sedentary lifestyle, obesity, oxygen dependent, depression  Adverse effects of medications:  Uses senna at home for intermittent constipation.  Intermittent pruritus from opioids, takes benadryl.  Current treatment efficacy: Fair. Taking OxyContin 40 mg in the morning with pain relief within 1 hour.  She states she wakes with pain 8-9/10 and reduces to 6/10. Taking oxycodone 30 mg 1 every 4 hours prn since hospitalization, has not tolerated reduction in daily dose.   Current treatment compliance: Good.  She has failed Lyrica, Gabapentin, Cymbalta, Fentanyl, Morphine, Vicodin, codeine in the past.  She has [facility] PT/OT and RN services in past, not currently needed.  Has declined consult with Dr. Morales for vertebroplasty status post compression fracture.  Uses daily pop out dosing for medications.  Reports  "taking medication as prescribed.      Reviewed visit with rheumatologist Dr. José on 05/15/17:  \"Catherine Sharp 41 y.o. female is here with exacerbation of pain in her left shoulder this is troubled her for the past few weeks consistent with tendinopathy, cervical spondylosis or combination thereof.  X-rays of the shoulder taken show little in the way of degenerative changes of the glenohumeral joint.  She has copious space underneath the acromion.  For her lupus,  positive KRISTOPHER, ds-DNA, SSA antibodies, anti-phospholipid antibodies, DVT, mouth ulcers, pleuritic/pericarditis symptoms, and inflam polyarthritis,  she is on hydroxychloroquine could not tolerate mycophenolate with the GI side effects.  She is cut back gradually to prednisone 5 mg now.  I recommended that she does so for.  She would like to get 5 mg and try if she can get it down to 4 mg a day.  As noted earlier she has been on prednisone since age 15.  With a diastolic failure she still is on supplemental oxygen.  I will ask her to return for follow-up here in 3 months sooner.  I reminded her of the eye exam she is going to make that appointment the next few days.\"    States more difficulty with pain in the left shoulder describing near her AC joint; she states if she presses hard in certain spots her pain will subside but when she breathes she feels pain go down neck to shoulder.  She states her shoulder injection with Dr. Jsoé on 05/15/17 did not help even temporarily.  She denies neck imaging but also does not endorse neck pain in the spine or paracervical muscles.  Rotation of head does not cause pain.  Denies left arm pain, tingling, numbness.  X-ray negative for left shoulder, she has had some PT of left shoulder for \"frozen shoulder\" with minimal benefit.      Lower back pain is most problematic area of spine between L3-L5.  She denies new areas of pain or new trauma, fall, or injury since last seen.  Denies lower extremity pain.  No recent " lumbar imaging, last done 2014.  Discussed her spine pain in case review today:  Purpose of Case Review: Discuss vertebroplasty & ED plan as patient currently has one through  to limit IV opioids and she is retiring.  History: 41-year-old female with history of systemic lupus erythematosus, interstitial lung disease, chronic hypercarbic hypoxemic respiratory failure on home O2 at 4-6 L/min. Patient has history of sleep apnea, antiphospholipid antibody syndrome, and diastolic heart failure. Follows pulmonology, rheumatology, osteoporosis specialist, MTM.  Catherine is seen for chronic back pain secondary to compression fractures due to chronic steroid exposure to treat SLE and multiple joint pains.  She is current taking oxycodone 30 mg every 4 hours and OxyContin 40 mg every am. Previously on OxyContin 40 mg in am and pm and 50 mg at bedtime + oxycodone.  She cannot tolerate OxyContin dosing every 8-12 hours per her report due to respiratory depression.  On chronic O2 and CPAP use at night. 2 ED to hospital admissions in past 12 months compared to 9 hospitalizations in 2015, many more in [id number].  She often presents for recurrent N/V thought to be related to dismotility from opioids, also accusations of withdrawal/opiate seeking although patient has been compliant with UDS/pill counts here at pain center.  Medication Trials: Voltaren Gel, Flector patch, lidocaine, toradol, Allergies to hydrocodone, morphine, fentanyl - not relieving, skin concerns, codeine (have discussed PGT), Gabapentin, Tylenol, prednisone, no NSAIDS due to Coumadin use   Other Treatments:  & [facility] PT referrals in the past (visceral manipulation) warm pool recommended but height restricted her.  Home health PT/OT, [facility] Dr. Marte 2014  Recommendations: In regards to vertebroplasty, this is not done at our pain center and has to be for a acute/subacute fracture less than 6 months old. IF patient presents with  "new/increased spine pain would recommend MRI and if any edema or changes to suggest acute compression fracture hospital radiology services to vertebroplasty.  Other outpatient recommendations for spine pain include thoracic brace, thoracic MBB, and if these fail consider stimulator trial. For ED plan, recommendation was to provide patient a letter than she can have documentation that we are following her for chronic pain and outline our med plan, and if she presents to ED with acute pain along with new diagnostic findings she may be treated as appropriate with oral or IV medications including opioids.  Will discuss ED frequency with patient and if a spike in ED visits will need to reassess.             Review of Systems  Constitutional: MERLIN, chronic O2. Denies fever, chills, night sweats, lethargy, weight loss  Musculoskeletal: Positive for mid and lower back pain, shoulder joint pain, joint swelling  Gastrointestional: Positive for intermittent nausea, vomiting, GERD, abdominal pain.  Denies difficulty swallowing, change in appetite, constipation, diarrhea, fecal incontinence.  Genitourinary: Denies urinary incontinence, dysuria, hematuria, UTI, frequency, hesitancy, change in libido  Neurologic: Headaches.  Denies confusion, seizure, weakness, changes in balance, changes in speech.  Psychiatric: Positive depression, anxiety.  Denies memory loss, psychoses, suicidal ideation, substance use/abuse.     Objective:     Vitals:    06/08/17 1300   BP: 132/82   Pulse: (!) 101   Resp: 16   Weight: 197 lb (89.4 kg)   Height: 4' 10\" (1.473 m)   PainSc:   5     Physical Exam  Constitutional- General appearance: Abnormal.  Well developed, uncomfortable, obese, appears older than stated age.  Presents alone today.   Psychiatric- Judgment and insight: Normal.  Speech: Normal rhythm.  Thought process: Normal.  No abnormal thoughts reported. Alert & Oriented to person, place, and time.  Recent and remote memory: Normal.  Mood and " affect: Normal.  Observed mood: Appropriate    Respiratory- Using O2 by nasal cannula.  Normal rhythm and rate.  Cardiovascular- Extremities warm and well perfused, no peripheral edema or varicosities.  Dermatologic- Exposed skin is clean, dry, and intact to inspection and palpation.   Musculoskeletal- Gait and station: Ambulating with walker today, stooped.  Significant thoracic kyphosis.  Able to sit to stand with difficulty.  Left shoulder pain to palpation AC joint.  Limited AROM in all planes due to pain.  No shoulder joint deformity, swelling, warmth noted.  No cervical spine pain to palpation and cervical rotation right and left, flexion and extension all WNL without pain.    *Opioid Universal Precautions:    UDS/Swab - 01/12/17 as expected  Opioid Consent -  due  Opioid Agreement - 03/14/17  Pharmacy- as documented    [place]  Reviewed - 06/08/17 as expected  Pill Count - Pill count 10/16/15 appropriate.  Patient brought in oxycodone 30 mg tablets today with #19 counted, due to refill 06/12.  Psychological evaluation - n/a  MME - 330  Pharmacogenetic testing - n/a    Imaging:  Two Views right shoulder and left shoulder 05/15/17:  FINDINGS:     RIGHT SHOULDER: On the anterior view, there is inferior subluxation of the right humeral head that appears similar to the prior study. On the prior CT and MRI, dislocations were not described. There is a suture line in the right lung. There is elevation   of the hemidiaphragm with atelectasis at the right lung base.     LEFT SHOULDER: A fracture or dislocation is not seen.    Assessment:   Catherine Sharp is a 41 y.o. female seen in clinic today for pain in her spine due to history of pathologic compression fractures due to steroids to treat SLE.  She is reporting most pain in her lumbar spine L3-5 at this time.  She continues to have pain in thoracic spine, assessing for acute on chronic compression fractures with MRI of thoracic and lumbar spine recommended as we are  discussing appropriate interventional treatments this will be assistive. She is having improved pain relief with oxycodone 30 mg every 4 hours prn, however we would prefer this to be temporary dosing and reduction as tolerated.  She continues OxyContin 40 mg for extended pain relief in the morning but reports she cannot take this every 12 hours as it causes more difficulty with SOB at increased frequency.  She is currently working with her pulmonologist for CPAP use and also oxygen tank need.  She understands the risks with opioids and respiratory depression.  Strongly recommend pharmacogenetic testing for guidance in opioid therapies but this has been cost prohibitive.  She had recent consult with [facility] and was told nothing more could be done for her spine symptoms.  She is also reporting chronic left shoulder pain, recent x-ray and shoulder injection negative.  Failed PT.  Will order left shoulder MRI as cervical spine exam today is normal outside of kyphosis.  If shoulder MRI is normal, would consider cervical spine imaging starting with x-ray.      Plan:   Prescribed 30 days of OxyContin 40 mg in the morning. printed to fill today due to travel, start date 06/12    Prescribed oxycodone 30 mg 1 every 4 hours maximum for pain as needed. Printed to fill today due to travel, start date 06/12.  Take Vistaril 25 mg 1 tablet for itching as needed.  Do not take this with benadryl.  May cause drowsiness.  I will update document for inpatient/ED care plan for pain and place in the FYI section of Epic for any provider to view.    Recommend thoracic and lumbar MRI scans to be done at [facility] facility - will review results when you return  Recommend left shoulder MRI to evaluate shoulder pain in setting of recent failed joint injection and negative shoulder x-ray.   Follow-up in 4 weeks with Elizabet.    Elizabet Palmer PA-C  [facility]  [address]  [place] [address]  Ph:  [phone]  Fax: [phone]

## 2021-06-12 NOTE — TELEPHONE ENCOUNTER
----- Message from Boris Bearden MD sent at 10/8/2020 10:45 AM CDT -----  Thank you  Scheduling team can you get her a visit with me when next available thank lidya carter  ----- Message -----  From: Benjamin Giang, LRT  Sent: 10/8/2020  10:32 AM CDT  To: Boris Bearden MD    Hi Dr Bearden,    I just wanted to reach out to you about this pt and her NIV device. It just seems that she hasn't been using her NIV as much since she received it. I've tried calling her and left her messages, but she hasn't returned any calls. I checked her [employer] account for her NIV, and it shows that she hasn't used it at all either. She also does not have it plugged in so we're not able to retrieve any recent data. The most recent data is in April, but again, anything before that also shows that she didn't use it at all. The last note that I see in our system says that she doesn't like switching between her nasal cannula and the mask interface on the NIV. Wondering if you are able to check in with her on this if she has any upcoming appointments with you. Please let me know if you need anything. Thanks, Dr Bearden!        Benjamin Giang, [facility]    [phone]

## 2021-06-12 NOTE — TELEPHONE ENCOUNTER
Patient called back. No availability. Put on a recall list for 6 weeks and will call when available.

## 2021-06-12 NOTE — PROGRESS NOTES
Reached out to pt in regards to her NIV device, because it seems that she has not been using it, per pt's [employer] account for her NIV. I've tried calling her phone number that's on file, but I have not been able to get a hold of her or has she returned any calls either. No recent data can be retrieved because she does not have her NIV device plugged in. I offered to see if she would like any help with anything, but she has not responded. Routed this to Dr Bearden.         Benjamin Giang RRT  [facility]  Office: [phone]  Direct: [phone]  Fax: [phone]

## 2021-06-12 NOTE — TELEPHONE ENCOUNTER
----- Message from Boris Bearden MD sent at 10/8/2020 10:45 AM CDT -----  Thank you  Scheduling team can you get her a visit with me when next available thank lidya carter  ----- Message -----  From: Benjamin Giang, LRT  Sent: 10/8/2020  10:32 AM CDT  To: Boris Bearden MD    Hi Dr Bearden,    I just wanted to reach out to you about this pt and her NIV device. It just seems that she hasn't been using her NIV as much since she received it. I've tried calling her and left her messages, but she hasn't returned any calls. I checked her [employer] account for her NIV, and it shows that she hasn't used it at all either. She also does not have it plugged in so we're not able to retrieve any recent data. The most recent data is in April, but again, anything before that also shows that she didn't use it at all. The last note that I see in our system says that she doesn't like switching between her nasal cannula and the mask interface on the NIV. Wondering if you are able to check in with her on this if she has any upcoming appointments with you. Please let me know if you need anything. Thanks, Dr Bearden!        Benjamin Giang, [facility]    [phone]

## 2021-06-12 NOTE — PROGRESS NOTES
Assessment and Plan:     1. Dyspnea on exertion  XR Chest 2 Views   2. Hypoxia  XR Chest 2 Views   3. Chronic respiratory failure with hypoxia and hypercapnia (H)     4. Chronic diastolic heart failure (H)     5. Pulmonary hypertension (H)     6. Systemic lupus erythematosus, unspecified SLE type, unspecified organ involvement status (H)     7. Antiphospholipid Antibody Syndrome     8. Edema of right foot     9. Hematuria, unspecified type  Urinalysis-UC if Indicated     Discussed treatment options.  No obvious pneumonia is noted on chest x-ray.  Patient is experiencing dyspnea with exertion and hypoxia.  She has right lower extremity swelling concerning for DVT or exacerbation of heart failure.  She also has hematuria concerning for acute cystitis or nephrolithiasis.  I recommended ER for further evaluation, but patient was insistent that she could receive treatment outside of the hospital.  Patient kept telling me that this is her baseline. I consulted with Dr. Ma who is her PCP who also recommended ER evaluation.  After 15 minutes, patient and her  reluctantly decided to present to the emergency room and refused care via ambulance.  I called to [facility] to notify them of her arrival.    Subjective:     Catherine is a 45 y.o. female presenting to the clinic for multiple concerns today.  She has multiple comorbidities including antiphospholipid antibody syndrome, SLE, interstitial lung disease, chronic diastolic heart failure, pulmonary hypertension, chronic pain, chronic respiratory failure with hypoxia and hypercapnia.  Patient states over the past 2 to 3 days, she has felt more short of breath both at rest and with activity.  She has not had any recent cold symptoms or fever.  She is clearing her throat frequently.  She does have a productive cough and has noticed hemoptysis.  She denies chest pain, chest tightness.  Her right foot has been swelling intermittently for 1 week.  This occurs primarily  with ambulation.  It improves when she elevates her leg.  She denies any known injury.  The foot is painful and it is swollen.  She has noticed some discoloration to it.  She does have a history of a DVT.  And is on chronic warfarin.  She denies recent travel.  Patient is also concerned about possible urinary tract infection.  She has a history of nephrolithiasis.  She complains of pelvic pressure and noticed a light tinge to her urine.  She has been experiencing urinary frequency.  She denies dysuria.  She has not had any vaginal discharge or irritation.  She has not tried taking any over-the-counter products for her symptoms.    Review of Systems: A complete 14 point review of systems was obtained and is negative or as stated in the history of present illness.    Social History     Socioeconomic History     Marital status:      Spouse name: Not on file     Number of children: Not on file     Years of education: Not on file     Highest education level: Not on file   Occupational History     Occupation: Unemployed   Social Needs     Financial resource strain: Not on file     Food insecurity     Worry: Not on file     Inability: Not on file     Transportation needs     Medical: Not on file     Non-medical: Not on file   Tobacco Use     Smoking status: Former Smoker     Packs/day: 0.50     Years: 10.00     Pack years: 5.00     Types: Cigarettes     Quit date: 2009     Years since quittin.2     Smokeless tobacco: Never Used   Substance and Sexual Activity     Alcohol use: No     Drug use: Yes     Types: Oxycodone     Sexual activity: Not on file   Lifestyle     Physical activity     Days per week: Not on file     Minutes per session: Not on file     Stress: Not on file   Relationships     Social connections     Talks on phone: Not on file     Gets together: Not on file     Attends [employer] service: Not on file     Active member of club or organization: Not on file     Attends meetings of clubs or  organizations: Not on file     Relationship status: Not on file     Intimate partner violence     Fear of current or ex partner: Not on file     Emotionally abused: Not on file     Physically abused: Not on file     Forced sexual activity: Not on file   Other Topics Concern     Not on file   Social History Narrative    Lives with family. Lives in [place].       Active Ambulatory Problems     Diagnosis Date Noted     Hypertension      Osteoporosis      Anemia      Generalized anxiety disorder      Antiphospholipid Antibody Syndrome      Peripheral Neuropathy      Systemic lupus erythematosus (H)      GERD (gastroesophageal reflux disease)      Opiate dependence (H)      Gastroparesis      Coagulopathy (H) 10/03/2014     ILD (interstitial lung disease) (H) 10/13/2014     Restrictive lung disease due to kyphoscoliosis 12/01/2014     Regional enteritis (H) 04/12/2007     Hyperthyroidism 12/12/2014     Bilateral shoulder tendinopathy 03/14/2016     Polyarthralgia 04/20/2016     Chronic diastolic heart failure (H) 04/27/2016     Pulmonary hypertension (H)      Chronic pain syndrome      High risk medication use      Pulmonary arterial hypertension (H)      Hypoxia 05/21/2019     Chronic respiratory failure with hypoxia and hypercapnia (H) 06/13/2019     Muscle pain 04/22/2008     Dyspnea on exertion 02/26/2019     Resolved Ambulatory Problems     Diagnosis Date Noted     Meralgia paresthetica      Closed Cervical Compression Fracture C1-C4 Level With Spinal Cord Injury      Chronic Colitis      Pulmonary emboli (H)      Herpes Zoster (Shingles)      Lung Mass      Chronic Obstructive Pulmonary Disease      Acute on chronic diastolic congestive heart failure (H) 08/19/2014     Hypovolemia 11/13/2014     Adhesive pericarditis 04/12/2007     Deep phlebothrombosis, antepartum, with delivery (H) 04/12/2007     History of DVT (deep vein thrombosis) 12/15/2014     Chronic hypercapnic respiratory failure (H) 12/22/2014      Acute respiratory failure (H) 01/13/2015     Respiratory failure, acute-on-chronic (H) 01/21/2015     History of DVT (deep vein thrombosis) 01/22/2015     Abdominal pain 02/13/2015     Shortness of breath 03/15/2015     Hypotension 04/12/2015     COPD exacerbation (H) 05/07/2015     COPD (chronic obstructive pulmonary disease) (H) 05/29/2015     Acute respiratory failure (H) 05/29/2015     Non compliance w medication regimen 05/29/2015     Drug withdrawal (H) 07/17/2012     Intractable nausea and vomiting 07/14/2015     Pain 07/14/2015     Chronic pain 07/15/2015     Hypercarbia 09/26/2015     Acute on chronic respiratory failure with hypercapnia (H) 09/26/2015     ALFONSO (acute kidney injury) (H) 09/26/2015     Encephalopathy acute 09/26/2015     Chronic respiratory failure with hypoxia (H) 09/26/2015     Snoring 12/03/2015     Trigger ring finger, right 04/20/2016     Pneumonia 07/11/2016     Abnormal CXR      Cardiomyopathy (H)      Chronic respiratory failure with hypercapnia (H)      Vomiting 04/13/2017     Respiratory failure (H) 04/13/2017     Acute renal failure, unspecified acute renal failure type (H)      Acute gastroenteritis      Elevated troponin      Acute on chronic respiratory failure with hypoxia and hypercapnia (H)      Past Medical History:   Diagnosis Date     Antiphospholipid antibody with hypercoagulable state (H)      Cervical compression fracture (H) 2009     Chest pain with normal coronary angiography 2010     Congestive heart failure (H)      Depression      Hypertension      Kidney infection      Kidney stone      Lupus (systemic lupus erythematosus) (H)      Obesity      Peripheral neuropathy      Pulmonary hypertension due to left ventricular diastolic dysfunction (H)      Recurrent Clostridium difficile diarrhea      Seizures (H)      Stress-induced cardiomyopathy 05/07/2010       Family History   Problem Relation Age of Onset     Asthma Mother      Hypertension Mother      Stroke Mother       Clotting disorder Mother      Cancer Mother      Hypertension Father      Stroke Father      Urolithiasis Father      Stroke Maternal Aunt      Mental illness Sister      Stroke Sister      Clotting disorder Sister      Cerebral aneurysm Maternal Aunt      Gout Neg Hx        Objective:     /84 (Patient Site: Right Arm, Patient Position: Sitting, Cuff Size: Adult Large)   Pulse 95   Wt 193 lb 14.4 oz (88 kg)   SpO2 (!) 80%   BMI 40.53 kg/m    Patient's O2 sat is 80% at rest and drops to 69% with ambulation with her home oxygenation.    Patient is alert, in no obvious distress.   Skin: Warm, dry.  No lesions or rashes.  Skin turgor rapid return.   HEENT:  Head normocephalic, atraumatic.  Eyes normal. Ears normal.  Nose patent, mucosa pink.  Oropharynx mucosa pink.  No lesions or tonsillar enlargement. She is receiving home oxygen via nasal cannula.    Neck: Supple, no lymphadenopathy.   Lungs:  Clear to auscultation. Respirations even and unlabored.  No wheezing or rales noted.   Heart:  Regular rate and rhythm.  No murmurs.    Abdomen: Soft, nontender.  No organomegaly. Bowel sounds normoactive. No guarding or masses noted.    Musculoskeletal:  Patient's toes are purple in color within the right foot.  The dorsal aspect of the foot is erythematous.  +1 nonpitting edema is present in bilateral lower extremities.     LABORATORY: I ordered and personally reviewed a chest x-ray showing no obvious infiltrate.  Cardiomegaly is present.  Will have radiology review.

## 2021-06-12 NOTE — PROGRESS NOTES
PAIN CENTER PROGRESS NOTE    Subjective:   Catherine Sharp is a 41 y.o. female who presents for evaluation of multi-site pain secondary to osteoporosis and pathologic fractures as a result of SLE treated with chronic steroids.  She has multiple co-morbidities including Interstitial lung disease, MERLIN, Antiphospholipid Antibody Syndrome, cardiomyopathy, OA in multiple sites, and recurrent hospitalizations.  Pain has been present for years and current treatment plan includes OxyContin, oxycodone, intermittent PT/OT.    Major issues:  1. Chronic pain syndrome    2. Opioid dependence, continuous    3. Systemic lupus erythematosus    4. Degeneration of lumbar intervertebral disc    5. Therapeutic opioid-induced constipation (OIC)      Pain location and description: 5/10 constant sharp, burning mid and lower back pain, bilateral shoulders.  Reports low back most bothersome currently. Function rated 8.  Last visit pan rated 7/10.   Radiation of pain: Denies  Paresthesias, numbness, weakness: Left thigh numbness which she has had for many years.    Gait disturbance: wheelchair for outings.  Denies recent falls.  Exacerbating factors: activity, arm movements, morning time.  Alleviating factors: Rest, pain medications  Associated symptoms: Sedentary lifestyle, obesity, oxygen dependent, depression  Adverse effects of medications:  Uses sennakot 2 tablets at home for intermittent constipation.  Hasn't used miralax but used to.  She bought generic laxative.  Intermittent pruritus from opioids, takes benadryl.  Current treatment efficacy: Fair. Taking OxyContin 40 mg in the morning with pain relief within 1 hour.  She states she wakes with pain 8-9/10 and reduces to 6/10. Taking oxycodone 30 mg 1 every 4 hours prn since hospitalization, has not tolerated reduction in daily dose.   Current treatment compliance: Good.  She has failed Lyrica, Gabapentin, Cymbalta, Fentanyl, Morphine, Vicodin, codeine in the past.  She has  [facility] PT/OT and RN services in past, not currently needed.  Uses daily pop out dosing for medications.  Reports taking medication as prescribed.      No ED visits since last seen.  She states she is doing ok with spine pain symptoms this month.  She states her joints flare up with humid weather due to lupus but this is not new.  She reports bilateral knees and wrists/fingers are painful.  She states her fingers are stuck in the morning when she wakes and has to manually peel them back.  She reports swelling in fingers.  She is taking prednisone 5 mg daily.  Plaquenil also prescribed by Dr. José and she will follow-up in September.      She states she had lumbar MRI but wasn't able to do thoracic MRI or left shoulder due to discomfort.  She states low back pain is the worst.  Denies lower extremity numbness, except in left thigh.  She reports back painful with walking any distance.  Describes some claudication in lower legs bilaterally.  MRI demonstrates mild to moderate facet arthropathy throughout, L3-4 central stenosis, L5-S1 disc protrusion, and old compression fractures L1, 2, 4, 5.  She denies previous history of lumbar injections but is interested in this treatment for her pain.  She was scheduled for LESI this week but Nicolle her mother in law had a  so she had to reschedule as she didn't have a .   She states she is not going to stop anticoagulant but will do heparin per Young Carey, PharmD [place].      She reports her constipation has been more bothersome and difficult to have BM even with the OTC stool softener and trials of laxative.  She tries to eat fresh fruit/vegetables, fiber, water. She is interested in OIC medication.  She denials trials in the past.  She denies previous bowel obstruction or history of surgery.  She states she has no current renal impairment, most recent BMP 17 normal.    Review of Systems  Constitutional: MERLIN, chronic O2. Denies fever,  "chills, night sweats, lethargy, weight loss  Musculoskeletal: Positive for mid and lower back pain, left shoulder joint pain, joint swelling  Gastrointestional: Positive for intermittent nausea, vomiting, GERD, abdominal pain.  Denies difficulty swallowing, change in appetite, constipation, diarrhea, fecal incontinence.  Genitourinary: Denies urinary incontinence, dysuria, hematuria, UTI, frequency, hesitancy, change in libido  Neurologic: Headaches.  Denies confusion, seizure, weakness, changes in balance, changes in speech.  Psychiatric: Positive depression, anxiety.  Denies memory loss, psychoses, suicidal ideation, substance use/abuse.     Objective:     Vitals:    08/10/17 1004   BP: 140/86   Pulse: (!) 109   Resp: 16   Weight: 190 lb (86.2 kg)   Height: 4' 10\" (1.473 m)   PainSc:   5     Physical Exam  Constitutional- General appearance: Normal.  Well developed, comfortable, obese, appears older than stated age.  Presents alone today.   Psychiatric- Judgment and insight: Normal.  Speech: Normal rhythm.  Thought process: Normal.  No abnormal thoughts reported. Alert & Oriented to person, place, and time.  Recent and remote memory: Normal.  Mood and affect: Normal.  Observed mood: Appropriate    Respiratory- Not using O2 by nasal cannula today.  Normal rhythm and rate.  Cardiovascular- Extremities warm and well perfused, no peripheral edema or varicosities.  Dermatologic- Exposed skin is clean, dry, and intact to inspection and palpation.   Musculoskeletal- Gait and station: Ambulating independently today, stooped.  Significant thoracic kyphosis.  Able to sit to stand with difficulty.      *Opioid [place] Precautions:    UDS/Swab - 01/12/17 as expected  Opioid Consent -  due  Opioid Agreement - 03/14/17  Pharmacy- as documented    [place]  Reviewed - 08/10/17 as expected  Pill Count - Pill count 10/16/15 appropriate.    Psychological evaluation - n/a  MME - 330  Pharmacogenetic testing - n/a    Imaging:  MR " Lumbar Spine without Contrast 07/05/17  IMPRESSION:   CONCLUSION:  1.  Multilevel chronic compression deformities at T12, L1, L2, L4, and L5 as above. No marrow edema or other findings to suggest acute compression fracture.  2.  At L3-4, mild trefoil type spinal canal stenosis related to a combination of vertebral retropulsion and disc bulging.  3.  At L5-S1, shallow right central/subarticular disc protrusion with small superimposed caudally dissecting extruded fragment. Mild right lateral recess stenosis with mild posterior displacement of the right S1 nerve root.  4.  At L2-3, low-grade narrowing of the lateral recesses and minimal right neural foraminal stenosis.    Assessment:   Catherine Sharp is a 41 y.o. female seen in clinic today for pain in her spine due to history of pathologic compression fractures due to steroids to treat SLE.  She is reporting most pain in her lumbar spine L3-5 at this time, MRI demonstrates spinal canal stenosis L3-4, disc bulging at L3-4 and L5-S1, and mild to moderate facet arthropathy throughout in addition to multiple chronic compression deformities.  She is advised to consult with Dr. Valles or Roxana for lumbar injections, likely LESI and then progress to LMBB if needed.  She is educated on the risks and benefits of these procedures today.  She is on coumadin and will transition to lovenox and hold for 24 hours prior to procedure.  She continues to have pain in thoracic spine, assessing for acute on chronic compression fractures with MRI of thoracic. She is having improved pain relief with oxycodone 30 mg every 4 hours prn, however we would prefer this to be temporary dosing and reduction as tolerated.  She continues OxyContin 40 mg for extended pain relief in the morning but reports she cannot take this every 12 hours as it causes more difficulty with SOB at increased frequency.  She has OIC and recommend Movantik 25 mg daily which is discussed with possible side effects.   She is currently working with her pulmonologist for CPAP use and also oxygen tank need.  She understands the risks with opioids and respiratory depression.  Strongly recommend pharmacogenetic testing for guidance in opioid therapies but this has been cost prohibitive.  She had recent consult with [facility] and was told nothing more could be done for her spine symptoms.  She is also reporting chronic left shoulder pain, recent x-ray and shoulder injection negative.  Failed PT.  Have ordered left shoulder MRI as cervical spine exam today is normal outside of kyphosis.  If shoulder MRI is normal, would consider cervical spine imaging starting with x-ray.      Plan:   Prescribed 30 days of OxyContin 40 mg in the morning. printed to fill 08/10/17 for use 08/11/17  Prescribed oxycodone 30 mg 1 every 4 hours maximum for pain as needed. Printed to fill 08/10/17 for use on 08/11/17.  Next month both printed to fill 09/08, start 09/10.    Start Movantik 25 mg tablet 1 daily for constipation.  Expect BM 6-12 hours after dosing.    Take Vistaril 25 mg 1 tablet for itching as needed.  Do not take this with benadryl.  May cause drowsiness.  Recommend thoracic and left shoulder MRI to evaluate shoulder pain in setting of recent failed joint injection and negative shoulder x-ray.    For lumbar pain, reschedule with Dr. Valles or Roxana for lumbar epidural steroid injection.  You must hold lovenox for 24 hours.  You must have a  the day of procedure take you home.  Follow-up in 8 weeks with Elizabet.    Elizabet Palmer PA-C  [facility]  [address] [place] [address]. [address]  [place] [address]  Ph: [phone]  Fax: [phone]

## 2021-06-13 NOTE — PROGRESS NOTES
Assessment/Plan:        Diagnoses and all orders for this visit:    Urinary calculus, unspecified  -     Urinalysis Macroscopic  -     Cancel: Renal Function Profile  -     Cancel: Magnesium  -     Cancel: Uric Acid  -     Cancel: Calcium, Ionized, Measured  -     Cancel: Parathyroid Hormone Intact with Minerals  -     Stone Formation, 24 Hour Urine x2; Standing  -     24 Hour Urine Collection Steps Education  -     Calcium, Ionized, Measured- Future; Future; Expected date: 10/22/17  -     Magnesium- Future; Future; Expected date: 10/22/17  -     Parathyroid Hormone Intact with Minerals-Future; Future; Expected date: 10/22/17  -     Renal Function Profile- Future; Future; Expected date: 10/22/17  -     Uric Acid- Future; Future; Expected date: 10/22/17    Calculus of kidney  -     Cancel: Renal Function Profile  -     Cancel: Magnesium  -     Cancel: Uric Acid  -     Cancel: Calcium, Ionized, Measured  -     Cancel: Parathyroid Hormone Intact with Minerals  -     Stone Formation, 24 Hour Urine x2; Standing  -     24 Hour Urine Collection Steps Education  -     Calcium, Ionized, Measured- Future; Future; Expected date: 10/22/17  -     Magnesium- Future; Future; Expected date: 10/22/17  -     Parathyroid Hormone Intact with Minerals-Future; Future; Expected date: 10/22/17  -     Renal Function Profile- Future; Future; Expected date: 10/22/17  -     Uric Acid- Future; Future; Expected date: 10/22/17      Stone Management Plan  [facility] 9/22/2017   Urinary Tract Infection No suspicion of infection   Renal Colic Asymptomatic at this time   Renal Failure No suspicion of renal failure   Current CT date 9/20/2017   Right sided stones? No   R Stone Event No current event   Left sided stones? Yes   L Number of ureteral stones No ureteral stones   L Number of kidney stones  8   L GSD of kidney stones 4 - 10   L Hydronephrosis Mild   L Stone Event New stone passed prior to visit   Resolved date 9/20/2017   L Current  Plan Observe   Observe rationale Limited stone burden with good prognosis for spontaneous passage             Subjective:      HPI  Ms. Catherine Sharp is a 42 y.o.  female presenting to the [facility] [place] with history of onset of severe left flank pain 9/20/2017.  In addition she had the feeling that she had to void but could not.  She voided small amounts frequently.  She presented to the emergency room where she was found to have  RBCs along with 5200 WBCs no bacteria negative nitrate.  Additional lab included white blood count 12,600 hemoglobin 15.5 C-reactive protein 0.7 sodium 140 potassium 4.2 creatinine 0.99 calcium 8.8.  Urine culture ultimately grew no growth.    Personal review of CT scan without contrast obtained 9/20/2017 demonstrated 8 stones in the left kidney the largest being 5 mm the remainder 1 mm to 2 mm.  No ureteral stone could be identified but there was mild hydroureteronephrosis suggestive of a recently passed stone.  No stones were seen in the bladder.  There are no stones seen in the right kidney.  Suggestion of full left ureter and possibly an incomplete duplex.    Patient's pain abated when she was discharged home.  She stated she thought she passed something but did not recover it and did not see it..    Patient presents today without symptoms of urgency or frequency or abdominal or flank pain.  UA today specific gravity 1.030 pH 6.5 negative blood nitrate and leukocytes.    Significant inpatient history she has severe pulmonary hypertension on 4 L of oxygen a day.  She has chronic pain issues on oxycodone 40 mg every 12 hours along with quick release 30 mg as needed.  She is insulin-dependent diabetic and has lupus systemic and is on prednisone but has tapering doses at this time..  She has had problems with recurrent Clostridium difficile, sleep apnea ,seizure disorder and congestive heart failure.  She is on Coumadin for risk for clots.    Patient  states she had a lower urinary tract infection in the remote past and one kidney infection associated with fever.  She had no history of UTIs as a child.    Impression recent passage left ureteral stone not recovered, multiple left renal stones  , No right renal stones  Multiple medical issues as noted above    Plan stone risk studies with disposition to follow.  Patient has available to her for symptom relief ibuprofen and Zofran in addition to her oxycodone 30 mg immediate release and OxyContin 40 mg every 12 hour medication.           ROS   Review of Systems  A 12 point comprehensive review of systems is negative except for HPI    Past Medical History:   Diagnosis Date     Antiphospholipid antibody with hypercoagulable state     Secondary to lupus     Cervical compression fracture 2009    closed, with spinal cord injury C1-C4 - prednisone induced     Chronic hypercapnic respiratory failure 12/22/2014     Chronic pain     on opiates     Congestive heart failure     Diastolic, Cor Pulmonale, EF 60% 2014     Depression      Gastroparesis     causing nausea/vomiting with recurrent admissions     GERD (gastroesophageal reflux disease)      Hypertension      Kidney infection      Kidney stone      Lupus (systemic lupus erythematosus)     complicated by joint and pulmonary involvement     Obesity      MERLIN (obstructive sleep apnea) 1/30/2015    On home bipap     Osteoporosis      Peripheral neuropathy      Recurrent Clostridium difficile diarrhea      Seizures     once due to high BP, hypertensive encephalopathy       Past Surgical History:   Procedure Laterality Date     PICC AND MIDLINE TEAM LINE INSERTION  9/27/2015          TN BIOPSY LUNG/MEDIASTINUM PERCUTANEOUS NEEDLE      Description: Biopsy Lung Percutaneous;  Recorded: 09/15/2011;     TN LAP,CHOLECYSTECTOMY      Description: Cholecystectomy Laparoscopic;  Recorded: 02/15/2008;     THORACOSCOPY Left 1/13/2015    Procedure: LEFT THORACOSCOPY CONVERTED TO  "THORACOTOMY;  Surgeon: Jose David Granados MD;  Location: [facility] OR;  Service:      THORACOTOMY Left 1/13/2015    Procedure: THORACOTOMY W/ WEDGE RESECTION;  Surgeon: Jose David Granados MD;  Location: [facility];  Service:        Current Outpatient Prescriptions   Medication Sig Dispense Refill     albuterol (PROVENTIL HFA;VENTOLIN HFA) 90 mcg/actuation inhaler Inhale 2 puffs every 6 (six) hours as needed for wheezing. 1 Inhaler 6     albuterol (PROVENTIL) 2.5 mg /3 mL (0.083 %) nebulizer solution Take 3 mL (2.5 mg total) by nebulization 4 (four) times a day as needed for wheezing or shortness of breath. 75 mL 11     alum/mag hydrox-simethicone-diphenhydramine-lidocaine (MAGIC MOUTHWASH) suspension Swish and swallow 5mL 3-4 times daily as needed. Standard prep: Diphenhydramine+Sucralfate+Mylanta 120 mL 1     BD INSULIN PEN NEEDLE UF MINI 31 gauge x 3/16\" Ndle        BD TUBERCULIN SYRINGE 1 mL 27 x 1/2\" Syrg USE TO DISPENSE HEPARIN  1     biotin 2,500 mcg cap Take 2,500 mcg by mouth every evening.        cholecalciferol, vitamin D3, 1,000 unit tablet Take 1,000 Units by mouth daily.       diltiazem (CARDIZEM CD) 240 MG 24 hr capsule Take 1 capsule (240 mg total) by mouth daily. 90 capsule 3     FERROUS SULFATE (SLOW FE ORAL) Take 65 mg by mouth daily.       furosemide (LASIX) 40 MG tablet Take 1 tablet (40 mg total) by mouth daily. 60 tablet 11     ibuprofen (ADVIL,MOTRIN) 200 MG tablet Take 2 tablets (400 mg total) by mouth every 6 (six) hours as needed for pain. 20 tablet 0     ipratropium-albuterol (DUO-NEB) 0.5-2.5 mg/3 mL nebulizer Inhale 3 mL 4 (four) times a day as needed.       levoFLOXacin (LEVAQUIN) 500 MG tablet Take 1 tablet (500 mg total) by mouth daily for 10 days. 10 tablet 0     levothyroxine (SYNTHROID, LEVOTHROID) 150 MCG tablet Take 1 tablet (150 mcg total) by mouth daily. 90 tablet 1     levothyroxine 150 mcg cap Take 150 mcg by mouth daily. 90 capsule 1     LORazepam (ATIVAN) 1 MG " tablet Take 1 mg by mouth every evening.       LORazepam (ATIVAN) 1 MG tablet TAKE 1 TABLET BY MOUTH DAILY 30 tablet 0     nystatin (MYCOSTATIN) powder Apply 1 application topically 3 (three) times a day as needed.       ondansetron (ZOFRAN ODT) 4 MG disintegrating tablet Take 1 tablet (4 mg total) by mouth every 8 (eight) hours as needed for nausea. 10 tablet 0     oxyCODONE (ROXICODONE) 30 MG immediate release tablet Take 1 tablet (30 mg total) by mouth every 4 (four) hours as needed for pain. 180 tablet 0     OXYCONTIN 40 mg 12 hr tablet Take 1 tablet (40 mg total) by mouth every morning. 30 tablet 0     predniSONE (DELTASONE) 1 MG tablet TAKE 5 TABLETS (5 MG TOTAL) BY MOUTH DAILY.  1     senna-docusate (SENNOSIDES-DOCUSATE SODIUM) 8.6-50 mg tablet Take 1 tablet by mouth 2 (two) times a day.  0     warfarin (COUMADIN) 5 MG tablet Take 5 mg by mouth daily.       HEPARIN SODIUM,PORCINE (HEPARIN, PORCINE,) 20,000 unit/mL injection Inject 0.7 ml (14,000 units) SQ every 8 hours 13 mL 1     predniSONE (DELTASONE) 1 MG tablet Take 5 tablets (5 mg total) by mouth daily. 450 tablet 1     No current facility-administered medications for this visit.        Allergies   Allergen Reactions     Hydrocodone-Acetaminophen Swelling     Throat swelling (tolerates oxycodone)  - Acetaminophen causes vomiting per patient/RN, 12/2014 tolerated hydromorphone with hospitalization     Protonix [Pantoprazole] Hives     Per pt  Per pt     Codeine Nausea Only     abd pain       Fentanyl Nausea And Vomiting and Unknown       Tolerated drip 9/28/15- pt states just nausea at times      Morphine Hives, Nausea And Vomiting and Other (See Comments)     Pt states nausea     Morphine Hcl Itching and Nausea And Vomiting     Pt states she has tolerated in past- just nausea and vomiting at times       Social History     Social History     Marital status:      Spouse name: N/A     Number of children: N/A     Years of education: N/A      Occupational History     Unemployed      Social History Main Topics     Smoking status: Former Smoker     Packs/day: 0.50     Years: 10.00     Types: Cigarettes     Quit date: 7/29/2009     Smokeless tobacco: Never Used     Alcohol use No     Drug use: Yes     Special: Oxycodone     Sexual activity: Yes     Partners: Male      Comment: [employer], in 2016     Other Topics Concern     Not on file     Social History Narrative    Lives with family. Lives in [place].       Family History   Problem Relation Age of Onset     Asthma Mother      Hypertension Mother      Stroke Mother      Clotting disorder Mother      Hypertension Father      Stroke Father      Urolithiasis Father      Stroke Maternal Aunt      Mental illness Sister      Stroke Sister      Clotting disorder Sister      Gout Neg Hx        Objective:      Physical Exam  Vitals:    09/22/17 1014   BP: 153/87   Pulse: 95   Temp: 97.8  F (36.6  C)     General - well developed, well nourished, appropriate for age. Appears no distress at this time above her baseline issues of shortness of breath  Heart - regular rate and rhythm, no murmur  Respiratory - normal effort, clear to auscultation, good air entry without adventitious noises  Abdomen - mildly obese soft, non-tender, no hepatosplenomegaly, no masses.   - no flank tenderness, no suprapubic tenderness, kidney and bladder non-palpable  MSK - normal spinal curvature. no spinal tenderness. normal gait. muscular strength intact.  Neurology - cranial nerves II-XII grossly intact, normal sensation, no unsteadiness  Skin - intact, no bruising, no gouty tophi  Psych - oriented to time, place, and person, normal mood and affect.        Labs  Urinalysis POC (Office):  Nitrite, UA   Date Value Ref Range Status   09/22/2017 Negative Negative Final   09/20/2017 Negative Negative Final   04/14/2017 Negative Negative Final       Lab Urinalysis:  Blood, UA   Date Value Ref Range Status   09/22/2017 Negative Negative  Final   09/20/2017 Large (!) Negative Final   04/14/2017 Large (!) Negative Final     Nitrite, UA   Date Value Ref Range Status   09/22/2017 Negative Negative Final   09/20/2017 Negative Negative Final   04/14/2017 Negative Negative Final     Leukocytes, UA   Date Value Ref Range Status   09/22/2017 Negative Negative Final   09/20/2017 Negative Negative Final   04/14/2017 Negative Negative Final     pH, UA   Date Value Ref Range Status   09/22/2017 6.5 5.0 - 8.0 Final   09/20/2017 7.0 4.5 - 8.0 Final   04/14/2017 6.0 4.5 - 8.0 Final    and Acute Labs   CBC   WBC   Date Value Ref Range Status   09/20/2017 12.6 (H) 4.0 - 11.0 thou/uL Final   07/09/2017 6.9 4.0 - 11.0 thou/uL Final   04/27/2017 7.4 4.0 - 11.0 thou/uL Final   09/27/2015 6.9 4.0 - 11.0 thou/uL Final   08/23/2015 5.8 4.0 - 11.0 thou/uL Final   08/22/2015 6.7 4.0 - 11.0 thou/uL Final     Hemoglobin   Date Value Ref Range Status   09/20/2017 15.5 12.0 - 16.0 g/dL Final   07/09/2017 13.5 12.0 - 16.0 g/dL Final   04/27/2017 13.7 12.0 - 16.0 g/dL Final     Platelets   Date Value Ref Range Status   09/20/2017 178 140 - 440 thou/uL Final   07/09/2017 175 140 - 440 thou/uL Final   04/27/2017 189 140 - 440 thou/uL Final   , C Reactive Protein    CRP   Date Value Ref Range Status   09/20/2017 0.7 0.0 - 0.8 mg/dL Final   07/11/2016 6.3 (H) 0.0 - 0.8 mg/dL Final   03/17/2016 1.3 (H) 0.0 - 0.8 mg/dL Final   , Renal Panel  [facility]  Creatinine   Date Value Ref Range Status   09/20/2017 0.99 0.60 - 1.10 mg/dL Final   07/09/2017 0.70 0.60 - 1.10 mg/dL Final   06/29/2017 0.74 0.60 - 1.10 mg/dL Final     Potassium   Date Value Ref Range Status   09/20/2017 4.2 3.5 - 5.0 mmol/L Final   07/09/2017 4.5 3.5 - 5.0 mmol/L Final   06/29/2017 3.5 3.5 - 5.0 mmol/L Final     Calcium   Date Value Ref Range Status   09/20/2017 8.8 8.5 - 10.5 mg/dL Final   07/09/2017 9.0 8.5 - 10.5 mg/dL Final   06/29/2017 9.3 8.5 - 10.5 mg/dL Final    and Urine Culture    Culture   Date Value Ref Range  Status   09/20/2017 No Growth  Final   07/14/2016 No MRSA Isolated (!)  Final   07/14/2016 Usual Quin  Final

## 2021-06-13 NOTE — PROGRESS NOTES
Assessment/Plan:        Diagnoses and all orders for this visit:    Chronic respiratory failure with hypercapnia    ILD (interstitial lung disease)    Pulmonary hypertension  42-year-old female with a history of lupus on low-dose prednisone and Plaquenil, history of interstitial lung disease related to previous ARDS now with bland persistent fibrosis, chronic hypoxic respiratory failure, here for follow-up.    Her oxygen requirement is out of proportion to her lung disease.  She has connective tissue disease.  There is suggestion of RV dysfunction and pulmonary hypertension on her echocardiogram.  Invasive assessment for pulmonary hypertension has been delayed in her efforts to wean her off of prednisone for her lung disease, fully evaluate her sleep, optimize her volume status and stabilize her chronic pain regimen.  During these efforts her functional status has improved to the point of not needing a walker and becoming more independent.    Overall she has stable, inactive lung disease and her oxygen requirement is out of proportion to this disease.  In addition she has lupus.  Because of these issues I will refer her to the [facility] for evaluation for pulmonary hypertension.  I am referring now because I would prefer that any invasive workup occur there.    Continue with same oxygen plan.    Keep it up with physical activity.  10-15 minutes once daily EVERY day of aerobic activity (walking, stationary bike).    I will order a consult with a pulmonary hypertension specialist to see if there are any other options for treatment.          Subjective:    Patient ID: Catherine Sharp is a 42 y.o. female.    HPI Comments: 42-year-old female with ILD, lupus, respiratory failure here for follow-up.    ILD: She has not had any pulmonary issues or flares since her last visit.  She has not been pulsed with prednisone and actually 2 years.    Chronic hypoxic restaurant failure: She is on 4-6 L/min of oxygen.   "She is tolerating the suction well.    Chronic diastolic heart failure: She is on chronic diuretics.  She earlier in the spring had some issues with increased creatinine but now is stabilized.  She has no lower extremity edema.  Overall this is stable.    Lupus: Currently sees rheumatology.  On 4-5 mg of prednisone and Plaquenil.      Review of Systems positive for activity change chills sweating fatigue sinus pressure mouth sores dyspnea wheezing nausea light sensitivity heat intolerance thirst joint pain joint swelling easy bruising headaches anxiousness and sleep disturbance.  The remainder of her 14 system review of systems was negative.  Current Outpatient Prescriptions on File Prior to Visit   Medication Sig Dispense Refill     albuterol (PROVENTIL HFA;VENTOLIN HFA) 90 mcg/actuation inhaler Inhale 2 puffs every 6 (six) hours as needed for wheezing. 1 Inhaler 6     albuterol (PROVENTIL) 2.5 mg /3 mL (0.083 %) nebulizer solution Take 3 mL (2.5 mg total) by nebulization 4 (four) times a day as needed for wheezing or shortness of breath. 75 mL 11     alum/mag hydrox-simethicone-diphenhydramine-lidocaine (MAGIC MOUTHWASH) suspension Swish and swallow 5mL 3-4 times daily as needed. Standard prep: Diphenhydramine+Sucralfate+Mylanta 120 mL 1     BD INSULIN PEN NEEDLE UF MINI 31 gauge x 3/16\" Ndle        BD TUBERCULIN SYRINGE 1 mL 27 x 1/2\" Syrg USE TO DISPENSE HEPARIN  1     biotin 2,500 mcg cap Take 2,500 mcg by mouth every evening.        cholecalciferol, vitamin D3, 1,000 unit tablet Take 1,000 Units by mouth daily.       diltiazem (CARDIZEM CD) 240 MG 24 hr capsule Take 1 capsule (240 mg total) by mouth daily. 90 capsule 3     enoxaparin (LOVENOX) 80 mg/0.8 mL syringe Inject 0.8 mL (80 mg total) under the skin every 12 (twelve) hours. 8 mL 1     FERROUS SULFATE (SLOW FE ORAL) Take 65 mg by mouth daily.       furosemide (LASIX) 40 MG tablet Take 1 tablet (40 mg total) by mouth daily. 60 tablet 11     HEPARIN " SODIUM,PORCINE (HEPARIN, PORCINE,) 20,000 unit/mL injection Inject 0.7 ml (14,000 units) SQ every 8 hours 13 mL 1     ibuprofen (ADVIL,MOTRIN) 200 MG tablet Take 2 tablets (400 mg total) by mouth every 6 (six) hours as needed for pain. 20 tablet 0     ipratropium-albuterol (DUO-NEB) 0.5-2.5 mg/3 mL nebulizer Inhale 3 mL 4 (four) times a day as needed.       levoFLOXacin (LEVAQUIN) 500 MG tablet Take 1 tablet (500 mg total) by mouth daily for 10 days. 10 tablet 0     levothyroxine (SYNTHROID, LEVOTHROID) 150 MCG tablet Take 1 tablet (150 mcg total) by mouth daily. 90 tablet 1     levothyroxine 150 mcg cap Take 150 mcg by mouth daily. 90 capsule 1     LORazepam (ATIVAN) 1 MG tablet Take 1 mg by mouth every evening.       LORazepam (ATIVAN) 1 MG tablet TAKE 1 TABLET BY MOUTH DAILY 30 tablet 0     nystatin (MYCOSTATIN) powder Apply 1 application topically 3 (three) times a day as needed.       ondansetron (ZOFRAN ODT) 4 MG disintegrating tablet Take 1 tablet (4 mg total) by mouth every 8 (eight) hours as needed for nausea. 10 tablet 0     oxyCODONE (ROXICODONE) 30 MG immediate release tablet Take 1 tablet (30 mg total) by mouth every 4 (four) hours as needed for pain. 180 tablet 0     OXYCONTIN 40 mg 12 hr tablet Take 1 tablet (40 mg total) by mouth every morning. 30 tablet 0     predniSONE (DELTASONE) 1 MG tablet TAKE 5 TABLETS (5 MG TOTAL) BY MOUTH DAILY.  1     senna-docusate (SENNOSIDES-DOCUSATE SODIUM) 8.6-50 mg tablet Take 1 tablet by mouth 2 (two) times a day.  0     warfarin (COUMADIN) 5 MG tablet Take 5 mg by mouth daily.       predniSONE (DELTASONE) 1 MG tablet Take 5 tablets (5 mg total) by mouth daily. 450 tablet 1     No current facility-administered medications on file prior to visit.        /80  Pulse 96  Resp 16  Wt 195 lb 14.4 oz (88.9 kg)  SpO2 97% Comment: pt on 5L of O2, can be on 4-6L of O2 PRN  BMI 40.94 kg/m2          Objective:    Physical Exam   Constitutional: She is oriented to  person, place, and time. She appears well-developed and well-nourished.   HENT:   Head: Normocephalic and atraumatic.   Nose: Nose normal.   Mouth/Throat: Oropharynx is clear and moist. No oropharyngeal exudate.   Eyes: Right eye exhibits no discharge. Left eye exhibits no discharge. No scleral icterus.   Cardiovascular: Normal rate and regular rhythm.    Pulmonary/Chest: No respiratory distress. She has no rales.   Musculoskeletal: She exhibits no edema.   Neurological: She is alert and oriented to person, place, and time. Coordination normal.   Skin: Skin is warm and dry. No rash noted. No erythema.   Psychiatric: She has a normal mood and affect. Her behavior is normal. Judgment and thought content normal.     /80  Pulse 96  Resp 16  Wt 195 lb 14.4 oz (88.9 kg)  SpO2 97% Comment: pt on 5L of O2, can be on 4-6L of O2 PRN  BMI 40.94 kg/m2              Medical History  Active Ambulatory (Non-Hospital) Problems    Diagnosis     Acute renal failure, unspecified acute renal failure type     Vomiting     Respiratory failure     Chronic respiratory failure with hypercapnia     Abnormal CXR     Cardiomyopathy     Pneumonia     Chronic diastolic heart failure     Polyarthralgia     Trigger ring finger, right     Bilateral shoulder tendinopathy     Snoring     Hypercarbia     Acute on chronic respiratory failure with hypercapnia     ALFONSO (acute kidney injury)     Encephalopathy acute     Respiratory failure with hypoxia     Chronic pain     Intractable nausea and vomiting     Abdominal pain     History of DVT (deep vein thrombosis)     Respiratory failure, acute-on-chronic     Acute respiratory failure     Hyperthyroidism     Restrictive lung disease due to kyphoscoliosis     ILD (interstitial lung disease)     Coagulopathy     Acute on chronic diastolic congestive heart failure     GERD (gastroesophageal reflux disease)     Opiate dependence     Gastroparesis     Hypertension     Osteoporosis     Anemia      Generalized anxiety disorder     Antiphospholipid Antibody Syndrome     Pulmonary emboli     Peripheral Neuropathy     Systemic lupus erythematosus     Adhesive pericarditis     Regional enteritis     Deep phlebothrombosis, antepartum, with delivery     Past Medical History:   Diagnosis Date     Antiphospholipid antibody with hypercoagulable state      Cervical compression fracture 2009     Chronic hypercapnic respiratory failure 12/22/2014     Chronic pain      Congestive heart failure      Depression      Gastroparesis      GERD (gastroesophageal reflux disease)      Hypertension      Kidney infection      Lupus (systemic lupus erythematosus)      Obesity      MERLIN (obstructive sleep apnea) 1/30/2015     Osteoporosis      Peripheral neuropathy      Recurrent Clostridium difficile diarrhea      Seizures            Social history reviewed: Lives with .   Allergies  Allergies   Allergen Reactions     Hydrocodone-Acetaminophen Swelling     Throat swelling (tolerates oxycodone)  - Acetaminophen causes vomiting per patient/RN, 12/2014 tolerated hydromorphone with hospitalization     Protonix [Pantoprazole] Hives     Per pt  Per pt     Codeine Nausea Only     abd pain       Fentanyl Nausea And Vomiting and Unknown       Tolerated drip 9/28/15- pt states just nausea at times      Morphine Hives, Nausea And Vomiting and Other (See Comments)     Pt states nausea     Morphine Hcl Itching and Nausea And Vomiting     Pt states she has tolerated in past- just nausea and vomiting at times                              Data Review - imaging, labs, and ekgs listed below were reviewed by me.  CXR and CT images and EKG tracings interpreted personally.     Past Labs  @LASTLABV(<SYNTAX> error)@  Past Imaging  Ct Abdomen Pelvis Without Oral Without Iv Contrast    Result Date: 9/20/2017  CT ABDOMEN PELVIS WO ORAL WO IV CONTRAST 9/20/2017 10:06 AM INDICATION: Left Flank pain, stone known or suspected TECHNIQUE: Routine CT abdomen  and pelvis without oral or IV contrast. Multiplanar reformation images (MPR). Dose reduction techniques were used. COMPARISON: 4/13/2017 FINDINGS: LUNG BASES: Postoperative changes at right base. Cardiomegaly. ABDOMEN: There is minimal left hydronephrosis and hydroureter which is new though etiology not identified, no ureteral stones. There are approximately 8 left-sided kidney stones with the largest at lower pole measuring 5 mm, the others are all 1 mm in size. Right kidney remains normal, no stones or hydronephrosis. Cholecystectomy. Liver, pancreas and spleen are negative. PELVIS: No bladder or distal ureteral stones evident. No adnexal lesions. No inflammatory changes of bowel. Appendix normal. MUSCULOSKELETAL: Degenerative changes and compression abnormalities of thoracolumbar spine unchanged.     CONCLUSION: 1.  There is new mild left hydronephrosis and hydroureter though etiology is not identified with certainty, no ureteral or bladder stones evident. 2.  There are numerous small left-sided kidney stones present and it is possible a stone recently passed.        CBC:   Lab Results   Component Value Date    WBC 12.6 (H) 09/20/2017    WBC 6.9 09/27/2015    RBC 5.26 09/20/2017     BMP:   Lab Results   Component Value Date    CO2 31 09/20/2017    BUN 26 (H) 09/20/2017    CREATININE 0.99 09/20/2017    CALCIUM 8.8 09/20/2017     Coagulation:   Lab Results   Component Value Date    INR 1.00 09/11/2017     Cardiac markers:   Lab Results   Component Value Date    CKMB 3 10/11/2014     ABGs:   Lab Results   Component Value Date    PH 7.39 03/30/2010         Total time was >25 minutes in which greater than 50% of the time was spent on face to face time with patient, patient care coordination and review of chart

## 2021-06-13 NOTE — PROGRESS NOTES
Assessment:     Catherine was seen today for follow-up and flu vaccine.    Diagnoses and all orders for this visit:    Hypothyroidism  -     Thyroid Stimulating Hormone (TSH); Standing  -     Thyroid Stimulating Hormone (TSH)    Kidney stones    SLE with normal kidneys    Bite wound of skin    Other orders  -     Influenza, Seasonal Quad, Preservative Free 36+ Months        Plan:     1. Hypothyroidism  Patient had a recent elevated TSH and her levothyroxine was increased to 150 mics.  She said she has not increased into the 150 mics that she thought maybe she just missed too many.  After reviewing the labs with her I suggest she get on the 150 mics of levothyroxine and recheck the TSH in 3 months  - Thyroid Stimulating Hormone (TSH); Standing  - Thyroid Stimulating Hormone (TSH)    2. Kidney stones  New kidney stones noted and she has had a consult with this [facility].  She passed a stone but has remaining large stone.  Numerous stones may be up to 15 continue to be present  Subsequent follow-up after 24-hour urines which are now been collected    3. SLE with normal kidneys  Despite her long-standing lupus she has normal kidneys.  Renal function reviewed and discussion with patient    4. Bite wound of skin  Had a bug bite on her right breast.  I see that this is healing better we did do 2 applications of silver nitrate to obliterate granulation tissue.  Today another application of silver nitrate given but no further should be needed.  Signs of healing in at skin edges noted      This is a 25 minute visit with greater than 50% of the time spent counseling regarding discussion of transferring care to the next provider.  She like to follow with Dr. Delmis Ma and I have discussed this with Dr. Ma.  I suggest she come in about every 3 months the next one being after her thyroid eval.  The chromophobic factor X is drawn today also and will be managed by her pharmacist.  Evaluation of multiple issues.   "Since her last visit last week patient went to the emergency room with abdominal pain.      Subjective:      Duane is a 42 y.o. female presenting to my clinic for she was in the ER between 8:57 AM.  At about 5 PM her pain subsided.  When a CT scan was obtained hydronephrosis was noted.  Her pain had already subsided.  It appeared she had passed a stone.    She has followed up with the stone clinic and has a 24-hour urine collection going on currently.  I see by her renal panel that she has an excellent renal function despite her lupus.  She has no pain currently.  She has had no change in her lung status and continues to be on 4-5 L of oxygen and follows with Dr. Ko and pulmonary.    Patient has a skin lesion on the right breast that started off as a bug bite and just in heel.  Last week it looks like some granulation tissue was preventing healing.  We treated with silver nitrate sticks and she treated it once at home also.  We inspect that today and decided to do another silver nitrate treatment but no further should be needed.  No signs of infection.  It appears that skin is now healing.        Current Outpatient Prescriptions on File Prior to Visit   Medication Sig Dispense Refill     albuterol (PROVENTIL HFA;VENTOLIN HFA) 90 mcg/actuation inhaler Inhale 2 puffs every 6 (six) hours as needed for wheezing. 1 Inhaler 6     albuterol (PROVENTIL) 2.5 mg /3 mL (0.083 %) nebulizer solution Take 3 mL (2.5 mg total) by nebulization 4 (four) times a day as needed for wheezing or shortness of breath. 75 mL 11     alum/mag hydrox-simethicone-diphenhydramine-lidocaine (MAGIC MOUTHWASH) suspension Swish and swallow 5mL 3-4 times daily as needed. Standard prep: Diphenhydramine+Sucralfate+Mylanta 120 mL 1     BD INSULIN PEN NEEDLE UF MINI 31 gauge x 3/16\" Ndle        BD TUBERCULIN SYRINGE 1 mL 27 x 1/2\" Syrg USE TO DISPENSE HEPARIN  1     biotin 2,500 mcg cap Take 2,500 mcg by mouth every evening.        " cholecalciferol, vitamin D3, 1,000 unit tablet Take 1,000 Units by mouth daily.       diltiazem (CARDIZEM CD) 240 MG 24 hr capsule Take 1 capsule (240 mg total) by mouth daily. 90 capsule 3     FERROUS SULFATE (SLOW FE ORAL) Take 65 mg by mouth daily.       furosemide (LASIX) 40 MG tablet Take 1 tablet (40 mg total) by mouth daily. 60 tablet 11     HEPARIN SODIUM,PORCINE (HEPARIN, PORCINE,) 20,000 unit/mL injection Inject 0.7 ml (14,000 units) SQ every 8 hours 13 mL 1     ibuprofen (ADVIL,MOTRIN) 200 MG tablet Take 2 tablets (400 mg total) by mouth every 6 (six) hours as needed for pain. 20 tablet 0     ipratropium-albuterol (DUO-NEB) 0.5-2.5 mg/3 mL nebulizer Inhale 3 mL 4 (four) times a day as needed.       levoFLOXacin (LEVAQUIN) 500 MG tablet Take 1 tablet (500 mg total) by mouth daily for 10 days. 10 tablet 0     levothyroxine (SYNTHROID, LEVOTHROID) 150 MCG tablet Take 1 tablet (150 mcg total) by mouth daily. 90 tablet 1     levothyroxine 150 mcg cap Take 150 mcg by mouth daily. 90 capsule 1     LORazepam (ATIVAN) 1 MG tablet Take 1 mg by mouth every evening.       LORazepam (ATIVAN) 1 MG tablet TAKE 1 TABLET BY MOUTH DAILY 30 tablet 0     nystatin (MYCOSTATIN) powder Apply 1 application topically 3 (three) times a day as needed.       ondansetron (ZOFRAN ODT) 4 MG disintegrating tablet Take 1 tablet (4 mg total) by mouth every 8 (eight) hours as needed for nausea. 10 tablet 0     oxyCODONE (ROXICODONE) 30 MG immediate release tablet Take 1 tablet (30 mg total) by mouth every 4 (four) hours as needed for pain. 180 tablet 0     OXYCONTIN 40 mg 12 hr tablet Take 1 tablet (40 mg total) by mouth every morning. 30 tablet 0     predniSONE (DELTASONE) 1 MG tablet Take 5 tablets (5 mg total) by mouth daily. 450 tablet 1     predniSONE (DELTASONE) 1 MG tablet TAKE 5 TABLETS (5 MG TOTAL) BY MOUTH DAILY.  1     senna-docusate (SENNOSIDES-DOCUSATE SODIUM) 8.6-50 mg tablet Take 1 tablet by mouth 2 (two) times a day.  0      warfarin (COUMADIN) 5 MG tablet Take 5 mg by mouth daily.       No current facility-administered medications on file prior to visit.      Allergies   Allergen Reactions     Hydrocodone-Acetaminophen Swelling     Throat swelling (tolerates oxycodone)  - Acetaminophen causes vomiting per patient/RN, 12/2014 tolerated hydromorphone with hospitalization     Protonix [Pantoprazole] Hives     Per pt  Per pt     Codeine Nausea Only     abd pain       Fentanyl Nausea And Vomiting and Unknown       Tolerated drip 9/28/15- pt states just nausea at times      Morphine Hives, Nausea And Vomiting and Other (See Comments)     Pt states nausea     Morphine Hcl Itching and Nausea And Vomiting     Pt states she has tolerated in past- just nausea and vomiting at times     Past Medical History:   Diagnosis Date     Antiphospholipid antibody with hypercoagulable state     Secondary to lupus     Cervical compression fracture 2009    closed, with spinal cord injury C1-C4 - prednisone induced     Chronic hypercapnic respiratory failure 12/22/2014     Chronic pain     on opiates     Congestive heart failure     Diastolic, Cor Pulmonale, EF 60% 2014     Depression      Gastroparesis     causing nausea/vomiting with recurrent admissions     GERD (gastroesophageal reflux disease)      Hypertension      Kidney infection      Kidney stone      Lupus (systemic lupus erythematosus)     complicated by joint and pulmonary involvement     Obesity      MERLIN (obstructive sleep apnea) 1/30/2015    On home bipap     Osteoporosis      Peripheral neuropathy      Recurrent Clostridium difficile diarrhea      Seizures     once due to high BP, hypertensive encephalopathy     Past Surgical History:   Procedure Laterality Date     PICC AND MIDLINE TEAM LINE INSERTION  9/27/2015          [facility] BIOPSY LUNG/MEDIASTINUM PERCUTANEOUS NEEDLE      Description: Biopsy Lung Percutaneous;  Recorded: 09/15/2011;     [facility] LAP,CHOLECYSTECTOMY      Description: Cholecystectomy  Laparoscopic;  Recorded: 02/15/2008;     THORACOSCOPY Left 1/13/2015    Procedure: LEFT THORACOSCOPY CONVERTED TO THORACOTOMY;  Surgeon: Jose David Granados MD;  Location: [facility] OR;  Service:      THORACOTOMY Left 1/13/2015    Procedure: THORACOTOMY W/ WEDGE RESECTION;  Surgeon: Jose David Granados MD;  Location: [facility] OR;  Service:      Social History     Social History     Marital status:      Spouse name: N/A     Number of children: N/A     Years of education: N/A     Occupational History     Unemployed      Social History Main Topics     Smoking status: Former Smoker     Packs/day: 0.50     Years: 10.00     Types: Cigarettes     Quit date: 7/29/2009     Smokeless tobacco: Never Used     Alcohol use No     Drug use: Yes     Special: Oxycodone     Sexual activity: Yes     Partners: Male      Comment: Christina, in 2016     Other Topics Concern     Not on file     Social History Narrative    Lives with family. Lives in [place].     Family History   Problem Relation Age of Onset     Asthma Mother      Hypertension Mother      Stroke Mother      Clotting disorder Mother      Hypertension Father      Stroke Father      Urolithiasis Father      Stroke Maternal Aunt      Mental illness Sister      Stroke Sister      Clotting disorder Sister      Gout Neg Hx        ROS:  I have performed a 10 point ROS.  All pertinent positives and negatives are found in the HPI.  All others are negative.    No pain no blood in urine    Objective:     Physical Exam:  /78  Pulse (!) 110  Wt 197 lb (89.4 kg)  SpO2 95%  BMI 41.17 kg/m2  General Appearance: Alert, cooperative, no distress, appears stated age  Lungs: Clear to auscultation bilaterally, respirations unlabored  Heart: Regular rate and rhythm, S1 and S2 normal, no murmur, rub, or gallop,     Abdomen: Soft, non-tender, bowel sounds active all four quadrants,  no masses, no organomegaly.  Back: Kyphotic posture with shortened height.  Tenderness  in the lumbar back area  Extremities: Extremities normal, atraumatic, no cyanosis or edema  Skin: Malar facial rash/  Has left breast dime size wound with good scab and apparent healing at edges.  No signs of infection    Neurologic: Intact, no focal deficits   Mental status:  Appropriate, Affect normal    Labs:  Kidney function are reviewed and normal    Imaging: Recent ER visit with CT of the abdomen shows left hydronephrosis and renal stones.  Previous MRI of the lumbar spine  With multiple problems reviewed.
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
## 2021-06-13 NOTE — PROGRESS NOTES
PAIN CENTER PROGRESS NOTE    Subjective:   Catherine Sharp is a 42 y.o. female who presents for evaluation of multi-site pain secondary to osteoporosis and pathologic fractures as a result of SLE treated with chronic steroids.  She has multiple co-morbidities including Interstitial lung disease, MERLIN, Antiphospholipid Antibody Syndrome, cardiomyopathy, OA in multiple sites, and recurrent hospitalizations.  Pain has been present for years and current treatment plan includes OxyContin, oxycodone, intermittent PT/OT.    Major issues:  1. Chronic pain syndrome    2. Opioid dependence, continuous    3. Low back pain    4. Thoracic back pain      Pain location and description: 5/10 constant sharp, burning mid and lower back pain, bilateral shoulders. Function rated 8.  Last visit pan rated 7/10.   Radiation of pain: Denies  Paresthesias, numbness, weakness: Left thigh numbness which she has had for many years.    Gait disturbance: wheelchair for outings.  Denies recent falls.  Exacerbating factors: activity, arm movements, morning time.  Alleviating factors: Rest, pain medications  Associated symptoms: Sedentary lifestyle, obesity, oxygen dependent, depression  Adverse effects of medications:  Uses sennakot 2 tablets at home for intermittent constipation.  Hasn't used miralax but used to.  She bought generic laxative.  Intermittent pruritus from opioids, takes benadryl.  Current treatment efficacy: Fair. Taking OxyContin 40 mg in the morning with pain relief within 1 hour.  She states she wakes with pain 8-9/10 and reduces to 6/10. Taking oxycodone 30 mg 1 every 4 hours prn since hospitalization, has not tolerated reduction in daily dose.   Current treatment compliance: Good.  She has failed Lyrica, Gabapentin, Cymbalta, Fentanyl, Morphine, Vicodin, codeine in the past.  She has [facility] PT/OT and RN services in past, not currently needed.  Uses daily pop out dosing for medications.  Reports taking medication  as prescribed.      She states she had lumbar MRI but wasn't able to do thoracic MRI or left shoulder due to discomfort.  She states low back pain is the worst.  Denies lower extremity numbness, except in left thigh.  She reports back painful with walking any distance.  Describes some claudication in lower legs bilaterally.  MRI demonstrates mild to moderate facet arthropathy throughout, L3-4 central stenosis, L5-S1 disc protrusion, and old compression fractures L1, 2, 4, 5.  She had LESI on L3-4 on 09/11/17 and reports it seemed like it helped a bit at first but intermittent and reports pain relief about 30% initially and has worn off a bit since then.  She states she felt great after the numbing medication.  She states she has had more sweating since treatment, she is having sweating with oral steroids but has increased.  Denies other side effects from steroid and denies post procedure complications.    She had ED visit on 09/20/17 for flank pain, ED demonstrated mild left hydronephorsis and hydroureter etiology not identified with certainty.  Left sided kidney stones seen and nothing obstructive. She was given antibiotic, ibuprofen for pain, and zofran for nausea.  She saw Dr. Ma on 09/22/17 for follow-up:  Stone Management Plan  [facility] Stone [facility] 9/22/2017   Urinary Tract Infection No suspicion of infection   Renal Colic Asymptomatic at this time   Renal Failure No suspicion of renal failure   Current CT date 9/20/2017   Right sided stones? No   R Stone Event No current event   Left sided stones? Yes   L Number of ureteral stones No ureteral stones   L Number of kidney stones  8   L GSD of kidney stones 4 - 10   L Hydronephrosis Mild   L Stone Event New stone passed prior to visit   Resolved date 9/20/2017   L Current Plan Observe   Observe rationale Limited stone burden with good prognosis for spontaneous passage      She was seen 09/28/17 and flu vaccine received.  She states her abdominal pain has  "subsided.  She states she passed stone while at the ED.  She states one day at home she may have passed 2 small stones as she felt a \"pop\" and she didn't have a strainer.  She hasn't passed one since and denies blood in urine, fever, nausea, pain in flank.  Denies obtaining any additional opioids for acute pain.    She reports her constipation has been more bothersome and difficult to have BM even with the OTC stool softener and trials of laxative.  She tries to eat fresh fruit/vegetables, fiber, water. She is interested in OIC medication.  She denials trials in the past.  She denies previous bowel obstruction or history of surgery.  She states she has no current renal impairment, most recent BMP 07/09/17 normal.    Review of Systems  Constitutional: MERLIN, chronic O2. Denies fever, chills, night sweats, lethargy, weight loss  Musculoskeletal: Positive for mid and lower back pain, left shoulder joint pain, joint swelling  Gastrointestional: Positive for intermittent nausea, vomiting, GERD, abdominal pain, constipation.  Denies difficulty swallowing, change in appetite, diarrhea, fecal incontinence.  Genitourinary: Denies urinary incontinence, dysuria, hematuria, UTI, frequency, hesitancy, change in libido  Neurologic: Headaches.  Denies confusion, seizure, weakness, changes in balance, changes in speech.  Psychiatric: Positive depression, anxiety.  Denies memory loss, psychoses, suicidal ideation, substance use/abuse.     Objective:     Vitals:    10/04/17 1050   BP: (!) 135/96   Pulse: (!) 116   Resp: 16   Weight: 195 lb (88.5 kg)   Height: 4' 10\" (1.473 m)   PainSc:   5     Physical Exam  Constitutional- General appearance: Normal.  Well developed, comfortable, obese, appears older than stated age.  Presents alone today.   Psychiatric- Judgment and insight: Normal.  Speech: Normal rhythm.  Thought process: Normal.  No abnormal thoughts reported. Alert & Oriented to person, place, and time.  Recent and remote memory: " Normal.  Mood and affect: Normal.  Observed mood: Appropriate    Respiratory- Not using O2 by nasal cannula today.  Normal rhythm and rate.  Cardiovascular- Extremities warm and well perfused, no peripheral edema or varicosities.  Dermatologic- Exposed skin is clean, dry, and intact to inspection and palpation.   Musculoskeletal- Gait and station: Ambulating independently today, stooped.  Significant thoracic kyphosis.  Able to sit to stand with difficulty.      *Opioid Eddy Precautions:    UDS/Swab - 01/12/17 as expected  Opioid Consent -  due  Opioid Agreement - 03/14/17  Pharmacy- as documented    [place]  Reviewed - 10/04/17 as expected  Pill Count - Pill count 10/16/15 appropriate.    Psychological evaluation - n/a  MME - 330  Pharmacogenetic testing - n/a    Imaging:  MR Lumbar Spine without Contrast 07/05/17  IMPRESSION:   CONCLUSION:  1.  Multilevel chronic compression deformities at T12, L1, L2, L4, and L5 as above. No marrow edema or other findings to suggest acute compression fracture.  2.  At L3-4, mild trefoil type spinal canal stenosis related to a combination of vertebral retropulsion and disc bulging.  3.  At L5-S1, shallow right central/subarticular disc protrusion with small superimposed caudally dissecting extruded fragment. Mild right lateral recess stenosis with mild posterior displacement of the right S1 nerve root.  4.  At L2-3, low-grade narrowing of the lateral recesses and minimal right neural foraminal stenosis.    Assessment:   Catherine Sharp is a 42 y.o. female seen in clinic today for pain in her spine due to history of pathologic compression fractures due to steroids to treat SLE.  She is reporting most pain in her lumbar spine L3-5 at this time, MRI demonstrates spinal canal stenosis L3-4, disc bulging at L3-4 and L5-S1, and mild to moderate facet arthropathy throughout in addition to multiple chronic compression deformities.  She had 30% pain relief with LESI and wants to repeat  lumbar procedure to see if additional pain relief.  She is educated on the risks and benefits of these procedures today.  She is on coumadin and will transition to lovenox and hold for 24 hours prior to procedure.  She continues to have pain in thoracic spine, assessing for acute on chronic compression fractures with MRI of thoracic. She is having improved pain relief with oxycodone 30 mg every 4 hours prn, however we would prefer this to be temporary dosing and reduction as tolerated.  She continues OxyContin 40 mg for extended pain relief in the morning but reports she cannot take this every 12 hours as it causes more difficulty with SOB at increased frequency.  She has OIC and recommend Movantik 25 mg daily which is discussed with possible side effects.  She is currently working with her pulmonologist for CPAP use and also oxygen tank need.  She understands the risks with opioids and respiratory depression.  Strongly recommend pharmacogenetic testing for guidance in opioid therapies but this has been cost prohibitive.  She had recent consult with [facility] and was told nothing more could be done for her spine symptoms.  She is also reporting chronic left shoulder pain, recent x-ray and shoulder injection negative.  Failed PT.  Have ordered left shoulder MRI and if shoulder MRI is normal, would consider cervical spine imaging starting with x-ray.      Plan:   Prescribed 30 days of OxyContin 40 mg in the morning. printed to fill 10/08/17 and next month 11/07/17  Prescribed oxycodone 30 mg 1 every 4 hours maximum for pain as needed. Printed to fill 10/08/17 for use on 11/07/17    Take Vistaril 25 mg 1 tablet for itching as needed.  Do not take this with benadryl.  May cause drowsiness.  Recommend thoracic and left shoulder MRI to evaluate shoulder pain in setting of recent failed joint injection and negative shoulder x-ray.  Will schedule when able  For lumbar pain, reschedule with Dr. Valles or Roxana  for repeat lumbar epidural steroid injection.  You must hold lovenox for 24 hours.  You must have a  the day of procedure take you home.  No injection within 2 weeks of flu vaccine (09/28/17)  Follow-up in 8 weeks with Elizabet.    Elizabet Palmer PA-C  [facility]  [address] [place] [address]. [address]  [place] [address]  Ph: [phone]  Fax: [phone]

## 2021-06-13 NOTE — PROGRESS NOTES
Assessment/Plan:        Diagnoses and all orders for this visit:    Urinary tract stones  -     Urinalysis Macroscopic  -     Stone Formation, 24 Hour Urine x1; Future; Expected date: 1/18/18  -     potassium citrate (UROCIT-K) 10 mEq (1,080 mg) SR tablet; Take 1 tablet (10 mEq total) by mouth 3 (three) times a day with meals.  Dispense: 180 tablet; Refill: 12  -     Patient Stated Goal: Prevent further stones  -     Patient Increasing Fluids Education  -     Potassium Citrate Education    Urinary calculus  -     Stone Formation, 24 Hour Urine x1; Future; Expected date: 1/18/18  -     potassium citrate (UROCIT-K) 10 mEq (1,080 mg) SR tablet; Take 1 tablet (10 mEq total) by mouth 3 (three) times a day with meals.  Dispense: 180 tablet; Refill: 12  -     Patient Stated Goal: Prevent further stones  -     Patient Increasing Fluids Education  -     Potassium Citrate Education    Calculus of kidney  -     Stone Formation, 24 Hour Urine x1; Future; Expected date: 1/18/18  -     potassium citrate (UROCIT-K) 10 mEq (1,080 mg) SR tablet; Take 1 tablet (10 mEq total) by mouth 3 (three) times a day with meals.  Dispense: 180 tablet; Refill: 12  -     Patient Stated Goal: Prevent further stones  -     Patient Increasing Fluids Education  -     Potassium Citrate Education    Hypocitraturia  -     Stone Formation, 24 Hour Urine x1; Future; Expected date: 1/18/18  -     potassium citrate (UROCIT-K) 10 mEq (1,080 mg) SR tablet; Take 1 tablet (10 mEq total) by mouth 3 (three) times a day with meals.  Dispense: 180 tablet; Refill: 12  -     Patient Stated Goal: Prevent further stones  -     Patient Increasing Fluids Education  -     Potassium Citrate Education    Low urine output  -     Stone Formation, 24 Hour Urine x1; Future; Expected date: 1/18/18  -     potassium citrate (UROCIT-K) 10 mEq (1,080 mg) SR tablet; Take 1 tablet (10 mEq total) by mouth 3 (three) times a day with meals.  Dispense: 180 tablet; Refill: 12  -     Patient  Stated Goal: Prevent further stones  -     Patient Increasing Fluids Education  -     Potassium Citrate Education      Stone Management Plan  [facility] 9/22/2017 10/20/2017   Urinary Tract Infection No suspicion of infection No suspicion of infection   Renal Colic Asymptomatic at this time Asymptomatic at this time   Renal Failure No suspicion of renal failure No suspicion of renal failure   Current CT date 9/20/2017 -   Right sided stones? No -   R Stone Event No current event No current event   Left sided stones? Yes -   L Number of ureteral stones No ureteral stones -   L Number of kidney stones  8 -   L GSD of kidney stones 4 - 10 -   L Hydronephrosis Mild -   L Stone Event New stone passed prior to visit No current event   Resolved date 9/20/2017 -   L Current Plan Observe -   Observe rationale Limited stone burden with good prognosis for spontaneous passage -             Subjective:      HPI  Ms. Catherine Sharp is a 42 y.o.  female returning to the [facility] [place] for follow-up stone risk studies having passed a stone that was not recovered with known 8+ stones on the left 1 5 mm the rest being 1-2 mm.  There were no stones seen on the right.      Patient currently is asymptomatic regards stones but it should be noted that she takes 30 mg of oxycodone every 4 hours along with 40 mg of OxyContin in the morning for back pain.  This is been going on for roughly 13 or 14 years.    Review of stone risk studies included a normal parathyroid hormone of 2038 with normal creatinine of 0.64 potassium 4.0 uric acid 3.7 ionized calcium normal at 1.10.  24 hour urine studies revealed low volume of 1300 cc and 1100 cc respectively.  Citrate was low at 143 and 110 with remainder of 24 urine studies being normal.    UA today specific gravity 1.015 pH 7 negative for blood nitrate and leukocytes.    Impression left renal stones multiple as noted above with risk factors low volume severe  severe hypocitraturia    Plan increase urine output to 80 ounces per 24 hours and use potassium citrate 20 mEq 3 times daily along with citrate rich fluids such as crystallite lemonade or real lemon juice and water.    Follow-up 24 hour urine in 3 months or to call if she develops colic.  She has urine strainer at home if she starts to pass a stone.         ROS   Review of systems is negative except for HPI.    Past Medical History:   Diagnosis Date     Antiphospholipid antibody with hypercoagulable state     Secondary to lupus     Cervical compression fracture 2009    closed, with spinal cord injury C1-C4 - prednisone induced     Chronic hypercapnic respiratory failure 12/22/2014     Chronic pain     on opiates     Congestive heart failure     Diastolic, Cor Pulmonale, EF 60% 2014     Depression      Gastroparesis     causing nausea/vomiting with recurrent admissions     GERD (gastroesophageal reflux disease)      Hypertension      Kidney infection      Kidney stone      Lupus (systemic lupus erythematosus)     complicated by joint and pulmonary involvement     Obesity      MERLIN (obstructive sleep apnea) 1/30/2015    On home bipap     Osteoporosis      Peripheral neuropathy      Recurrent Clostridium difficile diarrhea      Seizures     once due to high BP, hypertensive encephalopathy       Past Surgical History:   Procedure Laterality Date     PICC AND MIDLINE TEAM LINE INSERTION  9/27/2015          [facility] BIOPSY LUNG/MEDIASTINUM PERCUTANEOUS NEEDLE      Description: Biopsy Lung Percutaneous;  Recorded: 09/15/2011;     [facility] LAP,CHOLECYSTECTOMY      Description: Cholecystectomy Laparoscopic;  Recorded: 02/15/2008;     THORACOSCOPY Left 1/13/2015    Procedure: LEFT THORACOSCOPY CONVERTED TO THORACOTOMY;  Surgeon: Jose David Granados MD;  Location: [facility];  Service:      THORACOTOMY Left 1/13/2015    Procedure: THORACOTOMY W/ WEDGE RESECTION;  Surgeon: Jose David Granados MD;  Location: [facility] OR;  Service:  "       Current Outpatient Prescriptions   Medication Sig Dispense Refill     albuterol (PROVENTIL HFA;VENTOLIN HFA) 90 mcg/actuation inhaler Inhale 2 puffs every 6 (six) hours as needed for wheezing. 1 Inhaler 6     albuterol (PROVENTIL) 2.5 mg /3 mL (0.083 %) nebulizer solution Take 3 mL (2.5 mg total) by nebulization 4 (four) times a day as needed for wheezing or shortness of breath. 75 mL 11     alum/mag hydrox-simethicone-diphenhydramine-lidocaine (MAGIC MOUTHWASH) suspension Swish and swallow 5mL 3-4 times daily as needed. Standard prep: Diphenhydramine+Sucralfate+Mylanta 120 mL 1     BD INSULIN PEN NEEDLE UF MINI 31 gauge x 3/16\" Ndle        BD TUBERCULIN SYRINGE 1 mL 27 x 1/2\" Syrg USE TO DISPENSE HEPARIN  1     biotin 2,500 mcg cap Take 2,500 mcg by mouth every evening.        cholecalciferol, vitamin D3, 1,000 unit tablet Take 1,000 Units by mouth daily.       diltiazem (CARDIZEM CD) 240 MG 24 hr capsule Take 1 capsule (240 mg total) by mouth daily. 90 capsule 3     FERROUS SULFATE (SLOW FE ORAL) Take 65 mg by mouth daily.       furosemide (LASIX) 40 MG tablet Take 1 tablet (40 mg total) by mouth daily. 60 tablet 11     HEPARIN SODIUM,PORCINE (HEPARIN, PORCINE,) 20,000 unit/mL injection Inject 0.7 ml (14,000 units) SQ every 8 hours 13 mL 1     ibuprofen (ADVIL,MOTRIN) 200 MG tablet Take 2 tablets (400 mg total) by mouth every 6 (six) hours as needed for pain. 20 tablet 0     ipratropium-albuterol (DUO-NEB) 0.5-2.5 mg/3 mL nebulizer Inhale 3 mL 4 (four) times a day as needed.       levothyroxine (SYNTHROID, LEVOTHROID) 150 MCG tablet Take 1 tablet (150 mcg total) by mouth daily. 90 tablet 1     levothyroxine 150 mcg cap Take 150 mcg by mouth daily. 90 capsule 1     LORazepam (ATIVAN) 1 MG tablet Take 1 mg by mouth every evening.       LORazepam (ATIVAN) 1 MG tablet TAKE 1 TABLET BY MOUTH DAILY 30 tablet 0     nystatin (MYCOSTATIN) powder Apply 1 application topically 3 (three) times a day as needed.       " ondansetron (ZOFRAN ODT) 4 MG disintegrating tablet Take 1 tablet (4 mg total) by mouth every 8 (eight) hours as needed for nausea. 10 tablet 0     [START ON 11/7/2017] oxyCODONE (ROXICODONE) 30 MG immediate release tablet Take 1 tablet (30 mg total) by mouth every 4 (four) hours as needed for pain. 180 tablet 0     oxyCODONE (ROXICODONE) 30 MG immediate release tablet Take 1 tablet (30 mg total) by mouth every 4 (four) hours as needed for pain. 180 tablet 0     [START ON 11/7/2017] OXYCONTIN 40 mg 12 hr tablet Take 1 tablet (40 mg total) by mouth every morning. 30 tablet 0     OXYCONTIN 40 mg 12 hr tablet Take 1 tablet (40 mg total) by mouth every morning. 30 tablet 0     predniSONE (DELTASONE) 1 MG tablet TAKE 5 TABLETS (5 MG TOTAL) BY MOUTH DAILY.  1     senna-docusate (SENNOSIDES-DOCUSATE SODIUM) 8.6-50 mg tablet Take 1 tablet by mouth 2 (two) times a day.  0     warfarin (COUMADIN) 5 MG tablet Take 5 mg by mouth daily.       potassium citrate (UROCIT-K) 10 mEq (1,080 mg) SR tablet Take 1 tablet (10 mEq total) by mouth 3 (three) times a day with meals. 180 tablet 12     predniSONE (DELTASONE) 1 MG tablet Take 5 tablets (5 mg total) by mouth daily. 450 tablet 1     No current facility-administered medications for this visit.        Allergies   Allergen Reactions     Hydrocodone-Acetaminophen Swelling     Throat swelling (tolerates oxycodone)  - Acetaminophen causes vomiting per patient/RN, 12/2014 tolerated hydromorphone with hospitalization     Protonix [Pantoprazole] Hives     Per pt  Per pt     Codeine Nausea Only     abd pain       Fentanyl Nausea And Vomiting and Unknown       Tolerated drip 9/28/15- pt states just nausea at times      Morphine Hives, Nausea And Vomiting and Other (See Comments)     Pt states nausea     Morphine Hcl Itching and Nausea And Vomiting     Pt states she has tolerated in past- just nausea and vomiting at times       Social History     Social History     Marital status:       Spouse name: N/A     Number of children: N/A     Years of education: N/A     Occupational History     Unemployed      Social History Main Topics     Smoking status: Former Smoker     Packs/day: 0.50     Years: 10.00     Types: Cigarettes     Quit date: 7/29/2009     Smokeless tobacco: Never Used     Alcohol use No     Drug use: Yes     Special: Oxycodone     Sexual activity: Yes     Partners: Male      Comment: [place], in 2016     Other Topics Concern     Not on file     Social History Narrative    Lives with family. Lives in [place].       Family History   Problem Relation Age of Onset     Asthma Mother      Hypertension Mother      Stroke Mother      Clotting disorder Mother      Hypertension Father      Stroke Father      Urolithiasis Father      Stroke Maternal Aunt      Mental illness Sister      Stroke Sister      Clotting disorder Sister      Gout Neg Hx      Objective:      Physical Exam  Vitals:    10/20/17 0940   BP: 139/79   Pulse: (!) 108   Temp: 98.1  F (36.7  C)     General - well developed, well nourished, appropriate for age. Appears no distress at this time  Abdomen - moderately obese soft, non-tender, no hepatosplenomegaly, no masses.   - no flank tenderness, no suprapubic tenderness, kidney and bladder non-palpable  MSK - normal spinal curvature. no spinal tenderness. normal gait. muscular strength intact.  Psych - oriented to time, place, and person, normal mood and affect.        Labs   Urinalysis POC (Office):  Nitrite, UA   Date Value Ref Range Status   10/20/2017 Negative Negative Final   09/22/2017 Negative Negative Final   09/20/2017 Negative Negative Final       Lab Urinalysis:  Blood, UA   Date Value Ref Range Status   10/20/2017 Negative Negative Final   09/22/2017 Negative Negative Final   09/20/2017 Large (!) Negative Final     Nitrite, UA   Date Value Ref Range Status   10/20/2017 Negative Negative Final   09/22/2017 Negative Negative Final   09/20/2017 Negative Negative Final      Leukocytes, UA   Date Value Ref Range Status   10/20/2017 Negative Negative Final   09/22/2017 Negative Negative Final   09/20/2017 Negative Negative Final     pH, UA   Date Value Ref Range Status   10/20/2017 7.0 5.0 - 8.0 Final   09/22/2017 6.5 5.0 - 8.0 Final   09/20/2017 7.0 4.5 - 8.0 Final    and Stone prevention labs   Serum chemistries   Creatinine   Date Value Ref Range Status   09/27/2017 0.64 0.60 - 1.10 mg/dL Final   09/27/2017 0.65 0.60 - 1.10 mg/dL Final   09/20/2017 0.99 0.60 - 1.10 mg/dL Final     Potassium   Date Value Ref Range Status   09/27/2017 4.0 3.5 - 5.0 mmol/L Final   09/20/2017 4.2 3.5 - 5.0 mmol/L Final   07/09/2017 4.5 3.5 - 5.0 mmol/L Final     Calcium   Date Value Ref Range Status   09/27/2017 9.3 8.5 - 10.5 mg/dL Final   09/27/2017 9.3 8.5 - 10.5 mg/dL Final   09/20/2017 8.8 8.5 - 10.5 mg/dL Final     Phosphorus   Date Value Ref Range Status   09/27/2017 2.1 (L) 2.5 - 4.5 mg/dL Final   09/27/2017 2.1 (L) 2.5 - 4.5 mg/dL Final   06/29/2017 2.3 (L) 2.5 - 4.5 mg/dL Final     Uric Acid   Date Value Ref Range Status   09/27/2017 3.7 2.0 - 7.5 mg/dL Final   04/18/2011 5.0 2.6 - 7.2 mg/dL Final   , Calcium metabolism   Calcium, Ionized Measured   Date Value Ref Range Status   09/27/2017 1.10 (L) 1.11 - 1.30 mmol/L Final   03/18/2011 1.12 1.11 - 1.30 mmol/L Final   03/30/2010 1.23 1.11 - 1.30 mmol/L Final      PTH   Date Value Ref Range Status   09/27/2017 38 10 - 86 pg/mL Final   11/24/2015 21 10 - 86 pg/mL Final   03/30/2010 68 16 - 73 pg/mL Final     Vitamin D, Total (25-Hydroxy)   Date Value Ref Range Status   04/27/2017 50.8 30.0 - 80.0 ng/mL Final   03/09/2016 48.0 30.0 - 80.0 ng/mL Final   11/24/2015 22.1 (L) 30.0 - 80.0 ng/mL Final     and 24 hour urine   Calcium, 24H Urine   Date Value Ref Range Status   10/06/2017 156 20 - 275 mg/24hr Final     Comment:       Hypercalciuria >350  Values are for persons with  average daily calcium intake  (600-800 mg/day)   09/27/2017 124 20 - 275  mg/24hr Final     Comment:       Hypercalciuria >350  Values are for persons with  average daily calcium intake  (600-800 mg/day)     Sodium, 24 Hour Urine   Date Value Ref Range Status   10/06/2017 82 40 - 217 mmol/24hr Final   09/27/2017 145 40 - 217 mmol/24hr Final     Citrate, 24 Hour Urine   Date Value Ref Range Status   10/06/2017 110 (L) 292 - 1191 mg/24hr Final     Comment:       Reference Ranges are not  established for 0-19 years  or >60 years of age.   09/27/2017 143 (L) 292 - 1191 mg/24hr Final     Comment:       Reference Ranges are not  established for 0-19 years  or >60 years of age.     Oxalate, 24 Hour Urine   Date Value Ref Range Status   10/06/2017 26.8 7.0 - 44.0 mg/24hr Final   09/27/2017 29.9 7.0 - 44.0 mg/24hr Final     pH, Urine   Date Value Ref Range Status   10/06/2017 7.0 4.5 - 8.0 Final   09/27/2017 7.0 4.5 - 8.0 Final     Urine Volume   Date Value Ref Range Status   10/06/2017 1125 mL Final   09/27/2017 1310 mL Final

## 2021-06-14 NOTE — PROGRESS NOTES
PAIN CENTER PROGRESS NOTE    Subjective:   Catherine Sharp is a 42 y.o. female who presents for evaluation of multi-site pain secondary to osteoporosis and pathologic fractures as a result of SLE treated with chronic steroids.  She has multiple co-morbidities including Interstitial lung disease, MERLIN, Antiphospholipid Antibody Syndrome, cardiomyopathy, OA in multiple sites, and recurrent hospitalizations.  Pain has been present for years and current treatment plan includes OxyContin, oxycodone, intermittent PT/OT.    Major issues:  1. Chronic pain syndrome    2. Opioid dependence, continuous    3. Thoracic compression fracture    4. Lumbar spinal stenosis      Pain location and description: 5/10 constant sharp, burning mid and lower back pain, bilateral shoulders. Function rated 8.  Last visit pan rated 7/10.   Radiation of pain: Denies  Paresthesias, numbness, weakness: Left thigh numbness which she has had for many years.    Gait disturbance: wheelchair for outings.  Denies recent falls.  Exacerbating factors: activity, arm movements, morning time.  Alleviating factors: Rest, pain medications  Associated symptoms: Sedentary lifestyle, obesity, oxygen dependent, depression  Adverse effects of medications:  Uses sennakot 2 tablets at home for intermittent constipation.  Hasn't used miralax but used to.  She bought generic laxative.  Intermittent pruritus from opioids, takes benadryl.  Current treatment efficacy: Fair. Taking OxyContin 40 mg in the morning with pain relief within 1 hour.  She states she wakes with pain 8-9/10 and reduces to 6/10. Taking oxycodone 30 mg 1 every 4 hours prn has not tolerated reduction in daily dose.   Current treatment compliance: Good.  She has failed Lyrica, Gabapentin, Cymbalta, Fentanyl, Morphine, Vicodin, codeine in the past.  She has [facility] PT/OT and RN services in past, not currently needed.  Uses daily pop out dosing for medications.  Reports taking medication as  prescribed.      She had rheumatology follow-up with Dr. José on 11/27/17 and reviewed today:   ASSESSMENT AND PLAN:  Catherine Sharp 42 y.o. female is here for follow-up.  She has multiple comorbidities besides a systemic lupus erythematosus for which she is seen today hand.  She has rotator cuff tendinopathy.  She has history of lung disease on supplemental oxygen.  She has a history of DVT in the background of antiphospholipid antibodies and is on long-term anticoagulation.  She is on hydroxychloroquine.  I have asked her to reduce the dose to 200 mg once daily.  We tried to cut back prednisone from 5 to down to 4 mg as she flared up.  She for now she will stay on 5 mg daily we will re-address this issue in future visit.  He is she is due for labs but would like to defer those until she is seen at her primary physician office where she is going to have INR done.  I have asked her to return for follow-up here in 3 months.    She clarifies today that her right shoulder is frozen, left shoulder painful.  She has not had left shoulder MRI completed due to other health concerns.  She reports she has  cataracts - needs to see eye doctor, halo in eye won't go away.  [facility] eye clinic appointment scheduled. She also states she fell down stairs at home slipped and landed on low back and leg 1.5 weeks ago.  Denies any LOC, hitting head, dizziness, changes in balance, new numbness/tingling/weakness in legs, or new areas of pain.  Denies ED/UC visit or any bruising, swelling, lacerations.      She states today she is feeling ill today with fever and cough; she is using oxygen and presents in wheelchair.  She is going to try symptomatic treatment prior to seeing PCP.  Pulse and BP elevated today.      Review of Systems  Constitutional: Fever, chills, acutely ill. MERLIN, chronic O2. Denies night sweats, weight loss  Musculoskeletal: Positive for mid and lower back pain, left shoulder joint pain, recent  "fall.  Gastrointestional: Positive for intermittent nausea, vomiting, GERD, abdominal pain, constipation.  Denies difficulty swallowing, change in appetite, diarrhea, fecal incontinence.  Genitourinary: Denies urinary incontinence, dysuria, hematuria, UTI, frequency, hesitancy, change in libido  Neurologic: Headaches.  Denies confusion, seizure, weakness, changes in balance, changes in speech.  Psychiatric: Positive depression, anxiety.  Denies memory loss, psychoses, suicidal ideation, substance use/abuse.     Objective:     Vitals:    12/06/17 0904   BP: (!) 133/96   Pulse: (!) 120   Resp: 16   Weight: 195 lb (88.5 kg)   Height: 4' 10\" (1.473 m)   PainSc:   5     Physical Exam  Constitutional- General appearance: Abnormal, appears fatigued and acutely ill.  Well developed, uncomfortable, obese, appears older than stated age.  Presents with mother in law today.   Psychiatric- Judgment and insight: Normal.  Speech: Normal rhythm.  Thought process: Normal.  No abnormal thoughts reported. Alert & Oriented to person, place, and time.  Recent and remote memory: Normal.  Mood and affect: quiet, concerned.   Respiratory- Not using O2 by nasal cannula today.  Normal rhythm and rate.  Cardiovascular- Extremities warm and well perfused, no peripheral edema or varicosities.  Dermatologic- Exposed skin is clean, dry, and intact to inspection and palpation.   Musculoskeletal- Gait and station:  Not ambulating today, in wheelchair.     *Opioid [place] Precautions:    UDS/Swab - 01/12/17 as expected  Opioid Consent -  due  Opioid Agreement - 03/14/17  Pharmacy- as documented    [place]  Reviewed - 12/06/17 as expected  Pill Count - Pill count 10/16/15 appropriate.    Psychological evaluation - n/a  MME - 330  Pharmacogenetic testing - n/a    Patient deferred VARGAS today as she was not feeling well.  Will collect at next visit.    Imaging:  None reviewed today    Assessment:   Catherine Sharp is a 42 y.o. female seen in clinic " today for pain in her spine due to history of pathologic compression fractures due to steroids to treat SLE.  She is reporting most pain in her lumbar spine L3-5 at this time, MRI demonstrates spinal canal stenosis L3-4, disc bulging at L3-4 and L5-S1, and mild to moderate facet arthropathy throughout in addition to multiple chronic compression deformities.  She had 30% pain relief with LESI and wants to repeat lumbar procedure to see if additional pain relief.  She wants to wait to schedule after the holidays due to not feeling well and other appointments this month.  She is on coumadin and will transition to lovenox and hold for 24 hours prior to procedure.  She continues to have pain in thoracic spine, assessing for acute on chronic compression fractures with MRI of thoracic. She is having improved pain relief with oxycodone 30 mg every 4 hours prn, however we would prefer this to be temporary dosing and reduction as tolerated.  She continues OxyContin 40 mg for extended pain relief in the morning but reports she cannot take this every 12 hours as it causes more difficulty with SOB at increased frequency.  She understands the risks with opioids and respiratory depression.  Will review narcan dosing/administration with patient at next visit.  She had recent consult with [facility] and was told nothing more could be done for her spine symptoms.  She is also reporting chronic left shoulder pain, recent x-ray and shoulder injection negative.  Failed PT.  Have ordered left shoulder MRI and if shoulder MRI is normal, would consider cervical spine imaging starting with x-ray.      Plan:   Prescribed 30 days of OxyContin 40 mg in the morning. Printed to fill 12/06/17 for use on 12/07/17 and next month 1/06/2018  Prescribed oxycodone 30 mg 1 every 4 hours maximum for pain as needed. Printed to fill 12/06/17 for use on 12/07/17 and next month 1/06/2018  Take Vistaril 25 mg 1 tablet for itching as needed.  Do not  take this with benadryl.  May cause drowsiness.  Recommend thoracic and left shoulder MRI to evaluate shoulder pain in setting of recent failed joint injection and negative shoulder x-ray.  Will schedule when able  For lumbar pain, reschedule with Dr. Valles or Roxana for repeat lumbar epidural steroid injection after the holidays.  You must hold lovenox for 24 hours.  You must have a  the day of procedure take you home.  Will collect VARGAS next visit    Will review narcan indications/administration next visit.    Follow-up in 8 weeks with Elizabet.    Elizabet Palmer PA-C  [facility]  [address]  [place] [address]  Ph: [phone]  Fax: [phone]

## 2021-06-14 NOTE — PROGRESS NOTES
ASSESSMENT AND PLAN:  Catherine Sharp 42 y.o. female is here for follow-up.  She has multiple comorbidities besides a systemic lupus erythematosus for which she is seen today hand.  She has rotator cuff tendinopathy.  She has history of lung disease on supplemental oxygen.  She has a history of DVT in the background of antiphospholipid antibodies and is on long-term anticoagulation.  She is on hydroxychloroquine.  I have asked her to reduce the dose to 200 mg once daily.  We tried to cut back prednisone from 5 to down to 4 mg as she flared up.  She for now she will stay on 5 mg daily we will re-address this issue in future visit.  He is she is due for labs but would like to defer those until she is seen at her primary physician office where she is going to have INR done.  I have asked her to return for follow-up here in 3 months.          Diagnoses and all orders for this visit:    Systemic lupus erythematosus  -     hydroxychloroquine (PLAQUENIL) 200 mg tablet; Take 1 tablet (200 mg total) by mouth daily.  Dispense: 90 tablet; Refill: 0  -     predniSONE (DELTASONE) 5 MG tablet; Take 1 tablet (5 mg total) by mouth daily.  Dispense: 90 tablet; Refill: 0  -     Complement, C'3; Future; Expected date: 12/4/17  -     Complement, C'4; Future; Expected date: 12/4/17  -     C-Reactive Protein; Future; Expected date: 12/4/17  -     Erythrocyte Sedimentation Rate; Future; Expected date: 12/4/17  -     DNA (ds) Antibody Screen; Future; Expected date: 12/4/17    ILD (interstitial lung disease)    Bilateral shoulder tendinopathy      HISTORY OF PRESENTING ILLNESS:  Catherine Sharp, 42 y.o., female is here for follow-up.  She has SLE.  She has chronic pain syndrome.  She has chronic shoulder pain worse on the right side.  She follows up the pain clinic.  She is on hydroxychloroquine 200 mg twice daily.  Discussed this with her.  I have asked her to reduce the dose.  She has reported no fever or weight loss she has not had  "mouth ulcers eye redness she has had nausea there is no cough.  She has generalized stiffness for 3 hours in the morning.  She has she tried to cut back prednisone down to 4 mg for 5 she had much worsening of her overall achiness.  She is back to 5 mg.  longstanding history of SLE. She says she was age 15, the when her joints began to hurt in her hands, knees, elbows, and wrists. In fact, she recalls the whole body was \"painful\" and stiff in the morning up to two hours. She was seen in rheumatology, diagnosis of lupus was arrived. At that point she had no facial eruption or other skin lesions. It was over the subsequent time that she has developed a facial malar area eruption with or without exposure to sunlight. She is not reporting mouth ulcers; however  she has had a DVT, which she experienced six or eight years ago and has been on Coumadin since. She reports occasional chest pain and palpitations, which  is not clear if she has definite pleuritic component. She has a residual pain in her digits, wrists, elbows, and knees. She had been on azathioprine 50 mg twice daily and hydroxychloroquine but d/c ed by her hospital MD a few months ago for unclear reasons. At one point, she recalls a question of if she may have \"blood\" into her lungs because of the lupus. She underwent treatment with the high-dose prednisone. She subsequently developed osteoporosis. Currently, she is on 10 mg of prednisone. In the past, she has seen Dr. Leonardo and more recently Dr. Mathews. She has experienced chronic pain syndrome and has been on narcotics. Further historical information and ADL limitations as noted in the multidimensional health assessment questionnaire attached in the EMR.    ALLERGIES:Hydrocodone-acetaminophen; Protonix [pantoprazole]; Codeine; Fentanyl; Morphine; and Morphine hcl    PAST MEDICAL/ACTIVE PROBLEMS/MEDICATION/ FAMILY HISTORY/SOCIAL DATA:  The patient has a family history of  Past Medical History:   Diagnosis " Date     Antiphospholipid antibody with hypercoagulable state     Secondary to lupus     Cervical compression fracture 2009    closed, with spinal cord injury C1-C4 - prednisone induced     Chronic hypercapnic respiratory failure 12/22/2014     Chronic pain     on opiates     Congestive heart failure     Diastolic, Cor Pulmonale, EF 60% 2014     Depression      Gastroparesis     causing nausea/vomiting with recurrent admissions     GERD (gastroesophageal reflux disease)      Hypertension      Kidney infection      Kidney stone      Lupus (systemic lupus erythematosus)     complicated by joint and pulmonary involvement     Obesity      MERLIN (obstructive sleep apnea) 1/30/2015    On home bipap     Osteoporosis      Peripheral neuropathy      Recurrent Clostridium difficile diarrhea      Seizures     once due to high BP, hypertensive encephalopathy     History   Smoking Status     Former Smoker     Packs/day: 0.50     Years: 10.00     Types: Cigarettes     Quit date: 7/29/2009   Smokeless Tobacco     Never Used     Patient Active Problem List   Diagnosis     Hypertension     Osteoporosis     Anemia     Generalized anxiety disorder     Antiphospholipid Antibody Syndrome     Pulmonary emboli     Peripheral Neuropathy     Systemic lupus erythematosus     GERD (gastroesophageal reflux disease)     Opiate dependence     Gastroparesis     Coagulopathy     ILD (interstitial lung disease)     Restrictive lung disease due to kyphoscoliosis     Adhesive pericarditis     Regional enteritis     Deep phlebothrombosis, antepartum, with delivery     Hyperthyroidism     Acute respiratory failure     History of DVT (deep vein thrombosis)     Abdominal pain     Intractable nausea and vomiting     Chronic pain     Hypercarbia     ALFONSO (acute kidney injury)     Encephalopathy acute     Snoring     Bilateral shoulder tendinopathy     Polyarthralgia     Trigger ring finger, right     Chronic diastolic heart failure     Pneumonia     Abnormal  "CXR     Cardiomyopathy     Chronic respiratory failure with hypercapnia     Vomiting     Current Outpatient Prescriptions   Medication Sig Dispense Refill     albuterol (PROVENTIL HFA;VENTOLIN HFA) 90 mcg/actuation inhaler Inhale 2 puffs every 6 (six) hours as needed for wheezing. 1 Inhaler 6     albuterol (PROVENTIL) 2.5 mg /3 mL (0.083 %) nebulizer solution Take 3 mL (2.5 mg total) by nebulization 4 (four) times a day as needed for wheezing or shortness of breath. 75 mL 11     alum/mag hydrox-simethicone-diphenhydramine-lidocaine (MAGIC MOUTHWASH) suspension Swish and swallow 5mL 3-4 times daily as needed. Standard prep: Diphenhydramine+Sucralfate+Mylanta 120 mL 1     BD INSULIN PEN NEEDLE UF MINI 31 gauge x 3/16\" Ndle        BD TUBERCULIN SYRINGE 1 mL 27 x 1/2\" Syrg USE TO DISPENSE HEPARIN  1     biotin 2,500 mcg cap Take 2,500 mcg by mouth every evening.        cholecalciferol, vitamin D3, 1,000 unit tablet Take 1,000 Units by mouth daily.       diltiazem (CARDIZEM CD) 240 MG 24 hr capsule Take 1 capsule (240 mg total) by mouth daily. 90 capsule 3     FERROUS SULFATE (SLOW FE ORAL) Take 65 mg by mouth daily.       furosemide (LASIX) 40 MG tablet Take 1 tablet (40 mg total) by mouth daily. 60 tablet 11     HEPARIN SODIUM,PORCINE (HEPARIN, PORCINE,) 20,000 unit/mL injection Inject 0.7 ml (14,000 units) SQ every 8 hours 13 mL 1     ibuprofen (ADVIL,MOTRIN) 200 MG tablet Take 2 tablets (400 mg total) by mouth every 6 (six) hours as needed for pain. 20 tablet 0     ipratropium-albuterol (DUO-NEB) 0.5-2.5 mg/3 mL nebulizer Inhale 3 mL 4 (four) times a day as needed.       levothyroxine (SYNTHROID, LEVOTHROID) 150 MCG tablet Take 1 tablet (150 mcg total) by mouth daily. 90 tablet 1     levothyroxine 150 mcg cap Take 150 mcg by mouth daily. 90 capsule 1     LORazepam (ATIVAN) 1 MG tablet Take 1 mg by mouth every evening.       LORazepam (ATIVAN) 1 MG tablet TAKE 1 TABLET BY MOUTH DAILY 30 tablet 0     nystatin " "(MYCOSTATIN) powder Apply 1 application topically 3 (three) times a day as needed.       ondansetron (ZOFRAN ODT) 4 MG disintegrating tablet Take 1 tablet (4 mg total) by mouth every 8 (eight) hours as needed for nausea. 10 tablet 0     oxyCODONE (ROXICODONE) 30 MG immediate release tablet Take 1 tablet (30 mg total) by mouth every 4 (four) hours as needed for pain. 180 tablet 0     OXYCONTIN 40 mg 12 hr tablet Take 1 tablet (40 mg total) by mouth every morning. 30 tablet 0     potassium citrate (UROCIT-K) 10 mEq (1,080 mg) SR tablet Take 1 tablet (10 mEq total) by mouth 3 (three) times a day with meals. 180 tablet 12     predniSONE (DELTASONE) 1 MG tablet TAKE 5 TABLETS (5 MG TOTAL) BY MOUTH DAILY.  1     senna-docusate (SENNOSIDES-DOCUSATE SODIUM) 8.6-50 mg tablet Take 1 tablet by mouth 2 (two) times a day.  0     warfarin (COUMADIN) 5 MG tablet Take 5 mg by mouth daily.       oxyCODONE (ROXICODONE) 30 MG immediate release tablet Take 1 tablet (30 mg total) by mouth every 4 (four) hours as needed for pain. 180 tablet 0     OXYCONTIN 40 mg 12 hr tablet Take 1 tablet (40 mg total) by mouth every morning. 30 tablet 0     predniSONE (DELTASONE) 1 MG tablet Take 5 tablets (5 mg total) by mouth daily. 450 tablet 1     No current facility-administered medications for this visit.        DETAILED EXAMINATION  11/27/17  :  Vitals:    11/27/17 1418   BP: (!) 176/98   Weight: 195 lb (88.5 kg)   Height: 4' 10\" (1.473 m)     Alert oriented. Head including the face is examined for malar rash, heliotropes, scarring, lupus pernio. Eyes examined for redness such as in episcleritis/scleritis, periorbital lesions.   Neck/ Face examined for parotid gland swelling, range of motion of neck.  Left upper and lower and right upper and lower extremities examined for tenderness, swelling, warmth of the appendicular joints, range of motion, edema, rash.  Some of the important findings included: Impingement of the shoulders bilaterally worse " on the right side, no synovitis in any of the palpable appendicular joints.  She is on supplemental oxygen.  No pleuropericardial rubs.  LAB / IMAGING DATA:  ALT   Date Value Ref Range Status   09/20/2017 42 0 - 45 U/L Final   04/13/2017 15 0 - 45 U/L Final   01/09/2017 25 0 - 45 U/L Final     Albumin   Date Value Ref Range Status   09/27/2017 3.3 (L) 3.5 - 5.0 g/dL Final   09/20/2017 3.4 (L) 3.5 - 5.0 g/dL Final   06/29/2017 3.4 (L) 3.5 - 5.0 g/dL Final     Creatinine   Date Value Ref Range Status   09/27/2017 0.64 0.60 - 1.10 mg/dL Final   09/27/2017 0.65 0.60 - 1.10 mg/dL Final   09/20/2017 0.99 0.60 - 1.10 mg/dL Final       WBC   Date Value Ref Range Status   09/20/2017 12.6 (H) 4.0 - 11.0 thou/uL Final   07/09/2017 6.9 4.0 - 11.0 thou/uL Final   09/27/2015 6.9 4.0 - 11.0 thou/uL Final   08/23/2015 5.8 4.0 - 11.0 thou/uL Final     Hemoglobin   Date Value Ref Range Status   09/20/2017 15.5 12.0 - 16.0 g/dL Final   07/09/2017 13.5 12.0 - 16.0 g/dL Final   04/27/2017 13.7 12.0 - 16.0 g/dL Final     Platelets   Date Value Ref Range Status   09/20/2017 178 140 - 440 thou/uL Final   07/09/2017 175 140 - 440 thou/uL Final   04/27/2017 189 140 - 440 thou/uL Final       Lab Results   Component Value Date    RF <15.0 07/10/2014    SEDRATE 34 (H) 03/17/2016

## 2021-06-15 NOTE — PROGRESS NOTES
PAIN CENTER PROGRESS NOTE    Subjective:   Catherine Sharp is a 42 y.o. female who presents for evaluation of multi-site pain secondary to osteoporosis and pathologic fractures as a result of SLE treated with chronic steroids.  She has multiple co-morbidities including Interstitial lung disease, MERLIN, Antiphospholipid Antibody Syndrome, cardiomyopathy, OA in multiple sites, and recurrent hospitalizations.  Pain has been present for years and current treatment plan includes OxyContin, oxycodone, intermittent PT/OT.    Major issues:  1. Chronic pain syndrome    2. Opioid dependence, continuous    3. Lumbar compression fracture      Pain location and description: 5/10 constant sharp, burning, aching pain in lower back and bilateral shoulders. Function rated 8.  Last visit pan rated 5/10.  At best, pain is rated 4/10 and highest 9/10 and average is 5/10.    Radiation of pain: Denies  Paresthesias, numbness, weakness: Left thigh numbness which she has had for many years.    Gait disturbance: wheelchair for outings.  Denies recent falls.  Exacerbating factors: activity, arm movements, morning time.  Alleviating factors: Rest, pain medications  Associated symptoms: Sedentary lifestyle, obesity, oxygen dependent, depression.  Denies numbness, weakness, bowel or bladder incontinence, fever chills, weight loss.  Functional symptoms: Pain interferes with sleep, walking, work (doesn't work), ADLs, sexual health.  Denies relationship/social changes.  Pain treatment does help with function.  Adverse effects of medications:  Uses sennakot 2 tablets at home for intermittent constipation.  Hasn't used miralax but used to.  She bought generic laxative.  Intermittent pruritus from opioids, takes benadryl.  Current treatment efficacy: Fair. Taking OxyContin 40 mg in the morning with pain relief within 1 hour.  She states she wakes with pain 8-9/10 and reduces to 6/10. Taking oxycodone 30 mg 1 every 4 hours prn has not tolerated  reduction in daily dose.   Current treatment compliance: Good.  She has failed Lyrica, Gabapentin, Cymbalta, Fentanyl, Morphine, Vicodin, codeine in the past.  She has [facility] PT/OT and RN services in past, not currently needed.  Uses daily pop out dosing for medications.  Reports taking medication as prescribed.      She was seen in ED on 01/05/2018 for left flank pain.  She was diagnosed with pneumonia and given antibiotics.    She saw PCP Dr. Ma on 01/31/18 for follow-up and repeat x-ray shows improvement in left lower lobe infiltrate.  Neoplasm on skin referral to dermatology.  Labs were collected and order placed for DXA scan and referral to osteoporosis care.  No medication change were made.  She is also seeing a eye doctor as she was told she is having hemorrhaging behind bilateral eyes as she was having a halo effect.  She was advised to wait and see if symptoms worsen and then would consider eye injections or surgery.    She denies pain symptom changes.  States shoulder pain has been improved recently without any severe pain episodes; she states she can feel it with inspiration and into neck but it happens more when she has more fluid as when she takes her furosemide it goes away.  She can only lay on her left side; she is trying not to prop herself up as much on that side as she also thinks this aggravates her shoulder.    She denies any pain treatments since last seen.  She denies any questions today.     Review of Systems  Constitutional: Denies fever, chills, lethargy.  Has MERLIN, chronic O2. Denies night sweats, weight loss  Musculoskeletal: Positive for mid and lower back pain, left shoulder joint pain improved, denies recent fall.  Gastrointestional: Positive for intermittent nausea, vomiting, GERD, abdominal pain, constipation.  Denies difficulty swallowing, change in appetite, diarrhea, fecal incontinence.  Genitourinary: Denies urinary incontinence, dysuria, hematuria, UTI, frequency,  "hesitancy, change in libido  Neurologic: Headaches.  Denies confusion, seizure, weakness, changes in balance, changes in speech.  Psychiatric: Positive depression, anxiety.  Denies memory loss, psychoses, suicidal ideation, substance use/abuse.     Objective:     Vitals:    02/01/18 1254   BP: (!) 163/108   Pulse: (!) 110   Resp: 16   Weight: 201 lb (91.2 kg)   Height: 4' 10\" (1.473 m)   PainSc:   5     Physical Exam  Constitutional- General appearance: Normal.  Well developed, uncomfortable, obese, appears older than stated age.  Presents alone today.   Psychiatric- Judgment and insight: Normal.  Speech: Normal rhythm.  Thought process: Normal.  No abnormal thoughts reported. Alert & Oriented to person, place, and time.  Recent and remote memory: Normal.  Mood and affect: quiet, concerned.   Respiratory- Not using O2 by nasal cannula today.  Normal rhythm and rate.  Cardiovascular- Extremities warm and well perfused, no peripheral edema or varicosities.  Dermatologic- Exposed skin is clean, dry, and intact to inspection and palpation.   Musculoskeletal- Gait and station:  Not ambulating today, in wheelchair.     *Opioid [place] Precautions:    UDS/Swab - Collected 02/01/18, results pending  Opioid Consent -  due  Opioid Agreement - 03/14/17  Pharmacy- as documented    [place]  Reviewed - 02/01/18 as expected  Pill Count - Pill count 10/16/15 appropriate.    Psychological evaluation - n/a  MME - 330  Pharmacogenetic testing - n/a  VARGAS Score: 50 02/01/18    Imaging:  None reviewed today    Assessment:   Catherine Sharp is a 42 y.o. female seen in clinic today for pain in her spine due to history of pathologic compression fractures due to steroids to treat SLE.  She is reporting most pain in her lumbar spine L3-5 at this time, MRI demonstrates spinal canal stenosis L3-4, disc bulging at L3-4 and L5-S1, and mild to moderate facet arthropathy throughout in addition to multiple chronic compression deformities.  She had " "30% pain relief with LESI and wants to repeat lumbar procedure to see if additional pain relief. She is on coumadin and will transition to lovenox and hold for 24 hours prior to procedure.  She continues to have pain in thoracic spine, assessing for acute on chronic compression fractures with MRI of thoracic. She is having improved pain relief with oxycodone 30 mg every 4 hours prn, have discussed dosing outside of CDC guidelines.  She continues OxyContin 40 mg for extended pain relief in the morning but reports she cannot take this every 12 hours as it causes more difficulty with SOB at increased frequency.  She understands the risks with opioids and respiratory depression.  Reviewed narcan dosing/administration today due to high MME.      Plan:   Prescribed 30 days of OxyContin 40 mg in the morning.   Prescribed oxycodone 30 mg 1 every 4 hours maximum for pain as needed.   OK TO FILL ON 02/03/18, TO START ON 02/04/18 (printed)  DO NOT FILL UNTIL 03/05/18 FOR USE ON 03/06/18 (printed)  Take Vistaril 25 mg 1 tablet for itching as needed.  Do not take this with benadryl.  May cause drowsiness.  Recommend thoracic and left shoulder MRI to evaluate shoulder pain in setting of recent failed joint injection and negative shoulder x-ray.  Will schedule when able  Naloxone/\"Narcan\" is a medication being provided to you for safety. It is a medication that will decrease/eliminate the opioid narcotic medication in your body. Naloxone is a rescue medication in case of an overdose - either intentional or unintentional.  Symptoms of overdose may be breathing slow and shallow, erratic or not at all, pinpoint pupils, hallucinations, confusion, muscle jerks, slack muscles, extreme sleepiness or loss of alertness, awake but not able to talk, face pale or clammy, vomiting, for lighter skinned people, the skin tone turns bluish purple, for darker skinned people, it turns grayish or ashen.  If Naloxone is used 911 or the emergency " medical system should be call, CPR may be needed and the person needs to be seen in an emergency department.  Diagnostics: UDT/SWAB collected today results are pending.  UDT/SWAB:  Patient required a random Urine Drug Testing, due to the need to comply with [place] [employer] Policy Guidelines and CDC Guideline for the use of any controlled substances. This is to ensure that patient is compliant with treatment, and monitor for risks such as diversion, abuse, or any other aberrant behaviors. Patient is either being considered for or taking a controlled substance. Unexpected findings will be discussed and treatment decision may be adjusted. Testing is being implemented across the board randomly w/o bias related to age, race, gender, socioeconomic status or Evangel[employer] affiliation.   Follow-up in 8 weeks with Elizabet.    Elizabet Palmer PA-C  [facility]  [address]  [place] [address]  Ph: [phone]  Fax: [phone]

## 2021-06-15 NOTE — PROGRESS NOTES
Assessment/Plan:     1. Neoplasm of uncertain behavior of skin  Ambulatory referral to Dermatology   2. COPD (chronic obstructive pulmonary disease)  albuterol (PROAIR HFA;PROVENTIL HFA;VENTOLIN HFA) 90 mcg/actuation inhaler   3. Osteoporosis  DXA Bone Density Scan    Ambulatory referral to Osteoporosis Care   4. Left lower lobe pneumonia  XR Chest 2 Views   5. Hypothyroidism  Thyroid Stimulating Hormone (TSH)   6. Systemic lupus erythematosus  Complement, C'3    Complement, C'4    C-Reactive Protein    Erythrocyte Sedimentation Rate    DNA (ds) Antibody Screen   7. DVT (deep venous thrombosis)  Factor 10 Assay, Chromogenic   8. Encounter for counseling regarding contraception         Diagnoses and all orders for this visit:    Neoplasm of uncertain behavior of skin  -     Ambulatory referral to Dermatology  -Recommend patient be seen by dermatology for consideration of excision of this lesion.  Referral is placed    COPD (chronic obstructive pulmonary disease)  -     albuterol (PROAIR HFA;PROVENTIL HFA;VENTOLIN HFA) 90 mcg/actuation inhaler; Inhale 2 puffs every 6 (six) hours as needed for wheezing.  Dispense: 1 Inhaler; Refill: 6  -Continue current inhalers.  Will follow up with pulmonology as planned.  She is on continuous O2.    Osteoporosis  -     DXA Bone Density Scan; Future; Expected date: 1/31/18  -     Ambulatory referral to Osteoporosis Care    Left lower lobe pneumonia  -     XR Chest 2 Views  -Repeat chest x-ray shows improvement in left lower lobe infiltrate    Hypothyroidism  -     Thyroid Stimulating Hormone (TSH)  -Continue levothyroxine    Systemic lupus erythematosus  -     Complement, C'3  -     Complement, C'4  -     C-Reactive Protein  -     Erythrocyte Sedimentation Rate  -     DNA (ds) Antibody Screen    DVT (deep venous thrombosis)  -     Factor 10 Assay, Chromogenic    Encounter for counseling regarding contraception  Discussed alternative contraceptive options.  Discussed that a Mirena  IUD may be a good choice for her as 1 of the common side effects is a decrease in menstrual bleeding and about 20% of women who use this long-term will stop having menstrual periods altogether.  She was provided with a brochure on the Mirena and directed to the Mirena website for further information.  She will consider her options.  Briefly discussed procedure for insertion of Mirena IUD.  She will contact me if she has any follow-up questions.             Subjective:      Catherine Sharp is a 42 y.o. female who comes in today to follow-up on a wound on her right breast.  This was previously been treated by Dr. Berrios who has now retired.  Patient recalls that it started maybe about 6 or 7 months ago.  They were thinking that maybe it was a bug bite and it was just not healing very well.  Dr. Berrios had been applying silver nitrate sticks to the wound.  Patient reports that it would then go away but then returned a couple of weeks after treatment.  She feels like it is getting a little bit bigger.  It is not painful or causing irritation but it feels hard and she is concerned about it.  She is also due for blood work.  Needs her factor X chromogenic assay.  Has lab orders from her rheumatologist.  Also would like to have her thyroid level checked.  She would like to discuss contraception.  She has Nexplanon currently.  This was placed about a year ago.  Previously had been on Depo-Provera injections.  Like the Depakote injections because she did not get her menstrual periods.  However with the Nexplanon she is getting her.  Once, sometimes twice a month.  When she gets her period she gets very sick and has migraine headaches nausea and vomiting.  She is interested in something that will make her periods go away completely as possible.  She is wondering if it would be okay to go back on Depo-Provera injections.  She recalls that Dr. Berrios was concerned about the effects that Depakote was having on her bone  "density.  She does have osteoporosis.  Has not had bone density scan in a couple of years.  Previously was seen by Dr. Amin and received injections for treatment of osteoporosis.  She has not established with a new osteoporosis specialist since Dr. Amin left [facility].  We reviewed her medications and allergies.  She follows closely with the pain clinic for management of her chronic pain medications.  Her warfarin is managed by the medication therapy management pharmacist.  She follows with pulmonology.  Her respiratory symptoms are stable.  She is on continuous oxygen.  She was seen in the emergency room earlier this month for left lower lobe pneumonia.  Had been experiencing left flank pain.  Was concerned this could be a kidney stone.  Did not have kidney stone but was found to have pneumonia and was treated with antibiotics.  Reports symptoms have resolved and she is feeling better and back to her usual self.  She is not currently complaining of any coughing or increased dyspnea.  She has chronic bilateral lower extremity edema.  Takes furosemide to help manage this.  She reports no medication concerns.  Review of systems is assessed and is otherwise negative.  No other questions today.    Current Outpatient Prescriptions   Medication Sig Dispense Refill     albuterol (PROAIR HFA;PROVENTIL HFA;VENTOLIN HFA) 90 mcg/actuation inhaler Inhale 2 puffs every 6 (six) hours as needed for wheezing. 1 Inhaler 6     albuterol (PROVENTIL) 2.5 mg /3 mL (0.083 %) nebulizer solution Take 3 mL (2.5 mg total) by nebulization 4 (four) times a day as needed for wheezing or shortness of breath. 75 mL 11     alum/mag hydrox-simethicone-diphenhydramine-lidocaine (MAGIC MOUTHWASH) suspension Swish and swallow 5mL 3-4 times daily as needed. Standard prep: Diphenhydramine+Sucralfate+Mylanta 120 mL 1     BD INSULIN PEN NEEDLE UF MINI 31 gauge x 3/16\" Ndle        BD TUBERCULIN SYRINGE 1 mL 27 x 1/2\" Syrg USE TO DISPENSE HEPARIN "  1     biotin 2,500 mcg cap Take 2,500 mcg by mouth every evening.        cholecalciferol, vitamin D3, 1,000 unit tablet Take 1,000 Units by mouth daily.       diltiazem (CARDIZEM CD) 240 MG 24 hr capsule Take 1 capsule (240 mg total) by mouth daily. 90 capsule 3     FERROUS SULFATE (SLOW FE ORAL) Take 65 mg by mouth daily.       furosemide (LASIX) 40 MG tablet Take 1 tablet (40 mg total) by mouth daily. 60 tablet 11     hydroxychloroquine (PLAQUENIL) 200 mg tablet Take 1 tablet (200 mg total) by mouth daily. 90 tablet 0     ibuprofen (ADVIL,MOTRIN) 200 MG tablet Take 2 tablets (400 mg total) by mouth every 6 (six) hours as needed for pain. 20 tablet 0     ipratropium-albuterol (DUO-NEB) 0.5-2.5 mg/3 mL nebulizer Inhale 3 mL 4 (four) times a day as needed.       levothyroxine (SYNTHROID, LEVOTHROID) 150 MCG tablet Take 1 tablet (150 mcg total) by mouth daily. 90 tablet 1     LORazepam (ATIVAN) 1 MG tablet Take 1 mg by mouth every evening.       nystatin (MYCOSTATIN) powder Apply 1 application topically 3 (three) times a day as needed.       oxyCODONE (ROXICODONE) 30 MG immediate release tablet Take 1 tablet (30 mg total) by mouth every 4 (four) hours as needed for pain. 180 tablet 0     OXYCONTIN 40 mg 12 hr tablet Take 1 tablet (40 mg total) by mouth every morning. 30 tablet 0     predniSONE (DELTASONE) 5 MG tablet Take 1 tablet (5 mg total) by mouth daily. 90 tablet 0     senna-docusate (SENNOSIDES-DOCUSATE SODIUM) 8.6-50 mg tablet Take 1 tablet by mouth 2 (two) times a day.  0     warfarin (COUMADIN) 5 MG tablet Take 5 mg by mouth daily.       HEPARIN SODIUM,PORCINE (HEPARIN, PORCINE,) 20,000 unit/mL injection Inject 0.7 ml (14,000 units) SQ every 8 hours 13 mL 1     predniSONE (DELTASONE) 1 MG tablet Take 5 tablets (5 mg total) by mouth daily. 450 tablet 1     No current facility-administered medications for this visit.        Past Medical History, Family History, and Social History reviewed.  Past Medical  History:   Diagnosis Date     Antiphospholipid antibody with hypercoagulable state     Secondary to lupus     Cervical compression fracture 2009    closed, with spinal cord injury C1-C4 - prednisone induced     Chronic hypercapnic respiratory failure 12/22/2014     Chronic pain     on opiates     Congestive heart failure     Diastolic, Cor Pulmon[facility], EF 60% 2014     Depression      Gastroparesis     causing nausea/vomiting with recurrent admissions     GERD (gastroesophageal reflux disease)      Hypertension      Kidney infection      Kidney stone      Lupus (systemic lupus erythematosus)     complicated by joint and pulmonary involvement     Obesity      MERLIN (obstructive sleep apnea) 1/30/2015    On home bipap     Osteoporosis      Peripheral neuropathy      Recurrent Clostridium difficile diarrhea      Seizures     once due to high BP, hypertensive encephalopathy     Past Surgical History:   Procedure Laterality Date     PICC AND MIDLINE TEAM LINE INSERTION  9/27/2015          DC BIOPSY LUNG/MEDIASTINUM PERCUTANEOUS NEEDLE      Description: Biopsy Lung Percutaneous;  Recorded: 09/15/2011;     DC LAP,CHOLECYSTECTOMY      Description: Cholecystectomy Laparoscopic;  Recorded: 02/15/2008;     THORACOSCOPY Left 1/13/2015    Procedure: LEFT THORACOSCOPY CONVERTED TO THORACOTOMY;  Surgeon: Jose David Granados MD;  Location: [facility] OR;  Service:      THORACOTOMY Left 1/13/2015    Procedure: THORACOTOMY W/ WEDGE RESECTION;  Surgeon: Jose David Granados MD;  Location: [facility] OR;  Service:      Hydrocodone-acetaminophen; Protonix [pantoprazole]; Codeine; Fentanyl; Morphine; and Morphine hcl  Family History   Problem Relation Age of Onset     Asthma Mother      Hypertension Mother      Stroke Mother      Clotting disorder Mother      Hypertension Father      Stroke Father      Urolithiasis Father      Stroke Maternal Aunt      Mental illness Sister      Stroke Sister      Clotting disorder Sister      Gout Neg  Hx      Social History     Social History     Marital status:      Spouse name: N/A     Number of children: N/A     Years of education: N/A     Occupational History     Unemployed      Social History Main Topics     Smoking status: Former Smoker     Packs/day: 0.50     Years: 10.00     Types: Cigarettes     Quit date: 7/29/2009     Smokeless tobacco: Never Used     Alcohol use No     Drug use: Yes     Special: Oxycodone     Sexual activity: Yes     Partners: Male      Comment: Nextplanon, in 2016     Other Topics Concern     Not on file     Social History Narrative    Lives with family. Lives in [place].         Review of systems is as stated in HPI, and the remainder of the 10 system review is otherwise negative.    Objective:     Vitals:    01/31/18 1108 01/31/18 1128   BP: (!) 160/98 (!) 168/110   Patient Site: Right Arm    Patient Position: Sitting    Cuff Size: Adult Large    Pulse: (!) 110    Temp: 98.6  F (37  C)    TempSrc: Tympanic    SpO2: 97%    Weight: 201 lb 8 oz (91.4 kg)     Body mass index is 42.11 kg/(m^2).    General appearance: alert, appears stated age and cooperative  Lungs: clear to auscultation bilaterally and crackles bilateral bases  Heart: regular rate and rhythm, S1, S2 normal, no murmur, click, rub or gallop  Extremities: trace bilateral pitting edema  Skin: Raised, skin colored nodular lesion present right breast  Neurologic: Grossly normal        This note has been dictated using voice recognition software. Any grammatical or context distortions are unintentional and inherent to the the software.

## 2021-06-16 NOTE — TELEPHONE ENCOUNTER
Telephone Encounter by Tarah Hearn RN at 3/19/2019 11:45 AM     Author: Tarah Hearn RN Service: -- Author Type: Registered Nurse    Filed: 3/19/2019 11:48 AM Encounter Date: 3/19/2019 Status: Signed    : Tarah Hearn RN (Registered Nurse)       Medication being requested: Oxycontin, Oxycodone  Last visit date: 01/24.  Provider: RENZO  Next visit date: 04/12.  Provider: RENZO  Expected follow up: 8 weeks  MTM visit ([facility] Center) date: N/A  UDT date: 02/01/18  Agreement date: 05/31/18   snipped in:      Red Flags/Comments identified on call: None  Pertinent between visit information about requested medication (telephone, mychart, prior authorization): Fills 1 day before use  Script being sent to provider by nurse- dates and quantity:     Pharmacy cued: Scripts to be printed  Standing orders for withdrawal protocol implemented: N/A

## 2021-06-16 NOTE — TELEPHONE ENCOUNTER
Telephone Encounter by Sarai Carbajal RN at 9/11/2019  8:45 AM     Author: Sarai Carbajal RN Service: -- Author Type: Registered Nurse    Filed: 9/11/2019  8:52 AM Encounter Date: 9/10/2019 Status: Signed    : Sarai Carbajal RN (Registered Nurse)       Medication being requested: oxycontin 40 mg, oxycodone 30 mg  Last visit date: 8/16  Provider: RENZO  Next visit date: 10/9.  Provider: RENZO  Expected follow up: 8 weeks  MTM visit ([facility] Center) date: no  UDS - 04/12/19 CSA - 06/25/19   snipped in:    Pertinent between visit information about requested medication (telephone, mychart, prior authorization, concerns, comments):   Script being sent to provider by nurse- dates and quantity:   Requested Prescriptions     Pending Prescriptions Disp Refills   ? OXYCONTIN 40 mg 12 hr tablet 28 tablet 0     Sig: Take 1 tablet (40 mg total) by mouth every morning.   ? oxyCODONE (ROXICODONE) 30 MG immediate release tablet 168 tablet 0     Sig: Take 1 tablet (30 mg total) by mouth every 4 (four) hours as needed for pain.     Pharmacy cued: [facility]  Standing orders for withdrawal protocol implemented: ANAND

## 2021-06-17 NOTE — PROGRESS NOTES
Progress Note    Reason for Visit:  Chief Complaint     Osteoporosis          Progress Note:    HPI:   This patient is seen in consultation at the request of the primary care physician because of osteoporosis.  The patient is known to have lupus and she has interstitial lung disease for which she has been taking steroids since age of 16 right now she is on 7.5 mg of prednisone    She has continuous oxygen.    She has multiple vertebral compression fractures she broke her shoulder.    She has taken Forteo for 2 years and stopped 2 years ago.    She has past history of kidney stone.    She was on Depo-Provera.    She was an ex-smoker she is  with no children.  She stopped in 2009.    She takes biotin vitamin D 2000 units a day she takes iron Lasix 40 mg Plaquenil 20 mg for the lupus    She has hypothyroidism on Synthroid 150 mcg takes warfarin.    Family history negative for osteoporosis.    Component      Latest Ref Rng & Units 9/11/2017 9/28/2017 1/5/2018 1/31/2018   Sodium      136 - 145 mmol/L   140    Potassium      3.5 - 5.0 mmol/L   3.5    Chloride      98 - 107 mmol/L   99    CO2      22 - 31 mmol/L   33 (H)    Anion Gap, Calculation      5 - 18 mmol/L   8    Glucose      70 - 125 mg/dL   95    Calcium      8.5 - 10.5 mg/dL   9.3    BUN      8 - 22 mg/dL   11    Creatinine      0.60 - 1.10 mg/dL   0.59 (L)    GFR MDRD Af Amer      >60 mL/min/1.73m2   >60    GFR MDRD Non Af Amer      >60 mL/min/1.73m2   >60    TSH      0.30 - 5.00 uIU/mL 14.44 (H) 4.54  0.94       Patient Profile:  42 y.o. female, premenopausal, is here for the follow up bone density test.   History of fractures - Yes;  Shoulder and Vertebra. Family history of osteoporosis - None.  Family history of hip fracture: None. Smoking history - Past. Osteoporosis treatment past -  Yes;  Forteo. Osteoporosis treatment current - No.  Chronic medical problems - Chronic low back problems, History of kidney stones and Lupus. High risk medications -   Blood thinner (Coumadin or Heparin);  Yes, Currently, Depo-provera;  Yes, in the Past, Steroids;  Yes, Currently and Thyroid;  Yes, Currently.        Assessment:     1. The spine bone density L1-L4 with T-score 1.7, with Z-score 1.7  2. Femoral bone densities show left femoral neck T- score -2.6 (Z-Score -2.2)  and right total hip T-score -2.8 (Z-Score-2.6)  3. Trabecular bone score indicates good trabecular bone architecture.  4.  Left one third radius T score -0.2.  Z score -0.2     42 y.o. female with OSTEOPOROSIS and HIGH fracture risk who had received Forteo treatment in the past..      Since the previous bone density dated  September 14, 2015, there has been a   11.6 % increase in the bone density of the spine.   Some increases in the spine and be secondary to evolving degenerative changes. Additionally there has been a 6.3 % change in the left total hip and a 5.1 % decline in the right total hip.  Of note, no statistically significant change was seen in the left one third radius.  A mixed result is seen.       Review of Systems:    Nervous System: No headache, dizziness, fainting or memory loss. No tingling sensation of hand or feet.  Ears: No hearing loss or ringing in the ears  Eyes: No blurring of vision, redness, itching or dryness.  Nose: No nosebleed or loss of smell  Mouth: No mouth sores or loss of taste  Throat: No hoarseness or difficulty swallowing  Neck: No enlarged thyroid or lymph nodes.  Heart: No chest pain, palpitation or irregular heartbeat. No swelling of hands or feet  Lungs: No shortness of breath, cough, night sweats, wheezing or hemoptysis.  Gastrointestinal: No nausea or vomiting, constipation or diarrhea.  No acid reflux, abdominal pain or blood in stools.  Kidney/Bladdr: No polyuria, polydipsia, nocturia or hematuria.  Genital/Sexual: No loss of libido  Skin: No rash, hair loss or hirsutism.  No abnormal striae  Muscles/Joints/Bones: No morning stiffness, muscle aches and pain or  "loss of height.    Current Medications:  Current Outpatient Prescriptions   Medication Sig     albuterol (PROAIR HFA;PROVENTIL HFA;VENTOLIN HFA) 90 mcg/actuation inhaler Inhale 2 puffs every 6 (six) hours as needed for wheezing.     albuterol (PROVENTIL) 2.5 mg /3 mL (0.083 %) nebulizer solution Take 3 mL (2.5 mg total) by nebulization 4 (four) times a day as needed for wheezing or shortness of breath.     alum/mag hydrox-simethicone-diphenhydramine-lidocaine (MAGIC MOUTHWASH) suspension Swish and swallow 5mL 3-4 times daily as needed. Standard prep: Diphenhydramine+Sucralfate+Mylanta     BD INSULIN PEN NEEDLE UF MINI 31 gauge x 3/16\" Ndle      BD TUBERCULIN SYRINGE 1 mL 27 x 1/2\" Syrg USE TO DISPENSE HEPARIN     biotin 2,500 mcg cap Take 2,500 mcg by mouth every evening.      cholecalciferol, vitamin D3, 1,000 unit tablet Take 2 Units by mouth daily.      FERROUS SULFATE (SLOW FE ORAL) Take 65 mg by mouth daily.     furosemide (LASIX) 40 MG tablet Take 1 tablet (40 mg total) by mouth daily.     ibuprofen (ADVIL,MOTRIN) 200 MG tablet Take 2 tablets (400 mg total) by mouth every 6 (six) hours as needed for pain.     ipratropium-albuterol (DUO-NEB) 0.5-2.5 mg/3 mL nebulizer Inhale 3 mL 4 (four) times a day as needed.     levothyroxine (SYNTHROID, LEVOTHROID) 150 MCG tablet TAKE 1 TABLET (150 MCG TOTAL) BY MOUTH DAILY.     LORazepam (ATIVAN) 1 MG tablet Take 1 mg by mouth every evening.     naloxone (NARCAN) 4 mg/actuation nasal spray 1 spray (4 mg dose) into one nostril for opioid reversal. Call 911. May repeat if no response in 3 minutes.     nystatin (MYCOSTATIN) powder Apply 1 application topically 3 (three) times a day as needed.     oxyCODONE (ROXICODONE) 30 MG immediate release tablet Take 1 tablet (30 mg total) by mouth every 4 (four) hours as needed for pain.     OXYCONTIN 40 mg 12 hr tablet Take 1 tablet (40 mg total) by mouth every morning.     predniSONE (DELTASONE) 2.5 MG tablet Take 7.5 mg/d prednisone PO " for 1 month,     senna-docusate (SENNOSIDES-DOCUSATE SODIUM) 8.6-50 mg tablet Take 1 tablet by mouth 2 (two) times a day.     warfarin (COUMADIN) 5 MG tablet Take 1 to 1.5 tablets (5 to 7.5 mg) by mouth daily. Current dose 7.5mg Tue/Thur and 5mg ROW. Adjust dose based on INR results as directed.     hydroxychloroquine (PLAQUENIL) 200 mg tablet Take 1 tablet (200 mg total) by mouth daily.       Patients Active Problems:  Patient Active Problem List   Diagnosis     Hypertension     Osteoporosis     Anemia     Generalized anxiety disorder     Antiphospholipid Antibody Syndrome     Pulmonary emboli     Peripheral Neuropathy     Systemic lupus erythematosus     GERD (gastroesophageal reflux disease)     Opiate dependence     Gastroparesis     Coagulopathy     ILD (interstitial lung disease)     Restrictive lung disease due to kyphoscoliosis     Adhesive pericarditis     Regional enteritis     Deep phlebothrombosis, antepartum, with delivery     Hyperthyroidism     Acute respiratory failure     History of DVT (deep vein thrombosis)     Abdominal pain     Intractable nausea and vomiting     Chronic pain     Hypercarbia     ALFONSO (acute kidney injury)     Encephalopathy acute     Snoring     Bilateral shoulder tendinopathy     Polyarthralgia     Trigger ring finger, right     Chronic diastolic heart failure     Pneumonia     Abnormal CXR     Cardiomyopathy     Chronic respiratory failure with hypercapnia     Vomiting       History:   reports that she quit smoking about 8 years ago. Her smoking use included Cigarettes. She has a 5.00 pack-year smoking history. She has never used smokeless tobacco. She reports that she uses illicit drugs, including Oxycodone. She reports that she does not drink alcohol.   reports that she quit smoking about 8 years ago. Her smoking use included Cigarettes. She has a 5.00 pack-year smoking history. She has never used smokeless tobacco. She reports that she uses illicit drugs, including  Oxycodone. She reports that she does not drink alcohol.  History   Smoking Status     Former Smoker     Packs/day: 0.50     Years: 10.00     Types: Cigarettes     Quit date: 7/29/2009   Smokeless Tobacco     Never Used      reports that she quit smoking about 8 years ago. Her smoking use included Cigarettes. She has a 5.00 pack-year smoking history. She has never used smokeless tobacco. She reports that she uses illicit drugs, including Oxycodone. She reports that she does not drink alcohol.  History   Sexual Activity     Sexual activity: Yes     Partners: Male     Comment: Christina, in 2016     Past Medical History:   Diagnosis Date     Antiphospholipid antibody with hypercoagulable state     Secondary to lupus     Cervical compression fracture 2009    closed, with spinal cord injury C1-C4 - prednisone induced     Chronic hypercapnic respiratory failure 12/22/2014     Chronic pain     on opiates     Congestive heart failure     Diastolic, Cor Pulmonale, EF 60% 2014     Depression      Gastroparesis     causing nausea/vomiting with recurrent admissions     GERD (gastroesophageal reflux disease)      Hypertension      Kidney infection      Kidney stone      Lupus (systemic lupus erythematosus)     complicated by joint and pulmonary involvement     Obesity      MERLIN (obstructive sleep apnea) 1/30/2015    On home bipap     Osteoporosis      Peripheral neuropathy      Recurrent Clostridium difficile diarrhea      Seizures     once due to high BP, hypertensive encephalopathy     Family History   Problem Relation Age of Onset     Asthma Mother      Hypertension Mother      Stroke Mother      Clotting disorder Mother      Hypertension Father      Stroke Father      Urolithiasis Father      Stroke Maternal Aunt      Mental illness Sister      Stroke Sister      Clotting disorder Sister      Gout Neg Hx      Past Medical History:   Diagnosis Date     Antiphospholipid antibody with hypercoagulable state     Secondary to  "lupus     Cervical compression fracture 2009    closed, with spinal cord injury C1-C4 - prednisone induced     Chronic hypercapnic respiratory failure 12/22/2014     Chronic pain     on opiates     Congestive heart failure     Diastolic, Cor Pulmonale, EF 60% 2014     Depression      Gastroparesis     causing nausea/vomiting with recurrent admissions     GERD (gastroesophageal reflux disease)      Hypertension      Kidney infection      Kidney stone      Lupus (systemic lupus erythematosus)     complicated by joint and pulmonary involvement     Obesity      MERLIN (obstructive sleep apnea) 1/30/2015    On home bipap     Osteoporosis      Peripheral neuropathy      Recurrent Clostridium difficile diarrhea      Seizures     once due to high BP, hypertensive encephalopathy     Past Surgical History:   Procedure Laterality Date     PICC AND MIDLINE TEAM LINE INSERTION  9/27/2015          CA BIOPSY LUNG/MEDIASTINUM PERCUTANEOUS NEEDLE      Description: Biopsy Lung Percutaneous;  Recorded: 09/15/2011;     CA LAP,CHOLECYSTECTOMY      Description: Cholecystectomy Laparoscopic;  Recorded: 02/15/2008;     THORACOSCOPY Left 1/13/2015    Procedure: LEFT THORACOSCOPY CONVERTED TO THORACOTOMY;  Surgeon: Jose David Granados MD;  Location: [facility];  Service:      THORACOTOMY Left 1/13/2015    Procedure: THORACOTOMY W/ WEDGE RESECTION;  Surgeon: Jose David Granados MD;  Location: [facility];  Service:        Vitals   height is 4' 10\" (1.473 m) and weight is 201 lb 1.6 oz (91.2 kg). Her blood pressure is 148/88.         Exam  General appearance: The patient looked well, not in acute distress.  Eyes: no evidence of thyroid eye disease.   Retinal exam: No evidence of diabetic retinopathy.  Mouth and Throat: Normal  Neck: No evidence of thyromegaly, enlarged lymph node or tenderness  Chest: Trachea is central. Chest is clear to auscultation and percussion. Breat sounds are normal.  Cardiovascular exam: JVP is not raised. " Heart sounds are normal, no murmurs or rub  Peripheral pulses are palpable.   Abdomen: No masses or tenderness.    Back: No vertebral tenderness or kyphosis.  Extremities: No evidence of leg edema.   Skin: Normal to touch.  No abnormal striae  Neurologic exam:  Visual fields are intact by confrontation, grossly intact. No evidence of peripheral neuropathy.  Detailed foot exam normal.        Diagnosis:  No diagnosis found.    Orders:   No orders of the defined types were placed in this encounter.        Assessment and Plan: Osteoporosis I discussed the pathophysiology of the disease with the patient she had a bone DEXA scan showed T score at the spine is -1.7 with improvement of 11.6% this is artifact.    The left hip is -2.6 she lost 6.3% the right hip -2.8 kg 5.1% the radius is -0.2 and is stable.  I did advise the patient to take Prolia 60 mg subcutaneously every 6 month.    We will continue on the calcium and vitamin D supplements.    Blood pressure 148/88    Hypothyroidism on Synthroid 150 mcg daily she has reasonable energy level will continue monitoring.    Patient calcium 9.3 parathyroid hormone is 38 phosphorus 2.1 urinary calcium is 124 TSH 0.94 creatinine 0.59.    Patient return to clinic in 1 year.    I have reviewed and ordered clinical lab test    I have reviewed and ordered radiology tests.    I have reviewed and ordered her medication as required.    I have reviewed her test results and advised with the performing physician.    I have reviewed the patient's old records.    I have reviewed and summarized the patient old records.    I did spend 60 minutes with the patient more than 50% was spent on counseling and managing her care.

## 2021-06-17 NOTE — PROGRESS NOTES
ASSESSMENT AND PLAN:  Catherine Sharp 42 y.o. female is here for follow-up.  She has systemic lupus erythematosus.  She has worsening of her joint symptoms.  I have asked her to increase prednisone to 7-1/2 mg for 30 days and then go back to 5 mg her baseline.  She is on hydroxychloroquine 200 mg daily.  She is following up with the ophthalmologist for additional issues of the eyes there.  She has multiple comorbidities including chronic pain syndrome, rotator cuff tendinopathy, interstitial lung disease on supplemental oxygen, history of DVT, antiphospholipid syndrome, chronic anticoagulation.  I will ask her to return for follow-up here in 2 months.         Diagnoses and all orders for this visit:    Systemic lupus erythematosus  -     predniSONE (DELTASONE) 2.5 MG tablet; Take 7.5 mg/d prednisone PO for 1 month,  Dispense: 90 tablet; Refill: 0    Restrictive lung disease due to kyphoscoliosis    Adhesive pericarditis    HISTORY OF PRESENTING ILLNESS:  Catherine Sharp, 42 y.o., female is here for follow-up.  She has SLE.  She noted that her joint symptoms have been more bothersome.  She recently had a respiratory tract infection and is beginning to recover although still have stuffiness such as in the ears.  She noted pain level of 6.5/10.  Joints in the hands especially the MCPs with tender and swollen especially on the right side.  She has noted myalgias, she has bilateral hip area pain.  She has low back pain which is been there chronically.  She has noted stiffness in the morning lasting 2 hours.  She has not had fever weight loss she has blurry vision.  She was seen in the ophthalmologist.  It sounds like there may be concerned vis-à-vis posterior, if she has hemorrhage there, she is going to follow the rectal surgeons.  There is a recurrence of mouth ulcer recently.  There is nausea.  She has had cough.  There is no new rash no malar area eruption.    She had been on azathioprine 50 mg twice daily and  "hydroxychloroquine but d/c ed by her hospital MD a few months ago for unclear reasons. At one point, she recalls a question of if she may have \"blood\" into her lungs because of the lupus. She underwent treatment with the high-dose prednisone. She subsequently developed osteoporosis. Currently, she is on 10 mg of prednisone. In the past, she has seen Dr. Leonardo and more recently Dr. Mathews. She has experienced chronic pain syndrome and has been on narcotics. Further historical information and ADL limitations as noted in the multidimensional health assessment questionnaire attached in the EMR.        Following is the excerpt from a recent note: She has chronic pain syndrome.  She has chronic shoulder pain worse on the right side.  She follows up the pain clinic.  She is on hydroxychloroquine 200 mg twice daily.  Discussed this with her.  I have asked her to reduce the dose.  She has reported no fever or weight loss she has not had mouth ulcers eye redness she has had nausea there is no cough.  She has generalized stiffness for 3 hours in the morning.  She has she tried to cut back prednisone down to 4 mg for 5 she had much worsening of her overall achiness.  She is back to 5 mg.  longstanding history of SLE. She says she was age 15, the when her joints began to hurt in her hands, knees, elbows, and wrists. In fact, she recalls the whole body was \"painful\" and stiff in the morning up to two hours. She was seen in rheumatology, diagnosis of lupus was arrived. At that point she had no facial eruption or other skin lesions. It was over the subsequent time that she has developed a facial malar area eruption with or without exposure to sunlight. She is not reporting mouth ulcers; however  she has had a DVT, which she experienced six or eight years ago and has been on Coumadin since. She reports occasional chest pain and palpitations, which  is not clear if she has definite pleuritic component. She has a residual pain in " her digits, wrists, elbows, and knees  ALLERGIES:Hydrocodone-acetaminophen; Protonix [pantoprazole]; Codeine; Fentanyl; Morphine; and Morphine hcl    PAST MEDICAL/ACTIVE PROBLEMS/MEDICATION/ FAMILY HISTORY/SOCIAL DATA:  The patient has a family history of  Past Medical History:   Diagnosis Date     Antiphospholipid antibody with hypercoagulable state     Secondary to lupus     Cervical compression fracture 2009    closed, with spinal cord injury C1-C4 - prednisone induced     Chronic hypercapnic respiratory failure 12/22/2014     Chronic pain     on opiates     Congestive heart failure     Diastolic, Cor Pulmonale, EF 60% 2014     Depression      Gastroparesis     causing nausea/vomiting with recurrent admissions     GERD (gastroesophageal reflux disease)      Hypertension      Kidney infection      Kidney stone      Lupus (systemic lupus erythematosus)     complicated by joint and pulmonary involvement     Obesity      MERLIN (obstructive sleep apnea) 1/30/2015    On home bipap     Osteoporosis      Peripheral neuropathy      Recurrent Clostridium difficile diarrhea      Seizures     once due to high BP, hypertensive encephalopathy     History   Smoking Status     Former Smoker     Packs/day: 0.50     Years: 10.00     Types: Cigarettes     Quit date: 7/29/2009   Smokeless Tobacco     Never Used     Patient Active Problem List   Diagnosis     Hypertension     Osteoporosis     Anemia     Generalized anxiety disorder     Antiphospholipid Antibody Syndrome     Pulmonary emboli     Peripheral Neuropathy     Systemic lupus erythematosus     GERD (gastroesophageal reflux disease)     Opiate dependence     Gastroparesis     Coagulopathy     ILD (interstitial lung disease)     Restrictive lung disease due to kyphoscoliosis     Adhesive pericarditis     Regional enteritis     Deep phlebothrombosis, antepartum, with delivery     Hyperthyroidism     Acute respiratory failure     History of DVT (deep vein thrombosis)     Abdominal  "pain     Intractable nausea and vomiting     Chronic pain     Hypercarbia     ALFONSO (acute kidney injury)     Encephalopathy acute     Snoring     Bilateral shoulder tendinopathy     Polyarthralgia     Trigger ring finger, right     Chronic diastolic heart failure     Pneumonia     Abnormal CXR     Cardiomyopathy     Chronic respiratory failure with hypercapnia     Vomiting     Current Outpatient Prescriptions   Medication Sig Dispense Refill     albuterol (PROAIR HFA;PROVENTIL HFA;VENTOLIN HFA) 90 mcg/actuation inhaler Inhale 2 puffs every 6 (six) hours as needed for wheezing. 1 Inhaler 6     albuterol (PROVENTIL) 2.5 mg /3 mL (0.083 %) nebulizer solution Take 3 mL (2.5 mg total) by nebulization 4 (four) times a day as needed for wheezing or shortness of breath. 75 mL 11     alum/mag hydrox-simethicone-diphenhydramine-lidocaine (MAGIC MOUTHWASH) suspension Swish and swallow 5mL 3-4 times daily as needed. Standard prep: Diphenhydramine+Sucralfate+Mylanta 120 mL 1     BD INSULIN PEN NEEDLE UF MINI 31 gauge x 3/16\" Ndle        BD TUBERCULIN SYRINGE 1 mL 27 x 1/2\" Syrg USE TO DISPENSE HEPARIN  1     biotin 2,500 mcg cap Take 2,500 mcg by mouth every evening.        cholecalciferol, vitamin D3, 1,000 unit tablet Take 1,000 Units by mouth daily.       FERROUS SULFATE (SLOW FE ORAL) Take 65 mg by mouth daily.       furosemide (LASIX) 40 MG tablet Take 1 tablet (40 mg total) by mouth daily. 60 tablet 11     HEPARIN SODIUM,PORCINE (HEPARIN, PORCINE,) 20,000 unit/mL injection Inject 0.7 ml (14,000 units) SQ every 8 hours 13 mL 1     ibuprofen (ADVIL,MOTRIN) 200 MG tablet Take 2 tablets (400 mg total) by mouth every 6 (six) hours as needed for pain. 20 tablet 0     ipratropium-albuterol (DUO-NEB) 0.5-2.5 mg/3 mL nebulizer Inhale 3 mL 4 (four) times a day as needed.       levothyroxine (SYNTHROID, LEVOTHROID) 150 MCG tablet TAKE 1 TABLET (150 MCG TOTAL) BY MOUTH DAILY. 90 tablet 2     LORazepam (ATIVAN) 1 MG tablet Take 1 mg by " mouth every evening.       LORazepam (ATIVAN) 1 MG tablet Take 1 tablet (1 mg total) by mouth daily. 30 tablet 0     naloxone (NARCAN) 4 mg/actuation nasal spray 1 spray (4 mg dose) into one nostril for opioid reversal. Call 911. May repeat if no response in 3 minutes. 1 Box 0     nystatin (MYCOSTATIN) powder Apply 1 application topically 3 (three) times a day as needed.       [START ON 5/4/2018] oxyCODONE (ROXICODONE) 30 MG immediate release tablet Take 1 tablet (30 mg total) by mouth every 4 (four) hours as needed for pain. 180 tablet 0     [START ON 5/4/2018] OXYCONTIN 40 mg 12 hr tablet Take 1 tablet (40 mg total) by mouth every morning. 30 tablet 0     predniSONE (DELTASONE) 5 MG tablet Take 1 tablet (5 mg total) by mouth daily. 30 tablet 0     senna-docusate (SENNOSIDES-DOCUSATE SODIUM) 8.6-50 mg tablet Take 1 tablet by mouth 2 (two) times a day.  0     warfarin (COUMADIN) 5 MG tablet Take 1 to 1.5 tablets (5 to 7.5 mg) by mouth daily. Current dose 7.5mg Tue/Thur and 5mg ROW. Adjust dose based on INR results as directed. 45 tablet 11     hydroxychloroquine (PLAQUENIL) 200 mg tablet Take 1 tablet (200 mg total) by mouth daily. 90 tablet 0     No current facility-administered medications for this visit.        DETAILED EXAMINATION  04/27/18  :  Vitals:    04/27/18 1016   BP: 136/90   Pulse: 72   Weight: 196 lb (88.9 kg)     Alert oriented. Head including the face is examined for malar rash, heliotropes, scarring, lupus pernio. Eyes examined for redness such as in episcleritis/scleritis, periorbital lesions.   Neck/ Face examined for parotid gland swelling, range of motion of neck.  Left upper and lower and right upper and lower extremities examined for tenderness, swelling, warmth of the appendicular joints, range of motion, edema, rash.  Some of the important findings included: She has tenderness and swelling in her MCP such as #3 on the left #4 on the right side scattered PIP tenderness.  Impingement of both  her shoulders.  There is no malar area eruption.  She carries the oxygen cylinder with her.  No pleuropericardial rubs.  LAB / IMAGING DATA:  ALT   Date Value Ref Range Status   09/20/2017 42 0 - 45 U/L Final   04/13/2017 15 0 - 45 U/L Final   01/09/2017 25 0 - 45 U/L Final     Albumin   Date Value Ref Range Status   09/27/2017 3.3 (L) 3.5 - 5.0 g/dL Final   09/20/2017 3.4 (L) 3.5 - 5.0 g/dL Final   06/29/2017 3.4 (L) 3.5 - 5.0 g/dL Final     Creatinine   Date Value Ref Range Status   01/05/2018 0.59 (L) 0.60 - 1.10 mg/dL Final   09/27/2017 0.64 0.60 - 1.10 mg/dL Final   09/27/2017 0.65 0.60 - 1.10 mg/dL Final       WBC   Date Value Ref Range Status   01/05/2018 8.6 4.0 - 11.0 thou/uL Final   09/20/2017 12.6 (H) 4.0 - 11.0 thou/uL Final   09/27/2015 6.9 4.0 - 11.0 thou/uL Final   08/23/2015 5.8 4.0 - 11.0 thou/uL Final     Hemoglobin   Date Value Ref Range Status   01/05/2018 15.1 12.0 - 16.0 g/dL Final   09/20/2017 15.5 12.0 - 16.0 g/dL Final   07/09/2017 13.5 12.0 - 16.0 g/dL Final     Platelets   Date Value Ref Range Status   01/05/2018 191 140 - 440 thou/uL Final   09/20/2017 178 140 - 440 thou/uL Final   07/09/2017 175 140 - 440 thou/uL Final       Lab Results   Component Value Date    RF <15.0 07/10/2014    SEDRATE 42 (H) 01/31/2018

## 2021-06-18 NOTE — LETTER
Letter by Binh Horvath MD at      Author: Binh Horvath MD Service: -- Author Type: --    Filed:  Encounter Date: 1/4/2019 Status: (Other)       Delmis Ma MD  [address]  [facility] [address]  [facility] [address]                                  January 6, 2019    Patient: Catherine Sharp   MR Number: [id number]   YOB: 1975   Date of Visit: 1/4/2019     Dear Thomas Gaona:    I saw Catherine Sharp on the above date at the [facility]. Below are the relevant portions of my assessment and plan of care.    If you have questions, please do not hesitate to call me. I look forward to following Catherine along with you.    Sincerely,        Binh Horvath MD            MD Alexandru Truong Andrew P, MD  1/6/2019  9:13 PM  Sign at close encounter  Pulmonary Clinic Follow-up Visit    Assessment/Plan  43 year old female  former smoker with a history of SLE on hydroxychloroquine and chronic low-dose prednisone, antiphospholipid antibody syndrome on chronic anticoagulation, obesity, fibrotic ILD after an episode of ARDS on chronic oxygen, pulmonary HTN, HFpEF,  possible OHS, presenting for follow-up.    Chronic hypoxemic/hypercapnic respiratory failure, pulmonary HTN: Improved after recent hospitalization, which was primarily for likely viral gastroenteritis but also with ADHF and possible exacerbation of underlying respiratory disease. Recent worsening cough with green phlegm and occasional small-volume hemoptysis. Exertion tolerance chronically limited by dyspnea but fluctuates. She had PSG in 2016 showing no significant MERLIN, with AHI <5, so MERLIN/OHS unlikely, and she has actually lost weight since then; could consider PSG with continuous capnography, which was not previously done, though the yield may be low given her recent PSG and actually has lost about 20 lbs since the prior PSG.    Plan:  - scheduled to be seen in the pulmonary HTN clinic at the [facility] on January 22  -  continue as-needed nebulized ipratropium-albuterol, and will prescribe a new nebulizer as her current machine is old and in need of replacement  - could consider repeat polysomnography with continuous CO2 monitoring (had PSG in 2016 with AHI <5 but no continuoue capnography)  - prednisone 12-day taper then back to baseline prednisone dose and Z-shashi given increased sputum with color change and small-volume hemoptysis intermittently  - continue oxygen 4 L/min at rest and at night, 6 L/min with exertion  - received influenza vaccine for this season  - up to date on pneumococcal vaccination  - follow up in 6 months or sooner if needed  - encouraged her to call any time with questions or concerning symptoms     I appreciate the opportunity to participate in the care of Ms. Sharp.  Please call any time if needed.    CCx: chronic hypoxemic/hypercapnic respiratory failure, pulmonary HTN    HPI: 43 year old female  former smoker with a history of SLE on hydroxychloroquine and chronic low-dose prednisone, antiphospholipid antibody syndrome on chronic anticoagulation, obesity, fibrotic ILD after an episode of ARDS on chronic oxygen, pulmonary HTN, HFpEF,  possible OHS, presenting for follow-up. Improved after recent hospitalization, which was primarily for likely viral gastroenteritis but also with ADHF and possible exacerbation of underlying respiratory disease. Recent worsening cough with green phlegm and occasional small-volume hemoptysis. Exertion tolerance chronically limited by dyspnea but fluctuates. She had PSG in 2016 showing no significant MERLIN, with AHI <5, so MERLIN/OHS unlikely, and she has actually lost weight since then; could consider PSG with continuous capnography, which was not previously done, though the yield may be low given her recent PSG and actually has lost about 20 lbs since the prior PSG.    ROS:  A 12-system review was obtained and was negative with the exception of the symptoms endorsed in the history  of present illness.    PMH:  Past Medical History:   Diagnosis Date   ? Antiphospholipid antibody with hypercoagulable state (H)     Secondary to lupus   ? Cervical compression fracture (H) 2009    closed, with spinal cord injury C1-C4 - prednisone induced   ? Chronic pain     on opiates   ? Chronic respiratory failure with hypoxia and hypercapnia (H)    ? Congestive heart failure (H)     Diastolic, Cor Pulmonale, EF 60% 2014   ? Depression    ? Gastroparesis     causing nausea/vomiting with recurrent admissions   ? GERD (gastroesophageal reflux disease)    ? Hypertension    ? Kidney infection    ? Kidney stone    ? Lupus (systemic lupus erythematosus) (H)     complicated by joint and pulmonary involvement   ? Obesity    ? Osteoporosis    ? Peripheral neuropathy    ? Pulmonary arterial hypertension (H)     PASP 50-55 mmHg estimated echocardiographically   ? Recurrent Clostridium difficile diarrhea    ? Seizures (H)     once due to high BP, hypertensive encephalopathy       PSH:  Past Surgical History:   Procedure Laterality Date   ? PICC AND MIDLINE TEAM LINE INSERTION  9/27/2015        ? TN BIOPSY LUNG/MEDIASTINUM PERCUTANEOUS NEEDLE      Description: Biopsy Lung Percutaneous;  Recorded: 09/15/2011;   ? TN LAP,CHOLECYSTECTOMY      Description: Cholecystectomy Laparoscopic;  Recorded: 02/15/2008;   ? THORACOSCOPY Left 1/13/2015    Procedure: LEFT THORACOSCOPY CONVERTED TO THORACOTOMY;  Surgeon: Jose David Granados MD;  Location: [facility];  Service:    ? THORACOTOMY Left 1/13/2015    Procedure: THORACOTOMY W/ WEDGE RESECTION;  Surgeon: Jose David Granados MD;  Location: [facility];  Service:        Allergies:  Allergies   Allergen Reactions   ? Hydrocodone-Acetaminophen Swelling     Throat swelling (tolerates oxycodone)  - Acetaminophen causes vomiting per patient/RN, 12/2014 tolerated hydromorphone with hospitalization   ? Protonix [Pantoprazole] Hives     Per pt  Per pt   ? Codeine Nausea Only     abd  pain     ? Fentanyl Nausea And Vomiting and Unknown       Tolerated drip 9/28/15- pt states just nausea at times    ? Morphine Hives, Nausea And Vomiting and Other (See Comments)     Pt states nausea   ? Morphine Hcl Itching and Nausea And Vomiting     Pt states she has tolerated in past- just nausea and vomiting at times       Family HX:  Family History   Problem Relation Age of Onset   ? Asthma Mother    ? Hypertension Mother    ? Stroke Mother    ? Clotting disorder Mother    ? Cancer Mother    ? Hypertension Father    ? Stroke Father    ? Urolithiasis Father    ? Stroke Maternal Aunt    ? Mental illness Sister    ? Stroke Sister    ? Clotting disorder Sister    ? Gout Neg Hx        Social Hx:  Social History     Socioeconomic History   ? Marital status:      Spouse name: Not on file   ? Number of children: Not on file   ? Years of education: Not on file   ? Highest education level: Not on file   Social Needs   ? Financial resource strain: Not on file   ? Food insecurity - worry: Not on file   ? Food insecurity - inability: Not on file   ? Transportation needs - medical: Not on file   ? Transportation needs - non-medical: Not on file   Occupational History   ? Occupation: Unemployed   Tobacco Use   ? Smoking status: Former Smoker     Packs/day: 0.50     Years: 10.00     Pack years: 5.00     Types: Cigarettes     Last attempt to quit: 2009     Years since quittin.4   ? Smokeless tobacco: Never Used   Substance and Sexual Activity   ? Alcohol use: No   ? Drug use: Yes     Types: Oxycodone   ? Sexual activity: Not on file   Other Topics Concern   ? Not on file   Social History Narrative    Lives with family. Lives in [place].       Current Meds:  Current Outpatient Medications   Medication Sig Dispense Refill   ? albuterol (PROAIR HFA;PROVENTIL HFA;VENTOLIN HFA) 90 mcg/actuation inhaler Inhale 2 puffs every 6 (six) hours as needed for wheezing. 1 Inhaler 6   ? albuterol (PROVENTIL) 2.5 mg /3 mL  (0.083 %) nebulizer solution Take 3 mL (2.5 mg total) by nebulization 4 (four) times a day as needed for wheezing or shortness of breath. 75 mL 11   ? azithromycin (ZITHROMAX) 250 MG tablet Take 500 mg (2 x 250 mg tablets) on day 1 followed by 250 mg (1 tablet) on days 2-5. 6 tablet 0   ? cholecalciferol, vitamin D3, 1,000 unit tablet Take 1,000 Units by mouth daily.      ? FERROUS SULFATE (SLOW FE ORAL) Take 65 mg by mouth daily.     ? furosemide (LASIX) 20 MG tablet Take 1 tablet (20 mg total) by mouth 2 (two) times a day at 9am and 6pm. 60 tablet 2   ? hydroxychloroquine (PLAQUENIL) 200 mg tablet Take by mouth daily.     ? ipratropium-albuterol (DUO-NEB) 0.5-2.5 mg/3 mL nebulizer Inhale 3 mL 4 (four) times a day as needed.     ? levothyroxine (SYNTHROID, LEVOTHROID) 150 MCG tablet Take 1 tablet (150 mcg total) by mouth daily. 90 tablet 3   ? LORazepam (ATIVAN) 1 MG tablet Take 1 mg by mouth every evening.     ? LORazepam (ATIVAN) 1 MG tablet TAKE 1 TABLET (1 MG TOTAL) BY MOUTH EVERY EVENING. 30 tablet 0   ? naloxone (NARCAN) 4 mg/actuation nasal spray 1 spray (4 mg dose) into one nostril for opioid reversal. Call 911. May repeat if no response in 3 minutes. 1 Box 0   ? nystatin (MYCOSTATIN) powder Apply 1 application topically 3 (three) times a day as needed.     ? oxyCODONE (ROXICODONE) 30 MG immediate release tablet Take 1 tablet (30 mg total) by mouth every 4 (four) hours as needed for pain. 180 tablet 0   ? OXYCONTIN 40 mg 12 hr tablet Take 1 tablet (40 mg total) by mouth every morning. 30 tablet 0   ? predniSONE (DELTASONE) 1 MG tablet Take 5 mg by mouth daily.     ? predniSONE (DELTASONE) 1 MG tablet Take 5 mg by mouth daily. 450 tablet 0   ? predniSONE (DELTASONE) 10 mg tablet Take 4 tabs daily x 3 days, then 3 tabs daily x 3 days, then 2 tabs daily x 3 days, then 1 tab daily x 3 days.. 30 tablet 0   ? senna-docusate (SENNOSIDES-DOCUSATE SODIUM) 8.6-50 mg tablet Take 1 tablet by mouth 2 (two) times a day.   "0   ? warfarin (COUMADIN) 5 MG tablet Take 1 to 1.5 tablets (5 to 7.5 mg) by mouth daily. Current dose 7.5mg Thur and 5mg ROW. Adjust dose based on INR results as directed. Skip tonight 45 tablet 11     Current Facility-Administered Medications   Medication Dose Route Frequency Provider Last Rate Last Dose   ? denosumab 60 mg (PROLIA 60 mg/ml)  60 mg Subcutaneous Q6 Months Anirudh Alcaraz MD   60 mg at 07/24/18 1143       Physical Exam:  /80   Pulse 98   Ht 4' 10\" (1.473 m)   Wt 197 lb (89.4 kg)   SpO2 91% Comment: 5 lpm  Breastfeeding? No   BMI 41.17 kg/m     Gen: alert, oriented, no distress  HEENT: nasal turbinates are unremarkable, no oropharyngeal lesions, no cervical or supraclavicular lymphadenopathy  CV: tachy, regular, no M/G/R  Resp: CTAB, no focal crackles or wheezes  Abd: soft, nontender, no palpable organomegaly  Skin: no apparent rashes  Ext: no cyanosis, clubbing or edema  Neuro: alert, nonfocal    Labs:  reviewed    Imaging studies:  Chest CTA (10/28/18):  - images directly reviewed, formal interpretation follows:  FINDINGS:  ANGIOGRAM CHEST: Smaller pulmonary artery branches obscured from patient motion artifact and body habitus. Prominent central pulmonary arteries consistent with pulmonary artery hypertension. No pulmonary emboli identified. No aortic aneurysm or   dissection. Atherosclerotic plaque including coronary artery calcification.     RV/LV RATIO: N/A     LUNGS AND PLEURA: Prominent areas of fibroatelectasis, groundglass opacity with more focal consolidation in left upper lobe and left lower lobe. Diffuse air trapping often associated with small airways disease or small vessel disease. Old chronic   calcified noncalcified plaque in the right lung base. Postoperative changes right chest.     MEDIASTINUM: Mildly prominent right hilar lymph nodes nonspecific. No other adenopathy.     LIMITED UPPER ABDOMEN: See separate report.     MUSCULOSKELETAL: Degenerative disease. " Multiple thoracic and upper lumbar compression fractures.     IMPRESSION:   CONCLUSION:  1.  Extensive areas of groundglass opacity, airspace disease, fibroatelectasis and consolidation overall not significantly changed from prior either recurrent or chronic. Air trapping most often associated with small airways or small vessel disease.   Other lung findings stable. Similar mildly prominent right hilar adenopathy.  2.  Evidence for pulmonary artery hypertension. No pulmonary embolus, aortic aneurysm or dissection.    TTE (10/29/18):  1. Normal left ventricular size and systolic performance with a visually estimated ejection fraction of 55%.    2. There is a small area of moderate distal anterior/apical hypokinesis.  3. There is mild sclerosis of the aortic valve with a small area of calcification (unchanged from previous).     No significant aortic stenosis or aortic insufficiency is detected on this study.  4. On selected views, the right ventricle appears mildly enlarged with mildly reduced right ventricular systolic function (right ventricle the right ventricle is not well visualized on some views).  5. There is mild left atrial enlargement.   6. Right ventricular systolic pressure relative to right atrial pressure is moderately increased.  The pulmonary artery pressure is estimated to be 50-55 mmHg plus right atrial pressure (the IVC is poorly visualized on this study).    PSG (February 2016):  Procedure:    Complete PSG with a digital sleep system using the international 10-20 electrode placement for recording EEG, EOG, EMG from chin, ECG, respiratory effort, oximetry, body position, airflow, nasal pressure, snoring sound, pulse rate, and EMG from lower extremities.     Primary Findings:     1. The patient presented to lab and reported taking one tablet of zolpidem prior to arrival.  2. The patient was using oxygen supplementation at 6 L/min on arrival. The sleep technician attempted to decrease oxygen  supplementation to establish a test baseline but the patient was very nervous about this an only agreed to initially lowering the oxygen to 5 L/min. Oxygen was lowered even further to 3 L/min while the patient was asleep in order to avoid masking obstructive respiratory events.   3. Respiratory monitoring showed intermittent snoring but overall no significant obstructive sleep apnea/hypopnea sufficient to disturb sleep (AHI=3.5).     Other Findings:  Sleep Architecture:     Sleep onset latency was normal.    REM onset latency was decreased.    All stages of sleep were sampled during the test.     Respiration:    Mean hemoglobin oxygen saturation (SaO2) during the diagnostic portion of the PSG in NREM and REM sleep was 91% with a SaO2 desaturation salma observed to 55%.     Movements:    Clinically significant periodic leg movements were not observed.    The patient had a Periodic Limb Movement (PLM) index of 0.0 and the BISI PLM index was 0.0.     Parasomnia:    No activity consistent with parasomnia was observed in the video recording.     Electrocardiogram:    Two-lead ECG showed normal sinus rhythm with PVCs.     Electroencephalogram:    We used a limited-montage EEG with F3, F4, C3, C4, O1, O2 and contra-lateral references to M1 and M2 that was reviewed using a 30-second EPOCH.     There was evidence of alpha frequency intrusion into N2 and N3 sleep. The clinical relevance of this finding remains unclear but it has been observed in association with non-restorative sleep.        Diagnosis:    R06.83 Primary Snoring    R09.02 Hypoxemia     Assessment & Recommendation:        This test did not show significant obstructive sleep apnea.    The patient had significant hypoxemia and required oxygen supplementation during the test. A dose of 4-5 L/min seemed sufficient to keep oxygen saturation above 88-90%.    Measures aimed at reducing upper airway resistance are recommended for the degree of sleep-disordered breathing  that was observed in this study. Options include: positional therapy to avoid sleep in the supine posture, ENT evaluation for surgery, and dental evaluation for an oral appliance.    Evaluation for hypoventilation can be considered if clinically indicated.    The patient presented to the sleep lab after taking a dose of zolpidem and this can be associated with higher risk and side effects from this medication. If a hypnotic is indicated, recommend using it when in bed or immediately prior to bedtime    PFT's (July 2014):  FEV1/FVC is 88% and is normal.  FEV1 is 1.30 L (51%) predicted and is reduced.  FVC is 1.47 L (48%) predicted and reduced.  There was no improvement in spirometry after a single inhaled dose of bronchodilator.  TLC is 2.47 L (59%) predicted and is reduced.  RV is 0.99 L (82%) predicted and is normal.  DLCO is 46% predicted and is reduced when it is corrected for hemoglobin.    Binh Horvath MD  Pulmonary and Critical Care Medicine  [facility]  Cell [phone]  Office [phone]  Pager [phone]

## 2021-06-18 NOTE — PROGRESS NOTES
Assessment/Plan:        Diagnoses and all orders for this visit:    ILD (interstitial lung disease)    Chronic respiratory failure with hypercapnia       42-year-old female with a history of lupus on low-dose prednisone and Plaquenil, history of interstitial lung disease related to previous ARDS now with bland persistent fibrosis, chronic hypoxic respiratory failure, here for follow-up.    Her oxygen requirement is out of proportion to her lung disease.  She has connective tissue disease.  There is suggestion of RV dysfunction and pulmonary hypertension on her echocardiogram.  Invasive assessment for pulmonary hypertension has been delayed in her efforts to wean her off of prednisone for her lung disease, fully evaluate her sleep, optimize her volume status and stabilize her chronic pain regimen.  During these efforts her functional status has improved to the point of not needing a walker and becoming more independent.    Overall she has stable, inactive lung disease and her oxygen requirement is out of proportion to this disease.  In addition she has lupus.  Because of these issues I will refer her to the [facility] for evaluation for pulmonary hypertension.  I am referring now because I would prefer that any invasive workup occur there.    Several years ago there are concerns about recurrent flares of lung disease and she was requiring frequent steroid pulses.  We ended up repeating her lung biopsy because of the significant comorbidities from steroids and her lung biopsy was planned.  Therefore we have decided and clinically it is seems that her lung disease is no longer active.  She has not received a steroid pulse in several years for her lungs and has had no issues.  Her primary issues for her breathing are related to her fluid status.    I will discuss her previous sleep study with 1 of our sleep doctors.        Subjective:    Patient ID: Catherine Sharp is a 42 y.o. female.    HPI Comments:  "42-year-old female with ILD, lupus, respiratory failure here for follow-up.    ILD: She has not had any pulmonary issues or flares since her last visit.  She has not been pulsed with prednisone for her lungs for >2 years.  Her breathing is a bit worse with humidity.    Chronic hypoxic respiratory failure: She is on 4-6 L/min of oxygen.  She can get down to 3 lpm when she is at rest.    Chronic diastolic heart failure: She is on chronic diuretics. She has no lower extremity edema.  Overall this is stable.    Lupus: Currently sees rheumatology.  On 5 mg of prednisone and Plaquenil.  Was doing great with flares but had one recently.      Review of Systems positive for activity change chills sweating fatigue sinus pressure mouth sores dyspnea wheezing nausea light sensitivity heat intolerance thirst joint pain joint swelling easy bruising headaches anxiousness and sleep disturbance.  The remainder of her 14 system review of systems was negative.  Current Outpatient Prescriptions on File Prior to Visit   Medication Sig Dispense Refill     albuterol (PROAIR HFA;PROVENTIL HFA;VENTOLIN HFA) 90 mcg/actuation inhaler Inhale 2 puffs every 6 (six) hours as needed for wheezing. 1 Inhaler 6     albuterol (PROVENTIL) 2.5 mg /3 mL (0.083 %) nebulizer solution Take 3 mL (2.5 mg total) by nebulization 4 (four) times a day as needed for wheezing or shortness of breath. 75 mL 11     alum/mag hydrox-simethicone-diphenhydramine-lidocaine (MAGIC MOUTHWASH) suspension Swish and swallow 5mL 3-4 times daily as needed. Standard prep: Diphenhydramine+Sucralfate+Mylanta 120 mL 1     BD TUBERCULIN SYRINGE 1 mL 27 x 1/2\" Syrg USE TO DISPENSE HEPARIN  1     biotin 2,500 mcg cap Take 2,500 mcg by mouth every evening.        cholecalciferol, vitamin D3, 1,000 unit tablet Take 2 Units by mouth daily.        FERROUS SULFATE (SLOW FE ORAL) Take 65 mg by mouth daily.       furosemide (LASIX) 40 MG tablet Take 1 tablet (40 mg total) by mouth daily. 60 " tablet 11     ipratropium-albuterol (DUO-NEB) 0.5-2.5 mg/3 mL nebulizer Inhale 3 mL 4 (four) times a day as needed.       levothyroxine (SYNTHROID, LEVOTHROID) 150 MCG tablet TAKE 1 TABLET (150 MCG TOTAL) BY MOUTH DAILY. 90 tablet 2     LORazepam (ATIVAN) 1 MG tablet Take 1 mg by mouth every evening.       naloxone (NARCAN) 4 mg/actuation nasal spray 1 spray (4 mg dose) into one nostril for opioid reversal. Call 911. May repeat if no response in 3 minutes. 1 Box 0     nystatin (MYCOSTATIN) powder Apply 1 application topically 3 (three) times a day as needed.       [START ON 7/1/2018] oxyCODONE (ROXICODONE) 30 MG immediate release tablet Take 1 tablet (30 mg total) by mouth every 4 (four) hours as needed for pain. 180 tablet 0     [START ON 7/1/2018] OXYCONTIN 40 mg 12 hr tablet Take 1 tablet (40 mg total) by mouth every morning. 30 tablet 0     predniSONE (DELTASONE) 5 MG tablet TAKE 1 TABLET (5 MG TOTAL) BY MOUTH DAILY. 30 tablet 1     senna-docusate (SENNOSIDES-DOCUSATE SODIUM) 8.6-50 mg tablet Take 1 tablet by mouth 2 (two) times a day.  0     warfarin (COUMADIN) 5 MG tablet Take 1 to 1.5 tablets (5 to 7.5 mg) by mouth daily. Current dose 7.5mg Tue/Thur and 5mg ROW. Adjust dose based on INR results as directed. 45 tablet 11     hydroxychloroquine (PLAQUENIL) 200 mg tablet Take 1 tablet (200 mg total) by mouth daily. 90 tablet 0     No current facility-administered medications on file prior to visit.        /88  Pulse 88  Resp 24  Wt 200 lb 11.2 oz (91 kg)  LMP 05/04/2018 (Exact Date)  SpO2 92% Comment: 5 LPM  BMI 41.95 kg/m2          Objective:    Physical Exam   Constitutional: She is oriented to person, place, and time. She appears well-developed and well-nourished.   HENT:   Head: Normocephalic and atraumatic.   Nose: Nose normal.   Mouth/Throat: Oropharynx is clear and moist. No oropharyngeal exudate.   Eyes: Right eye exhibits no discharge. Left eye exhibits no discharge. No scleral icterus.    Cardiovascular: Normal rate and regular rhythm.    Pulmonary/Chest: No respiratory distress. She has no rales.   Musculoskeletal: She exhibits no edema.   Neurological: She is alert and oriented to person, place, and time. Coordination normal.   Skin: Skin is warm and dry. No rash noted. No erythema.   Psychiatric: She has a normal mood and affect. Her behavior is normal. Judgment and thought content normal.     /88  Pulse 88  Resp 24  Wt 200 lb 11.2 oz (91 kg)  LMP 05/04/2018 (Exact Date)  SpO2 92% Comment: 5 LPM  BMI 41.95 kg/m2              Medical History  Active Ambulatory (Non-Hospital) Problems    Diagnosis     Vomiting     Chronic respiratory failure with hypercapnia     Abnormal CXR     Cardiomyopathy     Pneumonia     Chronic diastolic heart failure     Polyarthralgia     Trigger ring finger, right     Bilateral shoulder tendinopathy     Snoring     Hypercarbia     ALFONSO (acute kidney injury)     Encephalopathy acute     Chronic pain     Intractable nausea and vomiting     Abdominal pain     History of DVT (deep vein thrombosis)     Acute respiratory failure     Hyperthyroidism     Restrictive lung disease due to kyphoscoliosis     ILD (interstitial lung disease)     Coagulopathy     GERD (gastroesophageal reflux disease)     Opiate dependence     Gastroparesis     Hypertension     Osteoporosis     Anemia     Generalized anxiety disorder     Antiphospholipid Antibody Syndrome     Pulmonary emboli     Peripheral Neuropathy     Systemic lupus erythematosus     Adhesive pericarditis     Regional enteritis     Deep phlebothrombosis, antepartum, with delivery     Past Medical History:   Diagnosis Date     Antiphospholipid antibody with hypercoagulable state      Cervical compression fracture 2009     Chronic hypercapnic respiratory failure 12/22/2014     Chronic pain      Congestive heart failure      Depression      Gastroparesis      GERD (gastroesophageal reflux disease)      Hypertension       Kidney infection      Kidney stone      Lupus (systemic lupus erythematosus)      Obesity      MERLIN (obstructive sleep apnea) 1/30/2015     Osteoporosis      Peripheral neuropathy      Recurrent Clostridium difficile diarrhea      Seizures            Social history reviewed: Lives with .   Allergies  Allergies   Allergen Reactions     Hydrocodone-Acetaminophen Swelling     Throat swelling (tolerates oxycodone)  - Acetaminophen causes vomiting per patient/RN, 12/2014 tolerated hydromorphone with hospitalization     Protonix [Pantoprazole] Hives     Per pt  Per pt     Codeine Nausea Only     abd pain       Fentanyl Nausea And Vomiting and Unknown       Tolerated drip 9/28/15- pt states just nausea at times      Morphine Hives, Nausea And Vomiting and Other (See Comments)     Pt states nausea     Morphine Hcl Itching and Nausea And Vomiting     Pt states she has tolerated in past- just nausea and vomiting at times                              Data Review - imaging, labs, and ekgs listed below were reviewed by me.  CXR and CT images and EKG tracings interpreted personally.     Past Labs  @LASTLABV(<SYNTAX> error)@  Past Imaging  Dxa Bone Density Scan    Result Date: 5/4/2018 5/4/2018 RE: Catherine Lila YOB: 1975 Dear Delmis Ma, Patient Profile: 42 y.o. female, premenopausal, is here for the follow up bone density test. History of fractures - Yes;  Shoulder and Vertebra. Family history of osteoporosis - None.  Family history of hip fracture: None. Smoking history - Past. Osteoporosis treatment past -  Yes;  Forteo. Osteoporosis treatment current - No.  Chronic medical problems - Chronic low back problems, History of kidney stones and Lupus. High risk medications -  Blood thinner (Coumadin or Heparin);  Yes, Currently, Depo-provera;  Yes, in the Past, Steroids;  Yes, Currently and Thyroid;  Yes, Currently. Assessment: 1. The spine bone density L1-L4 with T-score 1.7, with Z-score 1.7 2. Femoral  bone densities show left femoral neck T- score -2.6 (Z-Score -2.2)  and right total hip T-score -2.8 (Z-Score-2.6) 3. Trabecular bone score indicates good trabecular bone architecture. 4.  Left one third radius T score -0.2.  Z score -0.2 42 y.o. female with OSTEOPOROSIS and HIGH fracture risk who had received Forteo treatment in the past.. Since the previous bone density dated  September 14, 2015, there has been a   11.6 % increase in the bone density of the spine.   Some increases in the spine and be secondary to evolving degenerative changes. Additionally there has been a 6.3 % change in the left total hip and a 5.1 % decline in the right total hip.  Of note, no statistically significant change was seen in the left one third radius.  A mixed result is seen. Recommendations: Continuation of evaluation and consideration for treatment is recommended with follow up bone density scan in 1-2 years. Bone densitometry was performed on your patient using our [employer] densitometer. The results are summarized and a copy of the actual scans are included for your review. In conformity with the International Society of Clinical Densitometry's most recent position statement for DXA interpretation (2015), the diagnosis will be made on the lowest measured T-score of the lumbar spine, femoral neck, total proximal femur or 33% radius. Note the change in terminology for diagnostic classification from OSTEOPENIA to LOW BONE MASS. All trending for sequential exams will be done using multiple vertebrae or the total proximal femur. Fracture risk is based on the WHO Fracture Risk Assessment Tool (FRAX). If additional information is needed or if you would like to discuss the results, please do not hesitate to call me. Thank you for referring this patient to [facility]. We are happy to be of service in support of you and your practice. If you have any questions or suggestions to improve our service, please call  me at [phone]. Sincerely, Hannah Pelaez M.D. LIAM. Osteoporosis Services, [facility]         CBC:   Lab Results   Component Value Date    WBC 8.6 01/05/2018    WBC 6.9 09/27/2015    RBC 5.21 01/05/2018     BMP:   Lab Results   Component Value Date    CO2 33 (H) 01/05/2018    BUN 11 01/05/2018    CREATININE 0.59 (L) 01/05/2018    CALCIUM 9.3 01/05/2018     Coagulation:   Lab Results   Component Value Date    INR 1.00 09/11/2017     Cardiac markers:   Lab Results   Component Value Date    CKMB 3 10/11/2014     ABGs:   Lab Results   Component Value Date    PH 7.36 09/27/2017         Total time was >25 minutes in which greater than 50% of the time was spent on face to face time with patient, patient care coordination and review of chart

## 2021-06-18 NOTE — PROGRESS NOTES
Assessment/Plan:     1. Acute bronchitis     2. Cough  HM2(CBC w/o Differential)    XR Chest 2 Views    albuterol nebulizer solution 3 mL (PROVENTIL)    Nebulizer   3. Left otitis media     4. DVT (deep venous thrombosis)  Factor 10 Assay, Chromogenic       Diagnoses and all orders for this visit:    Acute bronchitis  Chest x-ray was negative for pneumonia and white blood cell count is not elevated.  Feel that patient's symptoms of increased cough and productive sputum are likely due to a bronchitis or flareup of chronic ILD.  Patient received albuterol nebulizer treatment in clinic and did have symptomatic improvement following the nebulizer treatment.  Repeat lung exam showed improvement in expiratory wheezing as well.  Encouraged her to use albuterol nebulizer at home every 4-6 hours on a regular basis over next several days.  May continue Mucinex as well.  Will start her on Augmentin as she does have left otitis media.  Counseled her on use of medication and side effects.  Directed her to go to emergency room over the weekend if she develops progressive worsening of symptoms.  She is comfortable with this plan.    Cough  -     HM2(CBC w/o Differential)  -     XR Chest 2 Views  -     albuterol nebulizer solution 3 mL (PROVENTIL); Take 3 mL by nebulization once.  -     Nebulizer    Left otitis media  We will treat with Augmentin as noted above.  Counseled on use of medication and side effects.    DVT (deep venous thrombosis)  -     Factor 10 Assay, Chromogenic    Other orders  -     amoxicillin-clavulanate (AUGMENTIN) 875-125 mg per tablet; Take 1 tablet by mouth 2 (two) times a day for 10 days.  Dispense: 20 tablet; Refill: 0  -     LORazepam (ATIVAN) 1 MG tablet; Take 1 tablet (1 mg total) by mouth every evening.  Dispense: 30 tablet; Refill: 0             Subjective:      Catherinemaddison Sharp is a 42 y.o. female who comes in today with concern about cough and possible pneumonia.  She has complicated past  "medical history of lupus on low-dose prednisone and Plaquenil, history of interstitial lung disease related to previous ARDS now with persistent fibrosis and chronic hypoxic respiratory failure.  She was seen by her pulmonologist 1 week ago and was doing well at that time.  She states that the following day she developed a cough and the cough has been progressively getting worse.  She does have history of pneumonia and frequent hospitalizations for pneumonia but has actually been doing well for past several months.  States that initially she was coughing up phlegm that it was dark green and bloody in nature.  Now the phlegm is green in color.  She has had increased wheezing and dyspnea.  She has had chills and sweats.  Has had increased O2 requirements.  Normally on 4-6 L/min of oxygen.  Complains of severe pain and pressure in the left ear.  Has some sinus pressure.  Initially had sore throat but that is improved.  She has been taking Mucinex and that does help with some relief of symptoms.  Has also been taking NyQuil.  She has albuterol at home and she reports that her last albuterol treatment was last evening.  We reviewed medications and allergies and updated the chart.  Review of systems otherwise negative.  She has no other concerns or questions today.    Current Outpatient Prescriptions   Medication Sig Dispense Refill     albuterol (PROAIR HFA;PROVENTIL HFA;VENTOLIN HFA) 90 mcg/actuation inhaler Inhale 2 puffs every 6 (six) hours as needed for wheezing. 1 Inhaler 6     albuterol (PROVENTIL) 2.5 mg /3 mL (0.083 %) nebulizer solution Take 3 mL (2.5 mg total) by nebulization 4 (four) times a day as needed for wheezing or shortness of breath. 75 mL 11     alum/mag hydrox-simethicone-diphenhydramine-lidocaine (MAGIC MOUTHWASH) suspension Swish and swallow 5mL 3-4 times daily as needed. Standard prep: Diphenhydramine+Sucralfate+Mylanta 120 mL 1     BD TUBERCULIN SYRINGE 1 mL 27 x 1/2\" Syrg USE TO DISPENSE HEPARIN "  1     biotin 2,500 mcg cap Take 2,500 mcg by mouth every evening.        cholecalciferol, vitamin D3, 1,000 unit tablet Take 2 Units by mouth daily.        FERROUS SULFATE (SLOW FE ORAL) Take 65 mg by mouth daily.       furosemide (LASIX) 40 MG tablet Take 1 tablet (40 mg total) by mouth daily. 60 tablet 11     ipratropium-albuterol (DUO-NEB) 0.5-2.5 mg/3 mL nebulizer Inhale 3 mL 4 (four) times a day as needed.       levothyroxine (SYNTHROID, LEVOTHROID) 150 MCG tablet TAKE 1 TABLET (150 MCG TOTAL) BY MOUTH DAILY. 90 tablet 2     LORazepam (ATIVAN) 1 MG tablet Take 1 tablet (1 mg total) by mouth every evening. 30 tablet 0     naloxone (NARCAN) 4 mg/actuation nasal spray 1 spray (4 mg dose) into one nostril for opioid reversal. Call 911. May repeat if no response in 3 minutes. 1 Box 0     nystatin (MYCOSTATIN) powder Apply 1 application topically 3 (three) times a day as needed.       [START ON 7/1/2018] oxyCODONE (ROXICODONE) 30 MG immediate release tablet Take 1 tablet (30 mg total) by mouth every 4 (four) hours as needed for pain. 180 tablet 0     [START ON 7/1/2018] OXYCONTIN 40 mg 12 hr tablet Take 1 tablet (40 mg total) by mouth every morning. 30 tablet 0     predniSONE (DELTASONE) 5 MG tablet TAKE 1 TABLET (5 MG TOTAL) BY MOUTH DAILY. 30 tablet 1     senna-docusate (SENNOSIDES-DOCUSATE SODIUM) 8.6-50 mg tablet Take 1 tablet by mouth 2 (two) times a day.  0     warfarin (COUMADIN) 5 MG tablet Take 1 to 1.5 tablets (5 to 7.5 mg) by mouth daily. Current dose 7.5mg Tue/Thur and 5mg ROW. Adjust dose based on INR results as directed. 45 tablet 11     alendronate (FOSAMAX) 70 MG tablet Take 1 tablet (70 mg total) by mouth every 7 days. Take in the morning on an empty stomach with a full glass of water 30 minutes before food (Patient not taking: Reported on 6/8/2018) 12 tablet 0     amoxicillin-clavulanate (AUGMENTIN) 875-125 mg per tablet Take 1 tablet by mouth 2 (two) times a day for 10 days. 20 tablet 0      hydroxychloroquine (PLAQUENIL) 200 mg tablet Take 1 tablet (200 mg total) by mouth daily. 90 tablet 0     No current facility-administered medications for this visit.        Past Medical History, Family History, and Social History reviewed.  Past Medical History:   Diagnosis Date     Antiphospholipid antibody with hypercoagulable state     Secondary to lupus     Cervical compression fracture 2009    closed, with spinal cord injury C1-C4 - prednisone induced     Chronic hypercapnic respiratory failure 12/22/2014     Chronic pain     on opiates     Congestive heart failure     Diastolic, [facility] [place], EF 60% 2014     Depression      Gastroparesis     causing nausea/vomiting with recurrent admissions     GERD (gastroesophageal reflux disease)      Hypertension      Kidney infection      Kidney stone      Lupus (systemic lupus erythematosus)     complicated by joint and pulmonary involvement     Obesity      MERLIN (obstructive sleep apnea) 1/30/2015    On home bipap     Osteoporosis      Peripheral neuropathy      Recurrent Clostridium difficile diarrhea      Seizures     once due to high BP, hypertensive encephalopathy     Past Surgical History:   Procedure Laterality Date     PICC AND MIDLINE TEAM LINE INSERTION  9/27/2015          [place] BIOPSY LUNG/MEDIASTINUM PERCUTANEOUS NEEDLE      Description: Biopsy Lung Percutaneous;  Recorded: 09/15/2011;     [place] LAP,CHOLECYSTECTOMY      Description: Cholecystectomy Laparoscopic;  Recorded: 02/15/2008;     THORACOSCOPY Left 1/13/2015    Procedure: LEFT THORACOSCOPY CONVERTED TO THORACOTOMY;  Surgeon: Jose David Granados MD;  Location: [facility] OR;  Service:      THORACOTOMY Left 1/13/2015    Procedure: THORACOTOMY W/ WEDGE RESECTION;  Surgeon: Jose David Granados MD;  Location: [facility] OR;  Service:      Hydrocodone-acetaminophen; Protonix [pantoprazole]; Codeine; Fentanyl; Morphine; and Morphine hcl  Family History   Problem Relation Age of Onset     Asthma Mother       Hypertension Mother      Stroke Mother      Clotting disorder Mother      Hypertension Father      Stroke Father      Urolithiasis Father      Stroke Maternal Aunt      Mental illness Sister      Stroke Sister      Clotting disorder Sister      Gout Neg Hx      Social History     Social History     Marital status:      Spouse name: N/A     Number of children: N/A     Years of education: N/A     Occupational History     Unemployed      Social History Main Topics     Smoking status: Former Smoker     Packs/day: 0.50     Years: 10.00     Types: Cigarettes     Quit date: 7/29/2009     Smokeless tobacco: Never Used     Alcohol use No     Drug use: Yes     Special: Oxycodone     Sexual activity: Yes     Partners: Male      Comment: Christina, in 2016     Other Topics Concern     Not on file     Social History Narrative    Lives with family. Lives in [place].         Review of systems is as stated in HPI, and the remainder of the 10 system review is otherwise negative.    Objective:     Vitals:    06/08/18 1118   BP: 118/66   Patient Site: Left Arm   Patient Position: Sitting   Cuff Size: Adult Large   Pulse: (!) 102   Temp: 97.6  F (36.4  C)   TempSrc: Tympanic   SpO2: 91%   Weight: 196 lb 6 oz (89.1 kg)    Body mass index is 41.04 kg/(m^2).    General appearance: alert, appears stated age and cooperative  Head: Normocephalic, without obvious abnormality, atraumatic  Eyes: negative  Ears: Right TM normal in appearance, left TM erythematous and bulging  Nose: Nares normal. Septum midline. Mucosa normal. No drainage or sinus tenderness.  Throat: lips, mucosa, and tongue normal; teeth and gums normal  Neck: no adenopathy  Lungs: Faint expiratory wheezing heard in left lower lung, scattered crackles throughout bilateral lung fields  Heart: regular rate and rhythm, S1, S2 normal, no murmur, click, rub or gallop     Recent Results (from the past 24 hour(s))   HM2(CBC w/o Differential)    Collection Time: 06/08/18  11:50 AM   Result Value Ref Range    WBC 7.8 4.0 - 11.0 thou/uL    RBC 5.37 3.80 - 5.40 mill/uL    Hemoglobin 15.9 12.0 - 16.0 g/dL    Hematocrit 48.2 (H) 35.0 - 47.0 %    MCV 90 80 - 100 fL    MCH 29.6 27.0 - 34.0 pg    MCHC 33.1 32.0 - 36.0 g/dL    RDW 12.9 11.0 - 14.5 %    Platelets 160 140 - 440 thou/uL    MPV 7.9 7.0 - 10.0 fL       Results for orders placed in visit on 06/08/18   XR Chest 2 Views    Narrative XR CHEST 2 VIEWS  6/8/2018 11:40 AM    INDICATION: Cough  COMPARISON: 1/31/2018.    FINDINGS: Little change from previous. Low lung volumes and moderate chronic elevation of the right hemidiaphragm. Mild bibasilar densities may be atelectasis. Top normal heart size. Normal pulmonary vascularity.       This note has been dictated using voice recognition software. Any grammatical or context distortions are unintentional and inherent to the the software.

## 2021-06-19 NOTE — PROGRESS NOTES
ASSESSMENT AND PLAN:  Catherine Sharp 42 y.o. female is here for follow-up.  She has systemic lupus erythematosus manifested in the background of positive KRISTOPHER, double-stranded DNA -most recently negative, SSA antibodies, antiphospholipid antibodies history of DVT, has mouth ulcers, pleuritic and pericardiac symptoms.  She has had inflammatory arthropathy.  She has been on prednisone, as she noted since her early teens.  She is noted to have interstitial lung disease although the pulmonary opinion has been that the oxygen requirement seems to be disproportionately more than would be suggested by their assessment of her lung functions studies and other evaluation.  She is awaiting call from [facility] for further evaluation and primary there.  She is now down to 5 mg of prednisone.  We discussed reducing it to 4 mg she is somewhat reluctant but prepared to try this.  In the past she has tried mycophenolate could not take it because of GI side effects.  She is on hydroxychloroquine.  Return for follow-up here in 3 months or sooner.          Diagnoses and all orders for this visit:    Systemic lupus erythematosus (H)  -     predniSONE (DELTASONE) 1 MG tablet; Take 5 tablets (5 mg total) by mouth daily.  Dispense: 450 tablet; Refill: 0  -     hydroxychloroquine (PLAQUENIL) 200 mg tablet; Take 1 tablet (200 mg total) by mouth daily.  Dispense: 90 tablet; Refill: 0    ILD (interstitial lung disease) (H)    Polyarthralgia    Antiphospholipid Antibody Syndrome      HISTORY OF PRESENTING ILLNESS:  Catherine Sharp, 42 y.o., female is here for longstanding SLE.  She, reportedly, has had this since age 15.  She noted pain has improved significantly.  She noted mild discomfort in multiple joint areas.  She is able to do most of her day-to-day activities without any or with some difficulty.  She is taking hydroxychloroquine 200 mg daily.  Reminded of eye examination.  She is on 5 mg of prednisone.  She is to be on the  "higher dose for several decades has been gradually carefully tapered to current level.  In the past she has taken azathioprine but that was discontinued during recent hospitalization.  In the past she was unable to tolerate mycophenolate.  In view of the interstitial lung disease she will be a candidate for methotrexate.  She reports no malar area eruption, photosensitivity, pleuritic symptoms.  As noted elsewhere she has had DVT and in the background of antiphospholipid antibodies would fulfill criteria for the syndrome.  She is on Coumadin since.. She reports occasional nonpleuritic chest pain and palpitations,. She has a residual pain in her digits, wrists, elbows, and knees. She had been on azathioprine 50 mg twice daily and hydroxychloroquine but d/c ed by her hospital MD a few months ago for unclear reasons. At one point, she recalls a question of if she may have \"blood\" into her lungs because of the lupus. She underwent treatment with the high-dose prednisone. She subsequently developed osteoporosis. She has experienced chronic pain syndrome and has been on narcotics. She noted pain  both shoulder(s). This has gone on for a few months ago. Pain is described as sharp. It is at night/ wakes from sleep at night.  His symptoms are severe. The symptoms are gradually worsening. Symptoms include pain, tenderness to touch, redness, limited range of motion.  Treatment to date has been without significant relief.   Further historical information and ADL limitations as noted in the multidimensional health assessment questionnaire attached in the EMR.    ALLERGIES:Hydrocodone-acetaminophen; Protonix [pantoprazole]; Codeine; Fentanyl; Morphine; and Morphine hcl    PAST MEDICAL/ACTIVE PROBLEMS/MEDICATION/ FAMILY HISTORY/SOCIAL DATA:  The patient has a family history of  Past Medical History:   Diagnosis Date     Antiphospholipid antibody with hypercoagulable state (H)     Secondary to lupus     Cervical compression " fracture (H) 2009    closed, with spinal cord injury C1-C4 - prednisone induced     Chronic hypercapnic respiratory failure (H) 12/22/2014     Chronic pain     on opiates     Congestive heart failure (H)     Diastolic, Cor Pulmonale, EF 60% 2014     Depression      Gastroparesis     causing nausea/vomiting with recurrent admissions     GERD (gastroesophageal reflux disease)      Hypertension      Kidney infection      Kidney stone      Lupus (systemic lupus erythematosus) (H)     complicated by joint and pulmonary involvement     Obesity      MERLIN (obstructive sleep apnea) 1/30/2015    On home bipap     Osteoporosis      Peripheral neuropathy (H)      Recurrent Clostridium difficile diarrhea      Seizures (H)     once due to high BP, hypertensive encephalopathy     History   Smoking Status     Former Smoker     Packs/day: 0.50     Years: 10.00     Types: Cigarettes     Quit date: 7/29/2009   Smokeless Tobacco     Never Used     Patient Active Problem List   Diagnosis     Hypertension     Osteoporosis     Anemia     Generalized anxiety disorder     Antiphospholipid Antibody Syndrome     Pulmonary emboli (H)     Peripheral Neuropathy     Systemic lupus erythematosus (H)     GERD (gastroesophageal reflux disease)     Opiate dependence (H)     Gastroparesis     Coagulopathy (H)     ILD (interstitial lung disease) (H)     Restrictive lung disease due to kyphoscoliosis     Adhesive pericarditis     Regional enteritis (H)     Deep phlebothrombosis, antepartum, with delivery (H)     Hyperthyroidism     Acute respiratory failure (H)     History of DVT (deep vein thrombosis)     Abdominal pain     Intractable nausea and vomiting     Chronic pain     Hypercarbia     ALFONSO (acute kidney injury) (H)     Encephalopathy acute     Snoring     Bilateral shoulder tendinopathy     Polyarthralgia     Trigger ring finger, right     Chronic diastolic heart failure (H)     Pneumonia     Abnormal CXR     Cardiomyopathy (H)     Chronic  "respiratory failure with hypercapnia (H)     Vomiting     Respiratory failure (H)     Current Outpatient Prescriptions   Medication Sig Dispense Refill     albuterol (PROAIR HFA;PROVENTIL HFA;VENTOLIN HFA) 90 mcg/actuation inhaler Inhale 2 puffs every 6 (six) hours as needed for wheezing. 1 Inhaler 6     albuterol (PROVENTIL) 2.5 mg /3 mL (0.083 %) nebulizer solution Take 3 mL (2.5 mg total) by nebulization 4 (four) times a day as needed for wheezing or shortness of breath. 75 mL 11     alum/mag hydrox-simethicone-diphenhydramine-lidocaine (MAGIC MOUTHWASH) suspension Swish and swallow 5mL 3-4 times daily as needed. Standard prep: Diphenhydramine+Sucralfate+Mylanta 120 mL 1     BD TUBERCULIN SYRINGE 1 mL 27 x 1/2\" Syrg USE TO DISPENSE HEPARIN  1     biotin 2,500 mcg cap Take 2,500 mcg by mouth every evening.        cholecalciferol, vitamin D3, 1,000 unit tablet Take 2 Units by mouth daily.        FERROUS SULFATE (SLOW FE ORAL) Take 65 mg by mouth daily.       furosemide (LASIX) 40 MG tablet Take 1 tablet (40 mg total) by mouth daily. 60 tablet 11     ipratropium-albuterol (DUO-NEB) 0.5-2.5 mg/3 mL nebulizer Inhale 3 mL 4 (four) times a day as needed.       levothyroxine (SYNTHROID, LEVOTHROID) 150 MCG tablet TAKE 1 TABLET (150 MCG TOTAL) BY MOUTH DAILY. 90 tablet 2     LORazepam (ATIVAN) 1 MG tablet TAKE 1 TABLET (1 MG TOTAL) BY MOUTH EVERY EVENING. 30 tablet 0     naloxone (NARCAN) 4 mg/actuation nasal spray 1 spray (4 mg dose) into one nostril for opioid reversal. Call 911. May repeat if no response in 3 minutes. 1 Box 0     nystatin (MYCOSTATIN) powder Apply 1 application topically 3 (three) times a day as needed.       oxyCODONE (ROXICODONE) 30 MG immediate release tablet Take 1 tablet (30 mg total) by mouth every 4 (four) hours as needed for pain. 180 tablet 0     OXYCONTIN 40 mg 12 hr tablet Take 1 tablet (40 mg total) by mouth every morning. 30 tablet 0     predniSONE (DELTASONE) 1 MG tablet Take 5 tablets (5 " mg total) by mouth daily. 450 tablet 0     senna-docusate (SENNOSIDES-DOCUSATE SODIUM) 8.6-50 mg tablet Take 1 tablet by mouth 2 (two) times a day.  0     warfarin (COUMADIN) 5 MG tablet Take 1 to 1.5 tablets (5 to 7.5 mg) by mouth daily. Current dose 7.5mg Thur and 5mg ROW. Adjust dose based on INR results as directed. 45 tablet 11     hydroxychloroquine (PLAQUENIL) 200 mg tablet Take 1 tablet (200 mg total) by mouth daily. 90 tablet 0     Current Facility-Administered Medications   Medication Dose Route Frequency Provider Last Rate Last Dose     denosumab 60 mg (PROLIA 60 mg/ml)  60 mg Subcutaneous Q6 Months Anirudh Alcaraz MD   60 mg at 07/24/18 1143       DETAILED EXAMINATION:  Vitals:    07/24/18 1018 07/24/18 1102   BP: (!) 122/100 (!) 140/100   Patient Site: Right Arm Right Arm   Patient Position: Sitting Sitting   Cuff Size: Adult Regular Adult Regular   Pulse: (!) 112    Weight: 198 lb (89.8 kg)     Comfortable.  Alert oriented.  Eyes are without inflammatory changes.  Examination of both upper and lower extremities is performed for swollen & tender joints, range of motion, rash, weakness, discoloration, warmth, swelling.  The skin examined for nodules. The salient normal / abnormal findings are appended.  She has residual crackles bilaterally in the lungs.  No pleural pericardial rubs.  She does not have synovitis as noted before.   On supp Oxygen. .  Does not have sclerodactyly periungual erythema malar area eruption. No appreciable synovitis in any of the palpable appendicular joints of the upper extremities.  There is no stomatitis.  She is ahead of the supplemental oxygen.  She is a remarkably good range of motion of the shoulder joints.    LAB / IMAGING DATA:  ALT   Date Value Ref Range Status   09/20/2017 42 0 - 45 U/L Final   04/13/2017 15 0 - 45 U/L Final   01/09/2017 25 0 - 45 U/L Final     Albumin   Date Value Ref Range Status   09/27/2017 3.3 (L) 3.5 - 5.0 g/dL Final   09/20/2017 3.4 (L)  3.5 - 5.0 g/dL Final   06/29/2017 3.4 (L) 3.5 - 5.0 g/dL Final     Creatinine   Date Value Ref Range Status   01/05/2018 0.59 (L) 0.60 - 1.10 mg/dL Final   09/27/2017 0.64 0.60 - 1.10 mg/dL Final   09/27/2017 0.65 0.60 - 1.10 mg/dL Final       WBC   Date Value Ref Range Status   06/08/2018 7.8 4.0 - 11.0 thou/uL Final   01/05/2018 8.6 4.0 - 11.0 thou/uL Final   09/27/2015 6.9 4.0 - 11.0 thou/uL Final   08/23/2015 5.8 4.0 - 11.0 thou/uL Final     Hemoglobin   Date Value Ref Range Status   06/08/2018 15.9 12.0 - 16.0 g/dL Final   01/05/2018 15.1 12.0 - 16.0 g/dL Final   09/20/2017 15.5 12.0 - 16.0 g/dL Final     Platelets   Date Value Ref Range Status   06/08/2018 160 140 - 440 thou/uL Final   01/05/2018 191 140 - 440 thou/uL Final   09/20/2017 178 140 - 440 thou/uL Final       Lab Results   Component Value Date    RF <15.0 07/10/2014    SEDRATE 42 (H) 01/31/2018

## 2021-06-19 NOTE — LETTER
Letter by Boris Bearden MD at      Author: Boris Bearden MD Service: -- Author Type: --    Filed:  Encounter Date: 8/28/2019 Status: (Other)         August 28, 2019     Carter Eng MD  [address] [facility] [place] [address]    Patient: Catherine Sharp   MR Number: [id number]   YOB: 1975   Date of Visit: 8/28/2019       Dear Dr. Velasquez MD:    Thank you for referring Catherine Sharp to me for evaluation. Below are the relevant portions of my assessment and plan of care.        Diagnoses and all orders for this visit:    Chronic respiratory failure with hypoxia and hypercapnia (H)    Pulmonary hypertension (H)    ILD (interstitial lung disease) (H)    Sleep-related hypoventilation due to chest wall disorder       44-year-old woman with a history of lupus, interstitial lung disease related to previous ARDS-now planned persistent fibrosis, chronic hypoxic and hypercarbic respiratory failure, sleep-related hypoventilation, pulmonary hypertension here for follow-up.    Pulmonary hypertension: I am concerned that her pulmonary hypertension is out of proportion to her lung disease and sleep disordered breathing.  I agree with advanced therapies which she is considering at the [facility].    Interstitial lung disease: It is important to emphasize that she has bland fibrosis in the setting of previous ARDS.  Pulsing with steroids in the setting of her will not improve her breathing function.    Sleep disordered breathing-sleep-related hypoventilation.  She has chronic hypoventilation worsened at night.  She needs noninvasive positive pressure at night.  Because of her chest wall dysfunction and well-documented hypoventilation on arterial blood gas I am ordering a home ventilator today.    Chronic hypoxic and hypercarbic respiratory failure: She needs oxygen.  Her prescription will be 8 L/min at rest, 10 L/min with activity.          If you have questions, please do not  hesitate to call me. I look forward to following Catherine along with you.    Sincerely,        Boris Bearden MD        CC  No Recipients

## 2021-06-19 NOTE — PROGRESS NOTES
Patient here for lumbar medial branch block for left sided back pain. Pain analog sent with patient with instructions to complete and return to clinic.

## 2021-06-21 NOTE — PROGRESS NOTES
"TCM DISCHARGE FOLLOW UP CALL    Discharge Date:  10/31/2018  Reason for hospital stay (discharge diagnosis)::  Acute Gastroenteritis, Pulmonary Hypertension, Chronic Pain Syndrome  Are you feeling better, the same or worse since your discharge?:  Patient is feeling better (Stomach pain is better, feeling a little nauseous \"but not as bad.\")  Do you feel like you have a plan in the event of a health emergency?: Yes (Call clinic, ER.  Pt aware of 24/7 nurse triage)    As part of your discharge plan, were  home care services ordered for you?: No    Did you receive any new medications, or was there a change to your medications?: Yes    Are you taking those medications, or do you have any established regiment?:  RN reviewed new/changed d/c medications w/pt.  Pt confirmed she skipped last night's dose of warfarin and will resume dose this evening, as instructed.  Do you have any follow up visits scheduled with your PCP or Specialist?:  Yes, with Specialist  Who are you seeing and when is it scheduled?:  Dr. Horvath (Pulmonologist)   12/12/2018  I'm glad to hear you're doing well and we want you to continue to do well. Your PCP would like to see you for a follow-up visit. Can we help set that up for your today?: No (Pt declined scheduling w/RN)    (RN) Provided patient the PCP's phone number to call if they have any questions or concerns?: Yes      Luna Traore RN Care Manager, Population Health      "

## 2021-06-21 NOTE — PROGRESS NOTES
Order for sleep study received and reviewed.  This is an order for a baseline test only. We will reach out to ordering provider in order to get more information about the test.  Patient is currently admitted to the hospital.

## 2021-06-21 NOTE — PROGRESS NOTES
Assessment/Plan:     1. Urinary tract infection     2. Generalized muscle weakness  Urinalysis-UC if Indicated    XR Chest 2 Views    Culture, Urine   3. Dizziness  Urinalysis-UC if Indicated    XR Chest 2 Views    Culture, Urine   4. Palpitations  Electrocardiogram Perform and Read   5. Acquired hypothyroidism  Comprehensive Metabolic Panel    Thyroid Stimulating Hormone (TSH)   6. 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 Dyspnea  BNP(B-type Natriuretic Peptide)   8. Antiphospholipid antibody syndrome (H)  Factor 10 Assay, Chromogenic   9. Dehydration         Diagnoses and all orders for this visit:    Urinary tract infection  Discussed with patient that workup in clinic does not reveal clear source of symptoms.  UA is suggestive of possible infection.  We will send this for culture.  Recommend starting treatment with antibiotic and prescription for Bactrim DS was sent to pharmacy.  Counseled on use of medication and side effects.  Advised increased intake of fluids.  Will notify her of culture results when available.      Generalized muscle weakness  -     Urinalysis-UC if Indicated  -     XR Chest 2 Views  -     Culture, Urine  -Unclear etiology at this time.  Concern for possible UTI with abnormal UA today in clinic.  We will do some additional blood work today to check iron levels and ferritin.  We will also check her TSH given underlying hypothyroidism.  Encouraged increased intake of fluids.  Will treat for possible UA.  Further recommendations once results are available.    Dizziness  -     Urinalysis-UC if Indicated  -     XR Chest 2 Views  -     Culture, Urine  Unclear etiology.  Suspect some underlying dehydration.  Encouraged increased intake of fluids.-Possible UTI with abnormal UA.  Treat with antibiotic.  Check iron levels and ferritin today.  Further recommendations once results available.    Palpitations  -      Electrocardiogram Perform and Read  -Suspect secondary to dehydration.  Advised increased intake of fluids.    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                   Subjective:      Catherine Sharp is a 43 y.o. female who presents to clinic today with concern about dizzy spells, fatigue and palpitations.  States that she has not been feeling well for about a month.  Symptoms worse over the past week.  Describes a heavy feeling in her legs due to weakness.  Feels like she has no strength.  She denies any symptoms of cough or cold.  No ear pain, nasal congestion, sinus pain or pressure.  No headache.  No sore throat.  She denies any increased coughing or shortness of breath.  States that she is drinking adequate amounts of fluids.  She does have chronic back pain and is on pain medication managed by pain clinic.  She describes random episodes of palpitations.  Not associated with chest pain, pressure or heaviness in the chest.  She has felt flushed and sweaty at times.  Sometimes has felt dizzy and sort of a room spinning sensation.  This occurs randomly as well.  Not necessarily with changes in position.  We reviewed and updated medications and allergies.  She has no other concerns or questions today.  Review of systems otherwise negative.    No current facility-administered medications for this visit.      No current outpatient prescriptions on file.     Facility-Administered Medications Ordered in Other Visits   Medication Dose Route Frequency Provider Last Rate Last Dose     albuterol inhaler 2 puff (PROAIR HFA;PROVENTIL  HFA;VENTOLIN HFA)  2 puff Inhalation Q6H PRN Mina Rodriguez DO         bisacodyl suppository 10 mg (DULCOLAX)  10 mg Rectal Daily PRN Mina Rodriguez DO         ciprofloxacin IVPB 400 mg (CIPRO)  400 mg Intravenous Q12H Mina Rodriguez,  mL/hr at 10/29/18 0540 400 mg at 10/29/18 0540     diphenhydrAMINE injection 25 mg (BENADRYL)  25 mg Intravenous Q6H PRN Mina Rodriguez, DO   25 mg at 10/28/18 1600     hydrALAZINE injection 5 mg (APRESOLINE)  5 mg Intravenous Q6H PRN Mina Rodriguez DO         HYDROmorphone injection 1.5-2 mg (DILAUDID)  1.5-2 mg Intravenous Q2H PRN Mina Rodriguez, DO   2 mg at 10/29/18 0540     ipratropium-albuterol 0.5-2.5 mg/3 mL nebulizer solution 3 mL (DUO-NEB)  3 mL Inhalation QID PRN Mina Rodriguez DO         levothyroxine tablet 150 mcg (SYNTHROID, LEVOTHROID)  150 mcg Oral DAILY Mina Rodriguez DO   150 mcg at 10/29/18 0540     LORazepam tablet 1 mg (ATIVAN)  1 mg Oral QPM Mina Rodriguez DO   1 mg at 10/28/18 2030     magnesium hydroxide suspension 30 mL (MILK OF MAG)  30 mL Oral Daily PRN Mina Rodriguez DO         metoprolol tartrate injection 5 mg (LOPRESSOR)  5 mg Intravenous Q6H Mina Rodriguez, DO   5 mg at 10/29/18 0540     metroNIDAZOLE IVPB 500 mg (FLAGYL)  500 mg Intravenous Q12H Mina Rodriguez,  mL/hr at 10/29/18 0310 500 mg at 10/29/18 0310     naloxone injection 0.2-0.4 mg (NARCAN)  0.2-0.4 mg Intravenous PRN Lonnie Steele MD        Or     naloxone injection 0.2-0.4 mg (NARCAN)  0.2-0.4 mg Intramuscular PRN Lonnie Steele MD         nitroglycerin 0.1 mg/hr 1 patch (NITRODUR)  1 patch Transdermal Daily 0800 Mina Rodriguez DO   1 patch at 10/28/18 1717     nitroglycerin SL tablet 0.4 mg (NITROSTAT)  0.4 mg Sublingual Q5 Min PRN Mina Rodriguez DO   0.4 mg at 10/28/18 1647     ondansetron injection 4 mg (ZOFRAN)  4 mg Intravenous  Q12H PRN April Mei, CNS   4 mg at 10/28/18 1712     oxyCODONE 12 hr tablet 40 mg (OxyCONTIN)  40 mg Oral QAM Mina Rodriguez DO         oxyCODONE immediate release tablet 30 mg (ROXICODONE)  30 mg Oral Q4H PRN Mina Rodriguez DO         predniSONE tablet 5 mg (DELTASONE)  5 mg Oral DAILY Mina Rodriguez DO   5 mg at 10/28/18 2029     promethazine 12.5 mg in dextrose 5% 50 mL (PHENERGAN)  12.5 mg Intravenous Q6H PRN Mina Rodriguez DO         sodium chloride flush 3 mL (NS)  3 mL Intravenous PRN Lonnie Steele MD         warfarin - daily dose required   Other Med Consult or Protocol Mina Rodriguez DO           Past Medical History, Family History, and Social History reviewed.  Past Medical History:   Diagnosis Date     Antiphospholipid antibody with hypercoagulable state (H)     Secondary to lupus     Cervical compression fracture (H) 2009    closed, with spinal cord injury C1-C4 - prednisone induced     Chronic hypercapnic respiratory failure (H) 12/22/2014     Chronic pain     on opiates     Congestive heart failure (H)     Diastolic, Cor Pulmonale, EF 60% 2014     Depression      Gastroparesis     causing nausea/vomiting with recurrent admissions     GERD (gastroesophageal reflux disease)      Hypertension      Kidney infection      Kidney stone      Lupus (systemic lupus erythematosus) (H)     complicated by joint and pulmonary involvement     Obesity      MERLIN (obstructive sleep apnea) 1/30/2015    On home bipap     Osteoporosis      Peripheral neuropathy      Recurrent Clostridium difficile diarrhea      Seizures (H)     once due to high BP, hypertensive encephalopathy     Past Surgical History:   Procedure Laterality Date     PICC AND MIDLINE TEAM LINE INSERTION  9/27/2015          OK BIOPSY LUNG/MEDIASTINUM PERCUTANEOUS NEEDLE      Description: Biopsy Lung Percutaneous;  Recorded: 09/15/2011;     OK LAP,CHOLECYSTECTOMY      Description:  Cholecystectomy Laparoscopic;  Recorded: 02/15/2008;     THORACOSCOPY Left 1/13/2015    Procedure: LEFT THORACOSCOPY CONVERTED TO THORACOTOMY;  Surgeon: Jose David Granados MD;  Location: [facility] Main OR;  Service:      THORACOTOMY Left 1/13/2015    Procedure: THORACOTOMY W/ WEDGE RESECTION;  Surgeon: Jose David Granados MD;  Location: [facility] OR;  Service:      Hydrocodone-acetaminophen; Protonix [pantoprazole]; Codeine; Fentanyl; Morphine; and Morphine hcl  Family History   Problem Relation Age of Onset     Asthma Mother      Hypertension Mother      Stroke Mother      Clotting disorder Mother      Hypertension Father      Stroke Father      Urolithiasis Father      Stroke Maternal Aunt      Mental illness Sister      Stroke Sister      Clotting disorder Sister      Gout Neg Hx      Social History     Social History     Marital status:      Spouse name: N/A     Number of children: N/A     Years of education: N/A     Occupational History     Unemployed      Social History Main Topics     Smoking status: Former Smoker     Packs/day: 0.50     Years: 10.00     Types: Cigarettes     Quit date: 7/29/2009     Smokeless tobacco: Never Used     Alcohol use No     Drug use: Yes     Special: Oxycodone     Sexual activity: Not on file     Other Topics Concern     Not on file     Social History Narrative    Lives with family. Lives in [place].         Review of systems is as stated in HPI, and the remainder of the 10 system review is otherwise negative.    Objective:     Vitals:    10/24/18 1304   BP: (!) 144/100   Patient Site: Right Arm   Patient Position: Sitting   Cuff Size: Adult Large   Pulse: (!) 117   Temp: 97.9  F (36.6  C)   TempSrc: Oral   SpO2: 95%   Weight: 198 lb 8 oz (90 kg)    Body mass index is 41.49 kg/(m^2).    General appearance: alert, appears stated age and cooperative  Head: Normocephalic, without obvious abnormality, atraumatic  Eyes: negative  Ears: normal TM's and external ear canals  both ears  Nose: Nares normal. Septum midline. Mucosa normal. No drainage or sinus tenderness.  Throat: lips, mucosa, and tongue normal; teeth and gums normal  Neck: no adenopathy  Lungs: clear to auscultation bilaterally  Heart: regular rate and rhythm, S1, S2 normal, no murmur, click, rub or gallop  Extremities: extremities normal, atraumatic, no cyanosis or edema      Results: EKG was performed in clinic and personally reviewed.  This showed sinus tachycardia.  No significant changes from previous EKG.    Chest x-ray was performed in clinic and personally reviewed.  No evidence of acute infiltrate.    White blood cell count normal at 8.6  UA was negative for nitrites and leukocytes but Micro did show white blood cell clumps and moderate bacteria.    This note has been dictated using voice recognition software. Any grammatical or context distortions are unintentional and inherent to the the software.

## 2021-06-22 NOTE — PROGRESS NOTES
Pt is being seen today by KIN Barrera, for refills, VARGAS and f/u of 5-constant, sharp, burning back pain. F-8

## 2021-06-22 NOTE — PROGRESS NOTES
Pulmonary Clinic Follow-up Visit    Assessment/Plan  43 year old female former smoker with a history of SLE on hydroxychloroquine and chronic low-dose prednisone, antiphospholipid antibody syndrome on chronic anticoagulation, obesity, fibrotic ILD after an episode of ARDS on chronic oxygen, pulmonary HTN, HFpEF, possible OHS, presenting for follow-up.    Chronic hypoxemic/hypercapnic respiratory failure, pulmonary HTN: Improved after recent hospitalization, which was primarily for likely viral gastroenteritis but also with ADHF and possible exacerbation of underlying respiratory disease. Recent worsening cough with green phlegm and occasional small-volume hemoptysis. Exertion tolerance chronically limited by dyspnea but fluctuates. She had PSG in 2016 showing no significant MERLIN, with AHI <5, so MERLIN/OHS unlikely, and she has actually lost weight since then; could consider PSG with continuous capnography, which was not previously done, though the yield may be low given her recent PSG and actually has lost about 20 lbs since the prior PSG.    Plan:  - scheduled to be seen in the pulmonary HTN clinic at the [facility] on January 22  - continue as-needed nebulized ipratropium-albuterol, and will prescribe a new nebulizer as her current machine is old and in need of replacement  - could consider repeat polysomnography with continuous CO2 monitoring (had PSG in 2016 with AHI <5 but no continuoue capnography)  - prednisone 12-day taper then back to baseline prednisone dose and Z-shashi given increased sputum with color change and small-volume hemoptysis intermittently  - continue oxygen 4 L/min at rest and at night, 6 L/min with exertion  - received influenza vaccine for this season  - up to date on pneumococcal vaccination  - follow up in 6 months or sooner if needed  - encouraged her to call any time with questions or concerning symptoms     I appreciate the opportunity to participate in the care of Ms. Sharp.  Please call any  time if needed.    CCx: chronic hypoxemic/hypercapnic respiratory failure, pulmonary HTN    HPI: 43 year old female former smoker with a history of SLE on hydroxychloroquine and chronic low-dose prednisone, antiphospholipid antibody syndrome on chronic anticoagulation, obesity, fibrotic ILD after an episode of ARDS on chronic oxygen, pulmonary HTN, HFpEF, possible OHS, presenting for follow-up. Improved after recent hospitalization, which was primarily for likely viral gastroenteritis but also with ADHF and possible exacerbation of underlying respiratory disease. Recent worsening cough with green phlegm and occasional small-volume hemoptysis. Exertion tolerance chronically limited by dyspnea but fluctuates. She had PSG in 2016 showing no significant MERLIN, with AHI <5, so MERLIN/OHS unlikely, and she has actually lost weight since then; could consider PSG with continuous capnography, which was not previously done, though the yield may be low given her recent PSG and actually has lost about 20 lbs since the prior PSG.    ROS:  A 12-system review was obtained and was negative with the exception of the symptoms endorsed in the history of present illness.    PMH:  Past Medical History:   Diagnosis Date     Antiphospholipid antibody with hypercoagulable state (H)     Secondary to lupus     Cervical compression fracture (H) 2009    closed, with spinal cord injury C1-C4 - prednisone induced     Chronic pain     on opiates     Chronic respiratory failure with hypoxia and hypercapnia (H)      Congestive heart failure (H)     Diastolic, Cor Pulmonale, EF 60% 2014     Depression      Gastroparesis     causing nausea/vomiting with recurrent admissions     GERD (gastroesophageal reflux disease)      Hypertension      Kidney infection      Kidney stone      Lupus (systemic lupus erythematosus) (H)     complicated by joint and pulmonary involvement     Obesity      Osteoporosis      Peripheral neuropathy      Pulmonary arterial  hypertension (H)     PASP 50-55 mmHg estimated echocardiographically     Recurrent Clostridium difficile diarrhea      Seizures (H)     once due to high BP, hypertensive encephalopathy       PSH:  Past Surgical History:   Procedure Laterality Date     PICC AND MIDLINE TEAM LINE INSERTION  9/27/2015          IL BIOPSY LUNG/MEDIASTINUM PERCUTANEOUS NEEDLE      Description: Biopsy Lung Percutaneous;  Recorded: 09/15/2011;     IL LAP,CHOLECYSTECTOMY      Description: Cholecystectomy Laparoscopic;  Recorded: 02/15/2008;     THORACOSCOPY Left 1/13/2015    Procedure: LEFT THORACOSCOPY CONVERTED TO THORACOTOMY;  Surgeon: Jose David Granados MD;  Location: [facility];  Service:      THORACOTOMY Left 1/13/2015    Procedure: THORACOTOMY W/ WEDGE RESECTION;  Surgeon: Jose David Granados MD;  Location: [facility];  Service:        Allergies:  Allergies   Allergen Reactions     Hydrocodone-Acetaminophen Swelling     Throat swelling (tolerates oxycodone)  - Acetaminophen causes vomiting per patient/RN, 12/2014 tolerated hydromorphone with hospitalization     Protonix [Pantoprazole] Hives     Per pt  Per pt     Codeine Nausea Only     abd pain       Fentanyl Nausea And Vomiting and Unknown       Tolerated drip 9/28/15- pt states just nausea at times      Morphine Hives, Nausea And Vomiting and Other (See Comments)     Pt states nausea     Morphine Hcl Itching and Nausea And Vomiting     Pt states she has tolerated in past- just nausea and vomiting at times       Family HX:  Family History   Problem Relation Age of Onset     Asthma Mother      Hypertension Mother      Stroke Mother      Clotting disorder Mother      Cancer Mother      Hypertension Father      Stroke Father      Urolithiasis Father      Stroke Maternal Aunt      Mental illness Sister      Stroke Sister      Clotting disorder Sister      Gout Neg Hx        Social Hx:  Social History     Socioeconomic History     Marital status:      Spouse name: Not  on file     Number of children: Not on file     Years of education: Not on file     Highest education level: Not on file   Social Needs     Financial resource strain: Not on file     Food insecurity - worry: Not on file     Food insecurity - inability: Not on file     Transportation needs - medical: Not on file     Transportation needs - non-medical: Not on file   Occupational History     Occupation: Unemployed   Tobacco Use     Smoking status: Former Smoker     Packs/day: 0.50     Years: 10.00     Pack years: 5.00     Types: Cigarettes     Last attempt to quit: 2009     Years since quittin.4     Smokeless tobacco: Never Used   Substance and Sexual Activity     Alcohol use: No     Drug use: Yes     Types: Oxycodone     Sexual activity: Not on file   Other Topics Concern     Not on file   Social History Narrative    Lives with family. Lives in [place].       Current Meds:  Current Outpatient Medications   Medication Sig Dispense Refill     albuterol (PROAIR HFA;PROVENTIL HFA;VENTOLIN HFA) 90 mcg/actuation inhaler Inhale 2 puffs every 6 (six) hours as needed for wheezing. 1 Inhaler 6     albuterol (PROVENTIL) 2.5 mg /3 mL (0.083 %) nebulizer solution Take 3 mL (2.5 mg total) by nebulization 4 (four) times a day as needed for wheezing or shortness of breath. 75 mL 11     azithromycin (ZITHROMAX) 250 MG tablet Take 500 mg (2 x 250 mg tablets) on day 1 followed by 250 mg (1 tablet) on days 2-5. 6 tablet 0     cholecalciferol, vitamin D3, 1,000 unit tablet Take 1,000 Units by mouth daily.        FERROUS SULFATE (SLOW FE ORAL) Take 65 mg by mouth daily.       furosemide (LASIX) 20 MG tablet Take 1 tablet (20 mg total) by mouth 2 (two) times a day at 9am and 6pm. 60 tablet 2     hydroxychloroquine (PLAQUENIL) 200 mg tablet Take by mouth daily.       ipratropium-albuterol (DUO-NEB) 0.5-2.5 mg/3 mL nebulizer Inhale 3 mL 4 (four) times a day as needed.       levothyroxine (SYNTHROID, LEVOTHROID) 150 MCG tablet Take  "1 tablet (150 mcg total) by mouth daily. 90 tablet 3     LORazepam (ATIVAN) 1 MG tablet Take 1 mg by mouth every evening.       LORazepam (ATIVAN) 1 MG tablet TAKE 1 TABLET (1 MG TOTAL) BY MOUTH EVERY EVENING. 30 tablet 0     naloxone (NARCAN) 4 mg/actuation nasal spray 1 spray (4 mg dose) into one nostril for opioid reversal. Call 911. May repeat if no response in 3 minutes. 1 Box 0     nystatin (MYCOSTATIN) powder Apply 1 application topically 3 (three) times a day as needed.       oxyCODONE (ROXICODONE) 30 MG immediate release tablet Take 1 tablet (30 mg total) by mouth every 4 (four) hours as needed for pain. 180 tablet 0     OXYCONTIN 40 mg 12 hr tablet Take 1 tablet (40 mg total) by mouth every morning. 30 tablet 0     predniSONE (DELTASONE) 1 MG tablet Take 5 mg by mouth daily.       predniSONE (DELTASONE) 1 MG tablet Take 5 mg by mouth daily. 450 tablet 0     predniSONE (DELTASONE) 10 mg tablet Take 4 tabs daily x 3 days, then 3 tabs daily x 3 days, then 2 tabs daily x 3 days, then 1 tab daily x 3 days.. 30 tablet 0     senna-docusate (SENNOSIDES-DOCUSATE SODIUM) 8.6-50 mg tablet Take 1 tablet by mouth 2 (two) times a day.  0     warfarin (COUMADIN) 5 MG tablet Take 1 to 1.5 tablets (5 to 7.5 mg) by mouth daily. Current dose 7.5mg Thur and 5mg ROW. Adjust dose based on INR results as directed. Skip tonight 45 tablet 11     Current Facility-Administered Medications   Medication Dose Route Frequency Provider Last Rate Last Dose     denosumab 60 mg (PROLIA 60 mg/ml)  60 mg Subcutaneous Q6 Months Anirudh Alcaraz MD   60 mg at 07/24/18 1143       Physical Exam:  /80   Pulse 98   Ht 4' 10\" (1.473 m)   Wt 197 lb (89.4 kg)   SpO2 91% Comment: 5 lpm  Breastfeeding? No   BMI 41.17 kg/m    Gen: alert, oriented, no distress  HEENT: nasal turbinates are unremarkable, no oropharyngeal lesions, no cervical or supraclavicular lymphadenopathy  CV: tachy, regular, no M/G/R  Resp: CTAB, no focal crackles or " wheezes  Abd: soft, nontender, no palpable organomegaly  Skin: no apparent rashes  Ext: no cyanosis, clubbing or edema  Neuro: alert, nonfocal    Labs:  reviewed    Imaging studies:  Chest CTA (10/28/18):  - images directly reviewed, formal interpretation follows:  FINDINGS:  ANGIOGRAM CHEST: Smaller pulmonary artery branches obscured from patient motion artifact and body habitus. Prominent central pulmonary arteries consistent with pulmonary artery hypertension. No pulmonary emboli identified. No aortic aneurysm or   dissection. Atherosclerotic plaque including coronary artery calcification.     RV/LV RATIO: N/A     LUNGS AND PLEURA: Prominent areas of fibroatelectasis, groundglass opacity with more focal consolidation in left upper lobe and left lower lobe. Diffuse air trapping often associated with small airways disease or small vessel disease. Old chronic   calcified noncalcified plaque in the right lung base. Postoperative changes right chest.     MEDIASTINUM: Mildly prominent right hilar lymph nodes nonspecific. No other adenopathy.     LIMITED UPPER ABDOMEN: See separate report.     MUSCULOSKELETAL: Degenerative disease. Multiple thoracic and upper lumbar compression fractures.     IMPRESSION:   CONCLUSION:  1.  Extensive areas of groundglass opacity, airspace disease, fibroatelectasis and consolidation overall not significantly changed from prior either recurrent or chronic. Air trapping most often associated with small airways or small vessel disease.   Other lung findings stable. Similar mildly prominent right hilar adenopathy.  2.  Evidence for pulmonary artery hypertension. No pulmonary embolus, aortic aneurysm or dissection.    TTE (10/29/18):  1. Normal left ventricular size and systolic performance with a visually estimated ejection fraction of 55%.    2. There is a small area of moderate distal anterior/apical hypokinesis.  3. There is mild sclerosis of the aortic valve with a small area of  calcification (unchanged from previous).     No significant aortic stenosis or aortic insufficiency is detected on this study.  4. On selected views, the right ventricle appears mildly enlarged with mildly reduced right ventricular systolic function (right ventricle the right ventricle is not well visualized on some views).  5. There is mild left atrial enlargement.   6. Right ventricular systolic pressure relative to right atrial pressure is moderately increased.  The pulmonary artery pressure is estimated to be 50-55 mmHg plus right atrial pressure (the IVC is poorly visualized on this study).    PSG (February 2016):  Procedure:    Complete PSG with a digital sleep system using the international 10-20 electrode placement for recording EEG, EOG, EMG from chin, ECG, respiratory effort, oximetry, body position, airflow, nasal pressure, snoring sound, pulse rate, and EMG from lower extremities.     Primary Findings:     1. The patient presented to lab and reported taking one tablet of zolpidem prior to arrival.  2. The patient was using oxygen supplementation at 6 L/min on arrival. The sleep technician attempted to decrease oxygen supplementation to establish a test baseline but the patient was very nervous about this an only agreed to initially lowering the oxygen to 5 L/min. Oxygen was lowered even further to 3 L/min while the patient was asleep in order to avoid masking obstructive respiratory events.   3. Respiratory monitoring showed intermittent snoring but overall no significant obstructive sleep apnea/hypopnea sufficient to disturb sleep (AHI=3.5).     Other Findings:  Sleep Architecture:     Sleep onset latency was normal.    REM onset latency was decreased.    All stages of sleep were sampled during the test.     Respiration:    Mean hemoglobin oxygen saturation (SaO2) during the diagnostic portion of the PSG in NREM and REM sleep was 91% with a SaO2 desaturation salma observed to  55%.     Movements:    Clinically significant periodic leg movements were not observed.    The patient had a Periodic Limb Movement (PLM) index of 0.0 and the BISI PLM index was 0.0.     Parasomnia:    No activity consistent with parasomnia was observed in the video recording.     Electrocardiogram:    Two-lead ECG showed normal sinus rhythm with PVCs.     Electroencephalogram:    We used a limited-montage EEG with F3, F4, C3, C4, O1, O2 and contra-lateral references to M1 and M2 that was reviewed using a 30-second EPOCH.     There was evidence of alpha frequency intrusion into N2 and N3 sleep. The clinical relevance of this finding remains unclear but it has been observed in association with non-restorative sleep.        Diagnosis:    R06.83 Primary Snoring    R09.02 Hypoxemia     Assessment & Recommendation:        This test did not show significant obstructive sleep apnea.    The patient had significant hypoxemia and required oxygen supplementation during the test. A dose of 4-5 L/min seemed sufficient to keep oxygen saturation above 88-90%.    Measures aimed at reducing upper airway resistance are recommended for the degree of sleep-disordered breathing that was observed in this study. Options include: positional therapy to avoid sleep in the supine posture, ENT evaluation for surgery, and dental evaluation for an oral appliance.    Evaluation for hypoventilation can be considered if clinically indicated.    The patient presented to the sleep lab after taking a dose of zolpidem and this can be associated with higher risk and side effects from this medication. If a hypnotic is indicated, recommend using it when in bed or immediately prior to bedtime    PFT's (July 2014):  FEV1/FVC is 88% and is normal.  FEV1 is 1.30 L (51%) predicted and is reduced.  FVC is 1.47 L (48%) predicted and reduced.  There was no improvement in spirometry after a single inhaled dose of bronchodilator.  TLC is 2.47 L (59%) predicted and  is reduced.  RV is 0.99 L (82%) predicted and is normal.  DLCO is 46% predicted and is reduced when it is corrected for hemoglobin.    Binh Horvath MD  Pulmonary and Critical Care Medicine  [facility]  Cell [phone]  Office [phone]  Pager [phone]

## 2021-06-22 NOTE — TELEPHONE ENCOUNTER
Controlled Substance Refill Request  Medication:   Requested Prescriptions     Pending Prescriptions Disp Refills     LORazepam (ATIVAN) 1 MG tablet 30 tablet 0     Date Last Fill: 11/21/18  Pharmacy: [facility]    Submit electronically to pharmacy    Controlled Substance Agreement on File:   Encounter-Level CSA Scan Date:    There are no encounter-level csa scan date.         Last office visit: 10/24/2018 Delmis Ma MD

## 2021-06-22 NOTE — PATIENT INSTRUCTIONS - HE
It was good to see you in clinic today. This is what we discussed:    1. Continue the Duoneb as needed.  2. We will get you a new nebulizer machine.  3. Continue the oxygen.  4. We should think about getting a sleep study with CO2 monitoring. I can look into this and call you.  5. Take the prednisone 12-day taper and Z-shashi.  6. We will see what comes of the pulmonary hypertension visit.  7. I will see you in about 6 months or sooner if needed.  8. Call any time with questions or concerning symptoms.    Binh Horvath MD  Pulmonary and Critical Care Medicine  [facility]  Office [phone]

## 2021-06-22 NOTE — PROGRESS NOTES
PAIN CENTER PROGRESS NOTE    Subjective:   Catherine Sharp is a 43 y.o. female who presents for evaluation of multi-site pain secondary to osteoporosis and pathologic fractures as a result of SLE treated with chronic steroids.  She has multiple co-morbidities including Interstitial lung disease, MERLIN, Antiphospholipid Antibody Syndrome, cardiomyopathy, OA in multiple sites, and recurrent hospitalizations.  Pain has been present for years and current treatment plan includes OxyContin, oxycodone, intermittent PT/OT.    Major issues:  1. Chronic pain syndrome    2. Chronic, continuous use of opioids    3. Pathological compression fracture of vertebra, sequela    4. Opioid dependence, continuous (H)      Pain location and description: 5/10 constant stabbing, burning, deep pain in left lower back.  Function rated 8. At best, pain is rated 4/10 and highest 8/10 and average is 5/10.    Radiation of pain: Denies  Paresthesias, numbness, weakness: Left thigh numbness which she has had for many years.    Gait disturbance: wheelchair for outings.  Denies recent falls.  Exacerbating factors: activity, arm movements, morning time, prolonged sitting/standing/walking.  Alleviating factors: Rest, pain medications, sitting/standing short periods, laying down and changing positions.  Associated symptoms: Sedentary lifestyle, obesity, oxygen dependent, depression, pain at night.  Denies numbness, weakness, bowel or bladder incontinence, fever chills, unexplained weight loss.  Functional symptoms: Pain interferes with sleep, walking, ADLs., relationships/social, sexual health, mood (angry, frustrated).  Pain treatment does help with function.  Adverse effects of medications:  Uses sennakot 2 tablets at home for intermittent constipation.  Hasn't used miralax but used to.  She bought generic laxative.  Intermittent pruritus from opioids, takes benadryl.  Current treatment efficacy: Fair. Taking OxyContin 40 mg in the morning with pain  "relief within 1 hour.  She states she wakes with pain 8-9/10 and reduces to 6/10. Taking oxycodone 30 mg 1 every 4 hours prn has not tolerated reduction in daily dose.   Current treatment compliance: Good.  She has failed Lyrica, Gabapentin, Cymbalta, Fentanyl, Morphine, Vicodin, codeine in the past.  She has [facility] PT/OT and RN services in past, not currently needed.  Uses daily pop out dosing for medications.  Reports taking medication as prescribed.      Since last visit the patient denies any new diagnostic testing, new treatments or new medical conditions.  She did have ER/urgent care visits or hospitalizations since last seen (see below).  She continues use of her Coumadin for anticoagulant medication.  She states that she has not had any changes in her pain since her last visit.  She denies any new injuries, falls, new pain areas.  She states that she is not having any adverse effects to her medications and denies any questions that she would like addressed today.      Mom passed away on 11/02/18 age 68, lung cancer.  She states she had a busy month as her and sister are managing her affairs and sister lives in [place].  She had relatives from out of town here for 3 weeks.  Reports she had more pain during this time of increased activity.  She reports no use of counseling or grief therapy through hospice currently, mood stable.      She had ED to admission on 10/28/18-10/31/18 reviewed today:  \"Assessment/Plan:     1.  Abdominal pain with nausea and vomiting.  This now is markedly improved.  Other than some mild left upper quadrant pain intermittently she feels back to her normal baseline. Gastroenterology feels its most likely was a viral gastroenteritis though could be opiate withdrawal as patient was not able to keep meds in for 1 or 2 days prior to admission.  Is tolerating regular diet. Plan: Discharge to home today see primary is coming Monday or Tuesday.     2.  Lupus chronically on " "Plaquenil and low-dose prednisone     3.  Chronic pain on oxycodone extended release 40 mg every morning and oxycodone 30 mg every 4 hours as needed.  Has been followed here by acute pain team.     4.  Chronic lung disease on home O2 chronically requiring slightly more oxygen than at baseline which is 4-6 L.  She had been requiring 7 L yesterday morning and pulmonary feels she is probably just volume overloaded.  Now has had good diuresis since yesterday with IV furosemide back to her baseline of oxygen needs. Plan: Follow-up with pulmonary in 6 weeks, Dr. Castle.     5.  History consistent with sleep apnea but negative sleep study in 2016.  Now a significant weight gain warrants repeat study.  Plan: Pulmonary is getting her set up for a repeat sleep study     6.  Pulmonary hypertension.  Patient was supposed to get seen at [facility] for this but apparently has not called to set up an appointment.  Plan: This is recommended     7.  Lupus anticoagulant on warfarin.    Chromogenic factor X goal is 20-40%.  INR is or not to be used to follow warfarin dosing.  Chromogenic factor X today is 20.  Plan: Hold warfarin tonight then resume prior.  She can get chromogenic factor X level on Monday or Tuesday at office visit with primary     8.  Mildly elevated troponins for which she has now seen cardiology and they feel like this was just demand ischemia.  She currently denies any chest pain     9.  Prolonged QT.  Plan: Avoid medications that can exacerbate that     10.  Chronic heart failure with preserved ejection fraction.  BNP was elevated 2 days ago to 684.  Plan: Continue more vigorous diuresis     11.  Essential hypertension which is controlled with current blood pressure 117/60      12.  DVT prophylaxis: On chronic warfarin     13.  CODE STATUS: Full code     14.  Disposition inpatient and home today.\"    Pain consult with April Mei CNS:  PLAN:   1) Pain is consistent with acute abd pain, CT mild " "wall thickening of sm bowel with mild mesenteric edema, trace peritoneal fluid. Daily MME is 330mg in total. This would be about 16.5mg IV dilaudid (in 24 hours) to prevent opioid withdrawal. When I explained this to the patient she was unsure if she can take her OxyContin and prefers 1.5mg IV dilaudid.  High risk for respiratory suppression secondary to opioids given O2 needs at baseline. Will reassess again tomorrow.  2)Multimodal Medication Therapy  Topical: pt declines (having increase in low back pain)  NSAID'S: none nnow given anticoagulation   Muscle Relaxants: could consider although cannot take PO given severe nausea   Adjuvants: tylenol would need to be CA  Opioids: OxyContin 40mg dose if she can take po   Oxycodone Q4h PRN (Could consider this sublingual if she cannot keep anything down)  IV dilaudid PRN   3)Non-medication interventions  -ice and heat   4)Constipation Prophylaxis  -none currently given diarrhea (takes senokot at home)  5) Follow up   -The patients receives opioids/pain care from [facility] Elizabet Palmer. \"     We continue to discuss risks of opioids with respiratory concerns.  Review medical cannabis option today to assist in reducing opioids and she is interested, will review and discuss information with her spouse.  She didn't have a chance to yet with mother's passing away but still interested in information.       Review of Systems  Constitutional: Denies fever, chills, lethargy.  Has MERLIN, chronic O2. Denies night sweats, weight loss  Musculoskeletal: Positive for mid and lower back pain, denies joint pain/swelling, denies recent fall.  Gastrointestional: Positive for intermittent nausea, vomiting, GERD, abdominal pain, constipation.  Denies difficulty swallowing, change in appetite, diarrhea, fecal incontinence.  Genitourinary: Denies urinary incontinence, dysuria, hematuria, UTI, frequency, hesitancy, change in libido  Neurologic: Headaches.  Denies confusion, " "seizure, weakness, changes in balance, changes in speech.  Psychiatric: Positive depression, anxiety.  Denies memory loss, psychoses, suicidal ideation, substance use/abuse.     Objective:     Vitals:    12/04/18 0745   BP: (!) 160/98   Pulse: (!) 104   Weight: 198 lb (89.8 kg)   Height: 4' 10\" (1.473 m)   PainSc:   5   PainLoc: Back     Physical Exam  Constitutional- General appearance: Normal.  Well developed, uncomfortable, obese, appears older than stated age.  Presents alone today.   Psychiatric- Judgment and insight: Normal.  Speech: Normal rhythm.  Thought process: Normal.  No abnormal thoughts reported. Alert & Oriented to person, place, and time.  Recent and remote memory: Normal.  Mood and affect: quiet, concerned.   Respiratory- Using O2 by nasal cannula today.  Normal rhythm and rate.  Cardiovascular- Extremities warm and well perfused, no peripheral edema or varicosities.  Dermatologic- Exposed skin is clean, dry, and intact to inspection and palpation.   Musculoskeletal- Gait and station:  Ambulating independently today, stooped.      *Opioid [place] Precautions:    UDS/Swab - 02/01/18, as expected.  Opioid Consent -  05/31/18  Opioid Agreement - 05/31/18  Pharmacy- as documented    [place]  Reviewed - 09/27/18 as expected  Pill Count - Pill count 10/16/15 appropriate.    Psychological evaluation - n/a  MME - 330  Pharmacogenetic testing - n/a  VARGAS Score: 29 12/04/18    Imaging:  None reviewed today    Assessment:   Catherine Sharp is a 43 y.o. female seen in clinic today for pain in her spine due to history of pathologic compression fractures due to steroids to treat SLE.  She is reporting most pain in her left lumbar spine L3-5 at this time, MRI demonstrates spinal canal stenosis L3-4, disc bulging at L3-4 and L5-S1, and mild to moderate facet arthropathy throughout in addition to multiple chronic compression deformities.  She had 30% pain relief with LESI and wants to repeat lumbar procedure to see " "if additional pain relief.  Initial left LMBB was not helpful.  She continues to have pain in thoracic spine, history of chronic compression fractures. She is having stable pain relief with oxycodone 30 mg every 4 hours prn, have discussed dosing outside of CDC guidelines.  She continues OxyContin 40 mg for extended pain relief in the morning but reports she cannot take this every 12 hours as it causes more difficulty with SOB at increased frequency.  She understands the risks with opioids and respiratory depression.  We review medical cannabis program today as an option for pain control and to reduce opioid dosing; she will review information and discuss with family.    Plan:   Prescribed 30 days of OxyContin 40 mg in the morning.  Hold for any respiratory or sedation concerns.  Prescribed oxycodone 30 mg 1 every 4 hours maximum for pain as needed.   Printed to fill 12/27 for use on 12/28.    Please review the following websites for information on locations, and also other frequently asked questions:  [email or web address]  [email or web address]://[employer]  [email or web address].html (look on the right column \"Featured Sites\" and choose Medical Cannabis tab  Follow-up in 8 weeks with Elizabet.    Elizabet Palmer PA-C  [facility]  [address]  [place] [address]  Ph: [phone]  Fax: [phone]  "

## 2021-06-23 NOTE — PATIENT INSTRUCTIONS - HE
Prescribed 30 days of OxyContin 40 mg in the morning.  Hold for any respiratory or sedation concerns.  Prescribed oxycodone 30 mg 1 every 4 hours maximum for pain as needed.   Printed to fill 01/24/19 to start on 01/27/19 & 02/25/19 to start on 02/26/19.    Discussed medical cannabis program today  Discussed psychotherapy referral and/or medication trial today due to depression/grief.    Follow-up in 8 weeks with Donya

## 2021-06-23 NOTE — PROGRESS NOTES
ASSESSMENT AND PLAN:  Catherine Sharp 43 y.o. female is here for follow-up of systemic lupus erythematosus manifested by positive KRISTOPHER intermittently positive double-stranded DNA antibodies, stomatitis, serositis, pleuropericardial, inflammatory polyarthritis.  She has antiphospholipid syndrome.  She has interstitial lung disease respiratory failure [facility] pulmonary clinic.  Cardiomyopathy recent exacerbation.  Chronic prednisone use as far back as evidently age 15.  She is prepared to try once again to go down to 4 mg daily.  She feels generally achy when she drops from 5-4.  She is on hydroxychloroquine 200 mg daily.  She was not able to tolerate mycophenolate, azathioprine.  She is still the course except try to go down to prednisone 4 mg 1 more time.  Labs as noted.  Follow-up in 3 months.             Diagnoses and all orders for this visit:    Systemic lupus erythematosus, unspecified SLE type, unspecified organ involvement status (H)  -     hydroxychloroquine (PLAQUENIL) 200 mg tablet  Dispense: 90 tablet; Refill: 0  -     HM1(CBC and Differential)  -     Creatinine  -     ALT (SGPT)  -     DNA (ds) Antibody Screen  -     GRACIELA (Antibodies to Extractable Nuclear Antigens) Profile  -     Complement, C'4  -     Complement, C'3    ILD (interstitial lung disease) (H)    Antiphospholipid Antibody Syndrome    Polyarthralgia      HISTORY OF PRESENTING ILLNESS:  Catherine Sharp, 43 y.o., female is here for longstanding SLE.  She, reportedly, has had this since age 15.  Recent worsening of pulmonary symptoms, needed diuretics, steroids, antibiotics.  She looking back thought that the prednisone while did help her lung symptoms did not seem to residual joint pains.  She has noted mild discomfort in multiple joint areas except hips which she has moderately severe pain in the trochanteric regions.  She rates that a 6.0/10 worse when she lays on those sites.  Interference with some of the day-to-day  "activities as noted.  She try to stay on 4 mg of prednisone but some days she takes 5 because of generalized achiness.  She has not had fever weight loss blurry vision eye redness, rash.  She has shortness of breath, she gets intermittent mouth ulcers, dry cough.   In the past she was unable to tolerate mycophenolate.  In view of the interstitial lung disease she will be a candidate for methotrexate.  She reports no malar area eruption, photosensitivity, pleuritic symptoms.  As noted elsewhere she has had DVT and in the background of antiphospholipid antibodies would fulfill criteria for the syndrome.  She is on Coumadin since.. She reports occasional nonpleuritic chest pain and palpitations. She had been on azathioprine 50 mg twice daily and hydroxychloroquine but d/c ed by her hospital MD a few months ago for unclear reasons. At one point, she recalls a question of if she may have \"blood\" into her lungs because of the lupus. She underwent treatment with the high-dose prednisone. She subsequently developed osteoporosis. She has experienced chronic pain syndrome and has been on narcotics. She noted pain  both shoulder(s). This has gone on for a few months ago. Pain is described as sharp. It is at night/ wakes from sleep at night.  His symptoms are severe. The symptoms are gradually worsening. Symptoms include pain, tenderness to touch, redness, limited range of motion.  Treatment to date has been without significant relief.   Further historical information and ADL limitations as noted in the multidimensional health assessment questionnaire attached in the EMR.    ALLERGIES:Hydrocodone-acetaminophen; Protonix [pantoprazole]; Codeine; Fentanyl; Morphine; and Morphine hcl    PAST MEDICAL/ACTIVE PROBLEMS/MEDICATION/ FAMILY HISTORY/SOCIAL DATA:  The patient has a family history of  Past Medical History:   Diagnosis Date     Antiphospholipid antibody with hypercoagulable state (H)     Secondary to lupus     Cervical " compression fracture (H) 2009    closed, with spinal cord injury C1-C4 - prednisone induced     Chronic pain     on opiates     Chronic respiratory failure with hypoxia and hypercapnia (H)      Congestive heart failure (H)     Diastolic, Cor Pulmonale, EF 60%      Depression      Gastroparesis     causing nausea/vomiting with recurrent admissions     GERD (gastroesophageal reflux disease)      Hypertension      Kidney infection      Kidney stone      Lupus (systemic lupus erythematosus) (H)     complicated by joint and pulmonary involvement     Obesity      Osteoporosis      Peripheral neuropathy      Pulmonary arterial hypertension (H)     PASP 50-55 mmHg estimated echocardiographically     Recurrent Clostridium difficile diarrhea      Seizures (H)     once due to high BP, hypertensive encephalopathy     Social History     Tobacco Use   Smoking Status Former Smoker     Packs/day: 0.50     Years: 10.00     Pack years: 5.00     Types: Cigarettes     Last attempt to quit: 2009     Years since quittin.4   Smokeless Tobacco Never Used     Patient Active Problem List   Diagnosis     Hypertension     Osteoporosis     Anemia     Generalized anxiety disorder     Antiphospholipid Antibody Syndrome     Pulmonary emboli (H)     Peripheral Neuropathy     Systemic lupus erythematosus (H)     GERD (gastroesophageal reflux disease)     Opiate dependence (H)     Gastroparesis     Coagulopathy (H)     ILD (interstitial lung disease) (H)     Restrictive lung disease due to kyphoscoliosis     Adhesive pericarditis     Regional enteritis (H)     Deep phlebothrombosis, antepartum, with delivery (H)     Hyperthyroidism     Acute respiratory failure (H)     History of DVT (deep vein thrombosis)     Abdominal pain     Intractable nausea and vomiting     Chronic pain     Hypercarbia     ALFONSO (acute kidney injury) (H)     Encephalopathy acute     Chronic respiratory failure with hypoxia (H)     Snoring     Bilateral shoulder  tendinopathy     Polyarthralgia     Trigger ring finger, right     Chronic diastolic heart failure (H)     Pneumonia     Abnormal CXR     Cardiomyopathy (H)     Chronic respiratory failure with hypercapnia (H)     Respiratory failure (H)     Acute gastroenteritis     Elevated troponin     Pulmonary hypertension (H)     Chronic pain syndrome     High risk medication use     Chronic respiratory failure with hypoxia and hypercapnia (H)     Pulmonary arterial hypertension (H)     Current Outpatient Medications   Medication Sig Dispense Refill     albuterol (PROAIR HFA;PROVENTIL HFA;VENTOLIN HFA) 90 mcg/actuation inhaler Inhale 2 puffs every 6 (six) hours as needed for wheezing. 1 Inhaler 6     albuterol (PROVENTIL) 2.5 mg /3 mL (0.083 %) nebulizer solution Take 3 mL (2.5 mg total) by nebulization 4 (four) times a day as needed for wheezing or shortness of breath. 75 mL 11     cholecalciferol, vitamin D3, 1,000 unit tablet Take 1,000 Units by mouth daily.        FERROUS SULFATE (SLOW FE ORAL) Take 65 mg by mouth daily.       furosemide (LASIX) 20 MG tablet Take 1 tablet (20 mg total) by mouth 2 (two) times a day at 9am and 6pm. 60 tablet 2     hydroxychloroquine (PLAQUENIL) 200 mg tablet Take by mouth daily.       ipratropium-albuterol (DUO-NEB) 0.5-2.5 mg/3 mL nebulizer Inhale 3 mL 4 (four) times a day as needed.       levothyroxine (SYNTHROID, LEVOTHROID) 150 MCG tablet Take 1 tablet (150 mcg total) by mouth daily. 90 tablet 3     LORazepam (ATIVAN) 1 MG tablet Take 1 mg by mouth every evening.       LORazepam (ATIVAN) 1 MG tablet TAKE 1 TABLET (1 MG TOTAL) BY MOUTH EVERY EVENING. 30 tablet 0     naloxone (NARCAN) 4 mg/actuation nasal spray 1 spray (4 mg dose) into one nostril for opioid reversal. Call 911. May repeat if no response in 3 minutes. 1 Box 0     oxyCODONE (ROXICODONE) 30 MG immediate release tablet Take 1 tablet (30 mg total) by mouth every 4 (four) hours as needed for pain. 180 tablet 0     OXYCONTIN 40  mg 12 hr tablet Take 1 tablet (40 mg total) by mouth every morning. 30 tablet 0     predniSONE (DELTASONE) 1 MG tablet Take 5 mg by mouth daily.       warfarin (COUMADIN) 5 MG tablet Take 1 to 1.5 tablets (5 to 7.5 mg) by mouth daily. Current dose 7.5mg Thur and 5mg ROW. Adjust dose based on INR results as directed. Skip tonight 45 tablet 11     nystatin (MYCOSTATIN) powder Apply 1 application topically 3 (three) times a day as needed.       predniSONE (DELTASONE) 1 MG tablet Take 5 mg by mouth daily. 450 tablet 0     predniSONE (DELTASONE) 10 mg tablet Take 4 tabs daily x 3 days, then 3 tabs daily x 3 days, then 2 tabs daily x 3 days, then 1 tab daily x 3 days.. 30 tablet 0     senna-docusate (SENNOSIDES-DOCUSATE SODIUM) 8.6-50 mg tablet Take 1 tablet by mouth 2 (two) times a day.  0     Current Facility-Administered Medications   Medication Dose Route Frequency Provider Last Rate Last Dose     denosumab 60 mg (PROLIA 60 mg/ml)  60 mg Subcutaneous Q6 Months Anirudh Alcaraz MD   60 mg at 07/24/18 1143       DETAILED EXAMINATION:  There were no vitals filed for this visit. Comfortable.  Alert oriented.  Eyes are without inflammatory changes.  Examination of both upper and lower extremities is performed for swollen & tender joints, range of motion, rash, weakness, discoloration, warmth, swelling.  The skin examined for nodules. The salient normal / abnormal findings are appended.  No Maller area eruption sclerodactyly periungual erythema.  There is no stomatitis.  She has residual crackles bilaterally in the lungs.  No pleural pericardial rubs.  Tenderness of the right third MCP.  Which has subtle synovial thickening.  No other area of synovitis the palpable joints of upper extremities.  Tenderness trochanteric area.  Supplemental oxygen. She is a remarkably good range of motion of the shoulder joints.    LAB / IMAGING DATA:  ALT   Date Value Ref Range Status   10/29/2018 9 0 - 45 U/L Final   10/28/2018 13 0 -  45 U/L Final   10/24/2018 24 0 - 45 U/L Final     Albumin   Date Value Ref Range Status   10/29/2018 2.8 (L) 3.5 - 5.0 g/dL Final   10/28/2018 3.7 3.5 - 5.0 g/dL Final   10/24/2018 3.8 3.5 - 5.0 g/dL Final     Creatinine   Date Value Ref Range Status   10/31/2018 0.75 0.60 - 1.10 mg/dL Final   10/29/2018 0.74 0.60 - 1.10 mg/dL Final   10/28/2018 0.80 0.60 - 1.10 mg/dL Final       WBC   Date Value Ref Range Status   10/29/2018 10.1 4.0 - 11.0 thou/uL Final   10/28/2018 13.6 (H) 4.0 - 11.0 thou/uL Final   09/27/2015 6.9 4.0 - 11.0 thou/uL Final   08/23/2015 5.8 4.0 - 11.0 thou/uL Final     Hemoglobin   Date Value Ref Range Status   10/29/2018 15.5 12.0 - 16.0 g/dL Final   10/28/2018 18.8 (H) 12.0 - 16.0 g/dL Final   10/24/2018 16.8 (H) 12.0 - 16.0 g/dL Final     Platelets   Date Value Ref Range Status   10/29/2018 148 140 - 440 thou/uL Final   10/28/2018 182 140 - 440 thou/uL Final   10/24/2018 165 140 - 440 thou/uL Final       Lab Results   Component Value Date    RF <15.0 07/10/2014    SEDRATE 42 (H) 01/31/2018

## 2021-06-23 NOTE — PROGRESS NOTES
PAIN CENTER PROGRESS NOTE    Subjective:   Catherine Sharp is a 43 y.o. female who presents for evaluation of multi-site pain secondary to osteoporosis and pathologic fractures as a result of SLE treated with chronic steroids.  She has multiple co-morbidities including Interstitial lung disease, MERLIN, Antiphospholipid Antibody Syndrome, cardiomyopathy, OA in multiple sites, and recurrent hospitalizations.  Pain has been present for years and current treatment plan includes OxyContin, oxycodone, intermittent PT/OT.    Major issues:  1. Chronic pain syndrome    2. Opioid dependence, continuous (H)    3. Systemic lupus erythematosus, unspecified SLE type, unspecified organ involvement status (H)      Pain location and description: 3/10 constant stabbing, burning, deep pain in left lower back.  Function rated 8. At best, pain is rated 4/10 and highest 8/10 and average is 5/10.    Radiation of pain: Denies  Paresthesias, numbness, weakness: Left thigh numbness which she has had for many years.    Gait disturbance: wheelchair for outings.  Denies recent falls.  Exacerbating factors: activity, arm movements, morning time, prolonged sitting/standing/walking.  Alleviating factors: Rest, pain medications, sitting/standing short periods, laying down and changing positions.  Associated symptoms: Sedentary lifestyle, obesity, oxygen dependent, depression, pain at night.  Denies numbness, weakness, bowel or bladder incontinence, fever chills, unexplained weight loss.  Functional symptoms: Pain interferes with sleep, walking, ADLs, relationships/social, sexual health, mood (angry, frustrated).  Pain treatment does help with function.  Adverse effects of medications:  Uses sennakot 2 tablets at home for intermittent constipation.  Hasn't used miralax but used to.  She bought generic laxative.  Intermittent pruritus from opioids, takes benadryl.  Current treatment efficacy: Fair. Taking OxyContin 40 mg in the morning with pain  "relief within 1 hour.  She states she wakes with pain 8-9/10 and reduces to 6/10. Taking oxycodone 30 mg 1 every 4 hours prn has not tolerated reduction in daily dose.   Current treatment compliance: Good.  She has failed Lyrica, Gabapentin, Cymbalta, Fentanyl, Morphine, Vicodin, codeine in the past.  She has [facility] PT/OT and RN services in past, not currently needed.  Uses daily pop out dosing for medications.  Reports taking medication as prescribed.      Since last visit the patient denies any new diagnostic testing, new treatments or new medical conditions.  She denies ER/urgent care visits or hospitalizations since last seen.  She continues use of her Coumadin for anticoagulant medication.  She states that she has not had any changes in her pain since her last visit.  She denies any new injuries, falls, new pain areas.  She states that she is not having any adverse effects to her medications and denies any questions that she would like addressed today.      She did see Dr. Binh Horvath, pulmonologist on 01/04/19 reviewed today:  \"Assessment/Plan  43 year old female former smoker with a history of SLE on hydroxychloroquine and chronic low-dose prednisone, antiphospholipid antibody syndrome on chronic anticoagulation, obesity, fibrotic ILD after an episode of ARDS on chronic oxygen, pulmonary HTN, HFpEF, possible OHS, presenting for follow-up.     Chronic hypoxemic/hypercapnic respiratory failure, pulmonary HTN: Improved after recent hospitalization, which was primarily for likely viral gastroenteritis but also with ADHF and possible exacerbation of underlying respiratory disease. Recent worsening cough with green phlegm and occasional small-volume hemoptysis. Exertion tolerance chronically limited by dyspnea but fluctuates. She had PSG in 2016 showing no significant MERLIN, with AHI <5, so MERLIN/OHS unlikely, and she has actually lost weight since then; could consider PSG with continuous capnography, " "which was not previously done, though the yield may be low given her recent PSG and actually has lost about 20 lbs since the prior PSG.     Plan:  - scheduled to be seen in the pulmonary HTN clinic at the [facility] on January 22  - continue as-needed nebulized ipratropium-albuterol, and will prescribe a new nebulizer as her current machine is old and in need of replacement  - could consider repeat polysomnography with continuous CO2 monitoring (had PSG in 2016 with AHI <5 but no continuoue capnography)  - prednisone 12-day taper then back to baseline prednisone dose and Z-shashi given increased sputum with color change and small-volume hemoptysis intermittently  - continue oxygen 4 L/min at rest and at night, 6 L/min with exertion  - received influenza vaccine for this season  - up to date on pneumococcal vaccination  - follow up in 6 months or sooner if needed  - encouraged her to call any time with questions or concerning symptoms\"  She states she is still having some breathing concerns with more shortness of breath and more difficult to take a deep breath.  She denies chest pressure.  She states she still has sputum.    She states she missed her appointment this week due to bad roads and late to her visit.  She is waiting to reschedule.      She saw Dr. José, Rheumatologist on 01/15/19:  \"ASSESSMENT AND PLAN:  Catherine Sharp 43 y.o. female is here for follow-up of systemic lupus erythematosus manifested by positive KRISTOPHER intermittently positive double-stranded DNA antibodies, stomatitis, serositis, pleuropericardial, inflammatory polyarthritis.  She has antiphospholipid syndrome.  She has interstitial lung disease respiratory failure [facility] pulmonary clinic.  Cardiomyopathy recent exacerbation.  Chronic prednisone use as far back as evidently age 15.  She is prepared to try once again to go down to 4 mg daily.  She feels generally achy when she drops from 5-4.  She is on hydroxychloroquine 200 mg " "daily.  She was not able to tolerate mycophenolate, azathioprine.  She is still the course except try to go down to prednisone 4 mg 1 more time.  Labs as noted.  Follow-up in 3 months.\"    We continue to discuss risks of opioids with respiratory concerns.  Review medical cannabis option today to assist in reducing opioids and she is interested, will review and discuss information with her spouse.  She is concerned about the cost of this treatment.    She states activity is lower since her mom , unsure the reason.  She states she had discussed antidepressants with Dr. Berrios but doesn't want to take.  She has good support from  and discussed this with him this week.  She is leaving tomorrow for [place] for a [employer].  She does score a 15 on PHQ-9 today which indicates moderately severe.   She is offered referral to behavioral health assessment and also trial of antidepressant which could also improve her pain; she declines due to other appointments but will keep in mind.  Denies SI.       Review of Systems  Constitutional: Denies fever, chills, lethargy.  Has MERLIN, chronic O2. Denies night sweats, weight loss  Musculoskeletal: Positive for mid and lower back pain, denies joint pain/swelling, denies recent fall.  Gastrointestional: Positive for intermittent nausea, vomiting, GERD, abdominal pain, constipation.  Denies difficulty swallowing, change in appetite, diarrhea, fecal incontinence.  Genitourinary: Denies urinary incontinence, dysuria, hematuria, UTI, frequency, hesitancy, change in libido  Neurologic: Headaches.  Denies confusion, seizure, weakness, changes in balance, changes in speech.  Psychiatric: Positive depression, anxiety.  Denies memory loss, psychoses, suicidal ideation, substance use/abuse.     Objective:     Vitals:    19 1429   BP: (!) 174/107   Pulse: 95   Resp: 18   Weight: 202 lb (91.6 kg)   Height: 4' 10\" (1.473 m)   PainSc:   3     Physical Exam  Constitutional- " General appearance: Normal.  Well developed, uncomfortable, obese, appears older than stated age.  Presents alone today.   Psychiatric- Judgment and insight: Normal.  Speech: Normal rhythm.  Thought process: Normal.  No abnormal thoughts reported. Alert & Oriented to person, place, and time.  Recent and remote memory: Normal.  Mood and affect: quiet, depressed.   Respiratory- Using O2 by nasal cannula today.  Normal rhythm and rate.  Cardiovascular- Extremities warm and well perfused, no peripheral edema or varicosities.  Dermatologic- Exposed skin is clean, dry, and intact to inspection and palpation.   Musculoskeletal- Gait and station:  Ambulating with walker today, stooped.      *Opioid [place] Precautions:    UDS/Swab - 02/01/18, as expected.  Opioid Consent -  05/31/18  Opioid Agreement - 05/31/18  Pharmacy- as documented    [place]  Reviewed - 01/24/19 as expected  Pill Count - Pill count 10/16/15 appropriate.    Psychological evaluation - n/a  MME - 330  Pharmacogenetic testing - n/a  VARGAS 12/04/18    Imaging:  None reviewed today    Assessment:   Catherine Sharp is a 43 y.o. female seen in clinic today for pain in her spine due to history of pathologic compression fractures due to steroids to treat SLE.  She is reporting most pain in her left lumbar spine L3-5 at this time, MRI demonstrates spinal canal stenosis L3-4, disc bulging at L3-4 and L5-S1, and mild to moderate facet arthropathy throughout in addition to multiple chronic compression deformities.  She had 30% pain relief with LESI and wants to repeat lumbar procedure to see if additional pain relief.  Initial left LMBB was not helpful.  She continues to have pain in thoracic spine, history of chronic compression fractures. She is having stable pain relief with oxycodone 30 mg every 4 hours prn, have discussed dosing outside of CDC guidelines.  She continues OxyContin 40 mg for extended pain relief in the morning but reports she cannot take this  every 12 hours as it causes more difficulty with SOB at increased frequency.  She understands the risks with opioids and respiratory depression.  We review medical cannabis program today as an option for pain control and to reduce opioid dosing; she has reviewed information and worried about cost.  She is reporting moderate depression symptoms with mother's death, she is advised on behavioral health referral and declines today.    Plan:   Prescribed 30 days of OxyContin 40 mg in the morning.  Hold for any respiratory or sedation concerns.  Prescribed oxycodone 30 mg 1 every 4 hours maximum for pain as needed.   Printed to fill 01/24/19 to start on 01/27/19 & 02/25/19 to start on 02/26/19.    Discussed medical cannabis program today  Discussed psychotherapy referral and/or medication trial today due to depression/grief.    Follow-up in 8 weeks with Elizabet.    Elizabet Palmer PA-C  [facility]  [address]  [place] [address]  Ph: [phone]  Fax: [phone]

## 2021-06-24 NOTE — TELEPHONE ENCOUNTER
Refill request not appropriate  Last refill was 2/14/18, 30 tabs  Receipt confirmed by pharmacy (2/14/2019 12:12 PM CST)  Rebecca Durand, RN, [facility] RN Triage/Med Refills

## 2021-06-24 NOTE — TELEPHONE ENCOUNTER
Who is calling:  Patient via [facility]  Reason for Call:  Patient scheduled own appointment in [facility] as an office visit for a Nexplanon removal and re-insertion  Not enough time allowed for non-clinic staff to change to the correct appointment type.  Clinic ONLY call patient if this needs to be rescheduled.    Date of last appointment with primary care: n/a  Has the patient been recently seen:  Yes  Okay to leave a detailed message: Not able to ask, scheduled via [facility]

## 2021-06-24 NOTE — PROGRESS NOTES
Assessment/Plan:     1. Nexplanon insertion  Pregnancy, Urine   2. Irregular menstrual bleeding  Follicle Stimulating Hormone (FSH)    Thyroid Stimulating Hormone (TSH)   3. History of DVT (deep vein thrombosis)  Factor 10 Assay, Chromogenic   4. Nexplanon removal     5. Hypertension     6. Generalized anxiety disorder     7. Chronic respiratory failure with hypoxia and hypercapnia (H)     8. Current episode of major depressive disorder without prior episode, unspecified depression episode severity         Diagnoses and all orders for this visit:    Nexplanon insertion  -     Pregnancy, Urine    Urine pregnancy test was performed and was negative.  Following removal of nexplanon, nexplanon was inserted in a different location that is consistent with the prescribing recommendations in the Nexplanon insert. Procedure, benefits and risks and alternatives again discussed with patient today.  Consent form was reviewed with her and signed.  This will be placed in her chart.  She is right-handed so implant was placed in her left upper arm.  Guide marks were made to direct insertion.  Skin was cleansed with alcohol swab.  Local anesthetic administered with approximately 2.5 mL 1% lidocaine with epinephrine.  Skin was then cleansed with 3 Betadine swabs.  Implant was then inserted according to 's instructions.  Following insertion I was able to palpate the entire 4 cm implant in the appropriate position.   Skin was cleansed with soap and water.  Bandages placed over insertion site.  Compression dressing was applied.  She was advised to keep compression dressing on for 24 hours.  Keep wound covered with bandage for 2-3 days.  Monitor for signs of infection.  Discussed she will experience bruising and soreness at the insertion site.  Discussed warning signs to watch out for.  This should be removed no later than 2/25/2022.  Lot number of this device is [id number]        Irregular menstrual bleeding  -      Follicle Stimulating Hormone (FSH)  -     Thyroid Stimulating Hormone (TSH)    History of DVT (deep vein thrombosis)  -     Factor 10 Assay, Chromogenic  -continue warfarin    Nexplanon removal    1. Nexplanon removal: Procedure and risks discussed with patient who voiced understanding and agreed to proceed with removal of implant.  Implant was identified in medial aspect of left upper arm and pen used to antonio distal end of implant.  Skin cleansed with alcohol swab.  Local anesthetic administered with 1% lidocaine with epinephrine, approximately 2.5 mL. Skin was cleansed with 3 Betadine swabs.  Area draped in sterile fashion.  #11 scalpel was used to make an approximately 1 cm incision near the distal end of implant.  Subcutaneous tissue was dissected and distal end of implant was identified and grasped with a needle gallo.  Subcutaneous skin was further dissected and the implant was then removed in its entirety and placed in biohazard bag for disposal.  Patient tolerated procedure well.  Estimated blood loss minimal.  Skin cleansed with soap and water.  Steri-Strips placed over her incision site to approximate wound edges.  Bandage placed over incision and compression dressing applied.  Patient counseled to leave compression dressing on for 24 hours and then keep wound covered with bandage for 2-3 days.  Discussed she may experience bruising and soreness for the next couple of days.  May use  ice packs as needed for discomfort.  Monitor for signs of infection.  Follow-up if any concerns or problems.       Hypertension  Blood pressure elevated today but patient did not take usual dose of furosemide.  Directed her to take this immediately once she gets home.    Generalized anxiety disorder  Continue lorazepam on an as-needed basis.  With new symptoms of depression, recommend starting SSRI medication.  Discussed options with patient.  With chronic pain issues, she could consider Cymbalta.  Ultimately elected to start  fluoxetine 20 mg daily.  Counseled on use of medication and side effects.  Follow-up in 1 month for medication check.  Notify us sooner if significant side effects or worsening of symptoms.  We will check TSH today.    Chronic respiratory failure with hypoxia and hypercapnia (H)  She will take furosemide as soon as possible upon return to home.  She has appointment tomorrow at the [facility]    Current episode of major depressive disorder without prior episode, unspecified depression episode severity  -     FLUoxetine (PROZAC) 20 MG capsule  Dispense: 90 capsule; Refill: 3  -Check TSH  -With new symptoms of depression, recommend starting SSRI medication.  Discussed options with patient.  With chronic pain issues, she could consider Cymbalta.  Ultimately elected to start fluoxetine 20 mg daily.  Counseled on use of medication and side effects.  Follow-up in 1 month for medication check.  Notify us sooner if significant side effects or worsening of symptoms.  We will check TSH today.             The following are part of a depression follow up plan for the patient:  mental health care management    Subjective:      Catherine Sharp is a 43 y.o. female who comes in today for removal of Nexplanon and insertion of new Nexplanon.  Current implant was placed February 15, 2016.  She experiences irregular menstrual bleeding with the implant which is not desirable but she is not able to take Depakote due to bone loss.  Desires placement of new implant today.  We reviewed medications and allergies up to the chart.  She would like hormonal testing done including thyroid and FSH due to her irregular menstrual bleeding.  She also wishes to discuss starting medication for treatment of depression.  She has history of anxiety and uses lorazepam on a SSRI or similar medication for depression or anxiety in the past.  Her mother passed away several months ago and since then she has really been struggling with decreased mood,  lack of energy and motivation and just wants to stay in bed all day.  She had discussed her symptoms with her provider at the pain clinic who had suggested that she discuss starting an antidepressant medication with her PCP.  It is noted today that patient's blood pressure is elevated and her oxygen sats are little bit low despite use of oxygen.  She does have chronic respiratory failure and she actually has an appointment at the [facility] tomorrow to meet with cardiology and pulmonary hypertension.  Patient reports that she was out and about all morning doing errands and did not take her furosemide today as usual.  She feels that the decrease in oxygen and elevated blood pressure are a direct result of not taking her furosemide as she has felt this way in the past and had similar vital signs when she skips her furosemide.  She is planning to return immediately home after this visit and will take her furosemide.  She has history of multiple emergency room visits and hospital stays and does not feel that she is at the point that she needs to go to the emergency department at this time.  She has no other concerns or questions.  Review of systems  Is otherwise negative.    Current Outpatient Medications   Medication Sig Dispense Refill     albuterol (PROAIR HFA;PROVENTIL HFA;VENTOLIN HFA) 90 mcg/actuation inhaler Inhale 2 puffs every 6 (six) hours as needed for wheezing. 1 Inhaler 6     albuterol (PROVENTIL) 2.5 mg /3 mL (0.083 %) nebulizer solution Take 3 mL (2.5 mg total) by nebulization 4 (four) times a day as needed for wheezing or shortness of breath. 75 mL 11     cholecalciferol, vitamin D3, 1,000 unit tablet Take 1,000 Units by mouth daily.        FERROUS SULFATE (SLOW FE ORAL) Take 65 mg by mouth daily.       furosemide (LASIX) 20 MG tablet Take 1 tablet (20 mg total) by mouth 2 (two) times a day at 9am and 6pm. 60 tablet 2     hydroxychloroquine (PLAQUENIL) 200 mg tablet Take 1 tablet (200 mg  total) by mouth daily. 90 tablet 0     ipratropium-albuterol (DUO-NEB) 0.5-2.5 mg/3 mL nebulizer Inhale 3 mL 4 (four) times a day as needed.       levothyroxine (SYNTHROID, LEVOTHROID) 150 MCG tablet Take 1 tablet (150 mcg total) by mouth daily. 90 tablet 3     LORazepam (ATIVAN) 1 MG tablet Take 1 mg by mouth every evening.       oxyCODONE (ROXICODONE) 30 MG immediate release tablet Take 1 tablet (30 mg total) by mouth every 4 (four) hours as needed for pain. 180 tablet 0     OXYCONTIN 40 mg 12 hr tablet Take 1 tablet (40 mg total) by mouth every morning. 30 tablet 0     warfarin (COUMADIN) 5 MG tablet Take 1 to 1.5 tablets (5 to 7.5 mg) by mouth daily. Current dose 7.5mg Thur and 5mg ROW. Adjust dose based on INR results as directed. Skip tonight 45 tablet 11     FLUoxetine (PROZAC) 20 MG capsule Take 1 capsule (20 mg total) by mouth daily. 90 capsule 3     LORazepam (ATIVAN) 1 MG tablet TAKE 1 TABLET (1 MG TOTAL) BY MOUTH EVERY EVENING. 30 tablet 0     naloxone (NARCAN) 4 mg/actuation nasal spray 1 spray (4 mg dose) into one nostril for opioid reversal. Call 911. May repeat if no response in 3 minutes. 1 Box 0     nystatin (MYCOSTATIN) powder Apply 1 application topically 3 (three) times a day as needed.       predniSONE (DELTASONE) 10 mg tablet Take 4 tabs daily x 3 days, then 3 tabs daily x 3 days, then 2 tabs daily x 3 days, then 1 tab daily x 3 days.. 30 tablet 0     senna-docusate (SENNOSIDES-DOCUSATE SODIUM) 8.6-50 mg tablet Take 1 tablet by mouth 2 (two) times a day.  0     Current Facility-Administered Medications   Medication Dose Route Frequency Provider Last Rate Last Dose     denosumab 60 mg (PROLIA 60 mg/ml)  60 mg Subcutaneous Q6 Months Anirudh Alcaraz MD   60 mg at 07/24/18 1143       Past Medical History, Family History, and Social History reviewed.  Past Medical History:   Diagnosis Date     Antiphospholipid antibody with hypercoagulable state (H)     Secondary to lupus     Cervical  compression fracture (H) 2009    closed, with spinal cord injury C1-C4 - prednisone induced     Chronic pain     on opiates     Chronic respiratory failure with hypoxia and hypercapnia (H)      Congestive heart failure (H)     Diastolic, Cor Pulmonale, EF 60% 2014     Depression      Gastroparesis     causing nausea/vomiting with recurrent admissions     GERD (gastroesophageal reflux disease)      Hypertension      Kidney infection      Kidney stone      Lupus (systemic lupus erythematosus) (H)     complicated by joint and pulmonary involvement     Obesity      Osteoporosis      Peripheral neuropathy      Pulmonary arterial hypertension (H)     PASP 50-55 mmHg estimated echocardiographically     Recurrent Clostridium difficile diarrhea      Seizures (H)     once due to high BP, hypertensive encephalopathy     Past Surgical History:   Procedure Laterality Date     PICC AND MIDLINE TEAM LINE INSERTION  9/27/2015          MT BIOPSY LUNG/MEDIASTINUM PERCUTANEOUS NEEDLE      Description: Biopsy Lung Percutaneous;  Recorded: 09/15/2011;     MT LAP,CHOLECYSTECTOMY      Description: Cholecystectomy Laparoscopic;  Recorded: 02/15/2008;     THORACOSCOPY Left 1/13/2015    Procedure: LEFT THORACOSCOPY CONVERTED TO THORACOTOMY;  Surgeon: Jose David Granados MD;  Location: [facility] OR;  Service:      THORACOTOMY Left 1/13/2015    Procedure: THORACOTOMY W/ WEDGE RESECTION;  Surgeon: Jose David Granados MD;  Location: [facility] OR;  Service:      Hydrocodone-acetaminophen; Protonix [pantoprazole]; Codeine; Fentanyl; Morphine; and Morphine hcl  Family History   Problem Relation Age of Onset     Asthma Mother      Hypertension Mother      Stroke Mother      Clotting disorder Mother      Cancer Mother      Hypertension Father      Stroke Father      Urolithiasis Father      Stroke Maternal Aunt      Mental illness Sister      Stroke Sister      Clotting disorder Sister      Gout Neg Hx      Social History     Socioeconomic  History     Marital status:      Spouse name: Not on file     Number of children: Not on file     Years of education: Not on file     Highest education level: Not on file   Occupational History     Occupation: Unemployed   Social Needs     Financial resource strain: Not on file     Food insecurity:     Worry: Not on file     Inability: Not on file     Transportation needs:     Medical: Not on file     Non-medical: Not on file   Tobacco Use     Smoking status: Former Smoker     Packs/day: 0.50     Years: 10.00     Pack years: 5.00     Types: Cigarettes     Last attempt to quit: 2009     Years since quittin.5     Smokeless tobacco: Never Used   Substance and Sexual Activity     Alcohol use: No     Drug use: Yes     Types: Oxycodone     Sexual activity: Not on file   Lifestyle     Physical activity:     Days per week: Not on file     Minutes per session: Not on file     Stress: Not on file   Relationships     Social connections:     Talks on phone: Not on file     Gets together: Not on file     Attends [employer] service: Not on file     Active member of club or organization: Not on file     Attends meetings of clubs or organizations: Not on file     Relationship status: Not on file     Intimate partner violence:     Fear of current or ex partner: Not on file     Emotionally abused: Not on file     Physically abused: Not on file     Forced sexual activity: Not on file   Other Topics Concern     Not on file   Social History Narrative    Lives with family. Lives in [place].         Review of systems is as stated in HPI, and the remainder of the 10 system review is otherwise negative.    Objective:     Vitals:    19 1502 19 1507   BP: (!) 189/111 (!) 176/102   Patient Site: Left Arm Left Arm   Patient Position: Sitting Sitting   Cuff Size: Adult Large Adult Large   Pulse: (!) 133 (!) 124   Temp: 98  F (36.7  C) 98  F (36.7  C)   SpO2: (!) 82% (!) 89%   Weight: 204 lb 4 oz (92.6 kg)     Body  mass index is 42.69 kg/m .    General appearance: alert, appears stated age and cooperative  Head: Normocephalic, without obvious abnormality, atraumatic  Skin: nexplanon palpated medial left upper arm    Results: urine pregnancy test is negative      This note has been dictated using voice recognition software. Any grammatical or context distortions are unintentional and inherent to the the software.

## 2021-06-24 NOTE — TELEPHONE ENCOUNTER
Controlled Substance Refill Request  Medication:   Requested Prescriptions     Pending Prescriptions Disp Refills     LORazepam (ATIVAN) 1 MG tablet [Pharmacy Med Name: LORAZEPAM 1 MG TABLET] 30 tablet 0     Sig: TAKE 1 TABLET (1 MG TOTAL) BY MOUTH EVERY EVENING.     Date Last Fill: 1/3/19  [id number]   Submit electronically to pharmacy  Controlled Substance Agreement on File:   Encounter-Level CSA Scan Date:    There are no encounter-level csa scan date.       Last office visit: Last office visit pertaining to requested medication was 10/24/18.

## 2021-06-25 NOTE — TELEPHONE ENCOUNTER
Dr. Jang are you able to refill this?  Fabiola attempted to but her finger print wont work for controlled substances.

## 2021-06-25 NOTE — TELEPHONE ENCOUNTER
RN cannot approve Refill Request    RN can NOT refill this medication med is not covered by policy/route to provider.     Last office visit: 2/25/2019 Delmis Ma MD Last Physical: Visit date not found Last MTM visit: 6/28/2016 Amina Carey, PharmD Last visit same specialty: 2/25/2019 Delmis Ma MD.  Next visit within 3 mo: Visit date not found  Next physical within 3 mo: Visit date not found      [facility] Connection Triage/Med Refill 3/15/2019    Requested Prescriptions   Pending Prescriptions Disp Refills     LORazepam (ATIVAN) 1 MG tablet [Pharmacy Med Name: LORAZEPAM 1 MG TABLET] 30 tablet 0     Sig: TAKE 1 TABLET (1 MG TOTAL) BY MOUTH EVERY EVENING.    Controlled Substances Refill Protocol Failed - 3/15/2019 12:03 PM       Failed - Route all Controlled Substance Requests to Provider       Failed - Patient has controlled substance agreement in past 12 months    Encounter-Level CSA Scan Date:    There are no encounter-level csa scan date.              Passed - Visit with PCP or prescribing provider visit in past 12 months     Last office visit with prescriber/PCP: 2/25/2019 Delmis Ma MD OR same dept: 2/25/2019 Delmis Ma MD OR same specialty: 2/25/2019 Delmis Ma MD Last physical: Visit date not found Last MTM visit: 6/28/2016 Amina Carey, PharmD    Next visit within 3 mo: Visit date not found  Next physical within 3 mo: Visit date not found  Prescriber OR PCP: Delmis Ma MD  Last diagnosis associated with med order: There are no diagnoses linked to this encounter.

## 2021-06-25 NOTE — TELEPHONE ENCOUNTER
Patient Returning Call  Reason for call:  Patient is returning call   Information relayed to patient:  I want to know who denied this, why it was denied and how I can get this filled.   Patient was informed this was marked as a historical medication and a patient stated Delmis Ma MD just filled this on 2/14/19.   Patient has additional questions:  Yes  If YES, what are your questions/concerns:  Please return call to discuss this medication and advised if a covering provider will fill until Delmis Ma MD is in clinic.   Okay to leave a detailed message?: Yes

## 2021-06-25 NOTE — TELEPHONE ENCOUNTER
Approved refills. I have placed printed RX in the provider outbasket.  If patient misses next appointment I will need to do a tapering RX due to being almost 3 months in between visits. Thanks.

## 2021-06-25 NOTE — TELEPHONE ENCOUNTER
Controlled Substance Refill Request  Medication:   Requested Prescriptions     Pending Prescriptions Disp Refills     LORazepam (ATIVAN) 1 MG tablet 30 tablet 0     Sig: Take 1 tablet (1 mg total) by mouth every evening.     Refused Prescriptions Disp Refills     LORazepam (ATIVAN) 1 MG tablet [Pharmacy Med Name: LORAZEPAM 1 MG TABLET] 30 tablet 0     Sig: TAKE 1 TABLET (1 MG TOTAL) BY MOUTH EVERY EVENING.     Refused By: ELPIDIO WOODWARD     Reason for Refusal: Patient should contact provider first     Date Last Fill: 2/14/2019  Pharmacy: [facility]   Submit electronically to pharmacy  Controlled Substance Agreement on File:   Encounter-Level CSA Scan Date:    There are no encounter-level csa scan date.       Last office visit: 2/25/2019 Delmis Ma MD

## 2021-06-26 NOTE — PROGRESS NOTES
Progress Notes by Elizabet Palmer PA-C at 7/31/2018  7:29 AM     Author: Elizabet Palmer PA-C Service: -- Author Type: Physician Assistant    Filed: 7/31/2018  8:35 AM Date of Service: 7/31/2018  7:29 AM Status: Signed    : Elizabet Palmer PA-C (Physician Assistant)       PAIN CENTER PROGRESS NOTE    Subjective:   Catherine Sharp is a 42 y.o. female who presents for evaluation of multi-site pain secondary to osteoporosis and pathologic fractures as a result of SLE treated with chronic steroids.  She has multiple co-morbidities including Interstitial lung disease, MERLIN, Antiphospholipid Antibody Syndrome, cardiomyopathy, OA in multiple sites, and recurrent hospitalizations.  Pain has been present for years and current treatment plan includes OxyContin, oxycodone, intermittent PT/OT.    Major issues:  1. Chronic pain syndrome    2. Opioid dependence, continuous (H)    3. Systemic lupus erythematosus (H)    4. Lumbar compression fracture (H)      Pain location and description: 5/10 constant burning, sharp pain in left lower back.  Function rated 8. At best, pain is rated 4/10 and highest 8/10 and average is 5/10.    Radiation of pain: Denies  Paresthesias, numbness, weakness: Left thigh numbness which she has had for many years.    Gait disturbance: wheelchair for outings.  Denies recent falls.  Exacerbating factors: activity, arm movements, morning time, prolonged sitting/standing/walking.  Alleviating factors: Rest, pain medications, sitting/standing short periods  Associated symptoms: Sedentary lifestyle, obesity, oxygen dependent, depression, pain at night.  Denies numbness, weakness, bowel or bladder incontinence, fever chills, unexplained weight loss.  Functional symptoms: Pain interferes with sleep, walking, work, ADLs., relationships/social, sexual health, mood (angry, frustrated, anxious).  Pain treatment does help with function.  Adverse effects of medications:  Uses sennakot 2 tablets at  home for intermittent constipation.  Hasn't used miralax but used to.  She bought generic laxative.  Intermittent pruritus from opioids, takes benadryl.  Current treatment efficacy: Fair. Taking OxyContin 40 mg in the morning with pain relief within 1 hour.  She states she wakes with pain 8-9/10 and reduces to 6/10. Taking oxycodone 30 mg 1 every 4 hours prn has not tolerated reduction in daily dose.   Current treatment compliance: Good.  She has failed Lyrica, Gabapentin, Cymbalta, Fentanyl, Morphine, Vicodin, codeine in the past.  She has [facility] PT/OT and RN services in past, not currently needed.  Uses daily pop out dosing for medications.  Reports taking medication as prescribed.      Since last visit the patient denies any new diagnostic testing, new treatments or new medical conditions.  She denies any ER/urgent care visits or hospitalizations.  She continues use of her Coumadin for anticoagulant medication.  She states that she has not had any changes in her pain since her last visit.  She denies any new injuries, falls, new pain areas.  She states that she is not having any adverse effects to her medications and denies any questions that she would like addressed today.      She states she is having unchanged chronic pain but is experiencing right groin pain without new injury.  She has had for a few days.  She woke up at 0300 today due to it.  She wonders if this is side effect from prolia.  She had first treatment last week.    She saw Dr. José 07/24/18 for follow-up reviewed today:  ASSESSMENT AND PLAN:  Catherine Sharp 42 y.o. female is here for follow-up.  She has systemic lupus erythematosus manifested in the background of positive KRISTOPHER, double-stranded DNA -most recently negative, SSA antibodies, antiphospholipid antibodies history of DVT, has mouth ulcers, pleuritic and pericardiac symptoms.  She has had inflammatory arthropathy.  She has been on prednisone, as she noted since her  "early teens.  She is noted to have interstitial lung disease although the pulmonary opinion has been that the oxygen requirement seems to be disproportionately more than would be suggested by their assessment of her lung functions studies and other evaluation.  She is awaiting call from [facility] for further evaluation and primary there.  She is now down to 5 mg of prednisone.  We discussed reducing it to 4 mg she is somewhat reluctant but prepared to try this.  In the past she has tried mycophenolate could not take it because of GI side effects.  She is on hydroxychloroquine.  Return for follow-up here in 3 months or sooner.      We review option of left lumbar medial branch block and she is scheduled 08/09.  She had LESI on 09/2017 which she felt helped some for her pain but she denies any leg symptoms at this time and doesn't have clear disc herniation on her MRI, discuss that this treatment may help her axial back pain as facet arthropathy is seen on her MRI.  Spine pain has been more in her left lower back she describes \"like a softball size area\".   Denies referred pain to buttock, hip, leg.  She states that is bothering her more than other areas.  She reports her spine hurts if she is walking or standing prolonged as there is more pressure.      She reports experiencing some emotional stress as her mother is on hospice and in nursing home and she visits at least 2 days per week and 4 times a week if they call.  Mother is exhibiting some worsening dementia and often agitated.     Review of Systems  Constitutional: Denies fever, chills, lethargy.  Has MERLIN, chronic O2. Denies night sweats, weight loss  Musculoskeletal: Positive for mid and lower back pain, left shoulder joint pain, denies recent fall.  Gastrointestional: Positive for intermittent nausea, vomiting, GERD, abdominal pain, constipation.  Denies difficulty swallowing, change in appetite, diarrhea, fecal incontinence.  Genitourinary: Denies " "urinary incontinence, dysuria, hematuria, UTI, frequency, hesitancy, change in libido  Neurologic: Headaches.  Denies confusion, seizure, weakness, changes in balance, changes in speech.  Psychiatric: Positive depression, anxiety.  Denies memory loss, psychoses, suicidal ideation, substance use/abuse.     Objective:     Vitals:    07/31/18 0749   BP: 139/86   Pulse: 99   Resp: 16   Weight: 198 lb (89.8 kg)   Height: 4' 10\" (1.473 m)   PainSc:   5     Physical Exam  Constitutional- General appearance: Normal.  Well developed, uncomfortable, obese, appears older than stated age.  Presents alone today.   Psychiatric- Judgment and insight: Normal.  Speech: Normal rhythm.  Thought process: Normal.  No abnormal thoughts reported. Alert & Oriented to person, place, and time.  Recent and remote memory: Normal.  Mood and affect: quiet, concerned.   Respiratory- Not using O2 by nasal cannula today.  Normal rhythm and rate.  Cardiovascular- Extremities warm and well perfused, no peripheral edema or varicosities.  Dermatologic- Exposed skin is clean, dry, and intact to inspection and palpation.   Musculoskeletal- Gait and station:  Ambulating independently today, stooped.      *Opioid [place] Precautions:    UDS/Swab - 02/01/18, as expected.  Opioid Consent -  05/31/18  Opioid Agreement - 05/31/18  Pharmacy- as documented    [place]  Reviewed - 07/31/18 as expected    Pill Count - Pill count 10/16/15 appropriate.    Psychological evaluation - n/a  MME - 330  Pharmacogenetic testing - n/a  VARGAS Score: 56 07/31/18    Imaging:  None reviewed today    Assessment:   Catherine Sharp is a 42 y.o. female seen in clinic today for pain in her spine due to history of pathologic compression fractures due to steroids to treat SLE.  She is reporting most pain in her left lumbar spine L3-5 at this time, MRI demonstrates spinal canal stenosis L3-4, disc bulging at L3-4 and L5-S1, and mild to moderate facet arthropathy throughout in addition to " multiple chronic compression deformities.  She had 30% pain relief with LESI and wants to repeat lumbar procedure to see if additional pain relief.  Recommend LMBB to see how effective this is diagnostically at targeting her left lower back pain.  She continues to have pain in thoracic spine, assessing for acute on chronic compression fractures with MRI of thoracic spine. She is having improved pain relief with oxycodone 30 mg every 4 hours prn, have discussed dosing outside of CDC guidelines.  She continues OxyContin 40 mg for extended pain relief in the morning but reports she cannot take this every 12 hours as it causes more difficulty with SOB at increased frequency.  She understands the risks with opioids and respiratory depression.      Plan:   Prescribed 30 days of OxyContin 40 mg in the morning.  Hold for any respiratory or sedation concerns.  Prescribed oxycodone 30 mg 1 every 4 hours maximum for pain as needed.   Printed to fill 07/31/18 & 08/30/18  Take Vistaril 25 mg 1 tablet for itching as needed.  Do not take this with benadryl.  May cause drowsiness.  Also scheduled for left lumbar medial branch block for lower back pain.    Follow-up in 8 weeks with Elizabet.    Elizabet Palmer PA-C  [facility]  [address]  [place] [address]  Ph: [phone]  Fax: [phone]

## 2021-06-26 NOTE — PROGRESS NOTES
Progress Notes by Elizabet Palmer PA-C at 5/31/2018 12:29 PM     Author: Elizabet Palmer PA-C Service: -- Author Type: Physician Assistant    Filed: 5/31/2018  2:13 PM Date of Service: 5/31/2018 12:29 PM Status: Signed    : Elizabet Palmer PA-C (Physician Assistant)       PAIN CENTER PROGRESS NOTE    Subjective:   aCtherine Sharp is a 42 y.o. female who presents for evaluation of multi-site pain secondary to osteoporosis and pathologic fractures as a result of SLE treated with chronic steroids.  She has multiple co-morbidities including Interstitial lung disease, MERLIN, Antiphospholipid Antibody Syndrome, cardiomyopathy, OA in multiple sites, and recurrent hospitalizations.  Pain has been present for years and current treatment plan includes OxyContin, oxycodone, intermittent PT/OT.    Major issues:  1. Lumbar compression fracture    2. Chronic pain syndrome    3. Opioid dependence, continuous    4. Thoracic spine pain    5. Chronic left shoulder pain      Pain location and description: 5/10 constant burning, sharp pain in left lower back.  Function rated 6. At best, pain is rated 3/10 and highest 9/10 and average is 5/10.    Radiation of pain: Denies  Paresthesias, numbness, weakness: Left thigh numbness which she has had for many years.    Gait disturbance: wheelchair for outings.  Denies recent falls.  Exacerbating factors: activity, arm movements, morning time, prolonged sitting/standing/walking.  Alleviating factors: Rest, pain medications, sitting/standing short periods  Associated symptoms: Sedentary lifestyle, obesity, oxygen dependent, depression, pain at night.  Denies numbness, weakness, bowel or bladder incontinence, fever chills, unexplained weight loss.  Functional symptoms: Pain interferes with sleep, walking, ADLs., mood (frustrated, helpless/hopeless).  Pain treatment does help with function.  Adverse effects of medications:  Uses sennakot 2 tablets at home for intermittent  "constipation.  Hasn't used miralax but used to.  She bought generic laxative.  Intermittent pruritus from opioids, takes benadryl.  Current treatment efficacy: Fair. Taking OxyContin 40 mg in the morning with pain relief within 1 hour.  She states she wakes with pain 8-9/10 and reduces to 6/10. Taking oxycodone 30 mg 1 every 4 hours prn has not tolerated reduction in daily dose.   Current treatment compliance: Good.  She has failed Lyrica, Gabapentin, Cymbalta, Fentanyl, Morphine, Vicodin, codeine in the past.  She has [facility] PT/OT and RN services in past, not currently needed.  Uses daily pop out dosing for medications.  Reports taking medication as prescribed.      Since last visit the patient denies any new diagnostic testing, new treatments or new medical conditions.  She denies any ER/urgent care visits or hospitalizations.  She continues use of her Coumadin for anticoagulant medication.  She states that she has not had any changes in her pain since her last visit.  She denies any new injuries, falls, new pain areas.  She states that she is not having any adverse effects to her medications and denies any questions that she would like addressed today.  She does report she would like to [place] tomorrow for a family weekend and will also be going there 4th of July week and her prescription will be due 07/03.    She reports a flare of pain this past week unsure trigger and also had difficulty breathing and held OxyContin 3 mornings as she didn't want to make it worse.  She feels better today and will restart tomorrow.  She is taking Lasix varies dose 20-80 mg.  Prednisone was increased for joint pain and had side effect of sweating.  She states 7.5 mg x 1 month and is now back to taking 5 mg daily.      She saw Dr. José 04/27/18 for follow-up reviewed today:  \"ASSESSMENT AND PLAN:  Catherine Sharp 42 y.o. female is here for " "follow-up.  She has systemic lupus erythematosus.  She has worsening of her joint symptoms.  I have asked her to increase prednisone to 7-1/2 mg for 30 days and then go back to 5 mg her baseline.  She is on hydroxychloroquine 200 mg daily.  She is following up with the ophthalmologist for additional issues of the eyes there.  She has multiple comorbidities including chronic pain syndrome, rotator cuff tendinopathy, interstitial lung disease on supplemental oxygen, history of DVT, antiphospholipid syndrome, chronic anticoagulation.  I will ask her to return for follow-up here in 2 months.\"    She saw endocrinologist Dr. Alcaraz for osteoporosis on 05/08/18 and reviewed today:   \"Assessment and Plan: Osteoporosis I discussed the pathophysiology of the disease with the patient she had a bone DEXA scan showed T score at the spine is -1.7 with improvement of 11.6% this is artifact.     The left hip is -2.6 she lost 6.3% the right hip -2.8 kg 5.1% the radius is -0.2 and is stable.  I did advise the patient to take Prolia 60 mg subcutaneously every 6 month.     We will continue on the calcium and vitamin D supplements.     Blood pressure 148/88     Hypothyroidism on Synthroid 150 mcg daily she has reasonable energy level will continue monitoring.     Patient calcium 9.3 parathyroid hormone is 38 phosphorus 2.1 urinary calcium is 124 TSH 0.94 creatinine 0.59.     Patient return to clinic in 1 year.  She was notified yesterday that Prolia was denied and will contact provider to see next steps.       Continues with [facility] no injections or surgery recommended yet.  She was told left eye is slightly improved but no additional recommendations follow-up every 6 months. She still hasn't completed thoracic MRI or shoulder MRI as ordered 06/2017.    We review option of left lumbar medial branch block and she is considering.  She had LESI on 09/2017 which she felt helped some for her pain but she " "denies any leg symptoms at this time and doesn't have clear disc herniation on her MRI, discuss that this treatment may help her axial back pain as facet arthropathy is seen on her MRI.     Spine pain has been more in her left lower back she describes \"like a softball size area\".   Denies referred pain to buttock, hip, leg.  She states that is bothering her more than other areas.  She reports her spine hurts if she is walking or standing prolonged as there is more pressure.  She states burning in left lower back is \"killing her.\"      She reports experiencing some emotional stress as her  is gone traveling for work in [place].  She also reports her great nephew has been hospitalized and treated with IV antibiotics and drug induced coma for a blood infection.  She also reports her mom is in assisted living but refusing to do anything for herself so she is on hospice and she is worried they will make her leave there for a nursing home and having care conference this afternoon.  She states her friend who had aneurysm 2 months ago is recovering and returned to work recently so that was good news.      Review of Systems  Constitutional: Denies fever, chills, lethargy.  Has MERLIN, chronic O2. Denies night sweats, weight loss  Musculoskeletal: Positive for mid and lower back pain, left shoulder joint pain, denies recent fall.  Gastrointestional: Positive for intermittent nausea, vomiting, GERD, abdominal pain, constipation.  Denies difficulty swallowing, change in appetite, diarrhea, fecal incontinence.  Genitourinary: Denies urinary incontinence, dysuria, hematuria, UTI, frequency, hesitancy, change in libido  Neurologic: Headaches.  Denies confusion, seizure, weakness, changes in balance, changes in speech.  Psychiatric: Positive depression, anxiety.  Denies memory loss, psychoses, suicidal ideation, substance use/abuse.     Objective:     Vitals:    05/31/18 1247   BP: (!) 157/96   Pulse: 92   Resp: 16   Weight: " "201 lb 1.6 oz (91.2 kg)   Height: 4' 10\" (1.473 m)   PainSc:   5     Physical Exam  Constitutional- General appearance: Normal.  Well developed, uncomfortable, obese, appears older than stated age.  Presents alone today.   Psychiatric- Judgment and insight: Normal.  Speech: Normal rhythm.  Thought process: Normal.  No abnormal thoughts reported. Alert & Oriented to person, place, and time.  Recent and remote memory: Normal.  Mood and affect: quiet, concerned.   Respiratory- Not using O2 by nasal cannula today.  Normal rhythm and rate.  Cardiovascular- Extremities warm and well perfused, no peripheral edema or varicosities.  Dermatologic- Exposed skin is clean, dry, and intact to inspection and palpation.   Musculoskeletal- Gait and station:  Ambulating independently today, stooped.      *Opioid [place] Precautions:    UDS/Swab - 02/01/18, as expected.  Opioid Consent -  05/31/18  Opioid Agreement - 05/31/18  Pharmacy- as documented    [place]  Reviewed - 05/31/18 as expected    Pill Count - Pill count 10/16/15 appropriate.    Psychological evaluation - n/a  MME - 330  Pharmacogenetic testing - n/a  VARGAS 02/01/18      Imaging:  None reviewed today    Assessment:   Catherine Sharp is a 42 y.o. female seen in clinic today for pain in her spine due to history of pathologic compression fractures due to steroids to treat SLE.  She is reporting most pain in her left lumbar spine L3-5 at this time, MRI demonstrates spinal canal stenosis L3-4, disc bulging at L3-4 and L5-S1, and mild to moderate facet arthropathy throughout in addition to multiple chronic compression deformities.  She had 30% pain relief with LESI and wants to repeat lumbar procedure to see if additional pain relief.  Recommend LMBB to see how effective this is diagnostically at targeting her left lower back pain.  She continues to have pain in thoracic spine, assessing for acute on chronic compression fractures with MRI of thoracic spine. She is having " improved pain relief with oxycodone 30 mg every 4 hours prn, have discussed dosing outside of CDC guidelines.  She continues OxyContin 40 mg for extended pain relief in the morning but reports she cannot take this every 12 hours as it causes more difficulty with SOB at increased frequency.  She understands the risks with opioids and respiratory depression.  Reviewed narcan dosing/administration today due to high MME.      Plan:   Prescribed 30 days of OxyContin 40 mg in the morning.  Hold for any respiratory or sedation concerns.  Prescribed oxycodone 30 mg 1 every 4 hours maximum for pain as needed.   Printed to fill today for use on 06/03/18 & and fill on 07/01/18 for use on   Take Vistaril 25 mg 1 tablet for itching as needed.  Do not take this with benadryl.  May cause drowsiness.  Recommend thoracic and left shoulder MRI to evaluate shoulder pain in setting of recent failed joint injection and negative shoulder x-ray.  Will schedule when able  Also schedule left lumbar medial branch block for lower back pain - schedule with Dr. Valles and can let him determine if coumadin would need to be held.    Opioid agreement and consent form siged.  Follow-up in 8 weeks with Elizabet.    Elizabet Palmer PA-C  [facility]  [address]. [address]  [place] [address]  Ph: [phone]  Fax: [phone]

## 2021-06-26 NOTE — PROGRESS NOTES
Progress Notes by Elizabet Palmer PA-C at 4/4/2018 10:40 AM     Author: Elizabet Palmer PA-C Service: -- Author Type: Physician Assistant    Filed: 4/4/2018  5:09 PM Date of Service: 4/4/2018 10:40 AM Status: Signed    : Elizabet Palmer PA-C (Physician Assistant)       PAIN CENTER PROGRESS NOTE    Subjective:   Catherine Sharp is a 42 y.o. female who presents for evaluation of multi-site pain secondary to osteoporosis and pathologic fractures as a result of SLE treated with chronic steroids.  She has multiple co-morbidities including Interstitial lung disease, MERLIN, Antiphospholipid Antibody Syndrome, cardiomyopathy, OA in multiple sites, and recurrent hospitalizations.  Pain has been present for years and current treatment plan includes OxyContin, oxycodone, intermittent PT/OT.    Major issues:  1. Lumbar spondylosis    2. Opioid dependence, continuous    3. Chronic pain syndrome    4. Compression fracture of vertebra, sequela      Pain location and description: 5/10 constant deep, burning in left lower back.  Function rated 8. At best, pain is rated 3/10 and highest 9/10 and average is 5/10.    Radiation of pain: Denies  Paresthesias, numbness, weakness: Left thigh numbness which she has had for many years.    Gait disturbance: wheelchair for outings.  Denies recent falls.  Exacerbating factors: activity, arm movements, morning time, prolonged sitting/standing/walking.  Alleviating factors: Rest, pain medications  Associated symptoms: Sedentary lifestyle, obesity, oxygen dependent, depression.  Denies numbness, weakness, bowel or bladder incontinence, fever chills, weight loss.  Functional symptoms: Pain interferes with sleep, walking, work (doesn't work), ADLs, sexual health.  Denies relationship/social changes.  Pain treatment does help with function.  Adverse effects of medications:  Uses sennakot 2 tablets at home for intermittent constipation.  Hasn't used miralax but used to.  She bought  generic laxative.  Intermittent pruritus from opioids, takes benadryl.  Current treatment efficacy: Fair. Taking OxyContin 40 mg in the morning with pain relief within 1 hour.  She states she wakes with pain 8-9/10 and reduces to 6/10. Taking oxycodone 30 mg 1 every 4 hours prn has not tolerated reduction in daily dose.   Current treatment compliance: Good.  She has failed Lyrica, Gabapentin, Cymbalta, Fentanyl, Morphine, Vicodin, codeine in the past.  She has [facility] PT/OT and RN services in past, not currently needed.  Uses daily pop out dosing for medications.  Reports taking medication as prescribed.      Since last visit the patient denies any new diagnostic testing, new treatments or new medical conditions.  She denies any ER/urgent care visits or hospitalizations.  She continues use of her Coumadin for anticoagulant medication.  She states that she has not had any changes in her pain since her last visit.  She denies any new injuries, falls, new pain areas.  She states that she is not having any adverse effects to her medications and denies any questions that she would like addressed today.      She states crappy mood as her friend had an aneurysm and is still hospitalized and she has limited ability to visit as she cannot get there herself with her oxygen tank and  had health concerns resulting in hospitalization.  She states some of her appointments have had to be rescheduled due to these concerns.    She states she will see Dr. José on 04/19 for follow-up.  She will have follow-up with eye specialist and is seeing every 6 weeks [facility]  no injections or surgery recommended yet.  Bone scan is ordered from Dr. Ma, will complete this at [facility].  She still hasn't completed thoracic MRI or shoulder MRI as ordered 06/2017.    Has had menstruation this entire month, heavy bleeding as she stopped Depo injections. She has had DVT and has  "Osteoporosis so her options are limited.  Hot flashes, cannot sleep, irritated all the time.  She states hysterectomy surgery is the last resort due to risks.  She feels tired, but worst last week.  Has history of anemia.    She states pain is still manageable.  No changes, left lower back.  We review option of left lumbar medial branch block and she is considering.  She had LESI on 09/2017 which she felt helped some for her pain but she denies any leg symptoms at this time and doesn't have clear disc herniation on her MRI, discuss that this treatment may help her axial back pain as facet arthropathy is seen on her MRI.       She states that she was able to fill the Narcan that we had discussed at her last visit.  She does not have any questions about it and also is making her  aware of where it is located in case he would ever need to administer it for her.     Review of Systems  Constitutional: Denies fever, chills, lethargy.  Has MERLIN, chronic O2. Denies night sweats, weight loss  Musculoskeletal: Positive for mid and lower back pain, left shoulder joint pain, denies recent fall.  Gastrointestional: Positive for intermittent nausea, vomiting, GERD, abdominal pain, constipation.  Denies difficulty swallowing, change in appetite, diarrhea, fecal incontinence.  Genitourinary: Denies urinary incontinence, dysuria, hematuria, UTI, frequency, hesitancy, change in libido  Neurologic: Headaches.  Denies confusion, seizure, weakness, changes in balance, changes in speech.  Psychiatric: Positive depression, anxiety.  Denies memory loss, psychoses, suicidal ideation, substance use/abuse.     Objective:     Vitals:    04/04/18 1050   BP: (!) 148/96   Pulse: (!) 111   Resp: 16   Weight: 195 lb (88.5 kg)   Height: 4' 10\" (1.473 m)   PainSc:   5     Physical Exam  Constitutional- General appearance: Normal.  Well developed, uncomfortable, obese, appears older than stated age.  Presents alone today.   Psychiatric- Judgment " and insight: Normal.  Speech: Normal rhythm.  Thought process: Normal.  No abnormal thoughts reported. Alert & Oriented to person, place, and time.  Recent and remote memory: Normal.  Mood and affect: quiet, concerned.   Respiratory- Not using O2 by nasal cannula today.  Normal rhythm and rate.  Cardiovascular- Extremities warm and well perfused, no peripheral edema or varicosities.  Dermatologic- Exposed skin is clean, dry, and intact to inspection and palpation.   Musculoskeletal- Gait and station:  Ambulating today.      *Opioid [place] Precautions:    UDS/Swab - Reviewed from 02/01/18, results as expected  Opioid Consent -  04/04/18  Opioid Agreement - 04/04/18  Pharmacy- as documented    [place]  Reviewed - 04/04/18 as expected    Pill Count - Pill count 10/16/15 appropriate.    Psychological evaluation - n/a  MME - 330  Pharmacogenetic testing - n/a  VARGAS 02/01/18     Imaging:  None reviewed today    Assessment:   Catherine Sharp is a 42 y.o. female seen in clinic today for pain in her spine due to history of pathologic compression fractures due to steroids to treat SLE.  She is reporting most pain in her left lumbar spine L3-5 at this time, MRI demonstrates spinal canal stenosis L3-4, disc bulging at L3-4 and L5-S1, and mild to moderate facet arthropathy throughout in addition to multiple chronic compression deformities.  She had 30% pain relief with LESI and wants to repeat lumbar procedure to see if additional pain relief.  Recommend LMBB to see how effective this is diagnostically at targeting her left lower back pain.  She continues to have pain in thoracic spine, assessing for acute on chronic compression fractures with MRI of thoracic spine. She is having improved pain relief with oxycodone 30 mg every 4 hours prn, have discussed dosing outside of CDC guidelines.  She continues OxyContin 40 mg for extended pain relief in the morning but reports she cannot take this every 12 hours as it causes more  difficulty with SOB at increased frequency.  She understands the risks with opioids and respiratory depression.  Reviewed narcan dosing/administration today due to high MME.      Plan:   Prescribed 30 days of OxyContin 40 mg in the morning.   Prescribed oxycodone 30 mg 1 every 4 hours maximum for pain as needed.   Printed to fill on 04/04/18 & 05/04/18  Take Vistaril 25 mg 1 tablet for itching as needed.  Do not take this with benadryl.  May cause drowsiness.  Patient filled Narcan.  Please notify  of where it is located and instructions on use.  Any questions please let us know.  Recommend thoracic and left shoulder MRI to evaluate shoulder pain in setting of recent failed joint injection and negative shoulder x-ray.  Will schedule when able  Also consider left lumbar medial branch block for lower back pain - schedule with Dr. Valles and can let him determine if coumadin would need to be held.    Follow-up in 8 weeks with Elizabet.    Elizabet Palmer PA-C  [facility]  [address]  [place] [address]  Ph: [phone]  Fax: [phone]

## 2021-06-26 NOTE — PROGRESS NOTES
Progress Notes by Elizabet Palmer PA-C at 9/27/2018  9:59 AM     Author: Elizabet Palmer PA-C Service: -- Author Type: Physician Assistant    Filed: 9/27/2018  4:54 PM Date of Service: 9/27/2018  9:59 AM Status: Signed    : Elizabet Palmer PA-C (Physician Assistant)       PAIN CENTER PROGRESS NOTE    Subjective:   Catherine Sharp is a 43 y.o. female who presents for evaluation of multi-site pain secondary to osteoporosis and pathologic fractures as a result of SLE treated with chronic steroids.  She has multiple co-morbidities including Interstitial lung disease, MERLIN, Antiphospholipid Antibody Syndrome, cardiomyopathy, OA in multiple sites, and recurrent hospitalizations.  Pain has been present for years and current treatment plan includes OxyContin, oxycodone, intermittent PT/OT.    Major issues:  1. Chronic pain syndrome    2. Opioid dependence, continuous (H)    3. Pathological compression fracture of vertebra, sequela      Pain location and description: 5/10 constant stabbing, burning, deep pain in left lower back.  Function rated 8. At best, pain is rated 3/10 and highest 8/10 and average is 5/10.    Radiation of pain: Denies  Paresthesias, numbness, weakness: Left thigh numbness which she has had for many years.    Gait disturbance: wheelchair for outings.  Denies recent falls.  Exacerbating factors: activity, arm movements, morning time, prolonged sitting/standing/walking.  Alleviating factors: Rest, pain medications, sitting/standing short periods  Associated symptoms: Sedentary lifestyle, obesity, oxygen dependent, depression, pain at night.  Denies numbness, weakness, bowel or bladder incontinence, fever chills, unexplained weight loss.  Functional symptoms: Pain interferes with sleep, walking, work, ADLs., relationships/social, sexual health, mood (angry, frustrated, anxious).  Pain treatment does help with function.  Adverse effects of medications:  Uses sennakot 2 tablets at home  for intermittent constipation.  Hasn't used miralax but used to.  She bought generic laxative.  Intermittent pruritus from opioids, takes benadryl.  Current treatment efficacy: Fair. Taking OxyContin 40 mg in the morning with pain relief within 1 hour.  She states she wakes with pain 8-9/10 and reduces to 6/10. Taking oxycodone 30 mg 1 every 4 hours prn has not tolerated reduction in daily dose.   Current treatment compliance: Good.  She has failed Lyrica, Gabapentin, Cymbalta, Fentanyl, Morphine, Vicodin, codeine in the past.  She has [facility] PT/OT and RN services in past, not currently needed.  Uses daily pop out dosing for medications.  Reports taking medication as prescribed.      Since last visit the patient denies any new diagnostic testing, new treatments or new medical conditions.  She denies any ER/urgent care visits or hospitalizations.  She continues use of her Coumadin for anticoagulant medication.  She states that she has not had any changes in her pain since her last visit.  She denies any new injuries, falls, new pain areas.  She states that she is not having any adverse effects to her medications and denies any questions that she would like addressed today.      She states MIL sick right now and feels she is coming down with cold. She is supposed to be leaving tomorrow morning for [place].  Vacation in August didn't turn out so she will go with  on her own and 12 year anniversary next weekend celebrating this weekend.  She requests her refills dated today due to travel.  Having to take lasix more due to shortness of breath but skipped it today due to visit.  She is on O2 5L today. She takes 40-80 mg daily as she cannot tolerate 80 mg every day she feels ill from that dose.      We continue to discuss risks of opioids with respiratory concerns.  Review medical cannabis option today to assist in reducing opioids and she is interested, will review and discuss information with her  spouse.      She had left L3-4-5 LMBB with Dr. Valles on 08/09/18 and did not allow for enough relief for continuation.  She is scheduled with Dr. Valles for another treatment option.  States her pain is intermittently throbbing, burning pain and she was having a difficult time rating her pain.  She states on her way home in the car this pain returned as she  as laying for so long.  She denies complications from the procedure.      She saw Dr. José 07/24/18 for follow-up reviewed today:  ASSESSMENT AND PLAN:  Catherine Sharp 42 y.o. female is here for follow-up.  She has systemic lupus erythematosus manifested in the background of positive KRISTOPHER, double-stranded DNA -most recently negative, SSA antibodies, antiphospholipid antibodies history of DVT, has mouth ulcers, pleuritic and pericardiac symptoms.  She has had inflammatory arthropathy.  She has been on prednisone, as she noted since her early teens.  She is noted to have interstitial lung disease although the pulmonary opinion has been that the oxygen requirement seems to be disproportionately more than would be suggested by their assessment of her lung functions studies and other evaluation.  She is awaiting call from [facility] for further evaluation and primary there.  She is now down to 5 mg of prednisone.  We discussed reducing it to 4 mg she is somewhat reluctant but prepared to try this.  In the past she has tried mycophenolate could not take it because of GI side effects.  She is on hydroxychloroquine.  Return for follow-up here in 3 months or sooner.       Review of Systems  Constitutional: Denies fever, chills, lethargy.  Has MERLIN, chronic O2. Denies night sweats, weight loss  Musculoskeletal: Positive for mid and lower back pain, left shoulder joint pain, denies recent fall.  Gastrointestional: Positive for intermittent nausea, vomiting, GERD, abdominal pain, constipation.  Denies difficulty swallowing, change in appetite, diarrhea,  "fecal incontinence.  Genitourinary: Denies urinary incontinence, dysuria, hematuria, UTI, frequency, hesitancy, change in libido  Neurologic: Headaches.  Denies confusion, seizure, weakness, changes in balance, changes in speech.  Psychiatric: Positive depression, anxiety.  Denies memory loss, psychoses, suicidal ideation, substance use/abuse.     Objective:     Vitals:    09/27/18 1011   BP: (!) 142/102   Pulse: (!) 104   Resp: 16   Weight: 198 lb (89.8 kg)   Height: 4' 10\" (1.473 m)   PainSc:   5     Physical Exam  Constitutional- General appearance: Normal.  Well developed, uncomfortable, obese, appears older than stated age.  Presents alone today.   Psychiatric- Judgment and insight: Normal.  Speech: Normal rhythm.  Thought process: Normal.  No abnormal thoughts reported. Alert & Oriented to person, place, and time.  Recent and remote memory: Normal.  Mood and affect: quiet, concerned.   Respiratory- Using O2 by nasal cannula today.  Normal rhythm and rate.  Cardiovascular- Extremities warm and well perfused, no peripheral edema or varicosities.  Dermatologic- Exposed skin is clean, dry, and intact to inspection and palpation.   Musculoskeletal- Gait and station:  Ambulating independently today, stooped.      *Opioid [place] Precautions:    UDS/Swab - 02/01/18, as expected.  Opioid Consent -  05/31/18  Opioid Agreement - 05/31/18  Pharmacy- as documented    [place]  Reviewed - 09/27/18 as expected    Pill Count - Pill count 10/16/15 appropriate.    Psychological evaluation - n/a  MME - 330  Pharmacogenetic testing - n/a  VARGAS 07/31/18    Imaging:  None reviewed today    Assessment:   Catherine Sharp is a 43 y.o. female seen in clinic today for pain in her spine due to history of pathologic compression fractures due to steroids to treat SLE.  She is reporting most pain in her left lumbar spine L3-5 at this time, MRI demonstrates spinal canal stenosis L3-4, disc bulging at L3-4 and L5-S1, and mild to moderate " "facet arthropathy throughout in addition to multiple chronic compression deformities.  She had 30% pain relief with LESI and wants to repeat lumbar procedure to see if additional pain relief.  Initial left LMBB was not helpful.  She continues to have pain in thoracic spine, history of chronic compression fractures. She is having stable pain relief with oxycodone 30 mg every 4 hours prn, have discussed dosing outside of CDC guidelines.  She continues OxyContin 40 mg for extended pain relief in the morning but reports she cannot take this every 12 hours as it causes more difficulty with SOB at increased frequency.  She understands the risks with opioids and respiratory depression.  We review medical cannabis program today as an option for pain control and to reduce opioid dosing; she will review information and discuss with family.    Plan:   Prescribed 30 days of OxyContin 40 mg in the morning.  Hold for any respiratory or sedation concerns.  Prescribed oxycodone 30 mg 1 every 4 hours maximum for pain as needed.   Printed to fill today to start 09/29/18 & 10/29/18  Take Vistaril 25 mg 1 tablet for itching as needed.  Do not take this with benadryl.  May cause drowsiness.  Please review the following websites for information on locations, and also other frequently asked questions:  https://[employer]  https://[employer]  Http://www.[place].[facility].[place]/index.html (look on the right column \"Featured Sites\" and choose Medical Cannabis tab  Follow-up in 8 weeks with Elizabet.    Elizabet Palmer PA-C  [facility]  [address]  [place] [address]  Ph: [phone]  Fax: [phone]       "

## 2021-06-28 NOTE — PROGRESS NOTES
Progress Notes by Kaylie Giang LRT at 11/7/2019  1:46 PM     Author: Kaylie Giang LRT Service: -- Author Type: Respiratory Therapist    Filed: 11/8/2019 12:12 PM Encounter Date: 11/7/2019 Status: Signed    : Kaylie Giang LRT (Respiratory Therapist)       Patient was seen in home today. Introduced myself and brought patient extra supplies. Patient brought her NIV downstairs into the living area from her room. Patient using standard tubing. I asked if she was using humidity and she stated not yet. Apologized as I only brought heated tubing for backup, but could either ship standard tubing or wait til next home visit. Patient stated that she would be fine and not to worry at this time. Showed patient how to setup humidity, however, patient needs to find her humidifier. Did a learned circuit, and passed. Patient wanted to switch back to her standard tubing. Offered to create another mode with the same settings with a learned circuit for the standard tubing. Patient declined and stated she has been doing this since using it with no problems. I discussed deadspace between patient and NIV. Patient expressed understanding and still declined. Will contact me if she has any issues. Changed patient's AB filter and cushion. Patient's internal filter not dirty. Patient confessed that she hasn't really been using NIV as much as she should. Provided patient with backup heated tubing, AB filter, internal filter, cushion, mask with headgear, and water chamber. Patient expressed that she is able to hook modem back up to device and powercord. Patient had no questions/concerns. Will monitor and follow back up with patient again soon.     ASSESSMENT: BREATH SOUNDS CLEAR/DIMINISHED, HR 94, SpO2 95% ON 8 LPM O2 VIA NC     Settings:  ST IPAP 15, EPAP 8, RR 16

## 2021-06-29 NOTE — PROGRESS NOTES
Progress Notes by Nguyen Byers CMA at 7/7/2020  1:00 PM     Author: Nguyen Byers CMA Service: -- Author Type: Certified Medical Assistant    Filed: 7/9/2020  9:44 AM Date of Service: 7/7/2020  1:00 PM Status: Signed    : Nguyen Byers CMA (Certified Medical Assistant)

## 2021-06-29 NOTE — PROGRESS NOTES
"Progress Notes by Flores Iyer CMA at 5/14/2020 11:20 AM     Author: Flores Iyer CMA Service: -- Author Type: Certified Medical Assistant    Filed: 5/14/2020 12:40 PM Date of Service: 5/14/2020 11:20 AM Status: Signed    : Flores Iyer CMA (Certified Medical Assistant)       Catherine Sharp is a 44 y.o. female who is being evaluated via a billable video visit regarding joint and back pain. Patient describes her pain assharp, stabbing, intermittent, worsens. Patient's everyday functions effected by pain include:  walking, sleep, activities of daily living, mood.    The patient has been notified of following:     \"This video visit will be conducted via a call between you and your physician/provider. We have found that certain health care needs can be provided without the need for an in-person physical exam.  This service lets us provide the care you need with a video conversation.  If a prescription is necessary we can send it directly to your pharmacy.  If lab work is needed we can place an order for that and you can then stop by our lab to have the test done at a later time. Video visits are billed at different rates depending on your insurance coverage. Please reach out to your insurance provider with any questions.\"    Patient has given verbal consent to a Video visit? Yes    Patient would like to receive their AVS by AVS Preference: Tuan.    Patient would like the video invitation sent by: mobile    Will anyone else be joining your video visit? No       Flores Iyer CMA         "

## 2021-07-03 NOTE — ADDENDUM NOTE
Addendum Note by Tata Doherty LPN at 9/23/2020 10:40 AM     Author: Tata Doherty LPN Service: -- Author Type: Licensed Nurse    Filed: 9/23/2020  1:01 PM Date of Service: 9/23/2020 10:40 AM Status: Signed    : Tata Doherty LPN (Licensed Nurse)    Encounter addended by: Tata Doherty LPN on: 9/23/2020  1:01 PM      Actions taken: Charge Capture section accepted

## 2021-07-03 NOTE — ADDENDUM NOTE
Addendum Note by Tata Doherty LPN at 9/23/2020 10:40 AM     Author: Tata Doherty LPN Service: -- Author Type: Licensed Nurse    Filed: 9/23/2020 11:42 AM Date of Service: 9/23/2020 10:40 AM Status: Signed    : Tata Doherty LPN (Licensed Nurse)    Encounter addended by: Tata Doherty LPN on: 9/23/2020 11:42 AM      Actions taken: Diagnosis association updated, Order list changed

## 2021-07-03 NOTE — ADDENDUM NOTE
Addendum Note by Delmis Higuera MD at 6/19/2020  1:42 PM     Author: Delmis Higuera MD Service: -- Author Type: Physician    Filed: 6/19/2020  1:42 PM Encounter Date: 6/19/2020 Status: Signed    : Delmis Higuera MD (Physician)    Addended by: DELMIS HIGUERA on: 6/19/2020 01:42 PM        Modules accepted: Orders

## 2021-07-03 NOTE — ADDENDUM NOTE
Addendum Note by Michelle Zhang RN at 10/17/2019 11:40 AM     Author: Michelle Zhang RN Service: -- Author Type: Registered Nurse    Filed: 10/18/2019 11:05 AM Encounter Date: 10/17/2019 Status: Signed    : Michelle Zhang RN (Registered Nurse)    Addended by: MICHELLE ZHANG on: 10/18/2019 11:05 AM        Modules accepted: Orders

## 2021-07-03 NOTE — ADDENDUM NOTE
Addendum Note by Neha Bustamante CMA at 4/12/2019  1:06 PM     Author: Neha Bustamante CMA Service: -- Author Type: Certified Medical Assistant    Filed: 4/19/2019 12:17 PM Date of Service: 4/12/2019  1:06 PM Status: Signed    : Neha Bustamante CMA (Certified Medical Assistant)    Encounter addended by: Neha Bustamante CMA on: 4/19/2019 12:17 PM      Actions taken: Diagnosis association updated, Order list changed

## 2021-07-03 NOTE — ADDENDUM NOTE
Addendum Note by Narcisa Eubanks at 7/7/2020  1:00 PM     Author: Narcisa Eubanks Service: -- Author Type: --    Filed: 7/17/2020  9:08 AM Date of Service: 7/7/2020  1:00 PM Status: Signed    : Narcisa Eubanks    Encounter addended by: Narcisa Eubanks on: 7/17/2020  9:08 AM      Actions taken: Charge Capture section accepted

## 2021-07-03 NOTE — ADDENDUM NOTE
Addendum Note by Narcisa Eubanks at 5/14/2020 11:20 AM     Author: Narcisa Ebuanks Service: -- Author Type: --    Filed: 5/19/2020  6:58 AM Date of Service: 5/14/2020 11:20 AM Status: Signed    : Narcisa Eubanks    Encounter addended by: Narcisa Eubanks on: 5/19/2020  6:58 AM      Actions taken: Charge Capture section accepted

## 2021-07-03 NOTE — ADDENDUM NOTE
Addendum Note by Michelle Zhang RN at 4/28/2020 11:20 AM     Author: Michelle Zhang RN Service: -- Author Type: Registered Nurse    Filed: 4/30/2020  8:34 AM Encounter Date: 4/28/2020 Status: Signed    : Michelle Zhang RN (Registered Nurse)    Addended by: MICHELLE ZHANG on: 4/30/2020 08:34 AM        Modules accepted: Orders

## 2021-07-14 PROBLEM — J96.90 RESPIRATORY FAILURE (H): Status: RESOLVED | Noted: 2017-04-13 | Resolved: 2019-05-22

## (undated) DEVICE — PACK HEART RIGHT CUSTOM SAN32RHF18

## (undated) DEVICE — Device

## (undated) DEVICE — KIT RIGHT HEART CATH H965601307191

## (undated) DEVICE — INTRO SHEATH 7FRX10CM PINNACLE RSS702

## (undated) RX ORDER — SODIUM NITROPRUSSIDE 25 MG/ML
INJECTION INTRAVENOUS
Status: DISPENSED
Start: 2019-03-21

## (undated) RX ORDER — LIDOCAINE 40 MG/G
CREAM TOPICAL
Status: DISPENSED
Start: 2019-03-21